# Patient Record
Sex: FEMALE | Race: WHITE | NOT HISPANIC OR LATINO | Employment: FULL TIME | URBAN - METROPOLITAN AREA
[De-identification: names, ages, dates, MRNs, and addresses within clinical notes are randomized per-mention and may not be internally consistent; named-entity substitution may affect disease eponyms.]

---

## 2017-01-03 ENCOUNTER — TELEPHONE (OUTPATIENT)
Dept: INTERNAL MEDICINE | Facility: CLINIC | Age: 42
End: 2017-01-03

## 2017-01-03 DIAGNOSIS — N39.0 URINARY TRACT INFECTION, SITE UNSPECIFIED: Primary | ICD-10-CM

## 2017-01-04 ENCOUNTER — LAB VISIT (OUTPATIENT)
Dept: LAB | Facility: HOSPITAL | Age: 42
End: 2017-01-04
Attending: INTERNAL MEDICINE
Payer: COMMERCIAL

## 2017-01-04 DIAGNOSIS — N39.0 URINARY TRACT INFECTION, SITE UNSPECIFIED: ICD-10-CM

## 2017-01-04 LAB
BACTERIA #/AREA URNS AUTO: NORMAL /HPF
BILIRUB UR QL STRIP: NEGATIVE
CLARITY UR REFRACT.AUTO: CLEAR
COLOR UR AUTO: YELLOW
GLUCOSE UR QL STRIP: NEGATIVE
HGB UR QL STRIP: NEGATIVE
KETONES UR QL STRIP: NEGATIVE
LEUKOCYTE ESTERASE UR QL STRIP: ABNORMAL
MICROSCOPIC COMMENT: NORMAL
NITRITE UR QL STRIP: NEGATIVE
PH UR STRIP: 5 [PH] (ref 5–8)
PROT UR QL STRIP: NEGATIVE
RBC #/AREA URNS AUTO: 1 /HPF (ref 0–4)
SP GR UR STRIP: 1.02 (ref 1–1.03)
SQUAMOUS #/AREA URNS AUTO: 1 /HPF
URN SPEC COLLECT METH UR: ABNORMAL
UROBILINOGEN UR STRIP-ACNC: NEGATIVE EU/DL
WBC #/AREA URNS AUTO: 2 /HPF (ref 0–5)

## 2017-01-04 PROCEDURE — 81001 URINALYSIS AUTO W/SCOPE: CPT

## 2017-01-04 PROCEDURE — 87086 URINE CULTURE/COLONY COUNT: CPT

## 2017-01-05 ENCOUNTER — TELEPHONE (OUTPATIENT)
Dept: INTERNAL MEDICINE | Facility: CLINIC | Age: 42
End: 2017-01-05

## 2017-01-05 ENCOUNTER — PATIENT MESSAGE (OUTPATIENT)
Dept: INTERNAL MEDICINE | Facility: CLINIC | Age: 42
End: 2017-01-05

## 2017-01-05 LAB — BACTERIA UR CULT: NO GROWTH

## 2017-01-05 NOTE — TELEPHONE ENCOUNTER
----- Message from Ronnie Ochoa sent at 1/5/2017  2:00 PM CST -----  Contact: Self [phone]  Type: Test Results    What test was performed? Urine Specimen    Who ordered the test? Maty    When and where were the test performed? 01/04/2017 [facility]    Comments: Please call and advice    Thanks

## 2017-01-06 NOTE — TELEPHONE ENCOUNTER
Patient states she has some vaginal pain and there is no pain. The pain is a sharp pain that shoots through the canal area. Not all the time. Patient states she understood termed the call

## 2017-01-13 ENCOUNTER — PATIENT MESSAGE (OUTPATIENT)
Dept: INTERNAL MEDICINE | Facility: CLINIC | Age: 42
End: 2017-01-13

## 2017-01-13 RX ORDER — OXYCODONE AND ACETAMINOPHEN 10; 325 MG/1; MG/1
1 TABLET ORAL EVERY 4 HOURS PRN
Qty: 120 TABLET | Refills: 0 | Status: SHIPPED | OUTPATIENT
Start: 2017-01-13 | End: 2017-01-31 | Stop reason: SDUPTHER

## 2017-01-13 NOTE — TELEPHONE ENCOUNTER
----- Message from Florentino Gibson sent at 1/13/2017  8:20 AM CST -----  Contact: Pt at [phone]  RX request - refill or new RX.  Is this a refill or new RX:  refill  RX name and strength: oxycodone-acetaminophen (PERCOCET)  mg per tablet  Directions: Take 1 tablet by mouth every 4 (four) hours as needed for Pain.  Is this a 30 day or 90 day RX:  30 day  Pharmacy name and phone #: Pt  at [facility]  Comments:

## 2017-01-16 ENCOUNTER — TELEPHONE (OUTPATIENT)
Dept: INTERNAL MEDICINE | Facility: CLINIC | Age: 42
End: 2017-01-16

## 2017-01-16 NOTE — TELEPHONE ENCOUNTER
----- Message from Mehnaz Rodgers sent at 1/16/2017  4:01 PM CST -----  Contact: Call Pt [phone]  Pt called requesting status of refill requested on 01-13-17 for medication Oxycodone 10 mg.

## 2017-01-16 NOTE — TELEPHONE ENCOUNTER
Called to advise the patient that the Rx has been ready since Friday. Patient understood and termed the call

## 2017-01-17 ENCOUNTER — TELEPHONE (OUTPATIENT)
Dept: NEUROLOGY | Facility: HOSPITAL | Age: 42
End: 2017-01-17

## 2017-01-17 NOTE — TELEPHONE ENCOUNTER
Spoke with pt stating that she has magdiel feeling weak for the past few weeks. Pt stated that when she felt like this before Dr. Lynn sent pt to have IV fluids at the infusion center. Pt stated that she drinks 2 to 3 bottles of water a day and states that she cannot increase her water intake because of the ostomy bag that she has and plus she works and is unable to take breaks to drink water. Advised pt to get a 2L of water and drink on it all day and informed pt that forward information to Dr. Lynn for next step

## 2017-01-17 NOTE — TELEPHONE ENCOUNTER
----- Message from Emi Contreras sent at 1/17/2017 10:34 AM CST -----  Contact: [phone]  GI-Patient is thinking she is dehydrated and will need fluids and she now has an ostomy bag but would like to come here and get that done at the Infusion Center. Please call back to assist.

## 2017-01-19 ENCOUNTER — TELEPHONE (OUTPATIENT)
Dept: NEUROLOGY | Facility: HOSPITAL | Age: 42
End: 2017-01-19

## 2017-01-19 DIAGNOSIS — K52.9 IBD (INFLAMMATORY BOWEL DISEASE): Primary | ICD-10-CM

## 2017-01-19 NOTE — TELEPHONE ENCOUNTER
Spoke with pt and informed her that Dr. Lynn does not want pt to get the IV fluids and would like pt to drink oral fluids and also wants pt to see Dr. Currie. Pt stated that she already saw Dr. Currie and was told by his office to return to her GI doctor which is Dr. Lynn. Forward information to Dr. Lynn for review.

## 2017-01-20 ENCOUNTER — TELEPHONE (OUTPATIENT)
Dept: NEUROLOGY | Facility: HOSPITAL | Age: 42
End: 2017-01-20

## 2017-01-20 NOTE — TELEPHONE ENCOUNTER
Spoke with pt informing her that Dr. Lynn stated that she needs to follow up with Dr. Currie's office. Pt stated that she does not want to follow up with Dr. Currie and stated that she will find another GI MD to treat her.

## 2017-01-23 ENCOUNTER — PATIENT MESSAGE (OUTPATIENT)
Dept: GASTROENTEROLOGY | Facility: CLINIC | Age: 42
End: 2017-01-23

## 2017-01-23 ENCOUNTER — TELEPHONE (OUTPATIENT)
Dept: GASTROENTEROLOGY | Facility: CLINIC | Age: 42
End: 2017-01-23

## 2017-01-24 ENCOUNTER — PATIENT MESSAGE (OUTPATIENT)
Dept: INTERNAL MEDICINE | Facility: CLINIC | Age: 42
End: 2017-01-24

## 2017-01-24 ENCOUNTER — TELEPHONE (OUTPATIENT)
Dept: INTERNAL MEDICINE | Facility: CLINIC | Age: 42
End: 2017-01-24

## 2017-01-24 RX ORDER — OXYCODONE AND ACETAMINOPHEN 7.5; 325 MG/1; MG/1
1 TABLET ORAL EVERY 4 HOURS PRN
Qty: 80 TABLET | Refills: 0 | Status: SHIPPED | OUTPATIENT
Start: 2017-01-24 | End: 2017-02-06

## 2017-01-24 NOTE — TELEPHONE ENCOUNTER
"Patient sent a message on the portal that she dropped in Rx in the sink all her medications fell in a sink of water her "95 year old grandma" left in the bathroom. Patient states she spoke with the pharmacy about the situation and they advised her to get the Rx changed to '5mg instead of 10" mg and the can fill the Rx. Advise the patient will follow up later today in regards to her request. Termed the call   "

## 2017-01-24 NOTE — TELEPHONE ENCOUNTER
Called to advise the patient Dr Rutledge.is still reviewing the chart. Also advised that I will follow up with her. In regards to the medication R. She states the Rx has to be a different and I will call her and advise of instructions by 5pm

## 2017-01-24 NOTE — TELEPHONE ENCOUNTER
----- Message from Magali Abdul sent at 1/24/2017  8:09 AM CST -----  Contact: pt [phone]  Pt will like a call from the Dr in regards to her medication going down the sink by her 90 year old grandmother,please advise pt on getting another script

## 2017-01-26 ENCOUNTER — TELEPHONE (OUTPATIENT)
Dept: GASTROENTEROLOGY | Facility: CLINIC | Age: 42
End: 2017-01-26

## 2017-01-31 ENCOUNTER — PATIENT MESSAGE (OUTPATIENT)
Dept: INTERNAL MEDICINE | Facility: CLINIC | Age: 42
End: 2017-01-31

## 2017-01-31 ENCOUNTER — PATIENT MESSAGE (OUTPATIENT)
Dept: GASTROENTEROLOGY | Facility: CLINIC | Age: 42
End: 2017-01-31

## 2017-01-31 RX ORDER — OXYCODONE AND ACETAMINOPHEN 10; 325 MG/1; MG/1
1 TABLET ORAL EVERY 4 HOURS PRN
Qty: 120 TABLET | Refills: 0 | Status: SHIPPED | OUTPATIENT
Start: 2017-01-31 | End: 2017-02-16 | Stop reason: SDUPTHER

## 2017-02-01 ENCOUNTER — TELEPHONE (OUTPATIENT)
Dept: GASTROENTEROLOGY | Facility: CLINIC | Age: 42
End: 2017-02-01

## 2017-02-01 NOTE — TELEPHONE ENCOUNTER
----- Message from Eve Currie MD sent at 2/1/2017  2:48 PM CST -----  Make an appt for patient to see me within the next 2-4 weeks- on my schedule if I have availability otherwise with Paty when I am in clinic  SS

## 2017-02-06 ENCOUNTER — TELEPHONE (OUTPATIENT)
Dept: ENDOSCOPY | Facility: HOSPITAL | Age: 42
End: 2017-02-06

## 2017-02-06 ENCOUNTER — OFFICE VISIT (OUTPATIENT)
Dept: GASTROENTEROLOGY | Facility: CLINIC | Age: 42
End: 2017-02-06
Payer: COMMERCIAL

## 2017-02-06 VITALS
SYSTOLIC BLOOD PRESSURE: 111 MMHG | BODY MASS INDEX: 28.08 KG/M2 | DIASTOLIC BLOOD PRESSURE: 72 MMHG | WEIGHT: 158.5 LBS | HEIGHT: 63 IN | HEART RATE: 81 BPM | RESPIRATION RATE: 20 BRPM | TEMPERATURE: 98 F

## 2017-02-06 DIAGNOSIS — K50.00 CROHN'S DISEASE OF SMALL INTESTINE WITHOUT COMPLICATION: Primary | ICD-10-CM

## 2017-02-06 PROCEDURE — 99214 OFFICE O/P EST MOD 30 MIN: CPT | Mod: S$GLB,,, | Performed by: INTERNAL MEDICINE

## 2017-02-06 PROCEDURE — 99999 PR PBB SHADOW E&M-EST. PATIENT-LVL IV: CPT | Mod: PBBFAC,,, | Performed by: INTERNAL MEDICINE

## 2017-02-06 NOTE — MR AVS SNAPSHOT
[facility]  [address]  [place] [address]  Phone: [phone]  Fax: [phone]                  Emi Johnson   2017 2:20 PM   Office Visit    Description:  Female : 1975   Provider:  Eve Currie MD   Department:  [facility] - Gastroenterology           Reason for Visit     Crohn's Disease           Diagnoses this Visit        Comments    Crohn's disease of small intestine without complication    -  Primary            To Do List           Future Appointments        Provider Department Dept Phone    3/21/2017 2:00 PM Eve Currie MD [facility] Gastroenterology [phone]    3/27/2017 8:20 AM Bianca Rutledge MD [facility] Internal Medicine [phone]      Your Future Surgeries/Procedures     Mar 21, 2017   Surgery with Eve Currie MD   [facility]    [address] [address] [phone]              Goals (5 Years of Data)     None      [facility] On [facility]     [facility] Care Line -  Assistance  Registered nurses in the [facility] Center provide clinical advisement, health education, appointment booking, and other advisory services.  Call for this free service at [phone].             Medications           Message regarding Medications     Verify the changes and/or additions to your medication regime listed below are the same as discussed with your clinician today.  If any of these changes or additions are incorrect, please notify your healthcare provider.        STOP taking these medications     estradiol (ESTRACE) 2 MG tablet Take 2 mg by mouth once daily.    hydrocodone-homatropine 5-1.5 mg/5 ml (HYCODAN) 5-1.5 mg/5 mL Syrp Take 5 mLs by mouth every 6 (six) hours as needed.    HYDROmorphone (DILAUDID) 4 MG tablet Take 1 tablet (4 mg total) by mouth every 6 (six) hours as needed for Pain.    oxycodone-acetaminophen (PERCOCET) 7.5-325 mg per tablet Take 1  "tablet by mouth every 4 (four) hours as needed for Pain.           Verify that the below list of medications is an accurate representation of the medications you are currently taking.  If none reported, the list may be blank. If incorrect, please contact your healthcare provider. Carry this list with you in case of emergency.           Current Medications     albuterol (PROAIR HFA) 90 mcg/actuation inhaler Inhale 2 puffs into the lungs.    albuterol (PROVENTIL) 2.5 mg /3 mL (0.083 %) nebulizer solution Take 3 mLs (2.5 mg total) by nebulization every 6 (six) hours as needed for Wheezing.    blood sugar diagnostic (BLOOD GLUCOSE TEST) Strp by Misc.(Non-Drug; Combo Route) route.    blood sugar diagnostic (ONETOUCH ULTRA TEST) Strp 1 strip by Misc.(Non-Drug; Combo Route) route once daily.    clonazepam (KLONOPIN) 0.5 MG disintegrating tablet Take 1 tablet (0.5 mg total) by mouth 2 (two) times daily as needed.    cyanocobalamin 1,000 mcg/mL injection INJECT 1 ML (1,000 MCG TOTAL) INTO THE MUSCLE EVERY 28 DAYS.    fluticasone-salmeterol 500-50 mcg/dose (ADVAIR DISKUS) 500-50 mcg/dose DsDv diskus inhaler Inhale 1 puff into the lungs.    oxycodone-acetaminophen (PERCOCET)  mg per tablet Take 1 tablet by mouth every 4 (four) hours as needed for Pain.    promethazine (PHENERGAN) 25 MG tablet TAKE 1 TABLET (25 MG TOTAL) BY MOUTH EVERY 6 (SIX) HOURS AS NEEDED FOR NAUSEA.    sumatriptan (IMITREX STATDOSE) 6 mg/0.5 mL kit INJECT 0.5 ML UNDER THE SKIN AS NEEDED ONE TIME FOR MIGRAINE    zolpidem (AMBIEN) 10 mg Tab Take 1 tablet (10 mg total) by mouth nightly as needed.           Clinical Reference Information           Your Vitals Were     BP Pulse Temp Resp Height Weight    111/72 (BP Location: Left arm, Patient Position: Sitting, BP Method: Automatic) 81 98.1 °F (36.7 °C) 20 5' 3" (1.6 m) 71.9 kg (158 lb 8.2 oz)    BMI                28.08 kg/m2          Blood Pressure          Most Recent Value    BP  111/72      Allergies " as of 2/6/2017     Aspirin    Sulfa (Sulfonamide Antibiotics)    Iodinated Contrast Media - Iv Dye    Adhesive    Aspirin    Remicade [Infliximab]    Latex      Immunizations Administered on Date of Encounter - 2/6/2017     None      Orders Placed During Today's Visit      Normal Orders This Visit    Case request GI: ILEOSCOPY through stoma       Instructions    Instructions:  - ileoscopy through stoma end of March/early April  - Take OTC Vitamin D3 1000 IU daily  - avoid NSAIDS- aspirin, ibuprofen, advil, motrin, naproxen, aleve---tylenol okay  - drink plenty of water 70-80 ounces daily  - see your PCP for UTI- recommend at least getting repeat UA with them  - in regards to tailbone pain then be sure to let Dr. Cannon know to see if anything needs to be addressed  - continue f/u with ostomy nurse and ostomy support group  - Use antibiotics with caution  - Vaccines needed:      Flu shot yearly      Tetanus every 10 years      Hepatitis B series (three injections)  - Follow up with Dr. Currie the afternoon of the scope                 Language Assistance Services     ATTENTION: Language assistance services are available, free of charge. Please call [phone].      ATENCIÓN: Si habla aurelia, tiene a madrid disposición servicios gratuitos de asistencia lingüística. Llame al [phone].     DAMIEN Ý: N?u b?n nói Ti?ng Vi?t, có các d?ch v? h? tr? ngôn ng? mi?n phí dành cho b?n. G?i s? [phone].         [facility] complies with applicable Federal civil rights laws and does not discriminate on the basis of race, color, national origin, age, disability, or sex.

## 2017-02-06 NOTE — PATIENT INSTRUCTIONS
Instructions:  - ileoscopy through stoma end of March/early April  - Take OTC Vitamin D3 1000 IU daily  - avoid NSAIDS- aspirin, ibuprofen, advil, motrin, naproxen, aleve---tylenol okay  - drink plenty of water 70-80 ounces daily  - see your PCP for UTI- recommend at least getting repeat UA with them  - in regards to tailbone pain then be sure to let Dr. Cannon know to see if anything needs to be addressed  - continue f/u with ostomy nurse and ostomy support group  - Use antibiotics with caution  - Vaccines needed:      Flu shot yearly      Tetanus every 10 years      Hepatitis B series (three injections)  - Follow up with Dr. Currie the afternoon of the scope

## 2017-02-06 NOTE — TELEPHONE ENCOUNTER
Patient is scheduled for Ileoscopy via stoma 3/21/2017 with Dr. DANIELA Currie.  Instructions given to patient in clinic.

## 2017-02-06 NOTE — LETTER
February 6, 2017        Emily Gaspar MD  [address]  [address]  [place] [address]             [facility]astroenter[facility]  [address]  [place] [address]  Phone: [phone]  Fax: [phone]   Patient: Emi Johnson   MR Number: [id number]   YOB: 1975   Date of Visit: 2/6/2017       Dear Dr. Gaspar:    Thank you for referring Emi Johnson to me for evaluation. Attached you will find relevant portions of my assessment and plan of care.    If you have questions, please do not hesitate to call me. I look forward to following Emi Johnson along with you.    Sincerely,      Eve Currie MD              MD Jasmyne Shah, [facility]

## 2017-02-06 NOTE — PROGRESS NOTES
[facility]             Inflammatory Bowel Disease Follow-up  Note            Dr. Eve Currie  TODAY'S VISIT DATE:  2/6/2017    Chief Complaint:   Chief Complaint   Patient presents with    Crohn's Disease       PCP: Bianca Rutledge    Previous History:  Ms Johnson is a 41 y.o. with inflammatory bowel disease with pouch dysfunction.  She had initial symptoms in 2005 and diagnosed in 2006 with colonoscopy c/w CD initially and then though to be UC.  She was failed asacol, lialda, sulfasalazine, rowasa enemas, canasa suppositories, humira (allergic reaction- injection site reaction, rash), prednisone, cipro/flagyl.  Due to medically refractory disease she had a 2 step restorative proctocolectomy in 2011 with pathology most consistent with UC.  Since surgery though she has had unrelenting ongoing diarrhea up to 15-20 BMs/day despite 10 immodium daily. Pouchoscopy 4/2012, 2/2014 were normal and she has taken several courses of cipro/flagyl without relief and also failed entocort in early 2014. Pt saw Dr. Ray at [facility] who recommended temporary loop ileostomy and to consider biofeedback due to anorectal manometry with concerns for pelvic floor dysfunction.  He also recommended Valium rectal supp if any issues of muscle spasms during biofeedback.  Patient had additional testing in April/May 2014 which showed normal gastrograffin enema, MRI abd/pelvis normal and pouchoscopy on 6/2014 showed normal appearing mucosa, dislodged staples at the anastomosis, and pt felt pain when staples touched. Therefore, the staples were removed by biopsy forcep. Anterior anastomosis staple removal area was put one endoclip to prevent sinus formation. MRI abd/pelvis normal 11/2015. She did not return for appts with Dr. Ray due to cost. She had hysterectomy in 2009 due to endometriosis though 2/2015 bilateral oophorectomy with lysis of adhesions- post op SBO from adhesions. She is on chronic narcotics  "(RLQ and subumbilical pain) and chronic antiemetics (due to nausea/vomiting).  Her last pouchoscopy 10/8/15 showed some ulcerations in the J pouch along suture line but normal neoterminal ileum and overall this was mild in activity. At her previous visits I felt that her symptoms were out of proportion to the findings on endoscopy.  She saw Dr. Mcdaniel who recommended starting remicade which she has been on since 12/7/15. Stool studies were negative for infection 1/2016. She had a pouchoscopy 3/18/15 which was normal with no ulcerations though overall friable mucosa. At the time of her last visit with me on 3/31/16 she had 10-15 mushy to soft BMs/day (consistency changed since pouchoscopy) and anytime she passes gas she needs to have a bowel movement.  She also recommended RLQ intermittent burning with subumbilical cramping with continued rectal pain.  She missed her appt on 6/14/16 due to inability to get off of work.  She also said she lost 2 lomotil prescriptions I had given her.  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  We recommended biofeedback therapy with Dr. Emmanuel but she did not wish to proceed with these recommendations and reported to us by phone on 6/21/16 that "I don't like enemas or suppositories and I am in too much pain." She had several ER visits and had been prescribed prednisone which we recommended that she should taper off of.  We recommended pouch excision with end ileostomy but she was not willing to proceed with this.      Interval History:  - current IBD meds: none  - on percocet 1 tab daily- prescribed by Dr. Rutledge- pt PCP  - mid abdominal pain resolved but still continues with RLQ intermittent abdominal pain  - since last visit she established care with Dr Lynn who referred patient to have surgery with Dr. Gaspar and pt underwent pouch excision with intersphincteric pouchectomy and permanent end ileostomy on 11/11/16; surgical pathology " showed normal SB with fibrosis and chronic inflammation with foreign body giant cell reaction and edema  - since her surgery she has had 3 urinary tract infections with last one a week ago- per patient   - continues with rectal pain around the perianal area; sharp pains going from abdominal pain to vaginal area  - bags of ileostomy output: about 2-3 full ileostomy bags- brown thick liquid; pt sees ostomy nurse at   - sees PCP for fibromyalgia but not on any meds- joint pain/swelling- knees/feet/toes/hands  - lost 18 pounds since last visit with us and has lost about 30 pounds since 2016  - depression due to having ileostomy bag- goes to the ostomy support group at   - around the stoma- pt reports there is reddish mucosa- pt is seeing ostomy nurse tomorrow at   - constitutional/GI symptoms: heartburn  - extraintestinal manifestations: no eye pain/redness, skin lesions/rashes, liver problems    Prior Pertinent Endoscopy/Imagin09 colonoscopy with pathology:  normal terminal ileum, normal cecum, with ascending/transverse/descending colon with minimally active colitis and rectum with mod to moderately active colitis.   2010 colonoscopy:  continuous area of inflamed mucosa in rectosigmoid and sigmoid colon but otherwise normal colonoscopy and terminal ileum.    3/1/11 surgical pathology of colectomy showed multiple full thickness of large bowel generally maintained glandular architecture within density within lamina propria within normal limites, quiescent stage in idiopathic inflammatory bowel disease no excludable.  Unremarkable proximal and distal margins.   2011 gastrograffin enema normal  5/3/11 surgical pathology from ileostomy takedown:  showed ileostomy with flattening of usual villous pattern with surface mucosal erosion and fresh hemorrhage and moderate diffuse chronic inflammation at junction of skin and SB.   2012 pouchoscopy normal and biopsies normal  2014 pouchoscopy- few small  ulcers on septum and formed stool in J pouch with normal neoterminal ileum. Biopsies of ileoanal pouch ulcers showed diffuse marked acute and chronic inflammation and random biopsies of ileoanal pouch showed chronic inflammatory cells.   5/2014 Anorectal manometry;  paradoxical contractions- saw tooth indicating pelvic floor dysfunction; recommended biofeedback therapy 10 consecutive sessions and then may repeat manometry, if saw tooth still does not disappear then may consider botox ; valium rectal supp if any issues of muscle spasms during biofeedback   4/2014 gastrograffin enema:  NO MECHANICAL OBSTRUCTION OF J POUCH OR DISTAL SMALL BOWEL  5/2014 MRI abd/pelvis:  NO MR EVIDENCE OF POUCHITIS. NO ABSCESS OR FLUID COLLECTION.  6/2014 pouchoscopy:  The j-pouch body, afferent limb, inlet and cuff appeared normal. There were dislodged staples at the anastomosis, and pt felt pain when staples touched. Therefore, the staples were removed by biopsy forcep. Anterior  anastomosis staple removal area was put one endoclip to prevent sinus formation. Bx showed TI normal, pouch biopsies and rectal cuff with mild inflammation  7/7/14 EGD: normal.  2/2015 CT abd/pelvis:  postop changes from surgery and small bowel obstructions (distended SB loops and air fluid levels)11/2015 MRI abd/pelvis abundant stool is present in the rectum, distal bowel wall including the area of the anastomosis enhances but with no abnormal bowel wall thickening or surrounding inflammatory changes. No abscess, fistula, or evidence of bowel obstruction are noted. A fluid filled tubular structure is noted to the left of the area of the anastomosis felt to represent a loop of bowel.    10/8/15 pouchoscopy: showed ulceration along suture line with solitary 2 mm ulcer in the distal ileum proximal to J pouch. Pathology showed distal ileum with chronic ileitis with mild activity, J pouch with chronic enteritis with mild activity.  11/2015 MRI abd/pelvis  normal  3/18/15 pouchoscopy normal though friable mucosa; previous ulcerations no longer seen; biopsies all normal    Prior Pertinent Labs:  IBD serology in 5/2010 showed markers for Crohn's and ulcerative colitis.  Lab Results   Component Value Date    STOOLCULTURE  01/29/2016     No Salmonella,Shigella,Vibrio,Campylobacter,Yersinia isolated.    [id number] Negative 01/29/2016    [id number] Negative 01/29/2016    CDIFFICILEAN Negative 01/29/2016    CDIFFTOX Negative 01/29/2016     Lab Results   Component Value Date    CRP 7.3 04/25/2016     Lab Results   Component Value Date    KZKKVUHB65CP 29 (L) 02/18/2013     Lab Results   Component Value Date    HEPBCAB Negative 03/10/2016     No results found for: VARICELLAZOS, VARICELLAINT  No results found for: NIL, TBAG, TBAGNIL, MITOGENNIL, TBGOLD  No results found for: TPMTRESULT  No results found for: ANSADAINIT, INFLIXIMAB, INFLIXINTERP    Vaccinations:  Flu shot: 2015  Chickenpox status/Varicella vaccine: had chickenpox as a child  No results found for: VARICELLAZOS, VARICELLAINT  Tetanus: 2012 (not sure of exact year but UTD)  Pneumonia vaccine: 2015  Hepatitis B: never had  Lab Results   Component Value Date    HEPBSAB Negative 03/10/2016   Hepatitis A: NA  HPV: NA   Meningococcal: NA     Review of Systems   Constitutional: Negative for chills, fever and weight loss.   HENT:        No oral ulcers, dysphagia, oral thrush   Eyes: Negative for blurred vision, pain and redness.   Respiratory: Negative for cough and shortness of breath.    Cardiovascular: Negative for chest pain.   Gastrointestinal: Negative for abdominal pain, heartburn, nausea and vomiting.   Genitourinary: Negative for dysuria and hematuria.   Musculoskeletal: Negative for back pain and joint pain.   Skin: Negative for rash.   Psychiatric/Behavioral: Negative for depression. The patient is not nervous/anxious and does not have insomnia.      All Medical History/Surgical History/Family History/Social  History/Allergies have been reviewed and updated in EMR    Review of patient's allergies indicates:   Allergen Reactions    Aspirin      Other reaction(s): vomiting, contraindicated for bleeding from crohns  Other reaction(s): bleeding    Sulfa (sulfonamide antibiotics) Hives and Rash    Iodinated contrast media - iv dye Other (See Comments)    Adhesive     Aspirin     Remicade [infliximab] Other (See Comments)     Medical induced lupus    Latex Rash     Other reaction(s): Hives       Outpatient Prescriptions Marked as Taking for the 2/6/17 encounter (Office Visit) with Eve Currie MD   Medication Sig Dispense Refill    albuterol (PROAIR HFA) 90 mcg/actuation inhaler Inhale 2 puffs into the lungs.      albuterol (PROVENTIL) 2.5 mg /3 mL (0.083 %) nebulizer solution Take 3 mLs (2.5 mg total) by nebulization every 6 (six) hours as needed for Wheezing. 1 Box 3    blood sugar diagnostic (BLOOD GLUCOSE TEST) Strp by Misc.(Non-Drug; Combo Route) route.      blood sugar diagnostic (ONETOUCH ULTRA TEST) Strp 1 strip by Misc.(Non-Drug; Combo Route) route once daily. 100 strip 2    clonazepam (KLONOPIN) 0.5 MG disintegrating tablet Take 1 tablet (0.5 mg total) by mouth 2 (two) times daily as needed. 60 tablet 3    cyanocobalamin 1,000 mcg/mL injection INJECT 1 ML (1,000 MCG TOTAL) INTO THE MUSCLE EVERY 28 DAYS. 10 mL 1    fluticasone-salmeterol 500-50 mcg/dose (ADVAIR DISKUS) 500-50 mcg/dose DsDv diskus inhaler Inhale 1 puff into the lungs.      oxycodone-acetaminophen (PERCOCET)  mg per tablet Take 1 tablet by mouth every 4 (four) hours as needed for Pain. 120 tablet 0    promethazine (PHENERGAN) 25 MG tablet TAKE 1 TABLET (25 MG TOTAL) BY MOUTH EVERY 6 (SIX) HOURS AS NEEDED FOR NAUSEA. 30 tablet 3    sumatriptan (IMITREX STATDOSE) 6 mg/0.5 mL kit INJECT 0.5 ML UNDER THE SKIN AS NEEDED ONE TIME FOR MIGRAINE  1    zolpidem (AMBIEN) 10 mg Tab Take 1 tablet (10 mg total) by mouth nightly as needed.  30 tablet 3       Vital Signs:           Physical Exam   Constitutional: She is oriented to person, place, and time. She appears well-developed.   HENT:   Mouth/Throat: Oropharynx is clear and moist. No oral lesions.   No oral ulcers or thrush   Eyes: Conjunctivae are normal. Pupils are equal, round, and reactive to light.   Cardiovascular: Normal rate and regular rhythm.    Pulmonary/Chest: Effort normal and breath sounds normal.   Abdominal: Soft. There is no tenderness.   Ostomy bag intact, pink healthy stoma   Musculoskeletal:        Right lower leg: She exhibits no swelling.        Left lower leg: She exhibits no swelling.   Neurological: She is alert and oriented to person, place, and time.   Skin: No rash noted.   Psychiatric: She has a normal mood and affect.   Nursing note and vitals reviewed.      Labs: Reviewed and pertinent noted above    Assessment/Plan:  Emi Johnson is a 41 y.o. female with Crohn's disease of the J pouch with recent pouch excision and permanent end ileostomy in 11/2016.  Patient's abdominal pain has improved along with her nausea/vomiting. Her ongoing issues include anxiety/depression and coping with ostomy.  She still takes percocet daily for anorectal pain and some RLQ abdominal pain that is prescribed by her PCP.  She has had 3 UTIs since last visit managed by PCP but no obvious evidence of fistula to bladder.  She has normal ostomy output but not hydrating as well as she should so we reminded her to do so.  She follows with ostomy nurse who she is seeing tomorrow for some mild peristomal issues but overall stoma looks good. Today we spent time talking about recurrence of Crohn's disease and that overall low risk but we should continue ileoscopy to assess for early endoscopic recurrence which may warrant treatment.     Plan:  - ileoscopy through stoma end of March/early April 2017  - recommend OTC Vitamin D3 1000 IU daily- last vit D level normal  - avoid NSAIDS and use  antibiotics with caution  - drink plenty of water 70-80 ounces daily  - see your PCP for UTI- recommend at least getting repeat UA with them  - in regards to tailbone pain then be sure to let Dr. Cannon know to see if anything needs to be addressed  - continue f/u with ostomy nurse and ostomy support group  - Vaccines needed: Flu shot yearly, tetanus every 10 years, hepatitis B series (three injections)    Follow up: same day as ileoscopy in spring 2017 and thereafter agrees to continue f/u with NPJasmyne    Total visit time was 40 minutes, more than 50% of which was spent in face-to-face counseling with patient regarding prevention and management of recurrent Crohn's disease after surgery.    Eve Currie MD  Department of Gastroenterology  Medical Director, Inflammatory Bowel Disease

## 2017-02-16 ENCOUNTER — PATIENT MESSAGE (OUTPATIENT)
Dept: INTERNAL MEDICINE | Facility: CLINIC | Age: 42
End: 2017-02-16

## 2017-02-16 RX ORDER — OXYCODONE AND ACETAMINOPHEN 10; 325 MG/1; MG/1
1 TABLET ORAL EVERY 4 HOURS PRN
Qty: 120 TABLET | Refills: 0 | Status: SHIPPED | OUTPATIENT
Start: 2017-02-16 | End: 2017-03-07 | Stop reason: SDUPTHER

## 2017-02-16 NOTE — TELEPHONE ENCOUNTER
----- Message from Magali Abdul sent at 2/16/2017  8:13 AM CST -----  Contact: pt [phone]  RX request - refill or new RX.  Is this a refill or new RX:  refill  RX name and strength: oxycodone-acetaminophen (PERCOCET)  mg per tablet  Directions:   Is this a 30 day or 90 day RX:  30    Comments:

## 2017-02-17 ENCOUNTER — OFFICE VISIT (OUTPATIENT)
Dept: WOUND CARE | Facility: CLINIC | Age: 42
End: 2017-02-17
Payer: COMMERCIAL

## 2017-02-17 DIAGNOSIS — K52.9 IBD (INFLAMMATORY BOWEL DISEASE): ICD-10-CM

## 2017-02-17 DIAGNOSIS — Z43.2 ATTENTION TO ILEOSTOMY: Primary | ICD-10-CM

## 2017-02-17 PROCEDURE — 99202 OFFICE O/P NEW SF 15 MIN: CPT | Mod: S$GLB,,, | Performed by: CLINICAL NURSE SPECIALIST

## 2017-02-17 PROCEDURE — 99999 PR PBB SHADOW E&M-EST. PATIENT-LVL III: CPT | Mod: PBBFAC,,, | Performed by: CLINICAL NURSE SPECIALIST

## 2017-02-17 NOTE — PROGRESS NOTES
Subjective:       Patient ID: Emi Johnson is a 41 y.o. female.    Chief Complaint: Ileostomy and Skin Problem    HPI Comments: This is new pt to enterostomal therapy clinic and she comes today for assistance with her ileostomy, She had total colectomy 3 months ago by Dr Saldaña, she has done well and has a few concerns with her stoma and skin. She works full time and has returned to work.     Review of Systems   Constitutional: Negative for activity change, appetite change and fatigue.   HENT: Negative for congestion and rhinorrhea.    Respiratory: Negative for cough and shortness of breath.    Cardiovascular: Negative for chest pain and leg swelling.   Gastrointestinal: Negative.         Ileostomy   Genitourinary: Negative for difficulty urinating and hematuria.        Has had several UTI's in past year but no issues currently    Musculoskeletal: Negative.    Skin: Negative.    Neurological: Negative.    Psychiatric/Behavioral: Negative.        Objective:      Physical Exam   Constitutional: She is oriented to person, place, and time. She appears well-developed and well-nourished.   Pulmonary/Chest: Effort normal. No respiratory distress.   Abdominal: Soft. Bowel sounds are normal. She exhibits no distension. There is no tenderness.   Musculoskeletal: Normal range of motion. She exhibits no edema.   Neurological: She is alert and oriented to person, place, and time.   Skin: Skin is warm and dry.   Psychiatric: She has a normal mood and affect.       She has a nicely budded end stoma 25 mm , there is slightly pinkened skin at ford but this is probably from a reaction she had to stoma ring   She has slight indentation around edge but this is not abnormal   She wears convex cut to fit but the larger one and could benefit from the smaller profile in convex  [id number]   Discussed her routine and doing everything correctly, gave some suggestions but nothing HAS to be changed     Assessment:       1. Attention to  ileostomy    2. IBD (inflammatory bowel disease)        Plan:       Tried her in smaller cut to fit, placed Zayda [id number]   Will request samples from [place] as well   [employer] is her DME   Follow up as needed for any ostomy issues  I have reviewed the plan of care with the patient and she express understanding. I spent over 50% of this 20 minute visit in face to face counseling.

## 2017-03-03 ENCOUNTER — TELEPHONE (OUTPATIENT)
Dept: INTERNAL MEDICINE | Facility: CLINIC | Age: 42
End: 2017-03-03

## 2017-03-03 ENCOUNTER — PATIENT MESSAGE (OUTPATIENT)
Dept: INTERNAL MEDICINE | Facility: CLINIC | Age: 42
End: 2017-03-03

## 2017-03-03 ENCOUNTER — HOSPITAL ENCOUNTER (EMERGENCY)
Facility: HOSPITAL | Age: 42
Discharge: HOME | End: 2017-03-03
Attending: EMERGENCY MEDICINE
Payer: COMMERCIAL

## 2017-03-03 VITALS
RESPIRATION RATE: 18 BRPM | HEART RATE: 72 BPM | BODY MASS INDEX: 28.35 KG/M2 | TEMPERATURE: 98 F | HEIGHT: 63 IN | DIASTOLIC BLOOD PRESSURE: 63 MMHG | OXYGEN SATURATION: 100 % | WEIGHT: 160 LBS | SYSTOLIC BLOOD PRESSURE: 105 MMHG

## 2017-03-03 DIAGNOSIS — R35.0 FREQUENCY OF URINATION: Primary | ICD-10-CM

## 2017-03-03 DIAGNOSIS — R30.0 DYSURIA: ICD-10-CM

## 2017-03-03 DIAGNOSIS — R35.0 URINARY FREQUENCY: Primary | ICD-10-CM

## 2017-03-03 LAB
ALBUMIN SERPL BCP-MCNC: 3.8 G/DL
ALP SERPL-CCNC: 101 U/L
ALT SERPL W/O P-5'-P-CCNC: 18 U/L
ANION GAP SERPL CALC-SCNC: 10 MMOL/L
AST SERPL-CCNC: 15 U/L
BACTERIA #/AREA URNS HPF: ABNORMAL /HPF
BACTERIA #/AREA URNS HPF: ABNORMAL /HPF
BASOPHILS # BLD AUTO: 0.03 K/UL
BASOPHILS NFR BLD: 0.6 %
BILIRUB SERPL-MCNC: 0.6 MG/DL
BILIRUB UR QL STRIP: NEGATIVE
BILIRUB UR QL STRIP: NEGATIVE
BUN SERPL-MCNC: 18 MG/DL
CALCIUM SERPL-MCNC: 9.3 MG/DL
CHLORIDE SERPL-SCNC: 107 MMOL/L
CLARITY UR: ABNORMAL
CLARITY UR: ABNORMAL
CO2 SERPL-SCNC: 26 MMOL/L
COLOR UR: YELLOW
COLOR UR: YELLOW
CREAT SERPL-MCNC: 1 MG/DL
DIFFERENTIAL METHOD: NORMAL
EOSINOPHIL # BLD AUTO: 0.2 K/UL
EOSINOPHIL NFR BLD: 3.1 %
ERYTHROCYTE [DISTWIDTH] IN BLOOD BY AUTOMATED COUNT: 13 %
EST. GFR  (AFRICAN AMERICAN): >60 ML/MIN/1.73 M^2
EST. GFR  (NON AFRICAN AMERICAN): >60 ML/MIN/1.73 M^2
GLUCOSE SERPL-MCNC: 94 MG/DL
GLUCOSE UR QL STRIP: NEGATIVE
GLUCOSE UR QL STRIP: NEGATIVE
HCT VFR BLD AUTO: 44.4 %
HGB BLD-MCNC: 14.5 G/DL
HGB UR QL STRIP: ABNORMAL
HGB UR QL STRIP: NEGATIVE
KETONES UR QL STRIP: NEGATIVE
KETONES UR QL STRIP: NEGATIVE
LEUKOCYTE ESTERASE UR QL STRIP: ABNORMAL
LEUKOCYTE ESTERASE UR QL STRIP: ABNORMAL
LYMPHOCYTES # BLD AUTO: 1.8 K/UL
LYMPHOCYTES NFR BLD: 34.6 %
MCH RBC QN AUTO: 27.6 PG
MCHC RBC AUTO-ENTMCNC: 32.7 %
MCV RBC AUTO: 85 FL
MICROSCOPIC COMMENT: ABNORMAL
MICROSCOPIC COMMENT: ABNORMAL
MONOCYTES # BLD AUTO: 0.4 K/UL
MONOCYTES NFR BLD: 7.6 %
NEUTROPHILS # BLD AUTO: 2.8 K/UL
NEUTROPHILS NFR BLD: 54.1 %
NITRITE UR QL STRIP: NEGATIVE
NITRITE UR QL STRIP: NEGATIVE
PH UR STRIP: 5 [PH] (ref 5–8)
PH UR STRIP: 6 [PH] (ref 5–8)
PLATELET # BLD AUTO: 174 K/UL
PMV BLD AUTO: 11.3 FL
POTASSIUM SERPL-SCNC: 3.8 MMOL/L
PROT SERPL-MCNC: 6.8 G/DL
PROT UR QL STRIP: NEGATIVE
PROT UR QL STRIP: NEGATIVE
RBC # BLD AUTO: 5.25 M/UL
RBC #/AREA URNS HPF: 3 /HPF (ref 0–4)
SODIUM SERPL-SCNC: 143 MMOL/L
SP GR UR STRIP: >=1.03 (ref 1–1.03)
SP GR UR STRIP: >=1.03 (ref 1–1.03)
URN SPEC COLLECT METH UR: ABNORMAL
URN SPEC COLLECT METH UR: ABNORMAL
UROBILINOGEN UR STRIP-ACNC: NEGATIVE EU/DL
UROBILINOGEN UR STRIP-ACNC: NEGATIVE EU/DL
WBC # BLD AUTO: 5.15 K/UL
WBC #/AREA URNS HPF: 15 /HPF (ref 0–5)
WBC #/AREA URNS HPF: 7 /HPF (ref 0–5)

## 2017-03-03 PROCEDURE — 81000 URINALYSIS NONAUTO W/SCOPE: CPT | Mod: 91

## 2017-03-03 PROCEDURE — 80053 COMPREHEN METABOLIC PANEL: CPT

## 2017-03-03 PROCEDURE — 85025 COMPLETE CBC W/AUTO DIFF WBC: CPT

## 2017-03-03 PROCEDURE — 87086 URINE CULTURE/COLONY COUNT: CPT

## 2017-03-03 PROCEDURE — P9612 CATHETERIZE FOR URINE SPEC: HCPCS

## 2017-03-03 PROCEDURE — 81000 URINALYSIS NONAUTO W/SCOPE: CPT

## 2017-03-03 PROCEDURE — 25000003 PHARM REV CODE 250: Performed by: PHYSICIAN ASSISTANT

## 2017-03-03 PROCEDURE — 99283 EMERGENCY DEPT VISIT LOW MDM: CPT

## 2017-03-03 RX ORDER — ONDANSETRON 4 MG/1
4 TABLET, ORALLY DISINTEGRATING ORAL EVERY 6 HOURS PRN
Qty: 15 TABLET | Refills: 0 | Status: SHIPPED | OUTPATIENT
Start: 2017-03-03 | End: 2017-08-31 | Stop reason: SDUPTHER

## 2017-03-03 RX ORDER — ONDANSETRON 4 MG/1
4 TABLET, ORALLY DISINTEGRATING ORAL
Status: COMPLETED | OUTPATIENT
Start: 2017-03-03 | End: 2017-03-03

## 2017-03-03 RX ORDER — SODIUM CHLORIDE 9 MG/ML
1000 INJECTION, SOLUTION INTRAVENOUS
Status: COMPLETED | OUTPATIENT
Start: 2017-03-03 | End: 2017-03-03

## 2017-03-03 RX ADMIN — SODIUM CHLORIDE 1000 ML: 0.9 INJECTION, SOLUTION INTRAVENOUS at 01:03

## 2017-03-03 RX ADMIN — ONDANSETRON 4 MG: 4 TABLET, ORALLY DISINTEGRATING ORAL at 01:03

## 2017-03-03 NOTE — DISCHARGE INSTRUCTIONS
"  Dysuria     Painful urination (dysuria) is often caused by a problem in the urinary tract.   Dysuria is pain felt during urination. It is often described as a burning. Learn more about this problem and how it can be treated.  What causes dysuria?  Possible causes include:  · Infection with a bacteria or virus. This can be a urinary tract infection (UTI). Or it may be a sexually transmitted infection (STI).  · Sensitivity or allergy to chemicals. These chemicals are found in lotions and other products.  · Prostate or bladder problems  · Radiation therapy to the pelvic area  How is dysuria diagnosed?  Your healthcare provider will examine you. He or she will ask about your symptoms and health. After talking with you and doing a physical exam, your healthcare provider may know what is causing your dysuria. He or she will usually request  a sample of your urine. Tests of your urine, or a "urinalysis," are done. A urinalysis may include:  · Looking at the urine sample (visual exam)  · Checking for substances (chemical exam)  · Checking a small amount under a microscope (microscopic exam)  Some parts of the urinalysis may be done in the provider's office and some in a lab. And, the urine sample may be checked for bacteria and yeast (urine culture). Your healthcare provider will tell you more about these tests if they are needed.  How is dysuria treated?  Treatment depends on the cause. If you have a bacterial infection, you may need antibiotics. You may be given medications to make it easier for you to urinate and help relieve pain. Your healthcare provider can tell you more about your treatment options. Untreated, symptoms may get worse.  Call the healthcare provider right away if you have any of the following:  · Fever of 100.4°F (38°C) or higher   · No improvement after three days of treatment  · Trouble urinating because of pain  · New or increased discharge from the vagina or penis  · Rash or joint " pain  · Increased back or abdominal pain  · Enlarged painful lymph nodes (lumps) in the groin   Date Last Reviewed: 9/24/2014  © 4183-2963 [employer]. [address] [place] [address], [place] [address]. All rights reserved. This information is not intended as a substitute for professional medical care. Always follow your healthcare professional's instructions.

## 2017-03-03 NOTE — ED AVS SNAPSHOT
[facility] [address]  [place] [address]               Emi Johnson   3/3/2017 12:33 PM   ED    Description:  Female : 1975   Department:  [facility]           Your Care was Coordinated By:     Provider Role From To    Giuliana Spangler MD Attending Provider 17 5006 --    Patito Mason PA-C Physician Assistant 17 1241 --      Reason for Visit     Urinary Tract Infection           Diagnoses this Visit        Comments    Urinary frequency    -  Primary     Dysuria           ED Disposition     None           To Do List           Follow-up Information     Please follow up.    Why:  keep appointment with [facility] this [facility].         These Medications        Disp Refills Start End    ondansetron (ZOFRAN-ODT) 4 MG TbDL 15 tablet 0 3/3/2017     Take 1 tablet (4 mg total) by mouth every 6 (six) hours as needed. - Oral      [facility]     [facility] -  Assistance  Registered nurses in the [facility] provide clinical advisement, health education, appointment booking, and other advisory services.  Call for this free service at [phone].             Medications           Message regarding Medications     Verify the changes and/or additions to your medication regime listed below are the same as discussed with your clinician today.  If any of these changes or additions are incorrect, please notify your healthcare provider.        START taking these NEW medications        Refills    ondansetron (ZOFRAN-ODT) 4 MG TbDL 0    Sig: Take 1 tablet (4 mg total) by mouth every 6 (six) hours as needed.    Class: Print    Route: Oral      These medications were administered today        Dose Freq    ondansetron disintegrating tablet 4 mg 4 mg ED 1 Time    Sig: Take 1 tablet (4 mg total) by mouth ED 1 Time.    Class: Normal    Route: Oral    Cosign for Ordering: Accepted by Giuliana Spangler MD on  3/3/2017  1:29 PM    0.9%  NaCl infusion 1,000 mL ED 1 Time    Sig: Inject 1,000 mLs into the vein ED 1 Time.    Class: Normal    Route: Intravenous    Cosign for Ordering: Accepted by Giuliana Spangler MD on 3/3/2017  1:29 PM           Verify that the below list of medications is an accurate representation of the medications you are currently taking.  If none reported, the list may be blank. If incorrect, please contact your healthcare provider. Carry this list with you in case of emergency.           Current Medications     albuterol (PROAIR HFA) 90 mcg/actuation inhaler Inhale 2 puffs into the lungs.    albuterol (PROVENTIL) 2.5 mg /3 mL (0.083 %) nebulizer solution Take 3 mLs (2.5 mg total) by nebulization every 6 (six) hours as needed for Wheezing.    blood sugar diagnostic (BLOOD GLUCOSE TEST) Strp by Misc.(Non-Drug; Combo Route) route.    blood sugar diagnostic (ONETOUCH ULTRA TEST) Strp 1 strip by Misc.(Non-Drug; Combo Route) route once daily.    clonazepam (KLONOPIN) 0.5 MG disintegrating tablet Take 1 tablet (0.5 mg total) by mouth 2 (two) times daily as needed.    cyanocobalamin 1,000 mcg/mL injection INJECT 1 ML (1,000 MCG TOTAL) INTO THE MUSCLE EVERY 28 DAYS.    fluticasone-salmeterol 500-50 mcg/dose (ADVAIR DISKUS) 500-50 mcg/dose DsDv diskus inhaler Inhale 1 puff into the lungs.    ondansetron (ZOFRAN-ODT) 4 MG TbDL Take 1 tablet (4 mg total) by mouth every 6 (six) hours as needed.    oxycodone-acetaminophen (PERCOCET)  mg per tablet Take 1 tablet by mouth every 4 (four) hours as needed for Pain.    promethazine (PHENERGAN) 25 MG tablet TAKE 1 TABLET (25 MG TOTAL) BY MOUTH EVERY 6 (SIX) HOURS AS NEEDED FOR NAUSEA.    sumatriptan (IMITREX STATDOSE) 6 mg/0.5 mL kit INJECT 0.5 ML UNDER THE SKIN AS NEEDED ONE TIME FOR MIGRAINE    zolpidem (AMBIEN) 10 mg Tab Take 1 tablet (10 mg total) by mouth nightly as needed.           Clinical Reference Information           Your Vitals Were     BP Pulse Temp Resp  "Height Weight    105/63 (BP Location: Right arm, Patient Position: Sitting, BP Method: Automatic) 72 98.2 °F (36.8 °C) (Oral) 18 5' 3" (1.6 m) 72.6 kg (160 lb)    SpO2 BMI             100% 28.34 kg/m2         Allergies as of 3/3/2017        Reactions    Aspirin     Other reaction(s): vomiting, contraindicated for bleeding from crohns  Other reaction(s): bleeding    Sulfa (Sulfonamide Antibiotics) Hives, Rash    Iodinated Contrast Media - Iv Dye Other (See Comments)    Adhesive     Aspirin     Remicade [Infliximab] Other (See Comments)    Medical induced lupus    Latex Rash    Other reaction(s): Hives      Immunizations Administered on Date of Encounter - 3/3/2017     None      ED Micro, Lab, POCT     Start Ordered       Status Ordering Provider    03/03/17 1345 03/03/17 1345  Urine culture  Once      In process     03/03/17 1330 03/03/17 1330  Urine culture  Add-on      Completed     03/03/17 1326 03/03/17 1325  CBC auto differential  STAT      Final result     03/03/17 1326 03/03/17 1325  Comprehensive metabolic panel  STAT      Final result     03/03/17 1326 03/03/17 1325  Urinalysis Catheterized  STAT      Final result     03/03/17 1325 03/03/17 1325  Urinalysis Microscopic  Once      Final result     03/03/17 1243 03/03/17 1242  Urinalysis  STAT      Final result     03/03/17 1243 03/03/17 1242    STAT,   Status:  Canceled      Canceled     03/03/17 1242 03/03/17 1242  Urinalysis Microscopic  Once      Final result       ED Imaging Orders     None        Discharge Instructions         Dysuria     Painful urination (dysuria) is often caused by a problem in the urinary tract.   Dysuria is pain felt during urination. It is often described as a burning. Learn more about this problem and how it can be treated.  What causes dysuria?  Possible causes include:  · Infection with a bacteria or virus. This can be a urinary tract infection (UTI). Or it may be a sexually transmitted infection (STI).  · Sensitivity or allergy " "to chemicals. These chemicals are found in lotions and other products.  · Prostate or bladder problems  · Radiation therapy to the pelvic area  How is dysuria diagnosed?  Your healthcare provider will examine you. He or she will ask about your symptoms and health. After talking with you and doing a physical exam, your healthcare provider may know what is causing your dysuria. He or she will usually request  a sample of your urine. Tests of your urine, or a "urinalysis," are done. A urinalysis may include:  · Looking at the urine sample (visual exam)  · Checking for substances (chemical exam)  · Checking a small amount under a microscope (microscopic exam)  Some parts of the urinalysis may be done in the provider's office and some in a lab. And, the urine sample may be checked for bacteria and yeast (urine culture). Your healthcare provider will tell you more about these tests if they are needed.  How is dysuria treated?  Treatment depends on the cause. If you have a bacterial infection, you may need antibiotics. You may be given medications to make it easier for you to urinate and help relieve pain. Your healthcare provider can tell you more about your treatment options. Untreated, symptoms may get worse.  Call the healthcare provider right away if you have any of the following:  · Fever of 100.4°F (38°C) or higher   · No improvement after three days of treatment  · Trouble urinating because of pain  · New or increased discharge from the vagina or penis  · Rash or joint pain  · Increased back or abdominal pain  · Enlarged painful lymph nodes (lumps) in the groin   Date Last Reviewed: 9/24/2014  © 3657-2807 [employer], [employer]. [address] [place] [address], [place] [address]. All rights reserved. This information is not intended as a substitute for professional medical care. Always follow your healthcare professional's instructions.          Your Scheduled Appointments     Mar 21, 2017  2:00 PM CDT   GASTROENTEROLOGY " ESTABLISHED PATIENT with Eve Currie MD   [facility] - Gastroenterology ([facility] )    [address]  [place] [address]   [phone]            Mar 27, 2017  8:20 AM CDT   Established Patient Visit with Bianca Rutledge MD   [place] [facility] [place]    [address]  [place] [address]   [phone]            Apr 05, 2017  9:00 AM CDT   New Patient with Gia Langley MD   [facility] - Urology 4th Floor ([facility] )    [address]  [place] [address]   [phone]              Your Future Surgeries/Procedures     Mar 21, 2017   Surgery with Eve Currie MD   [facility] ([facility])    [address] [facility] [id number] [phone]               [facility] complies with applicable Federal civil rights laws and does not discriminate on the basis of race, color, national origin, age, disability, or sex.        Language Assistance Services     ATTENTION: Language assistance services are available, free of charge. Please call [phone].      ATENCIÓN: Si habla español, tiene a madrid disposición servicios gratuitos de asistencia lingüística. Llame al [phone].     CHÚ Ý: N?u b?n nói Ti?ng Vi?t, có các d?ch v? h? tr? ngôn ng? mi?n phí dành cho b?n. G?i s? [phone].

## 2017-03-03 NOTE — TELEPHONE ENCOUNTER
----- Message from Florentino Gibson sent at 3/3/2017  7:11 AM CST -----  Contact: Pt at [phone]  Patient would like to get medical advice.  Symptoms (please be specific):  cough,stuffy nose,conjestion,possible uti  How long has patient had these symptoms:  About 2 days  Pharmacy name and phone #:  [facility]  [phone] (Phone)  [phone] (Fax)  Any drug allergies:   Aspirin  Sulfa (Sulfonamide Antibiotics)Hives, Rash  Iodinated Contrast Media - Iv DyeOther (See Comments)  Adhesive  Aspirin  Remicade [Infliximab]Other (See Comments)  LatexRash      Comments: Pt also thinks she is dehydrated and requesting a call back.

## 2017-03-05 LAB
BACTERIA UR CULT: NORMAL
BACTERIA UR CULT: NORMAL

## 2017-03-06 RX ORDER — BENZONATATE 200 MG/1
200 CAPSULE ORAL 3 TIMES DAILY PRN
Qty: 30 CAPSULE | Refills: 0 | Status: SHIPPED | OUTPATIENT
Start: 2017-03-06 | End: 2017-03-13

## 2017-03-06 RX ORDER — ZOLPIDEM TARTRATE 10 MG/1
10 TABLET ORAL NIGHTLY PRN
Qty: 30 TABLET | Refills: 3 | Status: SHIPPED | OUTPATIENT
Start: 2017-03-06 | End: 2017-06-19 | Stop reason: SDUPTHER

## 2017-03-06 NOTE — TELEPHONE ENCOUNTER
----- Message from Florentino Gibson sent at 3/6/2017  7:09 AM CST -----  Contact: Pt at [phone]  RX request - refill or new RX.  Is this a refill or new RX:  refill  RX name and strength: oxycodone-acetaminophen (PERCOCET)  mg per tablet  Directions: Take 1 tablet by mouth every 4 (four) hours as needed for Pain. - Oral  Is this a 30 day or 90 day RX:  120 tablet  Pharmacy name and phone #: Pt  at [facility]    RX request - refill or new RX.  Is this a refill or new RX:  refill  RX name and strength: zolpidem (AMBIEN) 10 mg Tab  Directions: Take 1 tablet (10 mg total) by mouth nightly as needed. - Oral  Is this a 30 day or 90 day RX:  30 day  Pharmacy name and phone #: Pt  at [facility]  Comments:      Patient would like to get medical advice.  Symptoms (please be specific):  On going cough,sore throat  How long has patient had these symptoms:  About 4 days  Pharmacy name and phone #:  [facility]  [phone] (Phone)  [phone] (Fax)  Any drug allergies:    Comments:

## 2017-03-07 RX ORDER — OXYCODONE AND ACETAMINOPHEN 10; 325 MG/1; MG/1
1 TABLET ORAL EVERY 4 HOURS PRN
Qty: 120 TABLET | Refills: 0 | Status: SHIPPED | OUTPATIENT
Start: 2017-03-07 | End: 2017-03-22 | Stop reason: SDUPTHER

## 2017-03-16 ENCOUNTER — TELEPHONE (OUTPATIENT)
Dept: ENDOSCOPY | Facility: HOSPITAL | Age: 42
End: 2017-03-16

## 2017-03-16 NOTE — TELEPHONE ENCOUNTER
Per Pre Call nurse, patient requests an emailed copy of prep instructions. Instructions emailed to address provided by patient.

## 2017-03-21 ENCOUNTER — HOSPITAL ENCOUNTER (OUTPATIENT)
Facility: HOSPITAL | Age: 42
Discharge: HOME | End: 2017-03-21
Attending: INTERNAL MEDICINE | Admitting: INTERNAL MEDICINE
Payer: COMMERCIAL

## 2017-03-21 ENCOUNTER — ANESTHESIA EVENT (OUTPATIENT)
Dept: ENDOSCOPY | Facility: HOSPITAL | Age: 42
End: 2017-03-21
Payer: COMMERCIAL

## 2017-03-21 ENCOUNTER — ANESTHESIA (OUTPATIENT)
Dept: ENDOSCOPY | Facility: HOSPITAL | Age: 42
End: 2017-03-21
Payer: COMMERCIAL

## 2017-03-21 ENCOUNTER — SURGERY (OUTPATIENT)
Age: 42
End: 2017-03-21

## 2017-03-21 ENCOUNTER — PATIENT MESSAGE (OUTPATIENT)
Dept: GASTROENTEROLOGY | Facility: CLINIC | Age: 42
End: 2017-03-21

## 2017-03-21 ENCOUNTER — TELEPHONE (OUTPATIENT)
Dept: GASTROENTEROLOGY | Facility: CLINIC | Age: 42
End: 2017-03-21

## 2017-03-21 ENCOUNTER — OFFICE VISIT (OUTPATIENT)
Dept: GASTROENTEROLOGY | Facility: CLINIC | Age: 42
End: 2017-03-21
Payer: COMMERCIAL

## 2017-03-21 VITALS
RESPIRATION RATE: 12 BRPM | OXYGEN SATURATION: 98 % | WEIGHT: 158 LBS | SYSTOLIC BLOOD PRESSURE: 120 MMHG | HEART RATE: 69 BPM | RESPIRATION RATE: 15 BRPM | TEMPERATURE: 99 F | BODY MASS INDEX: 28 KG/M2 | DIASTOLIC BLOOD PRESSURE: 65 MMHG | HEIGHT: 63 IN

## 2017-03-21 VITALS
TEMPERATURE: 99 F | DIASTOLIC BLOOD PRESSURE: 67 MMHG | BODY MASS INDEX: 28.29 KG/M2 | WEIGHT: 159.63 LBS | HEIGHT: 63 IN | HEART RATE: 84 BPM | SYSTOLIC BLOOD PRESSURE: 113 MMHG | RESPIRATION RATE: 18 BRPM

## 2017-03-21 DIAGNOSIS — F32.A DEPRESSION, UNSPECIFIED DEPRESSION TYPE: Primary | ICD-10-CM

## 2017-03-21 DIAGNOSIS — K52.9 IBD (INFLAMMATORY BOWEL DISEASE): Primary | ICD-10-CM

## 2017-03-21 DIAGNOSIS — K50.90 CROHN'S DISEASE: ICD-10-CM

## 2017-03-21 PROCEDURE — D9220A PRA ANESTHESIA: Mod: CRNA,,, | Performed by: NURSE ANESTHETIST, CERTIFIED REGISTERED

## 2017-03-21 PROCEDURE — 99999 PR PBB SHADOW E&M-EST. PATIENT-LVL IV: CPT | Mod: PBBFAC,,, | Performed by: INTERNAL MEDICINE

## 2017-03-21 PROCEDURE — 63600175 PHARM REV CODE 636 W HCPCS: Performed by: NURSE ANESTHETIST, CERTIFIED REGISTERED

## 2017-03-21 PROCEDURE — 44380 SMALL BOWEL ENDOSCOPY BR/WA: CPT | Mod: ,,, | Performed by: INTERNAL MEDICINE

## 2017-03-21 PROCEDURE — 44380 SMALL BOWEL ENDOSCOPY BR/WA: CPT | Performed by: INTERNAL MEDICINE

## 2017-03-21 PROCEDURE — D9220A PRA ANESTHESIA: Mod: ANES,,, | Performed by: ANESTHESIOLOGY

## 2017-03-21 PROCEDURE — 1160F RVW MEDS BY RX/DR IN RCRD: CPT | Mod: S$GLB,,, | Performed by: INTERNAL MEDICINE

## 2017-03-21 PROCEDURE — 99214 OFFICE O/P EST MOD 30 MIN: CPT | Mod: 25,S$GLB,, | Performed by: INTERNAL MEDICINE

## 2017-03-21 PROCEDURE — 25000003 PHARM REV CODE 250: Performed by: NURSE ANESTHETIST, CERTIFIED REGISTERED

## 2017-03-21 PROCEDURE — 37000009 HC ANESTHESIA EA ADD 15 MINS: Performed by: INTERNAL MEDICINE

## 2017-03-21 PROCEDURE — 37000008 HC ANESTHESIA 1ST 15 MINUTES: Performed by: INTERNAL MEDICINE

## 2017-03-21 PROCEDURE — 25000003 PHARM REV CODE 250: Performed by: INTERNAL MEDICINE

## 2017-03-21 RX ORDER — PROPOFOL 10 MG/ML
VIAL (ML) INTRAVENOUS CONTINUOUS PRN
Status: DISCONTINUED | OUTPATIENT
Start: 2017-03-21 | End: 2017-03-21

## 2017-03-21 RX ORDER — PROPOFOL 10 MG/ML
VIAL (ML) INTRAVENOUS
Status: DISCONTINUED | OUTPATIENT
Start: 2017-03-21 | End: 2017-03-21

## 2017-03-21 RX ORDER — ONDANSETRON 2 MG/ML
INJECTION INTRAMUSCULAR; INTRAVENOUS
Status: DISCONTINUED | OUTPATIENT
Start: 2017-03-21 | End: 2017-03-21

## 2017-03-21 RX ORDER — LIDOCAINE HCL/PF 100 MG/5ML
SYRINGE (ML) INTRAVENOUS
Status: DISCONTINUED | OUTPATIENT
Start: 2017-03-21 | End: 2017-03-21

## 2017-03-21 RX ORDER — SODIUM CHLORIDE 9 MG/ML
INJECTION, SOLUTION INTRAVENOUS CONTINUOUS
Status: DISCONTINUED | OUTPATIENT
Start: 2017-03-21 | End: 2017-03-21 | Stop reason: HOSPADM

## 2017-03-21 RX ADMIN — SODIUM CHLORIDE: 0.9 INJECTION, SOLUTION INTRAVENOUS at 07:03

## 2017-03-21 RX ADMIN — PROPOFOL 50 MG: 10 INJECTION, EMULSION INTRAVENOUS at 08:03

## 2017-03-21 RX ADMIN — LIDOCAINE HYDROCHLORIDE 100 MG: 20 INJECTION, SOLUTION INTRAVENOUS at 08:03

## 2017-03-21 RX ADMIN — ONDANSETRON 4 MG: 2 INJECTION INTRAMUSCULAR; INTRAVENOUS at 08:03

## 2017-03-21 RX ADMIN — PROPOFOL 150 MCG/KG/MIN: 10 INJECTION, EMULSION INTRAVENOUS at 08:03

## 2017-03-21 NOTE — H&P
Short Stay Endoscopy History and Physical    PCP - Bianca Rutledge MD  Referring Physician - Eve Currie MD  [address] [place] [address]    Procedure - ileoscopy through stoma  ASA - per anesthesia  Mallampati - per anesthesia  History of Anesthesia problems - no  Family history Anesthesia problems -  no   Plan of anesthesia - General    HPI  41 y.o. female  Reason for procedure: evaluate for recurrent Crohn's disease    ROS:  Constitutional: No fevers, chills, No weight loss  CV: No chest pain  Pulm: No cough, No shortness of breath  GI: see HPI    Medical History:  has a past medical history of Anemia; Asthma; B12 deficiency; Crohn's disease; High risk medication use; History of shingles; Immunosuppressed status; Lupus; Migraine headache; Raynaud disease; Reactive hypoglycemia; Seizures (2004); and Sleep apnea.    Surgical History:  has a past surgical history that includes Hysterectomy; TONSILLECTOMY, ADENOIDECTOMY; Laparoscopy w/ mini-laparotomy; large intestine removed; pelvic mass removal; Abdominal surgery; Stomach surgery; Colon surgery; Colostomy; Revision Colostomy (2010); and Ileostomy.    Family History: family history includes Breast cancer in her sister; Cancer in her maternal aunt, mother, and paternal grandmother; Colon cancer in her maternal grandmother; Migraines in her sister; Seizures in her sister. There is no history of Celiac disease, Cirrhosis, Colon polyps, Crohn's disease, Cystic fibrosis, Hemochromatosis, Esophageal cancer, Inflammatory bowel disease, Irritable bowel syndrome, Liver cancer, Liver disease, Rectal cancer, Stomach cancer, Ulcerative colitis, or Hugo's disease.. Otherwise no colon cancer, inflammatory bowel disease, or GI malignancies.    Social History:  reports that she has never smoked. She has never used smokeless tobacco. She reports that she does not drink alcohol or use illicit drugs.    Review of patient's allergies indicates:   Allergen  Reactions    Aspirin      Other reaction(s): vomiting, contraindicated for bleeding from crohns  Other reaction(s): bleeding    Sulfa (sulfonamide antibiotics) Hives and Rash    Iodinated contrast media - iv dye Other (See Comments)    Adhesive     Aspirin     Remicade [infliximab] Other (See Comments)     Medical induced lupus    Latex Rash     Other reaction(s): Hives       Medications:   Prescriptions Prior to Admission   Medication Sig Dispense Refill Last Dose    blood sugar diagnostic (BLOOD GLUCOSE TEST) Strp by Misc.(Non-Drug; Combo Route) route.   3/20/2017 at Unknown time    blood sugar diagnostic (ONETOUCH ULTRA TEST) Strp 1 strip by Misc.(Non-Drug; Combo Route) route once daily. 100 strip 2 3/20/2017 at Unknown time    clonazepam (KLONOPIN) 0.5 MG disintegrating tablet Take 1 tablet (0.5 mg total) by mouth 2 (two) times daily as needed. 60 tablet 3 Past Week at Unknown time    cyanocobalamin 1,000 mcg/mL injection INJECT 1 ML (1,000 MCG TOTAL) INTO THE MUSCLE EVERY 28 DAYS. 10 mL 1 Past Month at Unknown time    oxycodone-acetaminophen (PERCOCET)  mg per tablet Take 1 tablet by mouth every 4 (four) hours as needed for Pain. 120 tablet 0 3/20/2017 at Unknown time    zolpidem (AMBIEN) 10 mg Tab Take 1 tablet (10 mg total) by mouth nightly as needed. 30 tablet 3 3/20/2017 at Unknown time    albuterol (PROAIR HFA) 90 mcg/actuation inhaler Inhale 2 puffs into the lungs.   More than a month at Unknown time    albuterol (PROVENTIL) 2.5 mg /3 mL (0.083 %) nebulizer solution Take 3 mLs (2.5 mg total) by nebulization every 6 (six) hours as needed for Wheezing. 1 Box 3 More than a month at Unknown time    fluticasone-salmeterol 500-50 mcg/dose (ADVAIR DISKUS) 500-50 mcg/dose DsDv diskus inhaler Inhale 1 puff into the lungs.   Taking    ondansetron (ZOFRAN-ODT) 4 MG TbDL Take 1 tablet (4 mg total) by mouth every 6 (six) hours as needed. 15 tablet 0 More than a month at Unknown time     promethazine (PHENERGAN) 25 MG tablet TAKE 1 TABLET (25 MG TOTAL) BY MOUTH EVERY 6 (SIX) HOURS AS NEEDED FOR NAUSEA. 30 tablet 3 More than a month at Unknown time    sumatriptan (IMITREX STATDOSE) 6 mg/0.5 mL kit INJECT 0.5 ML UNDER THE SKIN AS NEEDED ONE TIME FOR MIGRAINE  1 More than a month at Unknown time       Physical Exam:    Vital Signs:   Vitals:    03/21/17 0741   BP: 113/71   Pulse: 85   Resp: 16   Temp: 98.2 °F (36.8 °C)       General Appearance: Well appearing in no acute distress  Lungs: CTA anteriorly  Heart:  Regular rate, S1, S2 normal, no murmurs heard.  Abdomen: Soft, non tender, non distended with normal bowel sounds, no masses  Extremities: No edema    Labs:  Lab Results   Component Value Date    WBC 5.15 03/03/2017    HGB 14.5 03/03/2017    HCT 44.4 03/03/2017     03/03/2017    ALT 18 03/03/2017    AST 15 03/03/2017     03/03/2017    K 3.8 03/03/2017     03/03/2017    CREATININE 1.0 03/03/2017    BUN 18 03/03/2017    CO2 26 03/03/2017    TSH 1.937 03/10/2016    INR 1.0 01/08/2014    GLUF 94 09/03/2009    HGBA1C 5.3 09/29/2011       I have explained the risks and benefits of this endoscopic procedure to the patient including but not limited to bleeding, inflammation, infection, perforation, and death.      Eve Currie MD

## 2017-03-21 NOTE — PATIENT INSTRUCTIONS
Instruction:  - repeat ileoscopy in 1 year  - no need for any Crohn's meds at this time  - referral to psychology- Dr. Rodríguez  - follow up 6 months- SCAR Jo

## 2017-03-21 NOTE — MR AVS SNAPSHOT
[facility]  [address]  [facility] [address]  Phone: [phone]  Fax: [phone]                  Emi Johnson   3/21/2017 2:00 PM   Office Visit    Description:  Female : 1975   Provider:  Eve Currie MD   Department:  [facility]           Reason for Visit     Crohn's Disease           Diagnoses this Visit        Comments    Depression, unspecified depression type    -  Primary            To Do List           Future Appointments        Provider Department Dept Phone    3/27/2017 8:20 AM Bianca Rutledge MD [place] - Internal Medicine [phone]    2017 9:00 AM MD [facility] 4th Floor [phone]      Goals (5 Years of Data)     None      [facility]     [facility] Nurse Care [facility] -  Assistance  Registered nurses in the [facility] Call Center provide clinical advisement, health education, appointment booking, and other advisory services.  Call for this free service at [phone].             Medications           Message regarding Medications     Verify the changes and/or additions to your medication regime listed below are the same as discussed with your clinician today.  If any of these changes or additions are incorrect, please notify your healthcare provider.        STOP taking these medications     fluticasone-salmeterol 500-50 mcg/dose (ADVAIR DISKUS) 500-50 mcg/dose DsDv diskus inhaler Inhale 1 puff into the lungs.           Verify that the below list of medications is an accurate representation of the medications you are currently taking.  If none reported, the list may be blank. If incorrect, please contact your healthcare provider. Carry this list with you in case of emergency.           Current Medications     albuterol (PROAIR HFA) 90 mcg/actuation inhaler Inhale 2 puffs into the lungs.    albuterol (PROVENTIL) 2.5 mg /3 mL (0.083 %) nebulizer solution Take 3 mLs (2.5 mg total) by  "nebulization every 6 (six) hours as needed for Wheezing.    blood sugar diagnostic (BLOOD GLUCOSE TEST) Strp by Misc.(Non-Drug; Combo Route) route.    blood sugar diagnostic (ONETOUCH ULTRA TEST) Strp 1 strip by Misc.(Non-Drug; Combo Route) route once daily.    clonazepam (KLONOPIN) 0.5 MG disintegrating tablet Take 1 tablet (0.5 mg total) by mouth 2 (two) times daily as needed.    cyanocobalamin 1,000 mcg/mL injection INJECT 1 ML (1,000 MCG TOTAL) INTO THE MUSCLE EVERY 28 DAYS.    ondansetron (ZOFRAN-ODT) 4 MG TbDL Take 1 tablet (4 mg total) by mouth every 6 (six) hours as needed.    oxycodone-acetaminophen (PERCOCET)  mg per tablet Take 1 tablet by mouth every 4 (four) hours as needed for Pain.    promethazine (PHENERGAN) 25 MG tablet TAKE 1 TABLET (25 MG TOTAL) BY MOUTH EVERY 6 (SIX) HOURS AS NEEDED FOR NAUSEA.    sumatriptan (IMITREX STATDOSE) 6 mg/0.5 mL kit INJECT 0.5 ML UNDER THE SKIN AS NEEDED ONE TIME FOR MIGRAINE    zolpidem (AMBIEN) 10 mg Tab Take 1 tablet (10 mg total) by mouth nightly as needed.           Clinical Reference Information           Your Vitals Were     BP Pulse Temp Resp Height Weight    113/67 84 98.6 °F (37 °C) (Oral) 18 5' 3" (1.6 m) 72.4 kg (159 lb 9.8 oz)    BMI                28.27 kg/m2          Blood Pressure          Most Recent Value    BP  113/67      Allergies as of 3/21/2017     Aspirin    Sulfa (Sulfonamide Antibiotics)    Iodinated Contrast Media - Iv Dye    Adhesive    Aspirin    Remicade [Infliximab]    Latex      Immunizations Administered on Date of Encounter - 3/21/2017     None      Orders Placed During Today's Visit      Normal Orders This Visit    Ambulatory Referral to Psychology       Instructions    Instruction:  - repeat ileoscopy in 1 year  - no need for any Crohn's meds at this time  - referral to psychology- Dr. Rodríguez  - follow up 6 months- NP Paty Jo       Language Assistance Services     ATTENTION: Language assistance services are available, free of " charge. Please call [phone].      ATENCIÓN: Si habla español, tiene a madrid disposición servicios gratuitos de asistencia lingüística. Llame al [phone].     CHÚ Ý: N?u b?n nói Ti?ng Vi?t, có các d?ch v? h? tr? ngôn ng? mi?n phí dành cho b?n. G?i s? [phone].         [facility] complies with applicable Federal civil rights laws and does not discriminate on the basis of race, color, national origin, age, disability, or sex.

## 2017-03-21 NOTE — DISCHARGE INSTRUCTIONS
Colonoscopy     A camera attached to a flexible tube with a viewing lens is used to take video pictures.     Colonoscopy is a test to view the inside of your lower digestive tract (colon and rectum). Sometimes it can show the last part of the small intestine (ileum). During the test, small pieces of tissue may be removed for testing. This is called a biopsy. Small growths, such as polyps, may also be removed.   Why is colonoscopy done?  The test is done to help look for colon cancer. And it can help find the source of abdominal pain, bleeding, and changes in bowel habits. It may be needed once a year, depending on factors such as your:  · Age  · Health history  · Family health history  · Symptoms  · Results from any prior colonoscopy  Risks and possible complications  These include:  · Bleeding               · A puncture or tear in the colon   · Risks of anesthesia  · A cancer lesion not being seen  Getting ready   To prepare for the test:  · Talk with your healthcare provider about the risks of the test (see below). Also ask your healthcare provider about alternatives to the test.  · Tell your healthcare provider about any medicines you take. Also tell him or her about any health conditions you may have.  · Make sure your rectum and colon are empty for the test. Follow the diet and bowel prep instructions exactly. If you dont, the test may need to be rescheduled.  · Plan for a friend or family member to drive you home after the test.     Colonoscopy provides an inside view of the entire colon.     You may discuss the results with your doctor right away or at a future visit.  During the test   The test is usually done in the hospital on an outpatient basis. This means you go home the same day. The procedure takes about 30 minutes. During that time:  · You are given relaxing (sedating) medicine through an IV line. You may be drowsy, or fully asleep.  · The healthcare provider will first give you a physical exam to  check for anal and rectal problems.  · Then the anus is lubricated and the scope inserted.  · If you are awake, you may have a feeling similar to needing to have a bowel movement. You may also feel pressure as air is pumped into the colon. Its OK to pass gas during the procedure.  · Biopsy, polyp removal, or other treatments may be done during the test.  After the test   You may have gas right after the test. It can help to try to pass it to help prevent later bloating. Your healthcare provider may discuss the results with you right away. Or you may need to schedule a follow-up visit to talk about the results. After the test, you can go back to your normal eating and other activities. You may be tired from the sedation and need to rest for a few hours.  Date Last Reviewed: 11/1/2016  © 4291-5683 [employer]. [address] [place] [address], [place] [address]. All rights reserved. This information is not intended as a substitute for professional medical care. Always follow your healthcare professional's instructions.

## 2017-03-21 NOTE — ANESTHESIA PREPROCEDURE EVALUATION
03/21/2017  Emi Johnson is a 41 y.o., female.    [facility] Anesthesia Evaluation         Review of Systems  Anesthesia Hx:  No problems with previous Anesthesia Hx of Anesthetic complications           ponv             Social:  Non-Smoker   Cardiovascular:   Exercise tolerance: good Denies Hypertension.  Denies CAD.     Denies Angina.  Functional Capacity Can you climb two flights of stairs? ==> Yes    Pulmonary:   Asthma Denies Recent URI. Sleep Apnea    Renal/:  Renal/ Normal     Hepatic/GI:   Denies PUD. Denies Hiatal Hernia. GERD Denies Liver Disease.  Denies Hepatitis.    Neurological:   Denies CVA. Seizures    Endocrine:   Denies Diabetes. Denies Hypothyroidism.        Physical Exam  General:  Well nourished    Airway/Jaw/Neck:  Airway Findings: Mouth Opening: Normal Tongue: Normal  General Airway Assessment: Adult, Possible difficult intubation            High arched palate, small oropharynx                 Mallampati: II  Improves to II with phonation.  TM Distance: Normal, at least 6 cm  Jaw/Neck Findings:  Neck ROM: Normal ROM  Neck Findings:     Eyes/Ears/Nose:  EYES/EARS/NOSE FINDINGS: Normal   Dental:  Dental Findings: In tact   Chest/Lungs:  Chest/Lungs Findings: Clear to auscultation     Heart/Vascular:  Heart Findings: Rate: Normal  Rhythm: Regular Rhythm  Sounds: Normal        Mental Status:  Mental Status Findings:  Alert and Oriented         Anesthesia Plan  Type of Anesthesia, risks & benefits discussed:  Anesthesia Type:  general  Patient's Preference: Proceed with anesthesia understanding that the risks are very small but could be serious or life threatening.  Intra-op Monitoring Plan: standard ASA monitors  Intra-op Monitoring Plan Comments:   Post Op Pain Control Plan:   Post Op Pain Control Plan Comments:   Induction:   IV  Beta Blocker:  Patient is not currently on a Beta-Blocker  (No further documentation required).       Informed Consent: Patient understands risks and agrees with Anesthesia plan.  Questions answered. Anesthesia consent signed with patient.  ASA Score: 2     Day of Surgery Review of History & Physical: I have interviewed and examined the patient. I have reviewed the patient's H&P dated:            Ready For Surgery From Anesthesia Perspective.

## 2017-03-21 NOTE — ANESTHESIA POSTPROCEDURE EVALUATION
"Anesthesia Post Evaluation    Patient: Emi Johnson    Procedure(s) Performed: Procedure(s) (LRB):  ILEOSCOPY through stoma (N/A)    Final Anesthesia Type: general  Patient location during evaluation: GI PACU  Patient participation: Yes- Able to Participate  Level of consciousness: awake and alert  Post-procedure vital signs: reviewed and stable  Pain management: adequate  Airway patency: patent  PONV status at discharge: No PONV  Anesthetic complications: no      Cardiovascular status: blood pressure returned to baseline  Respiratory status: unassisted  Hydration status: euvolemic  Follow-up not needed.        Visit Vitals    /65 (BP Location: Left arm, Patient Position: Lying, BP Method: Automatic)    Pulse 69    Temp 37.2 °C (98.9 °F) (Oral)    Resp 12    Ht 5' 3" (1.6 m)    Wt 71.7 kg (158 lb)    SpO2 98%    Breastfeeding No    BMI 27.99 kg/m2       Pain/Elliott Score: Pain Assessment Performed: Yes (3/21/2017  8:45 AM)  Presence of Pain: denies (3/21/2017  8:45 AM)  Pain Rating Prior to Med Admin: 0 (3/21/2017  8:45 AM)  Elliott Score: 10 (3/21/2017  8:45 AM)      "

## 2017-03-21 NOTE — PROGRESS NOTES
[facility]             Inflammatory Bowel Disease Follow-up  Note            Dr. Eve Currie  TODAY'S VISIT DATE:  3/21/2017    Chief Complaint:   Chief Complaint   Patient presents with    Crohn's Disease     endo follow up       PCP: Bianca Rutledge    Previous History:  Ms Johnson is a 41 y.o. with inflammatory bowel disease with pouch dysfunction who has since undergone pouch excision     .  She had initial symptoms in 2005 and diagnosed in 2006 with colonoscopy c/w CD initially and then though to be UC.  She was failed asacol, lialda, sulfasalazine, rowasa enemas, canasa suppositories, humira (allergic reaction- injection site reaction, rash), prednisone, cipro/flagyl.  Due to medically refractory disease she had a 2 step restorative proctocolectomy in 2011 with pathology most consistent with UC.  Since surgery though she has had unrelenting ongoing diarrhea up to 15-20 BMs/day despite 10 immodium daily. Pouchoscopy 4/2012, 2/2014 were normal and she has taken several courses of cipro/flagyl without relief and also failed entocort in early 2014. Pt saw Dr. Ray at [facility] who recommended temporary loop ileostomy and to consider biofeedback due to anorectal manometry with concerns for pelvic floor dysfunction.  He also recommended Valium rectal supp if any issues of muscle spasms during biofeedback.  Patient had additional testing in April/May 2014 which showed normal gastrograffin enema, MRI abd/pelvis normal and pouchoscopy on 6/2014 showed normal appearing mucosa, dislodged staples at the anastomosis, and pt felt pain when staples touched. Therefore, the staples were removed by biopsy forcep. Anterior anastomosis staple removal area was put one endoclip to prevent sinus formation. MRI abd/pelvis normal 11/2015. She did not return for appts with Dr. Ray due to cost. She had hysterectomy in 2009 due to endometriosis though 2/2015 bilateral oophorectomy with lysis of  "adhesions- post op SBO from adhesions. She is on chronic narcotics (RLQ and subumbilical pain) and chronic antiemetics (due to nausea/vomiting).  Her last pouchoscopy 10/8/15 showed some ulcerations in the J pouch along suture line but normal neoterminal ileum and overall this was mild in activity. At her previous visits I felt that her symptoms were out of proportion to the findings on endoscopy.  She saw Dr. Mcdaniel who recommended starting remicade which she has been on since 12/7/15. Stool studies were negative for infection 1/2016. She had a pouchoscopy 3/18/15 which was normal with no ulcerations though overall friable mucosa. She had ongoing symptoms of abdominal pain, diarrhea and missed f/u appts due to work issues and lost lomotil prescriptions given.  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 We had recommended biofeedback therapy with Dr. Emmanuel but she did not wish to proceed with these recommendations and reported to us by phone on 6/21/16 that "I don't like enemas or suppositories and I am in too much pain." She had several ER visits and had been prescribed prednisone which we recommended that she should taper off of.  We recommended pouch excision with end ileostomy but she was not willing to proceed with this at the time but ultimately had a pouch excision by Dr. Gaspar at .  She ultimately underwent a pouch excision with intersphincteric pouchectomy and permanent end ileostomy on 11/11/16. Surgical pathology showed normal SB with fibrosis and chronic inflammation with foreign body giant cell reaction and edema. She restablished care with us on 3/31/16 at which time we proceeded with scheduling ileoscopy to assess for recurrence of possible Crohn's disease and see if treatment was warranted.      Interval History:  - current IBD meds: none  - 3/21/17 ileoscopy: normal with no active Crohn's disease  - intermittent right abdominal pain especially RLQ, " worse with coughing or sneezing  - mild erythema peristomal area- seeing wound/ostomy RN; has transferred care to Rozina our ostomy nurse  - bags of ileostomy output: about 2-3 full ileostomy bags- brown thick liquid  - not on any meds for fibromyalgia, takes klonopin once every 2 weeks  - depression due to having ileostomy bag- goes to the ostomy support group at   - continued joint pain/swelling- knees/feet/toes/hands  - constitutional/GI symptoms: heartburn, dysphagia, GI bleeding  - extraintestinal manifestations: no eye pain/redness, skin lesions/rashes, liver problems    Prior Pertinent Endoscopy/Imagin09 colonoscopy with pathology:  normal terminal ileum, normal cecum, with ascending/transverse/descending colon with minimally active colitis and rectum with mod to moderately active colitis.   2010 colonoscopy:  continuous area of inflamed mucosa in rectosigmoid and sigmoid colon but otherwise normal colonoscopy and terminal ileum.    3/1/11 surgical pathology of colectomy showed multiple full thickness of large bowel generally maintained glandular architecture within density within lamina propria within normal limites, quiescent stage in idiopathic inflammatory bowel disease no excludable.  Unremarkable proximal and distal margins.   2011 gastrograffin enema normal  5/3/11 surgical pathology from ileostomy takedown:  showed ileostomy with flattening of usual villous pattern with surface mucosal erosion and fresh hemorrhage and moderate diffuse chronic inflammation at junction of skin and SB.   2012 pouchoscopy normal and biopsies normal  2014 pouchoscopy- few small ulcers on septum and formed stool in J pouch with normal neoterminal ileum. Biopsies of ileoanal pouch ulcers showed diffuse marked acute and chronic inflammation and random biopsies of ileoanal pouch showed chronic inflammatory cells.   2014 Anorectal manometry;  paradoxical contractions- saw tooth indicating pelvic floor  dysfunction; recommended biofeedback therapy 10 consecutive sessions and then may repeat manometry, if saw tooth still does not disappear then may consider botox ; valium rectal supp if any issues of muscle spasms during biofeedback   4/2014 gastrograffin enema:  NO MECHANICAL OBSTRUCTION OF J POUCH OR DISTAL SMALL BOWEL  5/2014 MRI abd/pelvis:  NO MR EVIDENCE OF POUCHITIS. NO ABSCESS OR FLUID COLLECTION.  6/2014 pouchoscopy:  The j-pouch body, afferent limb, inlet and cuff appeared normal. There were dislodged staples at the anastomosis, and pt felt pain when staples touched. Therefore, the staples were removed by biopsy forcep. Anterior  anastomosis staple removal area was put one endoclip to prevent sinus formation. Bx showed TI normal, pouch biopsies and rectal cuff with mild inflammation  7/7/14 EGD: normal.  2/2015 CT abd/pelvis:  postop changes from surgery and small bowel obstructions (distended SB loops and air fluid levels)11/2015 MRI abd/pelvis abundant stool is present in the rectum, distal bowel wall including the area of the anastomosis enhances but with no abnormal bowel wall thickening or surrounding inflammatory changes. No abscess, fistula, or evidence of bowel obstruction are noted. A fluid filled tubular structure is noted to the left of the area of the anastomosis felt to represent a loop of bowel.    10/8/15 pouchoscopy: showed ulceration along suture line with solitary 2 mm ulcer in the distal ileum proximal to J pouch. Pathology showed distal ileum with chronic ileitis with mild activity, J pouch with chronic enteritis with mild activity.  11/2015 MRI abd/pelvis normal  3/18/15 pouchoscopy normal though friable mucosa; previous ulcerations no longer seen; biopsies all normal  3/21/17 ileoscopy: normal with no active Crohn's disease    Prior Pertinent Labs:  IBD serology in 5/2010 showed markers for Crohn's and ulcerative colitis.  Lab Results   Component Value Date    STOOLCULTURE  01/29/2016      No Salmonella,Shigella,Vibrio,Campylobacter,Yersinia isolated.    [id number] Negative 01/29/2016    LHTAZXQTPW0D Negative 01/29/2016    CDIFFICILEAN Negative 01/29/2016    CDIFFTOX Negative 01/29/2016     Lab Results   Component Value Date    CRP 7.3 04/25/2016     Lab Results   Component Value Date    YAVTGYWI90AU 29 (L) 02/18/2013     Lab Results   Component Value Date    HEPBCAB Negative 03/10/2016     No results found for: VARICELLAZOS, VARICELLAINT  No results found for: NIL, TBAG, TBAGNIL, MITOGENNIL, TBGOLD  No results found for: TPMTRESULT  No results found for: ANSADAINIT, INFLIXIMAB, INFLIXINTERP    Vaccinations:  Flu shot: 2015  Chickenpox status/Varicella vaccine: had chickenpox as a child  No results found for: VARICELLAZOS, VARICELLAINT  Tetanus: 2012 (not sure of exact year but UTD)  Pneumonia vaccine: 2015  Hepatitis B: never had  Lab Results   Component Value Date    HEPBSAB Negative 03/10/2016   Hepatitis A: NA  HPV: NA   Meningococcal: NA     Review of Systems   Constitutional: Positive for chills (hot flashes). Negative for fever and weight loss.   HENT:        No oral ulcers, dysphagia, oral thrush   Eyes: Negative for blurred vision, pain and redness.   Respiratory: Positive for cough (constant nonproductive, sneezing attributes to sinus problems). Negative for shortness of breath.    Cardiovascular: Negative for chest pain.   Gastrointestinal: Positive for nausea (on phenergan which helps). Negative for abdominal pain, heartburn and vomiting.   Genitourinary: Negative for dysuria and hematuria.   Musculoskeletal: Negative for back pain and joint pain.   Skin: Negative for rash.   Psychiatric/Behavioral: Positive for depression. Negative for suicidal ideas. The patient is not nervous/anxious and does not have insomnia.      All Medical History/Surgical History/Family History/Social History/Allergies have been reviewed and updated in EMR    Review of patient's allergies indicates:    Allergen Reactions    Aspirin      Other reaction(s): vomiting, contraindicated for bleeding from crohns  Other reaction(s): bleeding    Sulfa (sulfonamide antibiotics) Hives and Rash    Iodinated contrast media - iv dye Other (See Comments)    Adhesive     Aspirin     Remicade [infliximab] Other (See Comments)     Medical induced lupus    Latex Rash     Other reaction(s): Hives       Outpatient Prescriptions Marked as Taking for the 3/21/17 encounter (Office Visit) with Eve Currie MD   Medication Sig Dispense Refill    albuterol (PROAIR HFA) 90 mcg/actuation inhaler Inhale 2 puffs into the lungs.      albuterol (PROVENTIL) 2.5 mg /3 mL (0.083 %) nebulizer solution Take 3 mLs (2.5 mg total) by nebulization every 6 (six) hours as needed for Wheezing. 1 Box 3    blood sugar diagnostic (BLOOD GLUCOSE TEST) Strp by Misc.(Non-Drug; Combo Route) route.      blood sugar diagnostic (ONETOUCH ULTRA TEST) Strp 1 strip by Misc.(Non-Drug; Combo Route) route once daily. 100 strip 2    clonazepam (KLONOPIN) 0.5 MG disintegrating tablet Take 1 tablet (0.5 mg total) by mouth 2 (two) times daily as needed. 60 tablet 3    cyanocobalamin 1,000 mcg/mL injection INJECT 1 ML (1,000 MCG TOTAL) INTO THE MUSCLE EVERY 28 DAYS. 10 mL 1    oxycodone-acetaminophen (PERCOCET)  mg per tablet Take 1 tablet by mouth every 4 (four) hours as needed for Pain. 120 tablet 0    promethazine (PHENERGAN) 25 MG tablet TAKE 1 TABLET (25 MG TOTAL) BY MOUTH EVERY 6 (SIX) HOURS AS NEEDED FOR NAUSEA. 30 tablet 3    sumatriptan (IMITREX STATDOSE) 6 mg/0.5 mL kit INJECT 0.5 ML UNDER THE SKIN AS NEEDED ONE TIME FOR MIGRAINE  1    zolpidem (AMBIEN) 10 mg Tab Take 1 tablet (10 mg total) by mouth nightly as needed. 30 tablet 3       Vital Signs:  BP: 113/67 Temp: 98.6 °F (37 °C) Pulse: 84 Resp: 18  Physical Exam   Constitutional: She is oriented to person, place, and time. She appears well-developed.   HENT:   Mouth/Throat: Oropharynx is  clear and moist. No oral lesions.   No oral ulcers or thrush   Eyes: Conjunctivae are normal. Pupils are equal, round, and reactive to light.   Cardiovascular: Normal rate and regular rhythm.    Pulmonary/Chest: Effort normal and breath sounds normal.   Abdominal: Soft. There is no tenderness.   Ostomy bag intact, pink healthy stoma   Musculoskeletal:        Right lower leg: She exhibits no swelling.        Left lower leg: She exhibits no swelling.   Neurological: She is alert and oriented to person, place, and time.   Skin: No rash noted.   Psychiatric: She has a normal mood and affect.   Nursing note and vitals reviewed.      Labs: Reviewed and pertinent noted above    Assessment/Plan:  Emi Johnson is a 41 y.o. female with Crohn's disease of the J pouch with pouch excision and permanent end ileostomy in 11/2016.  She has some ongoing mild peristomal issues that are improving with wound/ostomy care and has some intermittent abdominal pain. She has ongoing depression from adjusting to having ileostomy though I think has had issues with this in the past. She reports that she internalizes a lot and doesn't even discuss things about her childhood with her . I told her that she will likely benefit from evaluation with a psychologist and may consider seeing a psychiatrist in the future and she is agreeable to this. Given she also has a history of fibromyalgia she may benefit from trying cymbalta.  I will set her up to see Dr. Rodríguez. She had ileoscopy done today which shows no recurrent Crohn's disease and does not need any medications to prevent Crohn's disease recurrence though I will repeat ileoscopy in 1 year.       Plan:  - ileoscopy through stoma in 3/2017  - continue vit D 3 1000 IU daily  - continue f/u with wound/ostomy care nurse- Rozina Mcdonald  - will refer patient to psychologist, Dr. Rodríguez    Follow up: 6 months with NP, Paty Jo    Total visit time was 30 minutes, more than 50% of which was spent  in face-to-face counseling with patient regarding overall care for her Crohn's disease post-surgery    Eve Currie MD  Department of Gastroenterology  Medical Director, Inflammatory Bowel Disease

## 2017-03-21 NOTE — ANESTHESIA RELEASE NOTE
Anesthesia Release from PACU Note    Patient: Emi Johnson    Procedure(s) Performed: Procedure(s) (LRB):  ILEOSCOPY through stoma (N/A)    Anesthesia type: General    Post pain: Adequate analgesia    Post assessment: no apparent anesthetic complications    Last Vitals:   Vitals:    03/21/17 0845   BP: 120/65   Pulse: 69   Resp: 12   Temp: 37.2 °C (98.9 °F)   SpO2: 98%       Post vital signs: stable    Level of consciousness: awake    Complications: none    Airway Patency: patent    Respiratory: spontaneous    Cardiovascular: stable    Hydration: euvolemic

## 2017-03-21 NOTE — OR NURSING
"Notified Anesthesia and Dr. Currie that patient is c omplaining and stating " I am never coming here again"   " I am in pain"   According to Dr. Currie, paitient lives with chronic pain.   I notified Charge Carly and Cuca who gave me new Stoma Bags.   Patient wanted  to go home and get another one.   I advised we have some.   Madelyn JAMESON called the stoma RN  Rozina who is at bedside assisting patient with bag and stoma.   I covered stoma with clean sterile gauze siince patient pulled off bag Dr. Currie applied.   Rozina Stoma RN assisting patient with new bag.   "

## 2017-03-21 NOTE — OR NURSING
Dr. Currie at the bedside and talking with client.   Patient now happy with new stoma bag.   I provided sterile water and sterile gauze with new bag at bedside to assist patient with applying new bag provided and cut by Rozina Stoma TRINO.

## 2017-03-21 NOTE — TRANSFER OF CARE
"Anesthesia Transfer of Care Note    Patient: Emi Johnson    Procedure(s) Performed: Procedure(s) (LRB):  ILEOSCOPY through stoma (N/A)    Patient location: PACU    Anesthesia Type: general    Transport from OR: Transported from OR on 6-10 L/min O2 by face mask with adequate spontaneous ventilation    Post pain: adequate analgesia    Post assessment: no apparent anesthetic complications and tolerated procedure well    Post vital signs: stable    Level of consciousness: sedated and responds to stimulation    Nausea/Vomiting: no nausea/vomiting    Complications: none          Last vitals:   Visit Vitals    /71 (BP Location: Left arm, Patient Position: Sitting, BP Method: Automatic)    Pulse 85    Temp 36.8 °C (98.2 °F) (Oral)    Resp 16    Ht 5' 3" (1.6 m)    Wt 71.7 kg (158 lb)    SpO2 96%    Breastfeeding No    BMI 27.99 kg/m2     "

## 2017-03-21 NOTE — TELEPHONE ENCOUNTER
----- Message from Jackelin Butterfield sent at 3/21/2017 11:40 AM CDT -----  Contact: pt#[phone]  Pt is calling to see if she still needs make appt for today. She states that saw Dr Currie today and received results

## 2017-03-22 RX ORDER — OXYCODONE AND ACETAMINOPHEN 10; 325 MG/1; MG/1
1 TABLET ORAL EVERY 4 HOURS PRN
Qty: 120 TABLET | Refills: 0 | Status: SHIPPED | OUTPATIENT
Start: 2017-03-22 | End: 2017-03-29 | Stop reason: SDUPTHER

## 2017-03-22 NOTE — TELEPHONE ENCOUNTER
----- Message from Magali Abdul sent at 3/22/2017  8:19 AM CDT -----  Contact: pt [phone]  RX request - refill or new RX.  Is this a refill or new RX:  refill  RX name and strength: oxycodone-acetaminophen (PERCOCET)  mg per tablet  Directions:   Is this a 30 day or 90 day RX:  30     Comments:

## 2017-03-29 ENCOUNTER — OFFICE VISIT (OUTPATIENT)
Dept: INTERNAL MEDICINE | Facility: CLINIC | Age: 42
End: 2017-03-29
Payer: COMMERCIAL

## 2017-03-29 ENCOUNTER — LAB VISIT (OUTPATIENT)
Dept: LAB | Facility: HOSPITAL | Age: 42
End: 2017-03-29
Attending: INTERNAL MEDICINE
Payer: COMMERCIAL

## 2017-03-29 VITALS
WEIGHT: 157.63 LBS | DIASTOLIC BLOOD PRESSURE: 80 MMHG | HEIGHT: 63 IN | HEART RATE: 72 BPM | TEMPERATURE: 98 F | BODY MASS INDEX: 27.93 KG/M2 | RESPIRATION RATE: 16 BRPM | SYSTOLIC BLOOD PRESSURE: 114 MMHG

## 2017-03-29 DIAGNOSIS — J45.909 ASTHMA, CHRONIC, UNSPECIFIED ASTHMA SEVERITY, UNCOMPLICATED: Primary | Chronic | ICD-10-CM

## 2017-03-29 DIAGNOSIS — R20.0 NUMBNESS AND TINGLING: ICD-10-CM

## 2017-03-29 DIAGNOSIS — R11.0 NAUSEA: ICD-10-CM

## 2017-03-29 DIAGNOSIS — R20.2 NUMBNESS AND TINGLING: ICD-10-CM

## 2017-03-29 LAB
BASOPHILS # BLD AUTO: 0.01 K/UL
BASOPHILS NFR BLD: 0.2 %
DIFFERENTIAL METHOD: NORMAL
EOSINOPHIL # BLD AUTO: 0.2 K/UL
EOSINOPHIL NFR BLD: 3.6 %
ERYTHROCYTE [DISTWIDTH] IN BLOOD BY AUTOMATED COUNT: 13.3 %
HCT VFR BLD AUTO: 42.7 %
HGB BLD-MCNC: 14.4 G/DL
LYMPHOCYTES # BLD AUTO: 1.5 K/UL
LYMPHOCYTES NFR BLD: 26.7 %
MCH RBC QN AUTO: 27.6 PG
MCHC RBC AUTO-ENTMCNC: 33.7 %
MCV RBC AUTO: 82 FL
MONOCYTES # BLD AUTO: 0.4 K/UL
MONOCYTES NFR BLD: 7.3 %
NEUTROPHILS # BLD AUTO: 3.4 K/UL
NEUTROPHILS NFR BLD: 62 %
PLATELET # BLD AUTO: 192 K/UL
PMV BLD AUTO: 11.4 FL
RBC # BLD AUTO: 5.22 M/UL
WBC # BLD AUTO: 5.51 K/UL

## 2017-03-29 PROCEDURE — 82607 VITAMIN B-12: CPT

## 2017-03-29 PROCEDURE — 1160F RVW MEDS BY RX/DR IN RCRD: CPT | Mod: S$GLB,,, | Performed by: INTERNAL MEDICINE

## 2017-03-29 PROCEDURE — 82306 VITAMIN D 25 HYDROXY: CPT

## 2017-03-29 PROCEDURE — 99999 PR PBB SHADOW E&M-EST. PATIENT-LVL III: CPT | Mod: PBBFAC,,, | Performed by: INTERNAL MEDICINE

## 2017-03-29 PROCEDURE — 36415 COLL VENOUS BLD VENIPUNCTURE: CPT | Mod: PO

## 2017-03-29 PROCEDURE — 80053 COMPREHEN METABOLIC PANEL: CPT

## 2017-03-29 PROCEDURE — 99214 OFFICE O/P EST MOD 30 MIN: CPT | Mod: S$GLB,,, | Performed by: INTERNAL MEDICINE

## 2017-03-29 PROCEDURE — 85025 COMPLETE CBC W/AUTO DIFF WBC: CPT

## 2017-03-29 RX ORDER — OXYCODONE AND ACETAMINOPHEN 10; 325 MG/1; MG/1
1 TABLET ORAL EVERY 4 HOURS PRN
Qty: 150 TABLET | Refills: 0
Start: 2017-03-29 | End: 2017-04-10 | Stop reason: SDUPTHER

## 2017-03-29 NOTE — PROGRESS NOTES
CC: asthma exacerbation  HPI:  The patient is a 41 y.o. year old female who presents to the office for asthma exacerbation.  She reports cough, wheezing and shortness of breath.  Symptoms have been present for month.  She also complains of nausea.  She reports there is a virus going around at work.  She has been taking albuterol.  She also complains of numbness and tingling in her hands and feet.    PAST MEDICAL HISTORY:  Past Medical History:   Diagnosis Date    Anemia     Asthma     B12 deficiency     Crohn's disease     History of shingles     Lupus     Migraine headache     Raynaud disease     Reactive hypoglycemia     Seizures 2004    no medication     Sleep apnea     Non compliant with CPAP       SURGICAL HISTORY:  Past Surgical History:   Procedure Laterality Date    ABDOMINAL SURGERY      COLON SURGERY      95% of colon removed 2010    COLONOSCOPY      COLOSTOMY      HYSTERECTOMY      due to endometriosis    ILEOSTOMY      LAPAROSCOPY W/ MINI-LAPAROTOMY      large intestine removed      just a portion    pelvic mass removal      REVISION COLOSTOMY  2010    STOMACH SURGERY      TONSILLECTOMY, ADENOIDECTOMY         MEDS:  Medcard reviewed and updated    ALLERGIES: Allergy Card reviewed and updated    SOCIAL HISTORY:   The patient is a nonsmoker.    PE:   APPEARANCE: Well nourished, well developed, in no acute distress.    CHEST: Lungs clear to auscultation with unlabored respirations.  CARDIOVASCULAR: Normal S1, S2. No murmurs. No carotid bruits. No pedal edema.  ABDOMEN: Bowel sounds normal. Not distended. Soft. No tenderness or masses. Positive colostomy  PSYCHIATRIC: The patient is oriented to person, place, and time and has a pleasant affect.        ASSESSMENT/PLAN:  Emi was seen today for follow-up.    Diagnoses and all orders for this visit:    Asthma, chronic, unspecified asthma severity, uncomplicated   -     Continue current therapy    Numbness and tingling  -     Vitamin B12;  Future  -     Vitamin D; Future

## 2017-03-29 NOTE — MR AVS SNAPSHOT
[facility]   [facility]  [place] [address]  Phone: [phone]  Fax: [phone]                  Emi Johnson   3/29/2017 2:40 PM   Office Visit    Description:  Female : 1975   Provider:  Bianca Rutledge MD   Department:  [facility]           Reason for Visit     Follow-up           Diagnoses this Visit        Comments    Asthma, chronic, unspecified asthma severity, uncomplicated    -  Primary     Numbness and tingling         Nausea                To Do List           Future Appointments        Provider Department Dept Phone    3/29/2017 3:30 PM LAB, [facility] [place] - Laboratory [phone]    3/29/2017 3:45 PM [facility] [phone]    2017 9:00 AM Gia Langley MD [facility] - Urology 4th Floor [phone]    2017 2:00 PM Bianca Rutledge MD [facility] Internal Medicine [phone]      Goals (5 Years of Data)     None      Follow-Up and Disposition     Return in about 3 months (around 2017).       These Medications        Disp Refills Start End    oxycodone-acetaminophen (PERCOCET)  mg per tablet 150 tablet 0 3/29/2017     Take 1 tablet by mouth every 4 (four) hours as needed for Pain. - Oral    Pharmacy: [facility]/pharmacy #[id number] - [place] Munoz  [address] [place] [address] Ph #: [phone]         [facility] On Call     [facility] On Call Nurse Care Line -  Assistance  Registered nurses in the [facility] On Call Center provide clinical advisement, health education, appointment booking, and other advisory services.  Call for this free service at [phone].             Medications           Message regarding Medications     Verify the changes and/or additions to your medication regime listed below are the same as discussed with your clinician today.  If any of these changes or additions are incorrect, please notify your healthcare provider.             Verify that the below  "list of medications is an accurate representation of the medications you are currently taking.  If none reported, the list may be blank. If incorrect, please contact your healthcare provider. Carry this list with you in case of emergency.           Current Medications     albuterol (PROAIR HFA) 90 mcg/actuation inhaler Inhale 2 puffs into the lungs.    albuterol (PROVENTIL) 2.5 mg /3 mL (0.083 %) nebulizer solution Take 3 mLs (2.5 mg total) by nebulization every 6 (six) hours as needed for Wheezing.    blood sugar diagnostic (BLOOD GLUCOSE TEST) Strp by Misc.(Non-Drug; Combo Route) route.    blood sugar diagnostic (ONETOUCH ULTRA TEST) Strp 1 strip by Misc.(Non-Drug; Combo Route) route once daily.    clonazepam (KLONOPIN) 0.5 MG disintegrating tablet Take 1 tablet (0.5 mg total) by mouth 2 (two) times daily as needed.    cyanocobalamin 1,000 mcg/mL injection INJECT 1 ML (1,000 MCG TOTAL) INTO THE MUSCLE EVERY 28 DAYS.    ondansetron (ZOFRAN-ODT) 4 MG TbDL Take 1 tablet (4 mg total) by mouth every 6 (six) hours as needed.    oxycodone-acetaminophen (PERCOCET)  mg per tablet Take 1 tablet by mouth every 4 (four) hours as needed for Pain.    promethazine (PHENERGAN) 25 MG tablet TAKE 1 TABLET (25 MG TOTAL) BY MOUTH EVERY 6 (SIX) HOURS AS NEEDED FOR NAUSEA.    sumatriptan (IMITREX STATDOSE) 6 mg/0.5 mL kit INJECT 0.5 ML UNDER THE SKIN AS NEEDED ONE TIME FOR MIGRAINE    zolpidem (AMBIEN) 10 mg Tab Take 1 tablet (10 mg total) by mouth nightly as needed.           Clinical Reference Information           Your Vitals Were     BP Pulse Temp Resp Height Weight    114/80 (BP Location: Left arm, Patient Position: Sitting, BP Method: Manual) 72 98 °F (36.7 °C) (Oral) 16 5' 3" (1.6 m) 71.5 kg (157 lb 10.1 oz)    BMI                27.92 kg/m2          Blood Pressure          Most Recent Value    BP  114/80      Allergies as of 3/29/2017     Aspirin    Sulfa (Sulfonamide Antibiotics)    Iodinated Contrast Media - Iv Dye    " Adhesive    Aspirin    Remicade [Infliximab]    Latex      Immunizations Administered on Date of Encounter - 3/29/2017     None      Orders Placed During Today's Visit     Future Labs/Procedures Expected by Expires    CBC auto differential  3/29/2017 5/28/2018    Comprehensive metabolic panel  3/29/2017 5/28/2018    Vitamin B12  3/29/2017 5/28/2018    Vitamin D  3/29/2017 3/29/2018      Language Assistance Services     ATTENTION: Language assistance services are available, free of charge. Please call [phone].      ATENCIÓN: Si habla español, tiene a madrid disposición servicios gratuitos de asistencia lingüística. Llame al [phone].     CHÚ Ý: N?u b?n nói Ti?ng Vi?t, có các d?ch v? h? tr? ngôn ng? mi?n phí dành cho b?n. G?i s? [phone].         [facility] complies with applicable Federal civil rights laws and does not discriminate on the basis of race, color, national origin, age, disability, or sex.

## 2017-03-30 LAB
25(OH)D3+25(OH)D2 SERPL-MCNC: 40 NG/ML
ALBUMIN SERPL BCP-MCNC: 3.9 G/DL
ALP SERPL-CCNC: 110 U/L
ALT SERPL W/O P-5'-P-CCNC: 18 U/L
ANION GAP SERPL CALC-SCNC: 11 MMOL/L
AST SERPL-CCNC: 19 U/L
BILIRUB SERPL-MCNC: 0.7 MG/DL
BUN SERPL-MCNC: 4 MG/DL
CALCIUM SERPL-MCNC: 9.3 MG/DL
CHLORIDE SERPL-SCNC: 110 MMOL/L
CO2 SERPL-SCNC: 22 MMOL/L
CREAT SERPL-MCNC: 1 MG/DL
EST. GFR  (AFRICAN AMERICAN): >60 ML/MIN/1.73 M^2
EST. GFR  (NON AFRICAN AMERICAN): >60 ML/MIN/1.73 M^2
GLUCOSE SERPL-MCNC: 91 MG/DL
POTASSIUM SERPL-SCNC: 3.8 MMOL/L
PROT SERPL-MCNC: 7.1 G/DL
SODIUM SERPL-SCNC: 143 MMOL/L
VIT B12 SERPL-MCNC: 427 PG/ML

## 2017-04-06 ENCOUNTER — TELEPHONE (OUTPATIENT)
Dept: PSYCHIATRY | Facility: CLINIC | Age: 42
End: 2017-04-06

## 2017-04-06 NOTE — TELEPHONE ENCOUNTER
Ms. Johnson did not show to her initial intake evaluation at 10:00 am today.  A voicemail message was left for her requesting she call this provider to discuss whether she is still interested in treatment.

## 2017-04-10 RX ORDER — OXYCODONE AND ACETAMINOPHEN 10; 325 MG/1; MG/1
1 TABLET ORAL EVERY 4 HOURS PRN
Qty: 150 TABLET | Refills: 0 | Status: SHIPPED | OUTPATIENT
Start: 2017-04-10 | End: 2017-05-01 | Stop reason: SDUPTHER

## 2017-04-10 NOTE — TELEPHONE ENCOUNTER
----- Message from Alphonse Bills sent at 4/10/2017  7:07 AM CDT -----  Contact: self [phone]  Type: Rx    Name of medication(s):  oxycodone-acetaminophen (PERCOCET)  mg per tablet    Is this a refill? New rx? Refill     Who prescribed medication?    Pharmacy Name, Phone, & Location:  from the office .     Comments: please advise, Thanks  !

## 2017-04-21 ENCOUNTER — PATIENT MESSAGE (OUTPATIENT)
Dept: WOUND CARE | Facility: CLINIC | Age: 42
End: 2017-04-21

## 2017-04-21 ENCOUNTER — PATIENT MESSAGE (OUTPATIENT)
Dept: GASTROENTEROLOGY | Facility: CLINIC | Age: 42
End: 2017-04-21

## 2017-04-21 NOTE — TELEPHONE ENCOUNTER
Patient emailed complaining of pain to stoma with minimal blood.  She also emailed Rozina Mcdonald who took care of the complaint.    GERI

## 2017-05-01 RX ORDER — OXYCODONE AND ACETAMINOPHEN 10; 325 MG/1; MG/1
1 TABLET ORAL EVERY 4 HOURS PRN
Qty: 150 TABLET | Refills: 0 | Status: SHIPPED | OUTPATIENT
Start: 2017-05-01 | End: 2017-05-23 | Stop reason: SDUPTHER

## 2017-05-01 NOTE — TELEPHONE ENCOUNTER
----- Message from Jeannine Vazquez sent at 5/1/2017  7:49 AM CDT -----  Contact: call [phone]  RX request - refill or new RX.  Is this a refill or new RX: refill   RX name and strength: oxycodone-acetaminophen (PERCOCET)  mg per tablet  Directions:   Is this a 30 day or 90 day RX:  30 day  Pharmacy name and phone #:   Comments:  Please call when ready to

## 2017-05-08 ENCOUNTER — TELEPHONE (OUTPATIENT)
Dept: GASTROENTEROLOGY | Facility: CLINIC | Age: 42
End: 2017-05-08

## 2017-05-08 NOTE — TELEPHONE ENCOUNTER
----- Message from Kirill Briceno sent at 5/5/2017  4:54 PM CDT -----  Contact: Pt:[phone]  Pt called and states she would like to speak with 's nurse to schedule an appointment she is having issues with her stomach.

## 2017-05-09 NOTE — TELEPHONE ENCOUNTER
Called and spoke with pt  She stated that her stomach is bulged on the Right side of her stoma. She thinks it may be hernia. She noticed the change around a month ago.  When she is laying down flat nothing comes out. Only comes out if she lays on her side or is moving around.  Pain is a 8/10 when she coughs.  5-6/10 around the stoma regularly since she got it.     No meds    Last labs 03/29

## 2017-05-09 NOTE — TELEPHONE ENCOUNTER
Called and spoke with pt  I explained if she thinks it is a hernia she should contact the surgeon and see what they recommend. I also told her with pain 8/10 she should go to the ER.  She expressed understanding and appreciated the call

## 2017-05-09 NOTE — TELEPHONE ENCOUNTER
Patient calling with what sounds like abdominal pain secondary to a hernia.  Dr. Currie is out of the office until 5/18/2017.  Recommend she f/u with her surgeon for evaluation if this is a hernia.  8/10 abdominal pain warrants an ED visit to r/o incarcerated hernia.      KG

## 2017-05-12 ENCOUNTER — OFFICE VISIT (OUTPATIENT)
Dept: INTERNAL MEDICINE | Facility: CLINIC | Age: 42
End: 2017-05-12
Payer: COMMERCIAL

## 2017-05-12 VITALS
WEIGHT: 156.5 LBS | TEMPERATURE: 98 F | SYSTOLIC BLOOD PRESSURE: 116 MMHG | DIASTOLIC BLOOD PRESSURE: 82 MMHG | BODY MASS INDEX: 27.73 KG/M2 | HEIGHT: 63 IN | HEART RATE: 90 BPM

## 2017-05-12 DIAGNOSIS — M79.7 FIBROMYALGIA: Primary | ICD-10-CM

## 2017-05-12 DIAGNOSIS — R10.9 ABDOMINAL PAIN, UNSPECIFIED LOCATION: ICD-10-CM

## 2017-05-12 PROCEDURE — 99999 PR PBB SHADOW E&M-EST. PATIENT-LVL IV: CPT | Mod: PBBFAC,,, | Performed by: INTERNAL MEDICINE

## 2017-05-12 PROCEDURE — 99213 OFFICE O/P EST LOW 20 MIN: CPT | Mod: S$GLB,,, | Performed by: INTERNAL MEDICINE

## 2017-05-12 PROCEDURE — 1160F RVW MEDS BY RX/DR IN RCRD: CPT | Mod: S$GLB,,, | Performed by: INTERNAL MEDICINE

## 2017-05-12 RX ORDER — CODEINE PHOSPHATE AND GUAIFENESIN 10; 100 MG/5ML; MG/5ML
5 SOLUTION ORAL 3 TIMES DAILY PRN
Qty: 180 ML | Refills: 0 | Status: SHIPPED | OUTPATIENT
Start: 2017-05-12 | End: 2018-01-22 | Stop reason: SDUPTHER

## 2017-05-12 NOTE — PROGRESS NOTES
CC: followup of fibromyalgia  HPI:  The patient is a 41 y.o. year old female who presents to the office for followup of fibromyalgia.  Her symptoms have flared up recently.  Currently, she complains of right ear pain, migraine headaches, nausea, shortness of breath and chest pressure.  She also complains of abdominal pain with cough and sneezing, as well as feeling weak.      PAST MEDICAL HISTORY:  Past Medical History:   Diagnosis Date    Anemia     Asthma     B12 deficiency     Crohn's disease     History of shingles     Lupus     Migraine headache     Raynaud disease     Reactive hypoglycemia     Seizures 2004    no medication     Sleep apnea     Non compliant with CPAP       SURGICAL HISTORY:  Past Surgical History:   Procedure Laterality Date    ABDOMINAL SURGERY      COLON SURGERY      95% of colon removed 2010    COLONOSCOPY      COLOSTOMY      HYSTERECTOMY      due to endometriosis    ILEOSTOMY      LAPAROSCOPY W/ MINI-LAPAROTOMY      large intestine removed      just a portion    pelvic mass removal      REVISION COLOSTOMY  2010    STOMACH SURGERY      TONSILLECTOMY, ADENOIDECTOMY         MEDS:  Medcard reviewed and updated    ALLERGIES: Allergy Card reviewed and updated    SOCIAL HISTORY:   The patient is a nonsmoker.    PE:   APPEARANCE: Well nourished, well developed, in no acute distress.      EARS: TM's intact. No retraction or perforation.    MOUTH & THROAT: No tonsillar enlargement. No pharyngeal erythema or exudate. No stridor.  CHEST: Lungs clear to auscultation with unlabored respirations.  CARDIOVASCULAR: Normal S1, S2. No murmurs. No carotid bruits. No pedal edema.  ABDOMEN: Soft. Positive colostomy.  Mild swelling around colostomy, no erythema.  PSYCHIATRIC: The patient is oriented to person, place, and time and has a pleasant affect.        ASSESSMENT/PLAN:  Emi was seen today for fibromyalgia.    Diagnoses and all orders for this visit:    Fibromyalgia  -     Continue  pain medication    Abdominal pain, unspecified location  -     CT Abdomen Pelvis  Without Contrast; Future  -      Evaluate for possible hernia around colostomy

## 2017-05-12 NOTE — MR AVS SNAPSHOT
[facility]   [facility]  [place] [address]  Phone: [phone]  Fax: [phone]                  Emi Johnson   2017 9:40 AM   Office Visit    Description:  Female : 1975   Provider:  Bianca Rutledge MD   Department:  [facility]           Reason for Visit     Fibromyalgia           Diagnoses this Visit        Comments    Fibromyalgia    -  Primary     Abdominal pain, unspecified location                To Do List           Future Appointments        Provider Department Dept Phone    2017 9:40 AM Bianca Rutledge MD [facility] Internal Medicine [phone]      Goals (5 Years of Data)     None      Follow-Up and Disposition     Return in about 3 months (around 2017).      [facility] On Call     [facility] On Call Nurse Care Line -  Assistance  Unless otherwise directed by your provider, please contact [facility], our nurse care line that is available for  assistance.     Registered nurses in the [facility] On Call Center provide: appointment scheduling, clinical advisement, health education, and other advisory services.  Call: [phone] (toll free)               Medications           Message regarding Medications     Verify the changes and/or additions to your medication regime listed below are the same as discussed with your clinician today.  If any of these changes or additions are incorrect, please notify your healthcare provider.             Verify that the below list of medications is an accurate representation of the medications you are currently taking.  If none reported, the list may be blank. If incorrect, please contact your healthcare provider. Carry this list with you in case of emergency.           Current Medications     blood sugar diagnostic (BLOOD GLUCOSE TEST) Strp by Misc.(Non-Drug; Combo Route) route.    blood sugar diagnostic (ONETOUCH ULTRA TEST) Strp 1 strip by Misc.(Non-Drug; Combo Route)  "route once daily.    clonazepam (KLONOPIN) 0.5 MG disintegrating tablet Take 1 tablet (0.5 mg total) by mouth 2 (two) times daily as needed.    cyanocobalamin 1,000 mcg/mL injection INJECT 1 ML (1,000 MCG TOTAL) INTO THE MUSCLE EVERY 28 DAYS.    ondansetron (ZOFRAN-ODT) 4 MG TbDL Take 1 tablet (4 mg total) by mouth every 6 (six) hours as needed.    oxycodone-acetaminophen (PERCOCET)  mg per tablet Take 1 tablet by mouth every 4 (four) hours as needed for Pain.    promethazine (PHENERGAN) 25 MG tablet TAKE 1 TABLET (25 MG TOTAL) BY MOUTH EVERY 6 (SIX) HOURS AS NEEDED FOR NAUSEA.    sumatriptan (IMITREX STATDOSE) 6 mg/0.5 mL kit INJECT 0.5 ML UNDER THE SKIN AS NEEDED ONE TIME FOR MIGRAINE    zolpidem (AMBIEN) 10 mg Tab Take 1 tablet (10 mg total) by mouth nightly as needed.    albuterol (PROAIR HFA) 90 mcg/actuation inhaler Inhale 2 puffs into the lungs.    albuterol (PROVENTIL) 2.5 mg /3 mL (0.083 %) nebulizer solution Take 3 mLs (2.5 mg total) by nebulization every 6 (six) hours as needed for Wheezing.           Clinical Reference Information           Your Vitals Were     BP Pulse Temp Height Weight BMI    116/82 90 98.3 °F (36.8 °C) 5' 3" (1.6 m) 71 kg (156 lb 8.4 oz) 27.73 kg/m2      Blood Pressure          Most Recent Value    BP  116/82      Allergies as of 5/12/2017     Aspirin    Sulfa (Sulfonamide Antibiotics)    Iodinated Contrast Media - Oral And Iv Dye    Adhesive    Aspirin    Remicade [Infliximab]    Latex      Immunizations Administered on Date of Encounter - 5/12/2017     None      Orders Placed During Today's Visit     Future Labs/Procedures Expected by Expires    CT Abdomen Pelvis  Without Contrast  5/12/2017 5/12/2018      Language Assistance Services     ATTENTION: Language assistance services are available, free of charge. Please call [phone].      ATENCIÓN: Si habla español, tiene a madrid disposición servicios gratuitos de asistencia lingüística. Llame al [phone].     DAMIEN Ý: N?u " b?n nói Ti?ng Vi?t, có các d?ch v? h? tr? ngôn ng? mi?n phí dành cho b?n. G?i s? [phone].         [facility] complies with applicable Federal civil rights laws and does not discriminate on the basis of race, color, national origin, age, disability, or sex.

## 2017-05-23 RX ORDER — OXYCODONE AND ACETAMINOPHEN 10; 325 MG/1; MG/1
1 TABLET ORAL EVERY 4 HOURS PRN
Qty: 150 TABLET | Refills: 0 | Status: SHIPPED | OUTPATIENT
Start: 2017-05-23 | End: 2017-06-13 | Stop reason: SDUPTHER

## 2017-05-23 NOTE — TELEPHONE ENCOUNTER
----- Message from Alycia Malagon sent at 5/23/2017  7:06 AM CDT -----  Contact: patient [phone]  Pt called to request percocet 10 mg.

## 2017-06-13 RX ORDER — OXYCODONE AND ACETAMINOPHEN 10; 325 MG/1; MG/1
1 TABLET ORAL EVERY 4 HOURS PRN
Qty: 150 TABLET | Refills: 0 | Status: SHIPPED | OUTPATIENT
Start: 2017-06-13 | End: 2017-07-05 | Stop reason: SDUPTHER

## 2017-06-13 NOTE — TELEPHONE ENCOUNTER
----- Message from Karine Hampton sent at 6/13/2017  7:53 AM CDT -----  Contact: Mobile: [phone]   RX request - refill or new RX.  Is this a refill or new RX:  Refill  RX name and strength: Oxycodone 10 mg  Directions: take one pill every four hours for pain  Is this a 30 day or 90 day RX:  30  Pharmacy name and phone #:     Comments:  Call when ready for

## 2017-06-19 RX ORDER — ZOLPIDEM TARTRATE 10 MG/1
10 TABLET ORAL NIGHTLY PRN
Qty: 30 TABLET | Refills: 3 | Status: SHIPPED | OUTPATIENT
Start: 2017-06-19 | End: 2017-12-07 | Stop reason: SDUPTHER

## 2017-06-19 NOTE — TELEPHONE ENCOUNTER
----- Message from Angeles Cho sent at 6/19/2017  8:06 AM CDT -----  Contact: patient- [phone]  RX request - refill or new RX.  Is this a refill or new RX:  New Rx  RX name and strength: zolpidem (AMBIEN) 10 mg Tab  Directions:   Is this a 30 day or 90 day RX: 30   Pharmacy name and phone #: [facility] [phone] (Phone)  [phone] (Fax)  Comments:

## 2017-07-05 NOTE — TELEPHONE ENCOUNTER
----- Message from Mehnaz Rodgers sent at 7/3/2017  8:38 AM CDT -----  Contact: Pt [phone]  RX request - refill or new RX.  Is this a refill or new RX:  New  RX name and strength: Oxycodone/apap 10/325 mg  Directions: 1 T Q 4 H  Is this a 30 day or 90 day RX:  30  Pharmacy name and phone #: Pt will  hard copy when ready  Comments:

## 2017-07-06 RX ORDER — OXYCODONE AND ACETAMINOPHEN 10; 325 MG/1; MG/1
1 TABLET ORAL EVERY 4 HOURS PRN
Qty: 150 TABLET | Refills: 0 | Status: SHIPPED | OUTPATIENT
Start: 2017-07-06 | End: 2017-07-25 | Stop reason: SDUPTHER

## 2017-07-07 ENCOUNTER — TELEPHONE (OUTPATIENT)
Dept: INTERNAL MEDICINE | Facility: CLINIC | Age: 42
End: 2017-07-07

## 2017-07-25 RX ORDER — OXYCODONE AND ACETAMINOPHEN 10; 325 MG/1; MG/1
1 TABLET ORAL EVERY 4 HOURS PRN
Qty: 150 TABLET | Refills: 0 | Status: SHIPPED | OUTPATIENT
Start: 2017-07-25 | End: 2017-08-14 | Stop reason: SDUPTHER

## 2017-07-25 NOTE — TELEPHONE ENCOUNTER
----- Message from Alycia Malagon sent at 7/25/2017  7:09 AM CDT -----  Contact: patient [phone]  Pt called to request a rx for oxycodone and has been advised that someone will call when rx is ready to .

## 2017-08-07 ENCOUNTER — TELEPHONE (OUTPATIENT)
Dept: GASTROENTEROLOGY | Facility: CLINIC | Age: 42
End: 2017-08-07

## 2017-08-07 ENCOUNTER — TELEPHONE (OUTPATIENT)
Dept: ENDOSCOPY | Facility: HOSPITAL | Age: 42
End: 2017-08-07

## 2017-08-07 DIAGNOSIS — K50.00 CROHN'S DISEASE OF SMALL INTESTINE WITHOUT COMPLICATION: Primary | ICD-10-CM

## 2017-08-07 DIAGNOSIS — K13.79 RECURRENT ORAL ULCERS: ICD-10-CM

## 2017-08-07 NOTE — TELEPHONE ENCOUNTER
Called patient and started with oral ulcers 2 days ago and is concerned about having recurrent Crohn's disease.  She agrees to have ileoscopy through stoma to assess for Crohn's disease. No other bowel symptom change or change in ostomy output.     Orders for ileoscopy placed.     My team to route message to schedulers to have it scheduled

## 2017-08-07 NOTE — TELEPHONE ENCOUNTER
----- Message from Jackelin Butterfield sent at 8/7/2017  9:11 AM CDT -----  Contact: pt#[phone]  Pt states that she has a flare up with chron's. She states that she has ulcers in her mouth. Please call

## 2017-08-08 ENCOUNTER — TELEPHONE (OUTPATIENT)
Dept: INTERNAL MEDICINE | Facility: CLINIC | Age: 42
End: 2017-08-08

## 2017-08-08 NOTE — TELEPHONE ENCOUNTER
----- Message from Magali Abdul sent at 8/8/2017 11:52 AM CDT -----  Contact: pt [phone]  Pt said it's called Lidocaine oral suspension

## 2017-08-08 NOTE — TELEPHONE ENCOUNTER
----- Message from Alycia Malagon sent at 8/7/2017  7:43 AM CDT -----  Contact: patient [phone]  Pt has two mouth ulcers and pt called to ask if you would order lidocaine/ not the topical but one she can put in her mouth. It numbs the ulcers.  Pt said that this is a sign that her Chron's is back.     Pharmacy on file

## 2017-08-14 RX ORDER — OXYCODONE AND ACETAMINOPHEN 10; 325 MG/1; MG/1
1 TABLET ORAL EVERY 4 HOURS PRN
Qty: 150 TABLET | Refills: 0 | Status: SHIPPED | OUTPATIENT
Start: 2017-08-14 | End: 2017-08-31 | Stop reason: SDUPTHER

## 2017-08-14 NOTE — TELEPHONE ENCOUNTER
----- Message from Angeles Cho sent at 8/14/2017  8:43 AM CDT -----  Contact: patient- [phone]  Need to  Rx for oxycodone-acetaminophen (PERCOCET)

## 2017-08-23 ENCOUNTER — HOSPITAL ENCOUNTER (OUTPATIENT)
Facility: HOSPITAL | Age: 42
Discharge: HOME | End: 2017-08-23
Attending: INTERNAL MEDICINE | Admitting: INTERNAL MEDICINE
Payer: COMMERCIAL

## 2017-08-23 ENCOUNTER — ANESTHESIA EVENT (OUTPATIENT)
Dept: ENDOSCOPY | Facility: HOSPITAL | Age: 42
End: 2017-08-23
Payer: COMMERCIAL

## 2017-08-23 ENCOUNTER — ANESTHESIA (OUTPATIENT)
Dept: ENDOSCOPY | Facility: HOSPITAL | Age: 42
End: 2017-08-23
Payer: COMMERCIAL

## 2017-08-23 ENCOUNTER — SURGERY (OUTPATIENT)
Age: 42
End: 2017-08-23

## 2017-08-23 VITALS
HEIGHT: 63 IN | SYSTOLIC BLOOD PRESSURE: 115 MMHG | DIASTOLIC BLOOD PRESSURE: 67 MMHG | WEIGHT: 170 LBS | HEART RATE: 67 BPM | RESPIRATION RATE: 16 BRPM | BODY MASS INDEX: 30.12 KG/M2 | TEMPERATURE: 98 F | OXYGEN SATURATION: 96 %

## 2017-08-23 VITALS — RESPIRATION RATE: 30 BRPM

## 2017-08-23 DIAGNOSIS — K50.00 CROHN'S DISEASE OF SMALL INTESTINE WITHOUT COMPLICATION: Primary | ICD-10-CM

## 2017-08-23 DIAGNOSIS — K50.90 CROHN'S DISEASE: ICD-10-CM

## 2017-08-23 PROCEDURE — 37000008 HC ANESTHESIA 1ST 15 MINUTES: Performed by: INTERNAL MEDICINE

## 2017-08-23 PROCEDURE — D9220A PRA ANESTHESIA: Mod: ANES,,, | Performed by: ANESTHESIOLOGY

## 2017-08-23 PROCEDURE — 25000003 PHARM REV CODE 250: Performed by: INTERNAL MEDICINE

## 2017-08-23 PROCEDURE — 37000009 HC ANESTHESIA EA ADD 15 MINS: Performed by: INTERNAL MEDICINE

## 2017-08-23 PROCEDURE — 44380 SMALL BOWEL ENDOSCOPY BR/WA: CPT | Performed by: INTERNAL MEDICINE

## 2017-08-23 PROCEDURE — 63600175 PHARM REV CODE 636 W HCPCS: Performed by: NURSE ANESTHETIST, CERTIFIED REGISTERED

## 2017-08-23 PROCEDURE — D9220A PRA ANESTHESIA: Mod: CRNA,,, | Performed by: NURSE ANESTHETIST, CERTIFIED REGISTERED

## 2017-08-23 PROCEDURE — 44380 SMALL BOWEL ENDOSCOPY BR/WA: CPT | Mod: ,,, | Performed by: INTERNAL MEDICINE

## 2017-08-23 RX ORDER — LIDOCAINE HCL/PF 100 MG/5ML
SYRINGE (ML) INTRAVENOUS
Status: DISCONTINUED | OUTPATIENT
Start: 2017-08-23 | End: 2017-08-23

## 2017-08-23 RX ORDER — PROPOFOL 10 MG/ML
VIAL (ML) INTRAVENOUS
Status: DISCONTINUED | OUTPATIENT
Start: 2017-08-23 | End: 2017-08-23

## 2017-08-23 RX ORDER — SODIUM CHLORIDE 9 MG/ML
INJECTION, SOLUTION INTRAVENOUS CONTINUOUS
Status: DISCONTINUED | OUTPATIENT
Start: 2017-08-23 | End: 2017-08-23 | Stop reason: HOSPADM

## 2017-08-23 RX ORDER — ONDANSETRON 2 MG/ML
INJECTION INTRAMUSCULAR; INTRAVENOUS
Status: DISCONTINUED | OUTPATIENT
Start: 2017-08-23 | End: 2017-08-23

## 2017-08-23 RX ADMIN — SODIUM CHLORIDE: 0.9 INJECTION, SOLUTION INTRAVENOUS at 10:08

## 2017-08-23 RX ADMIN — PROPOFOL 50 MG: 10 INJECTION, EMULSION INTRAVENOUS at 10:08

## 2017-08-23 RX ADMIN — LIDOCAINE HYDROCHLORIDE 50 MG: 20 INJECTION, SOLUTION INTRAVENOUS at 10:08

## 2017-08-23 RX ADMIN — ONDANSETRON 4 MG: 2 INJECTION INTRAMUSCULAR; INTRAVENOUS at 10:08

## 2017-08-23 RX ADMIN — PROPOFOL 100 MG: 10 INJECTION, EMULSION INTRAVENOUS at 10:08

## 2017-08-23 NOTE — PLAN OF CARE
Dr Villar aware of reactive glycemia, sts ok to not not check blood sugar since patient home blood sugar has been normal.

## 2017-08-23 NOTE — ANESTHESIA POSTPROCEDURE EVALUATION
"Anesthesia Post Evaluation    Patient: Emi Johnson    Procedure(s) Performed: Procedure(s) (LRB):  ILEOSCOPY through stoma (N/A)    Final Anesthesia Type: general  Patient location during evaluation: PACU  Patient participation: Yes- Able to Participate  Level of consciousness: awake and alert and oriented  Post-procedure vital signs: reviewed and stable  Pain management: adequate  Airway patency: patent  PONV status at discharge: No PONV  Anesthetic complications: no      Cardiovascular status: blood pressure returned to baseline and hemodynamically stable  Respiratory status: unassisted, spontaneous ventilation and room air  Hydration status: euvolemic  Follow-up not needed.        Visit Vitals  /62 (BP Location: Left arm, Patient Position: Sitting)   Pulse 88   Temp 36.6 °C (97.8 °F)   Resp 17   Ht 5' 3" (1.6 m)   Wt 77.1 kg (170 lb)   SpO2 96%   Breastfeeding? No   BMI 30.11 kg/m²       Pain/Elliott Score: Pain Assessment Performed: Yes (8/23/2017 11:12 AM)  Presence of Pain: denies (8/23/2017 11:12 AM)  Pain Rating Prior to Med Admin: 6 (8/23/2017 10:29 AM)  Pain Rating Post Med Admin: 6 (8/23/2017 10:29 AM)  Elliott Score: 10 (8/23/2017 11:12 AM)      "

## 2017-08-23 NOTE — ANESTHESIA PREPROCEDURE EVALUATION
08/23/2017  Emi Johnson is a 41 y.o., female.    Pre-op Assessment         Review of Systems  Anesthesia Hx:  No problems with previous Anesthesia Hx of Anesthetic complications           ponv             Social:  Non-Smoker    Cardiovascular:   Exercise tolerance: good Denies Hypertension.  Denies CAD.     Denies Angina.  Functional Capacity Can you climb two flights of stairs? ==> Yes    Pulmonary:   Asthma Denies Recent URI. Sleep Apnea    Renal/:  Renal/ Normal     Hepatic/GI:   Denies PUD. Denies Hiatal Hernia. GERD Denies Liver Disease.  Denies Hepatitis.    Neurological:   Denies CVA. Seizures    Endocrine:   Denies Diabetes. Denies Hypothyroidism.        Physical Exam  General:  Well nourished    Airway/Jaw/Neck:  Airway Findings: Mouth Opening: Normal Tongue: Normal  General Airway Assessment: Adult, Possible difficult intubation            High arched palate, small oropharynx                 Mallampati: II  Improves to II with phonation.  TM Distance: Normal, at least 6 cm  Jaw/Neck Findings:  Neck ROM: Normal ROM  Neck Findings:     Eyes/Ears/Nose:  EYES/EARS/NOSE FINDINGS: Normal   Dental:  Dental Findings: In tact   Chest/Lungs:  Chest/Lungs Findings: Clear to auscultation     Heart/Vascular:  Heart Findings: Rate: Normal  Rhythm: Regular Rhythm  Sounds: Normal        Mental Status:  Mental Status Findings:  Alert and Oriented         Anesthesia Plan  Type of Anesthesia, risks & benefits discussed:  Anesthesia Type:  general  Patient's Preference:   Intra-op Monitoring Plan: standard ASA monitors  Intra-op Monitoring Plan Comments:   Post Op Pain Control Plan:   Post Op Pain Control Plan Comments:   Induction:   IV  Beta Blocker:  Patient is not currently on a Beta-Blocker (No further documentation required).       Informed Consent: Patient understands risks and agrees with Anesthesia  plan.  Questions answered. Anesthesia consent signed with patient.  ASA Score: 2     Day of Surgery Review of History & Physical: I have interviewed and examined the patient. I have reviewed the patient's H&P dated:    H&P update referred to the provider.         Ready For Surgery From Anesthesia Perspective.

## 2017-08-23 NOTE — TRANSFER OF CARE
"Anesthesia Transfer of Care Note    Patient: Emi Johnson    Procedure(s) Performed: Procedure(s) (LRB):  ILEOSCOPY through stoma (N/A)    Patient location: PACU    Anesthesia Type: general    Transport from OR: Transported from OR on room air with adequate spontaneous ventilation    Post pain: adequate analgesia    Post assessment: no apparent anesthetic complications and tolerated procedure well    Post vital signs: stable    Level of consciousness: awake    Nausea/Vomiting: no nausea/vomiting    Complications: none    Transfer of care protocol was followed      Last vitals:   Visit Vitals  /70 (BP Location: Left arm, Patient Position: Lying)   Pulse 79   Temp 37.2 °C (99 °F) (Temporal)   Resp 18   Ht 5' 3" (1.6 m)   Wt 77.1 kg (170 lb)   SpO2 100%   Breastfeeding? No   BMI 30.11 kg/m²     "

## 2017-08-23 NOTE — H&P
Short Stay Endoscopy History and Physical    PCP - Bianca Rutledge MD  Referring Physician - Eev Currie MD  [address] [place] [address]    Procedure - ileoscopy through stoma  ASA - per anesthesia  Mallampati - per anesthesia  History of Anesthesia problems - no  Family history Anesthesia problems -  no   Plan of anesthesia - General    HPI  41 y.o. female  Reason for procedure:  Crohn's disease    ROS:  Constitutional: No fevers, chills, No weight loss  CV: No chest pain  Pulm: No cough, No shortness of breath  GI: see HPI    Medical History:  has a past medical history of Anemia; Asthma; B12 deficiency; Crohn's disease; History of shingles; Lupus; Migraine headache; Raynaud disease; Reactive hypoglycemia; Seizures (2004); and Sleep apnea.    Surgical History:  has a past surgical history that includes Hysterectomy; TONSILLECTOMY, ADENOIDECTOMY; Laparoscopy w/ mini-laparotomy; large intestine removed; pelvic mass removal; Abdominal surgery; Stomach surgery; Colon surgery; Colostomy; Revision Colostomy (2010); Ileostomy; and Colonoscopy.    Family History: family history includes Breast cancer in her sister; Cancer in her maternal aunt, mother, and paternal grandmother; Colon cancer in her maternal grandmother; Migraines in her sister; Seizures in her sister.. Otherwise no colon cancer, inflammatory bowel disease, or GI malignancies.    Social History:  reports that she has never smoked. She has never used smokeless tobacco. She reports that she does not drink alcohol or use drugs.    Review of patient's allergies indicates:   Allergen Reactions    Aspirin      Other reaction(s): vomiting, contraindicated for bleeding from crohns  Other reaction(s): bleeding    Sulfa (sulfonamide antibiotics) Hives and Rash    Iodinated contrast- oral and iv dye Other (See Comments)    Adhesive     Aspirin     Remicade [infliximab] Other (See Comments)     Medical induced lupus    Latex Rash      Other reaction(s): Hives       Medications:   Prescriptions Prior to Admission   Medication Sig Dispense Refill Last Dose    albuterol (PROAIR HFA) 90 mcg/actuation inhaler Inhale 2 puffs into the lungs.   Not Taking    albuterol (PROVENTIL) 2.5 mg /3 mL (0.083 %) nebulizer solution Take 3 mLs (2.5 mg total) by nebulization every 6 (six) hours as needed for Wheezing. 1 Box 3 Not Taking    blood sugar diagnostic (BLOOD GLUCOSE TEST) Strp by Misc.(Non-Drug; Combo Route) route.   Taking    blood sugar diagnostic (ONETOUCH ULTRA TEST) Strp 1 strip by Misc.(Non-Drug; Combo Route) route once daily. 100 strip 2 Taking    clonazepam (KLONOPIN) 0.5 MG disintegrating tablet Take 1 tablet (0.5 mg total) by mouth 2 (two) times daily as needed. 60 tablet 3 Taking    cyanocobalamin 1,000 mcg/mL injection INJECT 1 ML (1,000 MCG TOTAL) INTO THE MUSCLE EVERY 28 DAYS. 10 mL 1 Taking    guaifenesin-codeine 100-10 mg/5 ml (CHERATUSSIN AC)  mg/5 mL syrup Take 5 mLs by mouth 3 (three) times daily as needed for Cough. Caution- medication is sedating. 180 mL 0     ondansetron (ZOFRAN-ODT) 4 MG TbDL Take 1 tablet (4 mg total) by mouth every 6 (six) hours as needed. 15 tablet 0 Taking    oxycodone-acetaminophen (PERCOCET)  mg per tablet Take 1 tablet by mouth every 4 (four) hours as needed for Pain. 150 tablet 0     promethazine (PHENERGAN) 25 MG tablet TAKE 1 TABLET (25 MG TOTAL) BY MOUTH EVERY 6 (SIX) HOURS AS NEEDED FOR NAUSEA. 30 tablet 3 Taking    sumatriptan (IMITREX STATDOSE) 6 mg/0.5 mL kit INJECT 0.5 ML UNDER THE SKIN AS NEEDED ONE TIME FOR MIGRAINE  1 Taking    zolpidem (AMBIEN) 10 mg Tab Take 1 tablet (10 mg total) by mouth nightly as needed. 30 tablet 3        Physical Exam:    Vital Signs: There were no vitals filed for this visit.    General Appearance: Well appearing in no acute distress  Lungs: CTA anteriorly  Heart:  Regular rate, S1, S2 normal, no murmurs heard.  Abdomen: Soft, non tender, non  distended with normal bowel sounds, no masses  Extremities: No edema    Labs:  Lab Results   Component Value Date    WBC 5.51 03/29/2017    HGB 14.4 03/29/2017    HCT 42.7 03/29/2017     03/29/2017    ALT 18 03/29/2017    AST 19 03/29/2017     03/29/2017    K 3.8 03/29/2017     03/29/2017    CREATININE 1.0 03/29/2017    BUN 4 (L) 03/29/2017    CO2 22 (L) 03/29/2017    TSH 1.937 03/10/2016    INR 1.0 01/08/2014    GLUF 94 09/03/2009    HGBA1C 5.3 09/29/2011       I have explained the risks and benefits of this endoscopic procedure to the patient including but not limited to bleeding, inflammation, infection, perforation, and death.      Eve Currie MD

## 2017-08-23 NOTE — PATIENT INSTRUCTIONS
Discharge Summary/Instructions after an Endoscopic Procedure  Patient Name: Emi Johnson  Patient MRN: [id number]  Patient YOB: 1975 Wednesday, August 23, 2017  Eve Currie MD  RESTRICTIONS:  During your procedure today, you received medications for sedation.  These   medications may affect your judgment, balance and coordination.  Therefore,   for 24 hours, you have the following restrictions:   - DO NOT drive a car, operate machinery, make legal/financial decisions,   sign important papers or drink alcohol.    ACTIVITY:  The following day: return to full activity including work, except no heavy   lifting, straining or running for 3 days if polyps were removed.  DIET:  Eat and drink normally unless instructed otherwise.  TREATMENT FOR COMMON SIDE EFFECTS:  - Mild abdominal pain, belching, bloating or excessive gas: rest, eat   lightly and use a heating pad.  - Sore Throat: treat with throat lozenges and/or gargle with warm salt   water.  SYMPTOMS TO WATCH FOR AND REPORT TO YOUR PHYSICIAN:  1. Abdominal pain or bloating, other than gas cramps.  2. Chest pain.  3. Back pain.  4. Chills or fever occurring within 24 hours after the procedure.  5. Rectal bleeding, which would show as bright red, maroon, or black stools.   (A tablespoon of blood from the rectum is not serious, especially if   hemorrhoids are present.)  6. Vomiting.  7. Weakness or dizziness.  8. Because air was used during the procedure, expelling large amounts of air   from your rectum or belching is normal.  9. If a bowel prep was taken, you may not have a bowel movement for 1-3   days.  This is normal.  GO DIRECTLY TO THE EMERGENCY ROOM IF YOU HAVE ANY OF THE FOLLOWING:   Difficulty breathing  Chills and/or fever over 101 F   Persistent vomiting and/or vomiting blood   Severe abdominal pain   Severe chest pain   Black, tarry stools   Bleeding- more than one tablespoon  Your doctor recommends these additional instructions:  If any  biopsies were taken, your doctorâs clinic will call you in 1 to 2   weeks with any results.  You are being discharged to home.   You have a contact number available for emergencies.  The signs and symptoms   of potential delayed complications were discussed with you.  You may return   to normal activities tomorrow.  Written discharge instructions were   provided to you.   Resume your previous diet.   Your physician has recommended a repeat ileoscopy in one year to assess   disease activity.  For questions, problems or results please call your physician - Eve Currie MD at Work:  [phone].  [facility], EMERGENCY ROOM PHONE NUMBER: [phone]  IF A COMPLICATION OR EMERGENCY SITUATION ARISES AND YOU ARE UNABLE TO REACH   YOUR PHYSICIAN - GO DIRECTLY TO THE EMERGENCY ROOM.  Eve Currie MD  8/23/2017 11:01:01 AM  This report has been verified and signed electronically.

## 2017-08-30 ENCOUNTER — TELEPHONE (OUTPATIENT)
Dept: ENDOSCOPY | Facility: HOSPITAL | Age: 42
End: 2017-08-30

## 2017-08-31 ENCOUNTER — OFFICE VISIT (OUTPATIENT)
Dept: INTERNAL MEDICINE | Facility: CLINIC | Age: 42
End: 2017-08-31
Payer: COMMERCIAL

## 2017-08-31 ENCOUNTER — LAB VISIT (OUTPATIENT)
Dept: LAB | Facility: HOSPITAL | Age: 42
End: 2017-08-31
Attending: INTERNAL MEDICINE
Payer: COMMERCIAL

## 2017-08-31 VITALS
SYSTOLIC BLOOD PRESSURE: 120 MMHG | HEIGHT: 64 IN | HEART RATE: 86 BPM | WEIGHT: 175.06 LBS | BODY MASS INDEX: 29.89 KG/M2 | TEMPERATURE: 98 F | DIASTOLIC BLOOD PRESSURE: 81 MMHG

## 2017-08-31 DIAGNOSIS — R53.83 FATIGUE, UNSPECIFIED TYPE: ICD-10-CM

## 2017-08-31 DIAGNOSIS — R11.0 NAUSEA: ICD-10-CM

## 2017-08-31 DIAGNOSIS — M79.7 FIBROMYALGIA: Primary | ICD-10-CM

## 2017-08-31 LAB
ALBUMIN SERPL BCP-MCNC: 3.9 G/DL
ALP SERPL-CCNC: 109 U/L
ALT SERPL W/O P-5'-P-CCNC: 27 U/L
ANION GAP SERPL CALC-SCNC: 9 MMOL/L
AST SERPL-CCNC: 20 U/L
BASOPHILS # BLD AUTO: 0.03 K/UL
BASOPHILS NFR BLD: 0.5 %
BILIRUB SERPL-MCNC: 0.8 MG/DL
BUN SERPL-MCNC: 17 MG/DL
CALCIUM SERPL-MCNC: 9.6 MG/DL
CHLORIDE SERPL-SCNC: 106 MMOL/L
CO2 SERPL-SCNC: 25 MMOL/L
CREAT SERPL-MCNC: 1 MG/DL
DIFFERENTIAL METHOD: NORMAL
EOSINOPHIL # BLD AUTO: 0.2 K/UL
EOSINOPHIL NFR BLD: 2.8 %
ERYTHROCYTE [DISTWIDTH] IN BLOOD BY AUTOMATED COUNT: 13 %
EST. GFR  (AFRICAN AMERICAN): >60 ML/MIN/1.73 M^2
EST. GFR  (NON AFRICAN AMERICAN): >60 ML/MIN/1.73 M^2
GLUCOSE SERPL-MCNC: 101 MG/DL
HCT VFR BLD AUTO: 44 %
HGB BLD-MCNC: 15.1 G/DL
LYMPHOCYTES # BLD AUTO: 1.7 K/UL
LYMPHOCYTES NFR BLD: 26.4 %
MCH RBC QN AUTO: 28 PG
MCHC RBC AUTO-ENTMCNC: 34.3 G/DL
MCV RBC AUTO: 82 FL
MONOCYTES # BLD AUTO: 0.5 K/UL
MONOCYTES NFR BLD: 7.4 %
NEUTROPHILS # BLD AUTO: 4.1 K/UL
NEUTROPHILS NFR BLD: 62.7 %
PLATELET # BLD AUTO: 192 K/UL
PMV BLD AUTO: 11.4 FL
POTASSIUM SERPL-SCNC: 4.1 MMOL/L
PROT SERPL-MCNC: 7.1 G/DL
RBC # BLD AUTO: 5.39 M/UL
SODIUM SERPL-SCNC: 140 MMOL/L
TSH SERPL DL<=0.005 MIU/L-ACNC: 2.46 UIU/ML
WBC # BLD AUTO: 6.52 K/UL

## 2017-08-31 PROCEDURE — 85025 COMPLETE CBC W/AUTO DIFF WBC: CPT

## 2017-08-31 PROCEDURE — 80053 COMPREHEN METABOLIC PANEL: CPT

## 2017-08-31 PROCEDURE — 84443 ASSAY THYROID STIM HORMONE: CPT

## 2017-08-31 PROCEDURE — 99999 PR PBB SHADOW E&M-EST. PATIENT-LVL III: CPT | Mod: PBBFAC,,, | Performed by: INTERNAL MEDICINE

## 2017-08-31 PROCEDURE — 99214 OFFICE O/P EST MOD 30 MIN: CPT | Mod: S$GLB,,, | Performed by: INTERNAL MEDICINE

## 2017-08-31 PROCEDURE — 36415 COLL VENOUS BLD VENIPUNCTURE: CPT | Mod: PO

## 2017-08-31 PROCEDURE — 3008F BODY MASS INDEX DOCD: CPT | Mod: S$GLB,,, | Performed by: INTERNAL MEDICINE

## 2017-08-31 RX ORDER — OXYCODONE AND ACETAMINOPHEN 10; 325 MG/1; MG/1
1 TABLET ORAL EVERY 4 HOURS PRN
Qty: 150 TABLET | Refills: 0 | Status: SHIPPED | OUTPATIENT
Start: 2017-08-31 | End: 2017-09-26 | Stop reason: SDUPTHER

## 2017-08-31 RX ORDER — ONDANSETRON 4 MG/1
4 TABLET, ORALLY DISINTEGRATING ORAL EVERY 8 HOURS PRN
Qty: 30 TABLET | Refills: 0 | Status: SHIPPED | OUTPATIENT
Start: 2017-08-31 | End: 2018-04-14 | Stop reason: SDUPTHER

## 2017-08-31 RX ORDER — HYDROMORPHONE HYDROCHLORIDE 4 MG/1
4 TABLET ORAL EVERY 6 HOURS PRN
Qty: 30 TABLET | Refills: 0 | Status: SHIPPED | OUTPATIENT
Start: 2017-08-31 | End: 2017-12-07 | Stop reason: SDUPTHER

## 2017-08-31 NOTE — PROGRESS NOTES
CC: followup of fibromyalgia  HPI:  The patient is a 41 y.o. year old female who presents to the office for followup of fibromyalgia.  She reports worsening pain.  She is currently working three jobs to cover the cost of her colostomy bags, $300 a month.  She reports asthma flareup recently.  The patient reports feeling weak and occasional nausea.    PAST MEDICAL HISTORY:  Past Medical History:   Diagnosis Date    Anemia     Asthma     B12 deficiency     Crohn's disease     History of shingles     Lupus     Migraine headache     Raynaud disease     Reactive hypoglycemia     Seizures 2004    no medication     Sleep apnea     Non compliant with CPAP       SURGICAL HISTORY:  Past Surgical History:   Procedure Laterality Date    ABDOMINAL SURGERY      COLON SURGERY      95% of colon removed 2010    COLONOSCOPY      COLOSTOMY      HYSTERECTOMY      due to endometriosis    ILEOSTOMY      LAPAROSCOPY W/ MINI-LAPAROTOMY      large intestine removed      just a portion    pelvic mass removal      REVISION COLOSTOMY  2010    STOMACH SURGERY      TONSILLECTOMY, ADENOIDECTOMY         MEDS:  Medcard reviewed and updated    ALLERGIES: Allergy Card reviewed and updated    SOCIAL HISTORY:   The patient is a nonsmoker.    PE:   APPEARANCE: Well nourished, well developed, in no acute distress.    CHEST: Lungs clear to auscultation with unlabored respirations.  CARDIOVASCULAR: Normal S1, S2. No murmurs. No carotid bruits. No pedal edema.  ABDOMEN: Bowel sounds normal. Not distended. Soft. Positive stoma with colostomy bag.  PSYCHIATRIC: The patient is oriented to person, place, and time and has a pleasant affect.        ASSESSMENT/PLAN:  Emi was seen today for follow-up.    Diagnoses and all orders for this visit:    Fibromyalgia  -     Prescribed Dilaudid for severe pain x 1  -     Refill Percocet    Nausea  -     Prescribed Zofran    Fatigue, unspecified type  -     CBC auto differential; Future  -      Comprehensive metabolic panel; Future  -     TSH; Future    Other orders  -     oxycodone-acetaminophen (PERCOCET)  mg per tablet; Take 1 tablet by mouth every 4 (four) hours as needed for Pain.  -     HYDROmorphone (DILAUDID) 4 MG tablet; Take 1 tablet (4 mg total) by mouth every 6 (six) hours as needed for Pain.  -     ondansetron (ZOFRAN-ODT) 4 MG TbDL; Take 1 tablet (4 mg total) by mouth every 8 (eight) hours as needed.        .

## 2017-09-26 RX ORDER — OXYCODONE AND ACETAMINOPHEN 10; 325 MG/1; MG/1
1 TABLET ORAL EVERY 4 HOURS PRN
Qty: 150 TABLET | Refills: 0 | Status: SHIPPED | OUTPATIENT
Start: 2017-09-26 | End: 2017-10-17 | Stop reason: SDUPTHER

## 2017-09-26 NOTE — TELEPHONE ENCOUNTER
----- Message from Alycia Malagon sent at 9/25/2017  7:42 AM CDT -----  Contact: patient [phone]  Patient called to request a rx for Oxycodone 10mg and has been advised that she will be called when rx is ready to .

## 2017-10-17 RX ORDER — OXYCODONE AND ACETAMINOPHEN 10; 325 MG/1; MG/1
1 TABLET ORAL EVERY 4 HOURS PRN
Qty: 150 TABLET | Refills: 0 | Status: SHIPPED | OUTPATIENT
Start: 2017-10-17 | End: 2017-11-06 | Stop reason: SDUPTHER

## 2017-10-17 NOTE — TELEPHONE ENCOUNTER
----- Message from Magali Abdul sent at 10/17/2017  9:08 AM CDT -----  Contact: pt [phone]  RX request - refill or new RX.  Is this a refill or new RX:  refill  RX name and strength: oxycodone-acetaminophen (PERCOCET)  mg per tablet  Directions:   Is this a 30 day or 90 day RX:      Comments:

## 2017-10-19 ENCOUNTER — PATIENT MESSAGE (OUTPATIENT)
Dept: INTERNAL MEDICINE | Facility: CLINIC | Age: 42
End: 2017-10-19

## 2017-10-31 ENCOUNTER — TELEPHONE (OUTPATIENT)
Dept: INTERNAL MEDICINE | Facility: CLINIC | Age: 42
End: 2017-10-31

## 2017-10-31 NOTE — TELEPHONE ENCOUNTER
----- Message from Rosa Damon sent at 10/31/2017  7:41 AM CDT -----  Contact: self [phone]  Patient would like to get medical advice.  Symptoms (please be specific):  Cough, congestion   How long has patient had these symptoms:  3 days   Pharmacy name and phone #:  [id number] [phone] (Phone)  [phone] (Fax)  Any drug allergies:  See chart   Comments:

## 2017-11-01 RX ORDER — AMOXICILLIN 875 MG/1
875 TABLET, FILM COATED ORAL 2 TIMES DAILY
Qty: 20 TABLET | Refills: 0 | Status: SHIPPED | OUTPATIENT
Start: 2017-11-01 | End: 2020-01-30 | Stop reason: SDUPTHER

## 2017-11-06 RX ORDER — OXYCODONE AND ACETAMINOPHEN 10; 325 MG/1; MG/1
1 TABLET ORAL EVERY 4 HOURS PRN
Qty: 150 TABLET | Refills: 0 | Status: SHIPPED | OUTPATIENT
Start: 2017-11-06 | End: 2017-12-04 | Stop reason: SDUPTHER

## 2017-11-06 NOTE — TELEPHONE ENCOUNTER
----- Message from Florentino Gibson sent at 11/6/2017  7:47 AM CST -----  Contact: Pt at [phone]  RX request - refill or new RX.  Is this a refill or new RX:  refill  RX name and strength: oxycodone-acetaminophen (PERCOCET)  mg per tablet    Pharmacy name and phone # : Pt  at [facility]  Comments:

## 2017-11-07 ENCOUNTER — PATIENT MESSAGE (OUTPATIENT)
Dept: INTERNAL MEDICINE | Facility: CLINIC | Age: 42
End: 2017-11-07

## 2017-11-07 NOTE — TELEPHONE ENCOUNTER
Called to advise the patient the Rx was ready for pickup lmom.  Also sent a message via the portal

## 2017-12-04 NOTE — TELEPHONE ENCOUNTER
----- Message from Alycia Malagon sent at 12/4/2017  7:10 AM CST -----  Contact: patient [phone]  Pt called to request oxycodone and has been advised that she will be called when her rx is ready to

## 2017-12-05 ENCOUNTER — PATIENT MESSAGE (OUTPATIENT)
Dept: INTERNAL MEDICINE | Facility: CLINIC | Age: 42
End: 2017-12-05

## 2017-12-05 RX ORDER — OXYCODONE AND ACETAMINOPHEN 10; 325 MG/1; MG/1
1 TABLET ORAL EVERY 4 HOURS PRN
Qty: 150 TABLET | Refills: 0 | Status: SHIPPED | OUTPATIENT
Start: 2017-12-05 | End: 2017-12-26 | Stop reason: SDUPTHER

## 2017-12-07 ENCOUNTER — TELEPHONE (OUTPATIENT)
Dept: INTERNAL MEDICINE | Facility: CLINIC | Age: 42
End: 2017-12-07

## 2017-12-07 ENCOUNTER — OFFICE VISIT (OUTPATIENT)
Dept: INTERNAL MEDICINE | Facility: CLINIC | Age: 42
End: 2017-12-07
Payer: COMMERCIAL

## 2017-12-07 VITALS
HEIGHT: 63 IN | RESPIRATION RATE: 12 BRPM | TEMPERATURE: 98 F | WEIGHT: 178.56 LBS | DIASTOLIC BLOOD PRESSURE: 70 MMHG | HEART RATE: 92 BPM | BODY MASS INDEX: 31.64 KG/M2 | SYSTOLIC BLOOD PRESSURE: 104 MMHG

## 2017-12-07 DIAGNOSIS — J01.90 ACUTE SINUSITIS, RECURRENCE NOT SPECIFIED, UNSPECIFIED LOCATION: Primary | ICD-10-CM

## 2017-12-07 PROCEDURE — 99213 OFFICE O/P EST LOW 20 MIN: CPT | Mod: S$GLB,,, | Performed by: INTERNAL MEDICINE

## 2017-12-07 PROCEDURE — 99999 PR PBB SHADOW E&M-EST. PATIENT-LVL IV: CPT | Mod: PBBFAC,,, | Performed by: INTERNAL MEDICINE

## 2017-12-07 RX ORDER — HYDROMORPHONE HYDROCHLORIDE 4 MG/1
4 TABLET ORAL EVERY 6 HOURS PRN
Qty: 30 TABLET | Refills: 0 | Status: SHIPPED | OUTPATIENT
Start: 2017-12-07 | End: 2018-03-07

## 2017-12-07 RX ORDER — ZOLPIDEM TARTRATE 10 MG/1
10 TABLET ORAL NIGHTLY PRN
Qty: 30 TABLET | Refills: 3 | Status: SHIPPED | OUTPATIENT
Start: 2017-12-07 | End: 2018-03-07 | Stop reason: SDUPTHER

## 2017-12-07 RX ORDER — DOXYCYCLINE HYCLATE 100 MG
100 TABLET ORAL 2 TIMES DAILY
Qty: 14 TABLET | Refills: 0 | Status: SHIPPED | OUTPATIENT
Start: 2017-12-07 | End: 2018-01-16 | Stop reason: ALTCHOICE

## 2017-12-07 NOTE — LETTER
December 7, 2017      [facility]  [address]  [place] [address]  Phone: [phone]  Fax: [phone]       Patient: Emi Johnson   YOB: 1975  Date of Visit: 12/07/2017    To Whom It May Concern:    Rodri Johnson  was at [facility] on 12/07/2017. She may return to work/school on 12/08/2017 with no restrictions.  Please excuse absence 12/06/2017 and 12/07/2017 for illness. If you have any questions or concerns, or if I can be of further assistance, please do not hesitate to contact me.    Sincerely,    Bianca Rutledge MD

## 2017-12-07 NOTE — PROGRESS NOTES
CC: right ear pain  HPI:  The patient is a 41 y.o. year old female who presents to the office for right ear pain.  Her symptoms started about 3 days ago.  She denies any sore throat or fever, but does report green nasal discharge.  She complains of postnasal drip and episatxis from right nare.    PAST MEDICAL HISTORY:  Past Medical History:   Diagnosis Date    Anemia     Asthma     B12 deficiency     Crohn's disease     History of shingles     Lupus     Migraine headache     Raynaud disease     Reactive hypoglycemia     Seizures 2004    no medication     Sleep apnea     Non compliant with CPAP       SURGICAL HISTORY:  Past Surgical History:   Procedure Laterality Date    ABDOMINAL SURGERY      COLON SURGERY      95% of colon removed 2010    COLONOSCOPY      COLOSTOMY      HYSTERECTOMY      due to endometriosis    ILEOSTOMY      LAPAROSCOPY W/ MINI-LAPAROTOMY      large intestine removed      just a portion    pelvic mass removal      REVISION COLOSTOMY  2010    STOMACH SURGERY      TONSILLECTOMY, ADENOIDECTOMY         MEDS:  Medcard reviewed and updated    ALLERGIES: Allergy Card reviewed and updated    SOCIAL HISTORY:   The patient is a nonsmoker.    PE:   APPEARANCE: Well nourished, well developed, in no acute distress.    EARS: TM's intact. No retraction or perforation.    NOSE: Mucosa pink. Airway clear.  MOUTH & THROAT: No tonsillar enlargement. No pharyngeal erythema or exudate. No stridor.  CHEST: Lungs clear to auscultation with unlabored respirations.  CARDIOVASCULAR: Normal S1, S2. No murmurs. No carotid bruits. No pedal edema.  ABDOMEN: Bowel sounds normal. Not distended. Soft. No tenderness or masses.  Positive colostomy.  PSYCHIATRIC: The patient is oriented to person, place, and time and has a pleasant affect.        ASSESSMENT/PLAN:  Emi was seen today for r ear pain.    Diagnoses and all orders for this visit:    Acute sinusitis, recurrence not specified, unspecified  location  -     Prescribed doxycycline    Other orders  -     zolpidem (AMBIEN) 10 mg Tab; Take 1 tablet (10 mg total) by mouth nightly as needed.  -     HYDROmorphone (DILAUDID) 4 MG tablet; Take 1 tablet (4 mg total) by mouth every 6 (six) hours as needed for Pain.  -     doxycycline (VIBRA-TABS) 100 MG tablet; Take 1 tablet (100 mg total) by mouth 2 (two) times daily.

## 2017-12-26 ENCOUNTER — PATIENT MESSAGE (OUTPATIENT)
Dept: INTERNAL MEDICINE | Facility: CLINIC | Age: 42
End: 2017-12-26

## 2017-12-26 RX ORDER — OXYCODONE AND ACETAMINOPHEN 10; 325 MG/1; MG/1
1 TABLET ORAL EVERY 4 HOURS PRN
Qty: 150 TABLET | Refills: 0 | Status: SHIPPED | OUTPATIENT
Start: 2017-12-26 | End: 2018-01-16 | Stop reason: SDUPTHER

## 2017-12-26 NOTE — TELEPHONE ENCOUNTER
----- Message from Ashley Juan sent at 12/26/2017  9:09 AM CST -----  Contact: Patient [phone]    Is this a refill or new RX:  Refill    RX name and strength: oxyCODONE-acetaminophen (PERCOCET)  mg per tablet    Comments:  Please call her when it's ready.

## 2018-01-16 ENCOUNTER — LAB VISIT (OUTPATIENT)
Dept: LAB | Facility: HOSPITAL | Age: 43
End: 2018-01-16
Attending: INTERNAL MEDICINE
Payer: COMMERCIAL

## 2018-01-16 ENCOUNTER — OFFICE VISIT (OUTPATIENT)
Dept: INTERNAL MEDICINE | Facility: CLINIC | Age: 43
End: 2018-01-16
Payer: COMMERCIAL

## 2018-01-16 VITALS
OXYGEN SATURATION: 98 % | BODY MASS INDEX: 32.46 KG/M2 | SYSTOLIC BLOOD PRESSURE: 108 MMHG | WEIGHT: 183.19 LBS | TEMPERATURE: 98 F | DIASTOLIC BLOOD PRESSURE: 76 MMHG | HEIGHT: 63 IN | RESPIRATION RATE: 16 BRPM | HEART RATE: 100 BPM

## 2018-01-16 DIAGNOSIS — Z90.49 S/P COLECTOMY: ICD-10-CM

## 2018-01-16 DIAGNOSIS — R10.9 ABDOMINAL PAIN, UNSPECIFIED ABDOMINAL LOCATION: Primary | ICD-10-CM

## 2018-01-16 DIAGNOSIS — R10.9 ABDOMINAL PAIN, UNSPECIFIED ABDOMINAL LOCATION: ICD-10-CM

## 2018-01-16 DIAGNOSIS — J30.9 ALLERGIC RHINITIS, UNSPECIFIED CHRONICITY, UNSPECIFIED SEASONALITY, UNSPECIFIED TRIGGER: ICD-10-CM

## 2018-01-16 DIAGNOSIS — K50.00 CROHN'S DISEASE OF SMALL INTESTINE WITHOUT COMPLICATION: ICD-10-CM

## 2018-01-16 LAB
ALBUMIN SERPL BCP-MCNC: 3.8 G/DL
ALP SERPL-CCNC: 104 U/L
ALT SERPL W/O P-5'-P-CCNC: 34 U/L
ANION GAP SERPL CALC-SCNC: 8 MMOL/L
AST SERPL-CCNC: 22 U/L
BILIRUB SERPL-MCNC: 0.6 MG/DL
BUN SERPL-MCNC: 19 MG/DL
CALCIUM SERPL-MCNC: 9.6 MG/DL
CHLORIDE SERPL-SCNC: 107 MMOL/L
CO2 SERPL-SCNC: 28 MMOL/L
CREAT SERPL-MCNC: 1.1 MG/DL
EST. GFR  (AFRICAN AMERICAN): >60 ML/MIN/1.73 M^2
EST. GFR  (NON AFRICAN AMERICAN): >60 ML/MIN/1.73 M^2
GLUCOSE SERPL-MCNC: 91 MG/DL
MAGNESIUM SERPL-MCNC: 2.4 MG/DL
POTASSIUM SERPL-SCNC: 4.2 MMOL/L
PROT SERPL-MCNC: 7.2 G/DL
SODIUM SERPL-SCNC: 143 MMOL/L

## 2018-01-16 PROCEDURE — 36415 COLL VENOUS BLD VENIPUNCTURE: CPT | Mod: PO

## 2018-01-16 PROCEDURE — 80053 COMPREHEN METABOLIC PANEL: CPT

## 2018-01-16 PROCEDURE — 99999 PR PBB SHADOW E&M-EST. PATIENT-LVL IV: CPT | Mod: PBBFAC,,, | Performed by: INTERNAL MEDICINE

## 2018-01-16 PROCEDURE — 83735 ASSAY OF MAGNESIUM: CPT

## 2018-01-16 PROCEDURE — 99214 OFFICE O/P EST MOD 30 MIN: CPT | Mod: S$GLB,,, | Performed by: INTERNAL MEDICINE

## 2018-01-16 RX ORDER — ALPRAZOLAM 0.5 MG/1
TABLET ORAL
COMMUNITY
Start: 2015-04-10 | End: 2018-06-20 | Stop reason: ALTCHOICE

## 2018-01-16 RX ORDER — ALBUTEROL SULFATE 1.25 MG/3ML
SOLUTION RESPIRATORY (INHALATION)
COMMUNITY
Start: 2015-04-10 | End: 2018-10-08

## 2018-01-16 RX ORDER — SUMATRIPTAN SUCCINATE 100 MG/1
TABLET ORAL
COMMUNITY
End: 2018-10-08

## 2018-01-16 RX ORDER — GABAPENTIN 300 MG/1
CAPSULE ORAL
Refills: 0 | COMMUNITY
Start: 2017-12-28 | End: 2018-10-08

## 2018-01-16 RX ORDER — ALBUTEROL SULFATE 90 UG/1
AEROSOL, METERED RESPIRATORY (INHALATION)
COMMUNITY
Start: 2017-09-19 | End: 2018-03-07 | Stop reason: SDUPTHER

## 2018-01-16 RX ORDER — SCOLOPAMINE TRANSDERMAL SYSTEM 1 MG/1
1 PATCH, EXTENDED RELEASE TRANSDERMAL
Qty: 10 PATCH | Refills: 3 | Status: SHIPPED | OUTPATIENT
Start: 2018-01-16 | End: 2018-10-08

## 2018-01-16 RX ORDER — CYCLOBENZAPRINE HCL 10 MG
TABLET ORAL
Refills: 2 | COMMUNITY
Start: 2017-12-07 | End: 2018-10-08

## 2018-01-16 RX ORDER — FLUTICASONE PROPIONATE 50 MCG
1 SPRAY, SUSPENSION (ML) NASAL 2 TIMES DAILY
Qty: 1 BOTTLE | Refills: 3 | Status: SHIPPED | OUTPATIENT
Start: 2018-01-16 | End: 2018-06-20

## 2018-01-16 RX ORDER — TIZANIDINE 4 MG/1
4 TABLET ORAL EVERY 8 HOURS PRN
Qty: 45 TABLET | Refills: 0 | Status: SHIPPED | OUTPATIENT
Start: 2018-01-16 | End: 2018-01-26

## 2018-01-16 RX ORDER — FLUTICASONE PROPIONATE AND SALMETEROL 500; 50 UG/1; UG/1
POWDER RESPIRATORY (INHALATION)
COMMUNITY
End: 2018-03-07

## 2018-01-16 RX ORDER — DIAZEPAM 2 MG/1
TABLET ORAL
Refills: 0 | COMMUNITY
Start: 2018-01-10 | End: 2018-06-20 | Stop reason: ALTCHOICE

## 2018-01-16 RX ORDER — OXYCODONE AND ACETAMINOPHEN 10; 325 MG/1; MG/1
1 TABLET ORAL EVERY 4 HOURS PRN
Qty: 150 TABLET | Refills: 0 | Status: SHIPPED | OUTPATIENT
Start: 2018-01-23 | End: 2018-02-19 | Stop reason: SDUPTHER

## 2018-01-16 RX ORDER — CYCLOBENZAPRINE HCL 5 MG
TABLET ORAL
Refills: 0 | COMMUNITY
Start: 2018-01-09 | End: 2018-03-07 | Stop reason: SDUPTHER

## 2018-01-16 NOTE — LETTER
January 16, 2018      [facility]  [address]  [place] [address]  Phone: [phone]  Fax: [phone]       Patient: Emi Johnson   YOB: 1975  Date of Visit: 01/16/2018    To Whom It May Concern:    Rodri Johnson  was at [facility] on 01/16/2018. She may return to work/school on 01/17/208 with no restrictions. If you have any questions or concerns, or if I can be of further assistance, please do not hesitate to contact me.    Sincerely,    Bianca Rutledge MD

## 2018-01-16 NOTE — PROGRESS NOTES
CC: followup of abdominal pain  HPI:  The patient is a 42 y.o. year old female who presents to the office for followup of abdominal pain.  The patient reports cramping around colostomy.  She reports decreased water intake recently.  She has been taking flexeril without relief.  She also complains of 4 day history of sore throat, nasal congestion, postnasal drip, rhinorrhea and nausea.    PAST MEDICAL HISTORY:  Past Medical History:   Diagnosis Date    Anemia     Asthma     B12 deficiency     Crohn's disease     History of shingles     Lupus     Migraine headache     Raynaud disease     Reactive hypoglycemia     Seizures 2004    no medication     Sleep apnea     Non compliant with CPAP       SURGICAL HISTORY:  Past Surgical History:   Procedure Laterality Date    ABDOMINAL SURGERY      COLON SURGERY      95% of colon removed 2010    COLONOSCOPY      COLOSTOMY      HYSTERECTOMY      due to endometriosis    ILEOSTOMY      LAPAROSCOPY W/ MINI-LAPAROTOMY      large intestine removed      just a portion    pelvic mass removal      REVISION COLOSTOMY  2010    STOMACH SURGERY      TONSILLECTOMY, ADENOIDECTOMY         MEDS:  Medcard reviewed and updated    ALLERGIES: Allergy Card reviewed and updated    SOCIAL HISTORY:   The patient is a nonsmoker.    PE:   APPEARANCE: Well nourished, well developed, in no acute distress.    CHEST: Lungs clear to auscultation with unlabored respirations.  CARDIOVASCULAR: Normal S1, S2. No murmurs. No carotid bruits. No pedal edema.  ABDOMEN: Bowel sounds normal. Not distended. Soft. No tenderness or masses.   PSYCHIATRIC: The patient is oriented to person, place, and time and has a pleasant affect.        ASSESSMENT/PLAN:  Emi was seen today for sinusitis.    Diagnoses and all orders for this visit:    Abdominal pain, unspecified abdominal location  -     Comprehensive metabolic panel; Future  -     Magnesium; Future    S/P colectomy  -     Follow-up with  surgeon    Crohn's disease of small intestine without complication  -     Follow-up with surgeon    Allergic rhinitis, unspecified chronicity, unspecified seasonality, unspecified trigger  -     Flonase nasal spray    Other orders  -     oxyCODONE-acetaminophen (PERCOCET)  mg per tablet; Take 1 tablet by mouth every 4 (four) hours as needed for Pain.  -     tiZANidine (ZANAFLEX) 4 MG tablet; Take 1 tablet (4 mg total) by mouth every 8 (eight) hours as needed.  -     scopolamine (TRANSDERM-SCOP) 1.3-1.5 mg (1 mg over 3 days); Place 1 patch onto the skin every 72 hours.  -     fluticasone (FLONASE) 50 mcg/actuation nasal spray; 1 spray (50 mcg total) by Each Nare route 2 (two) times daily.

## 2018-01-22 ENCOUNTER — PATIENT MESSAGE (OUTPATIENT)
Dept: INTERNAL MEDICINE | Facility: CLINIC | Age: 43
End: 2018-01-22

## 2018-01-22 RX ORDER — CODEINE PHOSPHATE AND GUAIFENESIN 10; 100 MG/5ML; MG/5ML
5 SOLUTION ORAL 3 TIMES DAILY PRN
Qty: 180 ML | Refills: 0 | Status: SHIPPED | OUTPATIENT
Start: 2018-01-22 | End: 2018-06-20 | Stop reason: ALTCHOICE

## 2018-01-22 NOTE — TELEPHONE ENCOUNTER
----- Message from Alycia Rincon sent at 1/22/2018  7:37 AM CST -----  Contact: [phone]  Pt called in regard to getting a Rx due to bad cough.      Please advise

## 2018-01-23 ENCOUNTER — PATIENT MESSAGE (OUTPATIENT)
Dept: INTERNAL MEDICINE | Facility: CLINIC | Age: 43
End: 2018-01-23

## 2018-02-19 RX ORDER — OXYCODONE AND ACETAMINOPHEN 10; 325 MG/1; MG/1
1 TABLET ORAL EVERY 4 HOURS PRN
Qty: 150 TABLET | Refills: 0 | Status: SHIPPED | OUTPATIENT
Start: 2018-02-19 | End: 2018-03-07 | Stop reason: SDUPTHER

## 2018-02-19 NOTE — TELEPHONE ENCOUNTER
----- Message from Alycia Malagon sent at 2/19/2018  7:13 AM CST -----  Contact: patient [phone]  Pt called to request a rx for oxycodone 10mg and has been advised that she will be called when rx is ready to .

## 2018-02-20 ENCOUNTER — PATIENT MESSAGE (OUTPATIENT)
Dept: INTERNAL MEDICINE | Facility: CLINIC | Age: 43
End: 2018-02-20

## 2018-03-05 ENCOUNTER — TELEPHONE (OUTPATIENT)
Dept: GASTROENTEROLOGY | Facility: CLINIC | Age: 43
End: 2018-03-05

## 2018-03-05 DIAGNOSIS — K50.00 CROHN'S DISEASE OF SMALL INTESTINE WITHOUT COMPLICATION: Primary | ICD-10-CM

## 2018-03-05 NOTE — TELEPHONE ENCOUNTER
----- Message from Emi Ac sent at 3/5/2018  4:44 PM CST -----  Contact: self - [phone]  Kush - calling re scheduling her scope - please call patient at [phone]

## 2018-03-05 NOTE — TELEPHONE ENCOUNTER
----- Message from Emi Ac sent at 3/5/2018  8:49 AM CST -----  Contact: self   Kush -  Calling to schedule a scope - please call patient at

## 2018-03-05 NOTE — TELEPHONE ENCOUNTER
Pt called to schedule a F/U scope, but it is not due until 8/2018.    Pt reports emptying bag 6-8 times a day. 1/2 full-full. Soft. No blood. Noc x3/night.    Pain: Stoma 4/10.    Not taking any IBD Meds.

## 2018-03-05 NOTE — TELEPHONE ENCOUNTER
Spoke to pt and explained that she may schedule a Ileoscopy via stoma; also explained that pt will need 2 week F/U with WENDY Cabrera, after scope. Pt verbalized understanding. Transferred to endoscopy schedulers.

## 2018-03-05 NOTE — TELEPHONE ENCOUNTER
Please let patient know since she is having stoma pain we are happy to take a look and make sure her Crohn's has not come back. Initially we had planned on a repeat ileoscopy through stoma in 8/2017.      Please have schedulers schedule ileoscopy through stoma. Orders placed.     I would schedule her for a f/u appt with Paty 2 weeks after ileoscopy through stoma preferably when I am in clinic

## 2018-03-06 NOTE — TELEPHONE ENCOUNTER
Pt is scheduled for 2 week post endoscopy F/U with WENDY Cabrera, on 3/21/2018. Appointment reminder mailed to pt, mailing address confirmed.

## 2018-03-07 ENCOUNTER — OFFICE VISIT (OUTPATIENT)
Dept: INTERNAL MEDICINE | Facility: CLINIC | Age: 43
End: 2018-03-07
Payer: COMMERCIAL

## 2018-03-07 VITALS
BODY MASS INDEX: 33.79 KG/M2 | DIASTOLIC BLOOD PRESSURE: 80 MMHG | RESPIRATION RATE: 18 BRPM | WEIGHT: 190.69 LBS | HEART RATE: 87 BPM | TEMPERATURE: 98 F | HEIGHT: 63 IN | SYSTOLIC BLOOD PRESSURE: 118 MMHG

## 2018-03-07 DIAGNOSIS — K50.00 CROHN'S DISEASE OF SMALL INTESTINE WITHOUT COMPLICATION: ICD-10-CM

## 2018-03-07 DIAGNOSIS — F11.90 CHRONIC, CONTINUOUS USE OF OPIOIDS: ICD-10-CM

## 2018-03-07 DIAGNOSIS — R10.9 ABDOMINAL PAIN, UNSPECIFIED ABDOMINAL LOCATION: Primary | ICD-10-CM

## 2018-03-07 LAB
AMPHET+METHAMPHET UR QL: NEGATIVE
BARBITURATES UR QL SCN>200 NG/ML: NEGATIVE
BENZODIAZ UR QL SCN>200 NG/ML: NEGATIVE
BZE UR QL SCN: NEGATIVE
CANNABINOIDS UR QL SCN: NEGATIVE
CREAT UR-MCNC: 188 MG/DL
ETHANOL UR-MCNC: <10 MG/DL
METHADONE UR QL SCN>300 NG/ML: NEGATIVE
OPIATES UR QL SCN: NEGATIVE
PCP UR QL SCN>25 NG/ML: NEGATIVE
TOXICOLOGY INFORMATION: NORMAL

## 2018-03-07 PROCEDURE — 99999 PR PBB SHADOW E&M-EST. PATIENT-LVL IV: CPT | Mod: PBBFAC,,, | Performed by: INTERNAL MEDICINE

## 2018-03-07 PROCEDURE — 99213 OFFICE O/P EST LOW 20 MIN: CPT | Mod: S$GLB,,, | Performed by: INTERNAL MEDICINE

## 2018-03-07 PROCEDURE — 80307 DRUG TEST PRSMV CHEM ANLYZR: CPT

## 2018-03-07 RX ORDER — ZOLPIDEM TARTRATE 10 MG/1
10 TABLET ORAL NIGHTLY PRN
Qty: 30 TABLET | Refills: 3 | Status: SHIPPED | OUTPATIENT
Start: 2018-03-07 | End: 2018-10-08 | Stop reason: SDUPTHER

## 2018-03-07 RX ORDER — ALBUTEROL SULFATE 90 UG/1
AEROSOL, METERED RESPIRATORY (INHALATION)
Qty: 18 G | Refills: 6 | Status: SHIPPED | OUTPATIENT
Start: 2018-03-07 | End: 2018-10-08

## 2018-03-07 RX ORDER — OXYCODONE AND ACETAMINOPHEN 10; 325 MG/1; MG/1
1 TABLET ORAL EVERY 4 HOURS PRN
Qty: 150 TABLET | Refills: 0 | Status: SHIPPED | OUTPATIENT
Start: 2018-03-07 | End: 2018-04-09 | Stop reason: SDUPTHER

## 2018-03-07 NOTE — PROGRESS NOTES
CC: followup of Crohn's disease  HPI:  The patient is a 42 y.o. year old female who presents to the office for followup of Crohn's disease.  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  She continues to experience abdominal pain and has an endoscopy scheduled.    PAST MEDICAL HISTORY:  Past Medical History:   Diagnosis Date    Anemia     Asthma     B12 deficiency     Crohn's disease     History of shingles     Lupus     Migraine headache     Raynaud disease     Reactive hypoglycemia     Seizures 2004    no medication     Sleep apnea     Non compliant with CPAP       SURGICAL HISTORY:  Past Surgical History:   Procedure Laterality Date    ABDOMINAL SURGERY      COLON SURGERY      95% of colon removed 2010    COLONOSCOPY      COLOSTOMY      HYSTERECTOMY      due to endometriosis    ILEOSTOMY      LAPAROSCOPY W/ MINI-LAPAROTOMY      large intestine removed      just a portion    pelvic mass removal      REVISION COLOSTOMY  2010    STOMACH SURGERY      TONSILLECTOMY, ADENOIDECTOMY         MEDS:  Medcard reviewed and updated    ALLERGIES: Allergy Card reviewed and updated    SOCIAL HISTORY:   The patient is a nonsmoker.    PE:   APPEARANCE: Well nourished, well developed, in no acute distress.    CHEST: Lungs clear to auscultation with unlabored respirations.  CARDIOVASCULAR: Normal S1, S2. No murmurs. No carotid bruits. No pedal edema.  ABDOMEN: Bowel sounds normal. Not distended. Soft. No tenderness or masses. Positive colostomy.  PSYCHIATRIC: The patient is oriented to person, place, and time and has a pleasant affect.        ASSESSMENT/PLAN:  Emi was seen today for follow-up and abdominal pain.    Diagnoses and all orders for this visit:    Abdominal pain, unspecified abdominal location  -     Refill pain medication    Crohn's disease of small intestine without complication  -      Awaiting endoscopy        Chronic, continuous use of opioids  -      TOXICOLOGY SCREEN, URINE, RANDOM (COMPLIANCE)    Other orders  -     oxyCODONE-acetaminophen (PERCOCET)  mg per tablet; Take 1 tablet by mouth every 4 (four) hours as needed for Pain.  -     zolpidem (AMBIEN) 10 mg Tab; Take 1 tablet (10 mg total) by mouth nightly as needed.  -     albuterol 90 mcg/actuation inhaler; 2 puffs as needed

## 2018-03-12 ENCOUNTER — TELEPHONE (OUTPATIENT)
Dept: INTERNAL MEDICINE | Facility: CLINIC | Age: 43
End: 2018-03-12

## 2018-03-12 ENCOUNTER — PATIENT MESSAGE (OUTPATIENT)
Dept: INTERNAL MEDICINE | Facility: CLINIC | Age: 43
End: 2018-03-12

## 2018-03-12 NOTE — TELEPHONE ENCOUNTER
Patient called and states she was concerned about the results. Advised will inform PCP and rtc she understood and termed the call

## 2018-03-12 NOTE — TELEPHONE ENCOUNTER
----- Message from Karine Hampton sent at 3/9/2018  7:58 AM CST -----  Contact: Self . Call   [phone]  Patient want to go over her test results from 1/16.

## 2018-03-12 NOTE — TELEPHONE ENCOUNTER
----- Message from Karine Memo sent at 3/12/2018  8:55 AM CDT -----  Contact: Self Call Mobile: [phone]   She wants a call back about her urine test done at last visit. She'd rather tell nurse or doctor.

## 2018-03-13 ENCOUNTER — ANESTHESIA EVENT (OUTPATIENT)
Dept: ENDOSCOPY | Facility: HOSPITAL | Age: 43
End: 2018-03-13
Payer: COMMERCIAL

## 2018-03-13 ENCOUNTER — HOSPITAL ENCOUNTER (OUTPATIENT)
Facility: HOSPITAL | Age: 43
Discharge: HOME | End: 2018-03-13
Attending: INTERNAL MEDICINE | Admitting: INTERNAL MEDICINE
Payer: COMMERCIAL

## 2018-03-13 ENCOUNTER — ANESTHESIA (OUTPATIENT)
Dept: ENDOSCOPY | Facility: HOSPITAL | Age: 43
End: 2018-03-13
Payer: COMMERCIAL

## 2018-03-13 ENCOUNTER — SURGERY (OUTPATIENT)
Age: 43
End: 2018-03-13

## 2018-03-13 VITALS
OXYGEN SATURATION: 95 % | WEIGHT: 190 LBS | TEMPERATURE: 99 F | HEART RATE: 95 BPM | SYSTOLIC BLOOD PRESSURE: 116 MMHG | RESPIRATION RATE: 18 BRPM | DIASTOLIC BLOOD PRESSURE: 64 MMHG | HEIGHT: 63 IN | BODY MASS INDEX: 33.66 KG/M2

## 2018-03-13 DIAGNOSIS — K50.90 CROHN'S DISEASE: ICD-10-CM

## 2018-03-13 DIAGNOSIS — K50.00 CROHN'S DISEASE OF SMALL INTESTINE WITHOUT COMPLICATION: Primary | ICD-10-CM

## 2018-03-13 LAB — POCT GLUCOSE: 90 MG/DL (ref 70–110)

## 2018-03-13 PROCEDURE — D9220A PRA ANESTHESIA: Mod: ANES,,, | Performed by: ANESTHESIOLOGY

## 2018-03-13 PROCEDURE — 25000003 PHARM REV CODE 250: Performed by: INTERNAL MEDICINE

## 2018-03-13 PROCEDURE — 44380 SMALL BOWEL ENDOSCOPY BR/WA: CPT | Performed by: INTERNAL MEDICINE

## 2018-03-13 PROCEDURE — 82962 GLUCOSE BLOOD TEST: CPT | Performed by: INTERNAL MEDICINE

## 2018-03-13 PROCEDURE — 37000009 HC ANESTHESIA EA ADD 15 MINS: Performed by: INTERNAL MEDICINE

## 2018-03-13 PROCEDURE — 63600175 PHARM REV CODE 636 W HCPCS: Performed by: NURSE ANESTHETIST, CERTIFIED REGISTERED

## 2018-03-13 PROCEDURE — 37000008 HC ANESTHESIA 1ST 15 MINUTES: Performed by: INTERNAL MEDICINE

## 2018-03-13 PROCEDURE — D9220A PRA ANESTHESIA: Mod: CRNA,,, | Performed by: NURSE ANESTHETIST, CERTIFIED REGISTERED

## 2018-03-13 PROCEDURE — 44380 SMALL BOWEL ENDOSCOPY BR/WA: CPT | Mod: ,,, | Performed by: INTERNAL MEDICINE

## 2018-03-13 RX ORDER — LIDOCAINE HCL/PF 100 MG/5ML
SYRINGE (ML) INTRAVENOUS
Status: DISCONTINUED | OUTPATIENT
Start: 2018-03-13 | End: 2018-03-13

## 2018-03-13 RX ORDER — PROPOFOL 10 MG/ML
VIAL (ML) INTRAVENOUS
Status: DISCONTINUED | OUTPATIENT
Start: 2018-03-13 | End: 2018-03-13

## 2018-03-13 RX ORDER — ONDANSETRON 2 MG/ML
INJECTION INTRAMUSCULAR; INTRAVENOUS
Status: DISCONTINUED | OUTPATIENT
Start: 2018-03-13 | End: 2018-03-13

## 2018-03-13 RX ORDER — SODIUM CHLORIDE 9 MG/ML
INJECTION, SOLUTION INTRAVENOUS CONTINUOUS
Status: DISCONTINUED | OUTPATIENT
Start: 2018-03-13 | End: 2018-03-13 | Stop reason: HOSPADM

## 2018-03-13 RX ADMIN — SODIUM CHLORIDE: 9 INJECTION, SOLUTION INTRAVENOUS at 09:03

## 2018-03-13 RX ADMIN — SODIUM CHLORIDE: 9 INJECTION, SOLUTION INTRAVENOUS at 08:03

## 2018-03-13 RX ADMIN — PROPOFOL 100 MG: 10 INJECTION, EMULSION INTRAVENOUS at 09:03

## 2018-03-13 RX ADMIN — ONDANSETRON 4 MG: 2 INJECTION INTRAMUSCULAR; INTRAVENOUS at 09:03

## 2018-03-13 RX ADMIN — LIDOCAINE HYDROCHLORIDE 50 MG: 20 INJECTION, SOLUTION INTRAVENOUS at 09:03

## 2018-03-13 RX ADMIN — PROPOFOL 50 MG: 10 INJECTION, EMULSION INTRAVENOUS at 09:03

## 2018-03-13 NOTE — DISCHARGE INSTRUCTIONS
What Is a Colostomy?    During a colostomy part of the colon (large intestine) is removed or disconnected. If the large intestine was diseased, it may be removed. If it was injured, it may be disconnected for a short time while it heals, then reconnected. after a certain period of time. During a colostomy, the colon is brought through the abdominal wall. This makes an opening, called a stoma, for stool and mucus to pass out of the body. You will have special appliances to cover the stoma to collect the stool and to eliminate odor.   Types of colostomies  The type of colostomy you have depends on what part of the colon is removed or disconnected. The most common types of colostomies are:  Sigmoid colostomy    Here is what to expect with a sigmoid colostomy:  · The last section of the colon is removed or disconnected. The rectum and anus may be removed, or they may be disconnected and left in the body.  · The stoma is usually on the lower left side of the belly.  · Stool is most often firm.  Descending colostomy  Here is what to expect with a descending colostomy:  · The sigmoid colon and part of the descending colon are removed or disconnected. The rectum and anus may be removed or just disconnected.  · The stoma is usually on the left side of the belly.  · Stool may be almost firm.  Transverse colostomy  Here is what to expect with a transverse colostomy:  · All of the sigmoid and descending colon and part of the transverse colon are removed or disconnected. The rectum and anus may be removed or just disconnected.  · The stoma can be in the middle or on the right or left side of the upper belly.  · Stool varies from pastelike to almost liquid.  Types of stomas  The stoma is created by bringing the colon through the abdominal wall and turning it back on itself, like a cuff. The stoma is pink and moist, like the inside of the mouth. It shrinks to its final size 6 or 8 weeks after surgery. The kind of stoma you have  depends on your surgery. The most common types are:  An end stoma, most often done for a permanent colostomy. Stool and mucus pass from the same opening. If the anus is not removed, mucus passes from it as well, ?like you might see in the stool of a normal bowel movement??.  A loop stoma, most often done for a temporary colostomy. Stool passes from one side of the stoma. Mucus passes from the other. The anus is most often not removed, so mucus passes from it, too.  Two stomas may be done for a temporary or permanent colostomy. Stool passes from one stoma. Mucus passes from the other. If the anus is not removed, mucus passes from it as well.  Date Last Reviewed: 7/1/2016  © 2356-1721 [employer]. [address] [place] [address], [place] [address]. All rights reserved. This information is not intended as a substitute for professional medical care. Always follow your healthcare professional's instructions.

## 2018-03-13 NOTE — H&P
Short Stay Endoscopy History and Physical    PCP - Bianca Rutledge MD  Referring Physician - Amy Aguilar RN  No address on file    Procedure - ileoscopy through stoma  ASA - per anesthesia  Mallampati - per anesthesia  History of Anesthesia problems - no  Family history Anesthesia problems -  no   Plan of anesthesia - General    HPI  42 y.o. female  Reason for procedure: abdominal pain, stoma pain, assess for recurrent Crohn's disease    ROS:  Constitutional: No fevers, chills, No weight loss  CV: No chest pain  Pulm: No cough, No shortness of breath  GI: see HPI    Medical History:  has a past medical history of Anemia; Asthma; B12 deficiency; Crohn's disease; History of shingles; Lupus; Migraine headache; Raynaud disease; Reactive hypoglycemia; Seizures (2004); and Sleep apnea.    Surgical History:  has a past surgical history that includes Hysterectomy; TONSILLECTOMY, ADENOIDECTOMY; Laparoscopy w/ mini-laparotomy; large intestine removed; pelvic mass removal; Abdominal surgery; Stomach surgery; Colon surgery; Colostomy; Revision Colostomy (2010); Ileostomy; and Colonoscopy.    Family History: family history includes Breast cancer in her sister; Cancer in her maternal aunt, mother, and paternal grandmother; Colon cancer in her maternal grandmother; Migraines in her sister; Seizures in her sister.. Otherwise no colon cancer, inflammatory bowel disease, or GI malignancies.    Social History:  reports that she has never smoked. She has never used smokeless tobacco. She reports that she does not drink alcohol or use drugs.    Review of patient's allergies indicates:   Allergen Reactions    Aspirin      Other reaction(s): vomiting, contraindicated for bleeding from crohns  Other reaction(s): bleeding    Sulfa (sulfonamide antibiotics) Hives and Rash    Iodinated contrast- oral and iv dye Other (See Comments)    Adhesive     Aspirin     Remicade [infliximab] Other (See Comments)     Medical induced lupus     Latex Rash     Other reaction(s): Hives       Medications:   Prescriptions Prior to Admission   Medication Sig Dispense Refill Last Dose    albuterol (ACCUNEB) 1.25 mg/3 mL Nebu 3 ml as needed   Taking    albuterol (PROVENTIL) 2.5 mg /3 mL (0.083 %) nebulizer solution Take 3 mLs (2.5 mg total) by nebulization every 6 (six) hours as needed for Wheezing. 1 Box 3 Taking    albuterol 90 mcg/actuation inhaler 2 puffs as needed 18 g 6     ALPRAZolam (XANAX) 0.5 MG tablet 1/2-1 tablet   Taking    blood sugar diagnostic (BLOOD GLUCOSE TEST) Strp by Misc.(Non-Drug; Combo Route) route.   Taking    blood sugar diagnostic (ONETOUCH ULTRA TEST) Strp 1 strip by Misc.(Non-Drug; Combo Route) route once daily. 100 strip 2 Taking    clonazepam (KLONOPIN) 0.5 MG disintegrating tablet Take 1 tablet (0.5 mg total) by mouth 2 (two) times daily as needed. 60 tablet 3 Taking    cyanocobalamin 1,000 mcg/mL injection INJECT 1 ML (1,000 MCG TOTAL) INTO THE MUSCLE EVERY 28 DAYS. 10 mL 1 Taking    cyclobenzaprine (FLEXERIL) 10 MG tablet TAKE 1 TABLET BY MOUTH TWICE A DAY AS NEEDED FOR SPASMS  2 Taking    diazePAM (VALIUM) 2 MG tablet TAKE 1 TABLET BY MOUTH 4 TIMES A DAY AS NEEDED FOR SPASM  0 Not Taking    fluticasone (FLONASE) 50 mcg/actuation nasal spray 1 spray (50 mcg total) by Each Nare route 2 (two) times daily. 1 Bottle 3 Not Taking    gabapentin (NEURONTIN) 300 MG capsule TAKE 1 CAPSULE BY MOUTH THREE TIMES A DAY AS NEEDED FOR PAIN  0 Not Taking    guaifenesin-codeine 100-10 mg/5 ml (CHERATUSSIN AC)  mg/5 mL syrup Take 5 mLs by mouth 3 (three) times daily as needed for Cough. Caution- medication is sedating. 180 mL 0 Not Taking    ondansetron (ZOFRAN-ODT) 4 MG TbDL Take 1 tablet (4 mg total) by mouth every 8 (eight) hours as needed. 30 tablet 0 Taking    oxyCODONE-acetaminophen (PERCOCET)  mg per tablet Take 1 tablet by mouth every 4 (four) hours as needed for Pain. 150 tablet 0     promethazine  (PHENERGAN) 25 MG tablet TAKE 1 TABLET (25 MG TOTAL) BY MOUTH EVERY 6 (SIX) HOURS AS NEEDED FOR NAUSEA. 30 tablet 3 Taking    scopolamine (TRANSDERM-SCOP) 1.3-1.5 mg (1 mg over 3 days) Place 1 patch onto the skin every 72 hours. 10 patch 3 Taking    sumatriptan (IMITREX STATDOSE) 6 mg/0.5 mL kit INJECT 0.5 ML UNDER THE SKIN AS NEEDED ONE TIME FOR MIGRAINE  1 Taking    sumatriptan (IMITREX) 100 MG tablet 1 tablet as needed one time   Taking    SUMAtriptan succinate 6 mg/0.5 mL NfIj 0.5 ml as needed   Taking    zolpidem (AMBIEN) 10 mg Tab Take 1 tablet (10 mg total) by mouth nightly as needed. 30 tablet 3        Physical Exam:    Vital Signs: There were no vitals filed for this visit.    General Appearance: Well appearing in no acute distress  Lungs: CTA anteriorly  Heart:  Regular rate, S1, S2 normal, no murmurs heard.  Abdomen: Soft, non tender, non distended with normal bowel sounds, no masses  Extremities: No edema    Labs:  Lab Results   Component Value Date    WBC 6.52 08/31/2017    HGB 15.1 08/31/2017    HCT 44.0 08/31/2017     08/31/2017    ALT 34 01/16/2018    AST 22 01/16/2018     01/16/2018    K 4.2 01/16/2018     01/16/2018    CREATININE 1.1 01/16/2018    BUN 19 01/16/2018    CO2 28 01/16/2018    TSH 2.460 08/31/2017    INR 1.0 01/08/2014    GLUF 94 09/03/2009    HGBA1C 5.3 09/29/2011       I have explained the risks and benefits of this endoscopic procedure to the patient including but not limited to bleeding, inflammation, infection, perforation, and death.      Eve Currie MD

## 2018-03-13 NOTE — ANESTHESIA POSTPROCEDURE EVALUATION
"Anesthesia Post Evaluation    Patient: Emi Johnson    Procedure(s) Performed: Procedure(s) (LRB):  ILEOSCOPY through stoma (N/A)    Final Anesthesia Type: general  Patient location during evaluation: GI PACU  Patient participation: Yes- Able to Participate  Level of consciousness: awake and alert and oriented  Post-procedure vital signs: reviewed and stable  Pain management: adequate  Airway patency: patent  PONV status at discharge: No PONV  Anesthetic complications: no      Cardiovascular status: blood pressure returned to baseline and stable  Respiratory status: unassisted, spontaneous ventilation and room air  Hydration status: euvolemic  Follow-up not needed.        Visit Vitals  /64 (BP Location: Left arm, Patient Position: Sitting)   Pulse 95   Temp 37.1 °C (98.8 °F) (Temporal)   Resp 18   Ht 5' 3" (1.6 m)   Wt 86.2 kg (190 lb)   SpO2 95%   Breastfeeding? No   BMI 33.66 kg/m²       Pain/Elliott Score: Pain Assessment Performed: Yes (3/13/2018  9:55 AM)  Presence of Pain: denies (3/13/2018  9:55 AM)  Elliott Score: 9 (3/13/2018  9:25 AM)      "

## 2018-03-13 NOTE — ANESTHESIA PREPROCEDURE EVALUATION
03/13/2018  Emi Johnson is a 42 y.o., female.    Pre-op Assessment         Review of Systems  Anesthesia Hx:  Hx of Anesthetic complications ponv             Social:  Non-Smoker    Cardiovascular:   Exercise tolerance: good Denies Hypertension.  Denies CAD.     Denies Angina.  Functional Capacity Can you climb two flights of stairs? ==> Yes    Pulmonary:   Asthma Denies Recent URI. Sleep Apnea    Renal/:  Renal/ Normal     Hepatic/GI:   Denies PUD. Denies Hiatal Hernia. GERD Denies Liver Disease.  Denies Hepatitis.    Neurological:   Denies CVA. Seizures    Endocrine:   Denies Diabetes. Denies Hypothyroidism.        Physical Exam  General:  Well nourished    Airway/Jaw/Neck:  Airway Findings: Mouth Opening: Normal Tongue: Normal  General Airway Assessment: Adult, Possible difficult intubation            High arched palate, small oropharynx                 Mallampati: II  Improves to II with phonation.  TM Distance: Normal, at least 6 cm  Jaw/Neck Findings:  Neck ROM: Normal ROM  Neck Findings:      Dental:  Dental Findings: In tact, Prominent Incisors   Chest/Lungs:  Chest/Lungs Findings: Clear to auscultation     Heart/Vascular:  Heart Findings: Rate: Normal  Rhythm: Regular Rhythm  Sounds: Normal        Mental Status:  Mental Status Findings:  Alert and Oriented         Anesthesia Plan  Type of Anesthesia, risks & benefits discussed:  Anesthesia Type:  general  Patient's Preference: ga  Intra-op Monitoring Plan: standard ASA monitors  Intra-op Monitoring Plan Comments:   Post Op Pain Control Plan:   Post Op Pain Control Plan Comments:   Induction:   IV  Beta Blocker:  Patient is not currently on a Beta-Blocker (No further documentation required).       Informed Consent: Patient understands risks and agrees with Anesthesia plan.  Questions answered. Anesthesia consent signed with patient.  ASA Score:  2     Day of Surgery Review of History & Physical:    H&P update referred to the provider.         Ready For Surgery From Anesthesia Perspective.

## 2018-03-13 NOTE — TRANSFER OF CARE
"Anesthesia Transfer of Care Note    Patient: Emi Johnson    Procedure(s) Performed: Procedure(s) (LRB):  ILEOSCOPY through stoma (N/A)    Patient location: PACU    Anesthesia Type: general    Transport from OR: Transported from OR on room air with adequate spontaneous ventilation    Post pain: adequate analgesia    Post assessment: no apparent anesthetic complications    Post vital signs: stable    Level of consciousness: awake    Nausea/Vomiting: no nausea/vomiting    Complications: none    Transfer of care protocol was followed      Last vitals:   Visit Vitals  /69   Pulse 86   Temp 37 °C (98.6 °F)   Resp 16   Ht 5' 3" (1.6 m)   Wt 86.2 kg (190 lb)   SpO2 99%   Breastfeeding? No   BMI 33.66 kg/m²     "

## 2018-03-13 NOTE — ANESTHESIA RELEASE NOTE
"Anesthesia Release from PACU Note    Patient: Emi Johnson    Procedure(s) Performed: Procedure(s) (LRB):  ILEOSCOPY through stoma (N/A)    Anesthesia type: general    Post pain: Adequate analgesia    Post assessment: no apparent anesthetic complications and tolerated procedure well    Last Vitals:   Visit Vitals  /64 (BP Location: Left arm, Patient Position: Sitting)   Pulse 95   Temp 37.1 °C (98.8 °F) (Temporal)   Resp 18   Ht 5' 3" (1.6 m)   Wt 86.2 kg (190 lb)   SpO2 95%   Breastfeeding? No   BMI 33.66 kg/m²       Post vital signs: stable    Level of consciousness: awake, alert  and oriented    Nausea/Vomiting: no nausea/no vomiting    Complications: none    Airway Patency: patent    Respiratory: unassisted, spontaneous ventilation, room air    Cardiovascular: stable and blood pressure at baseline    Hydration: euvolemic  "

## 2018-03-14 ENCOUNTER — PATIENT MESSAGE (OUTPATIENT)
Dept: INTERNAL MEDICINE | Facility: CLINIC | Age: 43
End: 2018-03-14

## 2018-03-14 ENCOUNTER — TELEPHONE (OUTPATIENT)
Dept: INTERNAL MEDICINE | Facility: CLINIC | Age: 43
End: 2018-03-14

## 2018-03-14 ENCOUNTER — TELEPHONE (OUTPATIENT)
Dept: GASTROENTEROLOGY | Facility: CLINIC | Age: 43
End: 2018-03-14

## 2018-03-14 DIAGNOSIS — F11.90 CHRONIC, CONTINUOUS USE OF OPIOIDS: Primary | ICD-10-CM

## 2018-03-14 NOTE — TELEPHONE ENCOUNTER
----- Message from Eve Currie MD sent at 3/13/2018  9:22 AM CDT -----  Okay to cancel appt with Paty on 3/27 since ileoscopy through stoma is normal. Do a routine appt as planned based on my last clinic note please. Schedule this now if possible.  Next ileoscopy won't be for 1 year    SS

## 2018-03-20 ENCOUNTER — TELEPHONE (OUTPATIENT)
Dept: ENDOSCOPY | Facility: HOSPITAL | Age: 43
End: 2018-03-20

## 2018-03-22 NOTE — TELEPHONE ENCOUNTER
Called pt, no answer, Lm explaining I was calling to get a follow up scheduled.  Left my direct line to return call

## 2018-03-23 ENCOUNTER — LAB VISIT (OUTPATIENT)
Dept: LAB | Facility: HOSPITAL | Age: 43
End: 2018-03-23
Attending: INTERNAL MEDICINE
Payer: COMMERCIAL

## 2018-03-23 ENCOUNTER — TELEPHONE (OUTPATIENT)
Dept: INTERNAL MEDICINE | Facility: CLINIC | Age: 43
End: 2018-03-23

## 2018-03-23 DIAGNOSIS — F11.90 CHRONIC, CONTINUOUS USE OF OPIOIDS: Primary | ICD-10-CM

## 2018-03-23 DIAGNOSIS — F11.90 CHRONIC, CONTINUOUS USE OF OPIOIDS: ICD-10-CM

## 2018-03-23 PROCEDURE — 80361 OPIATES 1 OR MORE: CPT

## 2018-03-26 ENCOUNTER — TELEPHONE (OUTPATIENT)
Dept: INTERNAL MEDICINE | Facility: CLINIC | Age: 43
End: 2018-03-26

## 2018-03-27 NOTE — TELEPHONE ENCOUNTER
Called and spoke with pt  Scheduled follow up for Wednesday 09/12 @ 2:20    Appoint reminder mailed

## 2018-03-28 LAB
CODEINE UR-MCNC: NEGATIVE NG/ML
CODEINE UR-MCNC: NEGATIVE NG/ML
HYDROCODONE UR-MCNC: NEGATIVE NG/ML
HYDROMORPHONE UR-MCNC: NEGATIVE NG/ML
MORPHINE UR-MCNC: NEGATIVE NG/ML
NALOXONE BY LS MS/MS: NEGATIVE NG/ML
NORHYDROCODONE BY LC MS/MS: NEGATIVE NG/ML
NOROXYCODONE BY LC-MS/MS: NORMAL NG/ML
NOROXYMORPHONE BY LC-MS/MS: 307 NG/ML
OXYCODONE UR-MCNC: 4804 NG/ML
OXYCODONE UR-MCNC: 900 NG/ML
TOXICOLOGIST REVIEW: NORMAL

## 2018-04-09 NOTE — TELEPHONE ENCOUNTER
----- Message from Alycia Malgaon sent at 4/9/2018  7:58 AM CDT -----  Contact: patient [phone]  Pt called to request a rx for oxycodone and is aware that she will be called when rx is ready for .

## 2018-04-10 RX ORDER — OXYCODONE AND ACETAMINOPHEN 10; 325 MG/1; MG/1
1 TABLET ORAL EVERY 4 HOURS PRN
Qty: 150 TABLET | Refills: 0 | Status: SHIPPED | OUTPATIENT
Start: 2018-04-10 | End: 2018-05-04 | Stop reason: SDUPTHER

## 2018-04-16 RX ORDER — ONDANSETRON 4 MG/1
TABLET, ORALLY DISINTEGRATING ORAL
Qty: 30 TABLET | Refills: 0 | Status: SHIPPED | OUTPATIENT
Start: 2018-04-16 | End: 2018-10-08 | Stop reason: SDUPTHER

## 2018-05-04 RX ORDER — OXYCODONE AND ACETAMINOPHEN 10; 325 MG/1; MG/1
1 TABLET ORAL EVERY 4 HOURS PRN
Qty: 150 TABLET | Refills: 0 | Status: SHIPPED | OUTPATIENT
Start: 2018-05-04 | End: 2018-05-28 | Stop reason: SDUPTHER

## 2018-05-04 NOTE — TELEPHONE ENCOUNTER
"----- Message from Jeannine George sent at 5/4/2018 12:13 PM CDT -----  Contact: call pt [phone]  RX request - refill or new RX.  Is this a refill or new RX:  refrill  RX name and strength: oxyCODONE-acetaminophen (PERCOCET)  mg per tablet  Directions:   Is this a 30 day or 90 day RX:  30 day   Pharmacy name and phone # (DON'T enter "on file" or "in chart"):   Comments: please call when ready to        "

## 2018-05-07 ENCOUNTER — PATIENT MESSAGE (OUTPATIENT)
Dept: INTERNAL MEDICINE | Facility: CLINIC | Age: 43
End: 2018-05-07

## 2018-05-28 RX ORDER — OXYCODONE AND ACETAMINOPHEN 10; 325 MG/1; MG/1
1 TABLET ORAL EVERY 4 HOURS PRN
Qty: 150 TABLET | Refills: 0 | Status: SHIPPED | OUTPATIENT
Start: 2018-05-28 | End: 2018-06-20 | Stop reason: SDUPTHER

## 2018-05-28 NOTE — TELEPHONE ENCOUNTER
----- Message from Angeles Cho sent at 5/28/2018 11:17 AM CDT -----  Contact: patient- [phone]  Patient would like to  Rx for oxyCODONE-acetaminophen (PERCOCET) .

## 2018-05-31 ENCOUNTER — TELEPHONE (OUTPATIENT)
Dept: INTERNAL MEDICINE | Facility: CLINIC | Age: 43
End: 2018-05-31

## 2018-05-31 NOTE — TELEPHONE ENCOUNTER
----- Message from Aslhee Andino sent at 5/31/2018  8:56 AM CDT -----  Contact: Self [phone]  Patient would like to know if the script for oxyCODONE-acetaminophen (PERCOCET)  mg per tablet is ready for pickup.

## 2018-06-18 ENCOUNTER — TELEPHONE (OUTPATIENT)
Dept: INTERNAL MEDICINE | Facility: CLINIC | Age: 43
End: 2018-06-18

## 2018-06-20 ENCOUNTER — TELEPHONE (OUTPATIENT)
Dept: INTERNAL MEDICINE | Facility: CLINIC | Age: 43
End: 2018-06-20

## 2018-06-20 ENCOUNTER — LAB VISIT (OUTPATIENT)
Dept: LAB | Facility: HOSPITAL | Age: 43
End: 2018-06-20
Attending: INTERNAL MEDICINE
Payer: COMMERCIAL

## 2018-06-20 ENCOUNTER — OFFICE VISIT (OUTPATIENT)
Dept: INTERNAL MEDICINE | Facility: CLINIC | Age: 43
End: 2018-06-20
Payer: COMMERCIAL

## 2018-06-20 VITALS
SYSTOLIC BLOOD PRESSURE: 110 MMHG | BODY MASS INDEX: 31.27 KG/M2 | HEART RATE: 76 BPM | HEIGHT: 64 IN | WEIGHT: 183.19 LBS | DIASTOLIC BLOOD PRESSURE: 80 MMHG | TEMPERATURE: 98 F

## 2018-06-20 DIAGNOSIS — R53.83 FATIGUE, UNSPECIFIED TYPE: Primary | ICD-10-CM

## 2018-06-20 DIAGNOSIS — K50.00 CROHN'S DISEASE OF SMALL INTESTINE WITHOUT COMPLICATION: ICD-10-CM

## 2018-06-20 DIAGNOSIS — K50.00 CROHN'S DISEASE OF SMALL INTESTINE WITHOUT COMPLICATION: Primary | ICD-10-CM

## 2018-06-20 LAB
ANION GAP SERPL CALC-SCNC: 8 MMOL/L
BUN SERPL-MCNC: 17 MG/DL
CALCIUM SERPL-MCNC: 9.4 MG/DL
CHLORIDE SERPL-SCNC: 107 MMOL/L
CO2 SERPL-SCNC: 27 MMOL/L
CREAT SERPL-MCNC: 1 MG/DL
EST. GFR  (AFRICAN AMERICAN): >60 ML/MIN/1.73 M^2
EST. GFR  (NON AFRICAN AMERICAN): >60 ML/MIN/1.73 M^2
GLUCOSE SERPL-MCNC: 83 MG/DL
POTASSIUM SERPL-SCNC: 3.9 MMOL/L
SODIUM SERPL-SCNC: 142 MMOL/L

## 2018-06-20 PROCEDURE — 3008F BODY MASS INDEX DOCD: CPT | Mod: CPTII,S$GLB,, | Performed by: INTERNAL MEDICINE

## 2018-06-20 PROCEDURE — 36415 COLL VENOUS BLD VENIPUNCTURE: CPT | Mod: PO

## 2018-06-20 PROCEDURE — 80048 BASIC METABOLIC PNL TOTAL CA: CPT

## 2018-06-20 PROCEDURE — 99999 PR PBB SHADOW E&M-EST. PATIENT-LVL IV: CPT | Mod: PBBFAC,,, | Performed by: INTERNAL MEDICINE

## 2018-06-20 PROCEDURE — 99213 OFFICE O/P EST LOW 20 MIN: CPT | Mod: S$GLB,,, | Performed by: INTERNAL MEDICINE

## 2018-06-20 RX ORDER — OXYCODONE AND ACETAMINOPHEN 10; 325 MG/1; MG/1
1 TABLET ORAL EVERY 4 HOURS PRN
Qty: 150 TABLET | Refills: 0 | Status: SHIPPED | OUTPATIENT
Start: 2018-06-20 | End: 2018-07-17 | Stop reason: SDUPTHER

## 2018-06-20 NOTE — PROGRESS NOTES
CC: followup of abdominal pain  HPI:  The patient is a 42 y.o. year old female who presents to the office for followup of abdominal pain.  She complains of fatigue.  The patient is working two jobs.  She reports having problems with her colostomy bags, leakage x 2 weeks.  She also reports persistent abdominal pain.    PAST MEDICAL HISTORY:  Past Medical History:   Diagnosis Date    Anemia     Asthma     B12 deficiency     Crohn's disease     History of shingles     Lupus     Migraine headache     Raynaud disease     Reactive hypoglycemia     Seizures 2004    no medication     Sleep apnea     Non compliant with CPAP       SURGICAL HISTORY:  Past Surgical History:   Procedure Laterality Date    ABDOMINAL SURGERY      COLON SURGERY      95% of colon removed 2010    COLONOSCOPY      COLOSTOMY      HYSTERECTOMY      due to endometriosis    ILEOSTOMY      LAPAROSCOPY W/ MINI-LAPAROTOMY      large intestine removed      just a portion    pelvic mass removal      REVISION COLOSTOMY  2010    STOMACH SURGERY      TONSILLECTOMY, ADENOIDECTOMY         MEDS:  Medcard reviewed and updated    ALLERGIES: Allergy Card reviewed and updated    SOCIAL HISTORY:   The patient is a nonsmoker.    PE:   APPEARANCE: Well nourished, well developed, in no acute distress.    CHEST: Lungs clear to auscultation with unlabored respirations.  CARDIOVASCULAR: Normal S1, S2. No murmurs. No carotid bruits. No pedal edema.  ABDOMEN: Bowel sounds normal. Not distended. Soft. No tenderness or masses.   PSYCHIATRIC: The patient is oriented to person, place, and time and has a pleasant affect.        ASSESSMENT/PLAN:  mEi was seen today for follow-up.    Diagnoses and all orders for this visit:    Crohn's disease of small intestine without complication  -     Basic metabolic panel; Future  -     Refill pain medication    Other orders  -     oxyCODONE-acetaminophen (PERCOCET)  mg per tablet; Take 1 tablet by mouth every 4  (four) hours as needed for Pain.

## 2018-06-29 DIAGNOSIS — Z12.39 BREAST CANCER SCREENING: ICD-10-CM

## 2018-07-02 ENCOUNTER — OFFICE VISIT (OUTPATIENT)
Dept: WOUND CARE | Facility: CLINIC | Age: 43
End: 2018-07-02
Payer: COMMERCIAL

## 2018-07-02 DIAGNOSIS — Z43.2 ATTENTION TO ILEOSTOMY: Primary | ICD-10-CM

## 2018-07-02 PROCEDURE — 99213 OFFICE O/P EST LOW 20 MIN: CPT | Mod: S$GLB,,, | Performed by: CLINICAL NURSE SPECIALIST

## 2018-07-02 PROCEDURE — 99999 PR PBB SHADOW E&M-EST. PATIENT-LVL II: CPT | Mod: PBBFAC,,, | Performed by: CLINICAL NURSE SPECIALIST

## 2018-07-02 NOTE — PROGRESS NOTES
"Subjective:       Patient ID: Emi Johnson is a 42 y.o. female.    Chief Complaint: Ileostomy    This is a fu to enterostomal therapy clinic and she comes today for assistance with her ileostomy, She had total colectomy 3 months ago by Dr Saldaña, she has few concerns and wants advice on pouches she should try .      Review of Systems   Constitutional: Negative for activity change, appetite change and fatigue.   HENT: Negative for congestion and rhinorrhea.    Respiratory: Negative for cough and shortness of breath.    Cardiovascular: Negative for chest pain and leg swelling.   Gastrointestinal: Negative.         Ileostomy   Genitourinary: Negative.    Musculoskeletal: Negative.    Skin: Negative.    Neurological: Negative.    Psychiatric/Behavioral: Negative.        Objective:      Physical Exam   Constitutional: She is oriented to person, place, and time. She appears well-developed and well-nourished.   Pulmonary/Chest: Effort normal. No respiratory distress.   Abdominal: Soft. Bowel sounds are normal. She exhibits no distension. There is no tenderness.   Musculoskeletal: Normal range of motion. She exhibits no edema.   Neurological: She is alert and oriented to person, place, and time.   Skin: Skin is warm and dry.   Psychiatric: She has a normal mood and affect.       First visit      Today 7/2/18    She has a nicely budded end stoma 25 mm , She wears convex cut to fit but the larger one and could benefit from the smaller profile in convex   This was told her her on last visit as well, she has a new image 13/4 cut to fit that was given by friend and has liked it, so I rec she order same for month and decide,  Placed a convex 1"precut cera pouch on and mini [facility],   Discussed her routine and doing everything correctly, gave some suggestions     Assessment:       1. Attention to ileostomy        Plan:       Try new image gave samples  She can request samples from [facility] as well   [facility] is her DME   Follow " up as needed for any ostomy issues  I have reviewed the plan of care with the patient and she express understanding. I spent over 50% of this 15 minute visit in face to face counseling.

## 2018-07-06 ENCOUNTER — TELEPHONE (OUTPATIENT)
Dept: GASTROENTEROLOGY | Facility: CLINIC | Age: 43
End: 2018-07-06

## 2018-07-06 NOTE — TELEPHONE ENCOUNTER
Called and spoke with pt  I explained I was calling to reschedule her follow up that is on 09/12 @ 2:20  Pt stated she was at work and asked if I could call back after 1:30.  I explained I can try but I gave her my direct line and told her to call me once she can talk  She expressed understanding     Portal message sent

## 2018-07-17 DIAGNOSIS — K50.00 CROHN'S DISEASE OF SMALL INTESTINE WITHOUT COMPLICATION: ICD-10-CM

## 2018-07-17 DIAGNOSIS — M79.7 FIBROMYALGIA: Primary | ICD-10-CM

## 2018-07-17 NOTE — TELEPHONE ENCOUNTER
"----- Message from Petty Ingram sent at 7/17/2018  7:59 AM CDT -----  Contact: self/ [phone]  RX request - refill or new RX.  Is this a refill or new RX:    RX name and strength: oxyCODONE-acetaminophen (PERCOCET)  mg per tablet 150 tablet   Directions:   Is this a 30 day or 90 day RX:    Local pharmacy or mail order pharmacy:    Pharmacy name and phone # (DON'T enter "on file" or "in chart"):   Comments:        "

## 2018-07-18 ENCOUNTER — TELEPHONE (OUTPATIENT)
Dept: INTERNAL MEDICINE | Facility: CLINIC | Age: 43
End: 2018-07-18

## 2018-07-18 RX ORDER — OXYCODONE AND ACETAMINOPHEN 10; 325 MG/1; MG/1
1 TABLET ORAL EVERY 4 HOURS PRN
Qty: 150 TABLET | Refills: 0 | Status: SHIPPED | OUTPATIENT
Start: 2018-07-18 | End: 2018-08-13 | Stop reason: SDUPTHER

## 2018-08-13 ENCOUNTER — PATIENT MESSAGE (OUTPATIENT)
Dept: INTERNAL MEDICINE | Facility: CLINIC | Age: 43
End: 2018-08-13

## 2018-08-13 DIAGNOSIS — M79.7 FIBROMYALGIA: ICD-10-CM

## 2018-08-13 DIAGNOSIS — K50.00 CROHN'S DISEASE OF SMALL INTESTINE WITHOUT COMPLICATION: ICD-10-CM

## 2018-08-13 RX ORDER — OXYCODONE AND ACETAMINOPHEN 10; 325 MG/1; MG/1
1 TABLET ORAL EVERY 4 HOURS PRN
Qty: 150 TABLET | Refills: 0 | Status: SHIPPED | OUTPATIENT
Start: 2018-08-13 | End: 2018-09-04 | Stop reason: SDUPTHER

## 2018-08-13 NOTE — TELEPHONE ENCOUNTER
"----- Message from Petty Ingram sent at 8/13/2018  7:17 AM CDT -----  Contact: self/ [phone]  RX request - refill or new RX.  Is this a refill or new RX:    RX name and strength:oxyCODONE-acetaminophen (PERCOCET)  mg per tablet   Directions:   Is this a 30 day or 90 day RX:    Local pharmacy or mail order pharmacy:    Pharmacy name and phone # (DON'T enter "on file" or "in chart"):   Comments:        "

## 2018-09-04 ENCOUNTER — PATIENT MESSAGE (OUTPATIENT)
Dept: INTERNAL MEDICINE | Facility: CLINIC | Age: 43
End: 2018-09-04

## 2018-09-04 DIAGNOSIS — M79.7 FIBROMYALGIA: ICD-10-CM

## 2018-09-04 DIAGNOSIS — K50.00 CROHN'S DISEASE OF SMALL INTESTINE WITHOUT COMPLICATION: ICD-10-CM

## 2018-09-04 RX ORDER — OXYCODONE AND ACETAMINOPHEN 10; 325 MG/1; MG/1
1 TABLET ORAL EVERY 4 HOURS PRN
Qty: 150 TABLET | Refills: 0 | Status: SHIPPED | OUTPATIENT
Start: 2018-09-04 | End: 2018-10-01 | Stop reason: SDUPTHER

## 2018-09-04 NOTE — TELEPHONE ENCOUNTER
----- Message from Alycia Malagon sent at 9/4/2018  7:29 AM CDT -----  Contact: patient [phone]  Pt called to request a rx for oxycodone 10mg and has been advised that she will be called when rx is ready to .

## 2018-09-28 ENCOUNTER — TELEPHONE (OUTPATIENT)
Dept: TRANSPLANT | Facility: CLINIC | Age: 43
End: 2018-09-28

## 2018-09-28 NOTE — TELEPHONE ENCOUNTER
Emi Elizabeth called and stated that she is interested in becoming a living donor for Albert Abdul, who she saw on social media.  Medical and social history obtained.  Not a candidate for donation due to multiple medical problems and prior surgeries. Patient verbalized understanding.

## 2018-10-01 DIAGNOSIS — K50.00 CROHN'S DISEASE OF SMALL INTESTINE WITHOUT COMPLICATION: ICD-10-CM

## 2018-10-01 DIAGNOSIS — M79.7 FIBROMYALGIA: ICD-10-CM

## 2018-10-01 RX ORDER — OXYCODONE AND ACETAMINOPHEN 10; 325 MG/1; MG/1
1 TABLET ORAL EVERY 4 HOURS PRN
Qty: 150 TABLET | Refills: 0 | Status: SHIPPED | OUTPATIENT
Start: 2018-10-01 | End: 2018-10-25 | Stop reason: SDUPTHER

## 2018-10-01 NOTE — TELEPHONE ENCOUNTER
----- Message from Alycia Malagon sent at 10/1/2018  7:18 AM CDT -----  Contact: patient [phone]  Pt called to request a rx for Oxycodone 10mg and hs been advised that she will called when rx is ready to .

## 2018-10-08 ENCOUNTER — OFFICE VISIT (OUTPATIENT)
Dept: INTERNAL MEDICINE | Facility: CLINIC | Age: 43
End: 2018-10-08
Payer: COMMERCIAL

## 2018-10-08 VITALS
HEART RATE: 60 BPM | DIASTOLIC BLOOD PRESSURE: 72 MMHG | TEMPERATURE: 98 F | WEIGHT: 176.13 LBS | HEIGHT: 63 IN | SYSTOLIC BLOOD PRESSURE: 94 MMHG | BODY MASS INDEX: 31.21 KG/M2

## 2018-10-08 DIAGNOSIS — K50.00 CROHN'S DISEASE OF SMALL INTESTINE WITHOUT COMPLICATION: Primary | ICD-10-CM

## 2018-10-08 DIAGNOSIS — M79.7 FIBROMYALGIA: ICD-10-CM

## 2018-10-08 PROCEDURE — 90686 IIV4 VACC NO PRSV 0.5 ML IM: CPT | Mod: S$GLB,,, | Performed by: INTERNAL MEDICINE

## 2018-10-08 PROCEDURE — 99213 OFFICE O/P EST LOW 20 MIN: CPT | Mod: 25,S$GLB,, | Performed by: INTERNAL MEDICINE

## 2018-10-08 PROCEDURE — 99999 PR PBB SHADOW E&M-EST. PATIENT-LVL IV: CPT | Mod: PBBFAC,,, | Performed by: INTERNAL MEDICINE

## 2018-10-08 PROCEDURE — 3008F BODY MASS INDEX DOCD: CPT | Mod: CPTII,S$GLB,, | Performed by: INTERNAL MEDICINE

## 2018-10-08 PROCEDURE — 90471 IMMUNIZATION ADMIN: CPT | Mod: S$GLB,,, | Performed by: INTERNAL MEDICINE

## 2018-10-08 RX ORDER — ALBUTEROL SULFATE 90 UG/1
2 AEROSOL, METERED RESPIRATORY (INHALATION)
COMMUNITY
Start: 2014-06-12 | End: 2019-12-10 | Stop reason: SDUPTHER

## 2018-10-08 RX ORDER — METRONIDAZOLE 7.5 MG/G
1 CREAM TOPICAL DAILY
Refills: 12 | COMMUNITY
Start: 2018-10-06 | End: 2019-04-17

## 2018-10-08 RX ORDER — LANCETS
1 EACH MISCELLANEOUS DAILY
Qty: 100 EACH | Refills: 3 | Status: SHIPPED | OUTPATIENT
Start: 2018-10-08 | End: 2019-01-11

## 2018-10-08 RX ORDER — ONDANSETRON 4 MG/1
TABLET, ORALLY DISINTEGRATING ORAL
Qty: 30 TABLET | Refills: 3 | Status: SHIPPED | OUTPATIENT
Start: 2018-10-08 | End: 2019-07-24 | Stop reason: SDUPTHER

## 2018-10-08 RX ORDER — ZOLPIDEM TARTRATE 10 MG/1
10 TABLET ORAL NIGHTLY PRN
Qty: 30 TABLET | Refills: 3 | Status: SHIPPED | OUTPATIENT
Start: 2018-10-08 | End: 2019-04-17 | Stop reason: SDUPTHER

## 2018-10-08 RX ORDER — CYANOCOBALAMIN 1000 UG/ML
INJECTION, SOLUTION INTRAMUSCULAR; SUBCUTANEOUS
Qty: 10 ML | Refills: 1 | Status: SHIPPED | OUTPATIENT
Start: 2018-10-08 | End: 2019-01-11 | Stop reason: SDUPTHER

## 2018-10-08 NOTE — PROGRESS NOTES
CC: followup of abdominal pain  HPI:  The patient is a 42 y.o. year old female who presents to the office for followup of abdominal pain.  She states her pain persists.  She reports recent pressure sensation in her rectum, and has an appointment with the surgeon.  She reports recent oral ulcers, secondary to Crohn's.      PAST MEDICAL HISTORY:  Past Medical History:   Diagnosis Date    Anemia     Asthma     B12 deficiency     Crohn's disease     History of shingles     Lupus     Migraine headache     Raynaud disease     Reactive hypoglycemia     Seizures 2004    no medication     Sleep apnea     Non compliant with CPAP       SURGICAL HISTORY:  Past Surgical History:   Procedure Laterality Date    ABDOMINAL SURGERY      COLON SURGERY      95% of colon removed 2010    COLONOSCOPY      COLOSTOMY      CT (COMPUTED TOMOGRAPHY) N/A 12/26/2013    [facility] by [facility] OR    EGD (ESOPHAGOGASTRODUODENOSCOPY) Left 4/22/2013    Performed by [facility] at [facility] ENDO    ESOPHAGOGASTRODUODENOSCOPY (EGD) N/A 4/19/2016    [facility] (4[facility])    ESOPHAGOGASTRODUODENOSCOPY (EGD) N/A 7/17/2014    [facility]    HYSTERECTOMY      due to endometriosis    ILEOSCOPY through stoma N/A 3/13/2018    Performed by [facility] at [facility] ENDO (4TH FLR)    ILEOSCOPY through stoma N/A 8/23/2017    Performed by [facility] ENDO (4TH FLR)    ILEOSCOPY through stoma N/A 3/21/2017    [facility]formed by [facility] ENDO (4TH FLR)    ILEOSTOMY      LAPAROSCOPY W/ MINI-LAPAROTOMY      large intestine removed      just a portion    pelvic mass removal      POUCHOSCOPY N/A 3/18/2016    Performed by [facility] ENDO (4TH FLR)    REVISION COLOSTOMY  2010    SIGMOIDOSCOPY, FLEXIBLE Left 4/22/2013    Performed [facility] ENDO    SIGMOIDOSCOPY-FLEXIBLE N/A  10/8/2015    Performed [facility] ENDO    STOMACH SURGERY      TONSILLECTOMY, ADENOIDECTOMY         MEDS:  Medcard reviewed and updated    ALLERGIES: Allergy Card reviewed and updated    SOCIAL HISTORY:   The patient is a nonsmoker.    PE:   APPEARANCE: Well nourished, well developed, in no acute distress.    CHEST: Lungs clear to auscultation with unlabored respirations.  CARDIOVASCULAR: Normal S1, S2. No murmurs. No carotid bruits. No pedal edema.  ABDOMEN: Bowel sounds normal. Not distended. Soft. No tenderness or masses. Positive colostomy.  PSYCHIATRIC: The patient is oriented to person, place, and time and has a pleasant affect.        ASSESSMENT/PLAN:  Emi was seen today for follow-up.    Diagnoses and all orders for this visit:    Crohn's disease of small intestine without complication  -     follow-up with GI    Fibromyalgia  -     continue pain control    Other orders  -     cyanocobalamin 1,000 mcg/mL injection; INJECT 1 ML (1,000 MCG TOTAL) INTO THE MUSCLE EVERY 28 DAYS.  -     zolpidem (AMBIEN) 10 mg Tab; Take 1 tablet (10 mg total) by mouth nightly as needed.  -     ondansetron (ZOFRAN-ODT) 4 MG TbDL; DISSOLVE 1 TABLET ON THE TONGUE EVERY 8 HOURS AS NEEDED  -     blood sugar diagnostic (BLOOD GLUCOSE TEST) Strp; 1 strip by Misc.(Non-Drug; Combo Route) route once daily.  -     lancets Misc; 1 Units by Misc.(Non-Drug; Combo Route) route once daily.  -     Influenza - Quadrivalent (3 years & older) (PF)

## 2018-10-23 DIAGNOSIS — R05.9 COUGH: ICD-10-CM

## 2018-10-23 NOTE — TELEPHONE ENCOUNTER
"----- Message from Karine Hampton sent at 10/23/2018  7:03 AM CDT -----  Contact: Call [phone]  Patient would like to get medical advice.    Symptoms (please be specific):  Bad cough    How long has patient had these symptoms:  X 2 days     Pharmacy name and phone # (DON'T enter "on file" or "in chart"):  [facility] [phone] (Phone) [phone] (Fax)    Any drug allergies:  Check chart     Would you prefer a response via [employer]?:   Phone     Comments:  Can't sleep and coughing all day.  "

## 2018-10-24 RX ORDER — PROMETHAZINE HYDROCHLORIDE AND DEXTROMETHORPHAN HYDROBROMIDE 6.25; 15 MG/5ML; MG/5ML
5 SYRUP ORAL NIGHTLY PRN
Qty: 180 ML | Refills: 0 | Status: SHIPPED | OUTPATIENT
Start: 2018-10-24 | End: 2019-10-24 | Stop reason: SDUPTHER

## 2018-10-25 DIAGNOSIS — K50.00 CROHN'S DISEASE OF SMALL INTESTINE WITHOUT COMPLICATION: ICD-10-CM

## 2018-10-25 DIAGNOSIS — M79.7 FIBROMYALGIA: ICD-10-CM

## 2018-10-25 RX ORDER — OXYCODONE AND ACETAMINOPHEN 10; 325 MG/1; MG/1
1 TABLET ORAL EVERY 4 HOURS PRN
Qty: 150 TABLET | Refills: 0 | Status: SHIPPED | OUTPATIENT
Start: 2018-10-25 | End: 2018-11-14 | Stop reason: SDUPTHER

## 2018-10-25 NOTE — TELEPHONE ENCOUNTER
"----- Message from Shandra Samara sent at 10/25/2018  7:57 AM CDT -----  Contact: pt [phone]  RX request - refill or new RX.  Is this a refill or new RX:  Refill   RX name and strength: oxyCODONE-acetaminophen (PERCOCET)  mg per tablet  Directions:   Is this a 30 day or 90 day RX:    Local pharmacy or mail order pharmacy:    Pharmacy name and phone # (DON'T enter "on file" or "in chart"):    Comments:        "

## 2018-10-29 ENCOUNTER — PATIENT MESSAGE (OUTPATIENT)
Dept: INTERNAL MEDICINE | Facility: CLINIC | Age: 43
End: 2018-10-29

## 2018-11-14 ENCOUNTER — PATIENT MESSAGE (OUTPATIENT)
Dept: INTERNAL MEDICINE | Facility: CLINIC | Age: 43
End: 2018-11-14

## 2018-11-14 DIAGNOSIS — M79.7 FIBROMYALGIA: ICD-10-CM

## 2018-11-14 DIAGNOSIS — K50.00 CROHN'S DISEASE OF SMALL INTESTINE WITHOUT COMPLICATION: ICD-10-CM

## 2018-11-14 RX ORDER — OXYCODONE AND ACETAMINOPHEN 10; 325 MG/1; MG/1
1 TABLET ORAL EVERY 4 HOURS PRN
Qty: 150 TABLET | Refills: 0 | Status: SHIPPED | OUTPATIENT
Start: 2018-11-14 | End: 2018-12-12 | Stop reason: SDUPTHER

## 2018-11-14 NOTE — TELEPHONE ENCOUNTER
"----- Message from Jackelin Ortiz sent at 11/14/2018  8:33 AM CST -----  Contact: self/[phone]  RX request - refill or new RX.  Is this a refill or new RX:  refill  RX name and strength: oxyCODONE-acetaminophen (PERCOCET)  mg per tablet  Directions: Take 1 tablet by mouth every 4 (four) hours as needed for Pain  Is this a 30 day or 90 day RX:    Local pharmacy or mail order pharmacy:    Pharmacy name and phone # (DON'T enter "on file" or "in chart"):   Comments:        "

## 2018-11-19 ENCOUNTER — TELEPHONE (OUTPATIENT)
Dept: INTERNAL MEDICINE | Facility: CLINIC | Age: 43
End: 2018-11-19

## 2018-11-19 NOTE — TELEPHONE ENCOUNTER
----- Message from Patito Garcia LPN sent at 11/17/2018  8:56 AM CST -----  Contact: self/[phone]      ----- Message -----  From: Jackelin Ortiz  Sent: 11/16/2018   4:28 PM  To: [facility] Staff    Pt would like to speak with the nurse in regards to paper work placed inside the pt's script that she picked up today from the clinic.

## 2018-11-21 ENCOUNTER — PATIENT MESSAGE (OUTPATIENT)
Dept: INTERNAL MEDICINE | Facility: CLINIC | Age: 43
End: 2018-11-21

## 2018-11-21 ENCOUNTER — TELEPHONE (OUTPATIENT)
Dept: INTERNAL MEDICINE | Facility: CLINIC | Age: 43
End: 2018-11-21

## 2018-11-21 NOTE — TELEPHONE ENCOUNTER
----- Message from Patito Garcia LPN sent at 11/17/2018  8:56 AM CST -----  Contact: self/[phone]      ----- Message -----  From: Jackelin Ortiz  Sent: 11/16/2018   4:28 PM  To: [facility] Staff    Pt would like to speak with the nurse in regards to paper work placed inside the pt's script that she picked up today from the clinic.

## 2018-12-06 ENCOUNTER — OFFICE VISIT (OUTPATIENT)
Dept: URGENT CARE | Facility: CLINIC | Age: 43
End: 2018-12-06
Payer: COMMERCIAL

## 2018-12-06 VITALS
BODY MASS INDEX: 31.01 KG/M2 | RESPIRATION RATE: 19 BRPM | HEART RATE: 110 BPM | SYSTOLIC BLOOD PRESSURE: 128 MMHG | HEIGHT: 63 IN | TEMPERATURE: 99 F | OXYGEN SATURATION: 98 % | DIASTOLIC BLOOD PRESSURE: 87 MMHG | WEIGHT: 175 LBS

## 2018-12-06 DIAGNOSIS — R05.9 COUGH: ICD-10-CM

## 2018-12-06 DIAGNOSIS — H66.002 ACUTE SUPPURATIVE OTITIS MEDIA OF LEFT EAR WITHOUT SPONTANEOUS RUPTURE OF TYMPANIC MEMBRANE, RECURRENCE NOT SPECIFIED: Primary | ICD-10-CM

## 2018-12-06 DIAGNOSIS — R52 BODY ACHES: ICD-10-CM

## 2018-12-06 LAB
CTP QC/QA: YES
FLUAV AG NPH QL: NEGATIVE
FLUBV AG NPH QL: NEGATIVE

## 2018-12-06 PROCEDURE — 3008F BODY MASS INDEX DOCD: CPT | Mod: CPTII,S$GLB,, | Performed by: NURSE PRACTITIONER

## 2018-12-06 PROCEDURE — 87804 INFLUENZA ASSAY W/OPTIC: CPT | Mod: QW,S$GLB,, | Performed by: NURSE PRACTITIONER

## 2018-12-06 PROCEDURE — 99214 OFFICE O/P EST MOD 30 MIN: CPT | Mod: S$GLB,,, | Performed by: NURSE PRACTITIONER

## 2018-12-06 RX ORDER — AMOXICILLIN AND CLAVULANATE POTASSIUM 875; 125 MG/1; MG/1
1 TABLET, FILM COATED ORAL 2 TIMES DAILY
Qty: 20 TABLET | Refills: 0 | Status: SHIPPED | OUTPATIENT
Start: 2018-12-06 | End: 2018-12-16

## 2018-12-06 RX ORDER — BENZONATATE 100 MG/1
100 CAPSULE ORAL 3 TIMES DAILY PRN
Qty: 30 CAPSULE | Refills: 0 | Status: SHIPPED | OUTPATIENT
Start: 2018-12-06 | End: 2018-12-16

## 2018-12-07 NOTE — PROGRESS NOTES
"Subjective:       Patient ID: Emi Johnson is a 42 y.o. female.    Vitals:  height is 5' 3" (1.6 m) and weight is 79.4 kg (175 lb). Her oral temperature is 99.4 °F (37.4 °C). Her blood pressure is 128/87 and her pulse is 110. Her respiration is 19 and oxygen saturation is 98%.     Chief Complaint: URI    URI    This is a new problem. The current episode started yesterday. The problem has been gradually worsening. There has been no fever. Associated symptoms include congestion, coughing, headaches and nausea. Pertinent negatives include no abdominal pain, chest pain, diarrhea, dysuria, ear pain, joint pain, joint swelling, neck pain, plugged ear sensation, rash, rhinorrhea, sinus pain, sneezing, sore throat, swollen glands, vomiting or wheezing. She has tried decongestant for the symptoms. The treatment provided no relief.       Constitution: Negative for chills, sweating, fatigue and fever.   HENT: Positive for congestion. Negative for ear pain, sinus pain, sinus pressure, sore throat and voice change.    Neck: Negative for neck pain and painful lymph nodes.   Cardiovascular: Negative for chest pain.   Eyes: Negative for eye redness.   Respiratory: Positive for cough and shortness of breath. Negative for chest tightness, sputum production, bloody sputum, COPD, stridor, wheezing and asthma.    Gastrointestinal: Positive for nausea. Negative for abdominal pain, vomiting and diarrhea.   Genitourinary: Negative for dysuria.   Musculoskeletal: Negative for muscle ache.   Skin: Negative for rash.   Allergic/Immunologic: Negative for seasonal allergies, asthma and sneezing.   Neurological: Positive for headaches.   Hematologic/Lymphatic: Negative for swollen lymph nodes.       Objective:      Physical Exam   Constitutional: She is oriented to person, place, and time. She appears well-developed and well-nourished. She is cooperative.  Non-toxic appearance. She does not appear ill. No distress.   HENT:   Head: " Normocephalic and atraumatic.   Right Ear: Hearing, external ear and ear canal normal. Tympanic membrane is bulging.   Left Ear: Hearing, external ear and ear canal normal. Tympanic membrane is erythematous and bulging.   Nose: Mucosal edema and rhinorrhea present. No nasal deformity. No epistaxis. Right sinus exhibits no maxillary sinus tenderness and no frontal sinus tenderness. Left sinus exhibits no maxillary sinus tenderness and no frontal sinus tenderness.   Mouth/Throat: Uvula is midline, oropharynx is clear and moist and mucous membranes are normal. No trismus in the jaw. Normal dentition. No uvula swelling. No posterior oropharyngeal erythema.   Eyes: Conjunctivae and lids are normal. No scleral icterus.   Sclera clear bilat   Neck: Trachea normal, full passive range of motion without pain and phonation normal. Neck supple.   Cardiovascular: Normal rate, regular rhythm, normal heart sounds, intact distal pulses and normal pulses.   Pulmonary/Chest: Effort normal and breath sounds normal. No respiratory distress.   Abdominal: Soft. Normal appearance and bowel sounds are normal. She exhibits no distension. There is no tenderness.       Musculoskeletal: Normal range of motion. She exhibits no edema or deformity.   Neurological: She is alert and oriented to person, place, and time. She exhibits normal muscle tone. Coordination normal.   Skin: Skin is warm, dry and intact. She is not diaphoretic. No pallor.   Psychiatric: She has a normal mood and affect. Her speech is normal and behavior is normal. Judgment and thought content normal. Cognition and memory are normal.   Nursing note and vitals reviewed.        Results for orders placed or performed in visit on 12/06/18   POCT Influenza A/B   Result Value Ref Range    Rapid Influenza A Ag Negative Negative    Rapid Influenza B Ag Negative Negative     Acceptable Yes        Assessment:       1. Acute suppurative otitis media of left ear without  spontaneous rupture of tympanic membrane, recurrence not specified    2. Body aches    3. Cough        Plan:         Acute suppurative otitis media of left ear without spontaneous rupture of tympanic membrane, recurrence not specified  -     amoxicillin-clavulanate 875-125mg (AUGMENTIN) 875-125 mg per tablet; Take 1 tablet by mouth 2 (two) times daily. for 10 days  Dispense: 20 tablet; Refill: 0    Body aches  -     POCT Influenza A/B    Cough  -     benzonatate (TESSALON) 100 MG capsule; Take 1 capsule (100 mg total) by mouth 3 (three) times daily as needed.  Dispense: 30 capsule; Refill: 0      Patient Instructions     Middle Ear Infection (Adult)  You have an infection of the middle ear, the space behind the eardrum. This is also called acute otitis media (AOM). Sometimes it is caused by the common cold. This is because congestion can block the internal passage (eustachian tube) that drains fluid from the middle ear. When the middle ear fills with fluid, bacteria can grow there and cause an infection. Oral antibiotics are used to treat this illness, not ear drops. Symptoms usually start to improve within 1 to 2 days of treatment.    Home care  The following are general care guidelines:  · Finish all of the antibiotic medicine given, even though you may feel better after the first few days.  · You may use over-the-counter medicine, such as acetaminophen or ibuprofen, to control pain and fever, unless something else was prescribed. If you have chronic liver or kidney disease or have ever had a stomach ulcer or gastrointestinal bleeding, talk with your healthcare provider before using these medicines. Do not give aspirin to anyone under 18 years of age who has a fever. It may cause severe illness or death.  Follow-up care  Follow up with your healthcare provider, or as advised, in 2 weeks if all symptoms have not gotten better, or if hearing doesn't go back to normal within 1 month.  When to seek medical  advice  Call your healthcare provider right away if any of these occur:  · Ear pain gets worse or does not improve after 3 days of treatment  · Unusual drowsiness or confusion  · Neck pain, stiff neck, or headache  · Fluid or blood draining from the ear canal  · Fever of 100.4°F (38°C) or as advised   · Seizure  Date Last Reviewed: 6/1/2016  © 4435-3636 [employer]. [address] [place] [address], [place] [address]. All rights reserved. This information is not intended as a substitute for professional medical care. Always follow your healthcare professional's instructions.    -Your flu swab today is negative  -Tessalon perles as needed for coughing.  -10 days of antibiotics for the ear infection.  -Increase your fluid intake.  -Follow up with your Primary care doctor.  Please follow up with your Primary care provider within 2-5 days if your signs and symptoms have not resolved or worsen.     If your condition worsens or fails to improve we recommend that you receive another evaluation at the emergency room immediately or contact your primary medical clinic to discuss your concerns.   You must understand that you have received an Urgent Care treatment only and that you may be released before all of your medical problems are known or treated. You, the patient, will arrange for follow up care as instructed.

## 2018-12-07 NOTE — PATIENT INSTRUCTIONS
Middle Ear Infection (Adult)  You have an infection of the middle ear, the space behind the eardrum. This is also called acute otitis media (AOM). Sometimes it is caused by the common cold. This is because congestion can block the internal passage (eustachian tube) that drains fluid from the middle ear. When the middle ear fills with fluid, bacteria can grow there and cause an infection. Oral antibiotics are used to treat this illness, not ear drops. Symptoms usually start to improve within 1 to 2 days of treatment.    Home care  The following are general care guidelines:  · Finish all of the antibiotic medicine given, even though you may feel better after the first few days.  · You may use over-the-counter medicine, such as acetaminophen or ibuprofen, to control pain and fever, unless something else was prescribed. If you have chronic liver or kidney disease or have ever had a stomach ulcer or gastrointestinal bleeding, talk with your healthcare provider before using these medicines. Do not give aspirin to anyone under 18 years of age who has a fever. It may cause severe illness or death.  Follow-up care  Follow up with your healthcare provider, or as advised, in 2 weeks if all symptoms have not gotten better, or if hearing doesn't go back to normal within 1 month.  When to seek medical advice  Call your healthcare provider right away if any of these occur:  · Ear pain gets worse or does not improve after 3 days of treatment  · Unusual drowsiness or confusion  · Neck pain, stiff neck, or headache  · Fluid or blood draining from the ear canal  · Fever of 100.4°F (38°C) or as advised   · Seizure  Date Last Reviewed: 6/1/2016 © 2000-2017 [employer]. [address] [place] [address], [place] [address]. All rights reserved. This information is not intended as a substitute for professional medical care. Always follow your healthcare professional's instructions.    -Your flu swab today is negative  -Tessalon  perles as needed for coughing.  -10 days of antibiotics for the ear infection.  -Increase your fluid intake.  -Follow up with your Primary care doctor.  Please follow up with your Primary care provider within 2-5 days if your signs and symptoms have not resolved or worsen.     If your condition worsens or fails to improve we recommend that you receive another evaluation at the emergency room immediately or contact your primary medical clinic to discuss your concerns.   You must understand that you have received an Urgent Care treatment only and that you may be released before all of your medical problems are known or treated. You, the patient, will arrange for follow up care as instructed.

## 2018-12-10 ENCOUNTER — TELEPHONE (OUTPATIENT)
Dept: INTERNAL MEDICINE | Facility: CLINIC | Age: 43
End: 2018-12-10

## 2018-12-10 RX ORDER — OFLOXACIN 3 MG/ML
5 SOLUTION AURICULAR (OTIC) DAILY
Qty: 5 ML | Refills: 0 | Status: SHIPPED | OUTPATIENT
Start: 2018-12-10 | End: 2019-04-17

## 2018-12-10 NOTE — TELEPHONE ENCOUNTER
Please inform patient that prescription for Floxin otic has been sent to pharmacy electronically.  However, she will need to schedule an appointment to be evaluated for efficacy of therapy.

## 2018-12-10 NOTE — TELEPHONE ENCOUNTER
----- Message from Rosa Damon sent at 12/10/2018  7:29 AM CST -----  Contact: self   Pt was seen at Urgent Care 12/06 for flu like symptoms& ear infection were give antibiotics  unable to take antibiotic for ear infection. Would like for doctor to call in ear drops.  Pharmacy :[id number]   [phone] (Phone)  [phone] (Fax)    Please advise

## 2018-12-11 ENCOUNTER — TELEPHONE (OUTPATIENT)
Dept: GASTROENTEROLOGY | Facility: CLINIC | Age: 43
End: 2018-12-11

## 2018-12-11 ENCOUNTER — PATIENT MESSAGE (OUTPATIENT)
Dept: INTERNAL MEDICINE | Facility: CLINIC | Age: 43
End: 2018-12-11

## 2018-12-11 NOTE — TELEPHONE ENCOUNTER
Spoke with patient.  Stated she was referred to Dr.James Velázquez by a friend. She has Crohn's. She has seen Dr.Shamita Currie and  in the past.  Scheduled to see Dr.James Velázquez on 2/6/2019 for 4:00.  Will mail confirmation

## 2018-12-11 NOTE — TELEPHONE ENCOUNTER
----- Message from Brianna Souza sent at 12/10/2018  8:56 AM CST -----  Contact: pt  Pt would like to be called back regarding est care    Pt can be reached at [phone]

## 2018-12-12 DIAGNOSIS — K50.00 CROHN'S DISEASE OF SMALL INTESTINE WITHOUT COMPLICATION: ICD-10-CM

## 2018-12-12 DIAGNOSIS — M79.7 FIBROMYALGIA: ICD-10-CM

## 2018-12-12 RX ORDER — OXYCODONE AND ACETAMINOPHEN 10; 325 MG/1; MG/1
1 TABLET ORAL EVERY 4 HOURS PRN
Qty: 150 TABLET | Refills: 0 | Status: SHIPPED | OUTPATIENT
Start: 2018-12-12 | End: 2019-01-11 | Stop reason: SDUPTHER

## 2018-12-13 ENCOUNTER — PATIENT MESSAGE (OUTPATIENT)
Dept: INTERNAL MEDICINE | Facility: CLINIC | Age: 43
End: 2018-12-13

## 2019-01-11 ENCOUNTER — OFFICE VISIT (OUTPATIENT)
Dept: INTERNAL MEDICINE | Facility: CLINIC | Age: 44
End: 2019-01-11
Payer: COMMERCIAL

## 2019-01-11 VITALS
HEIGHT: 63 IN | DIASTOLIC BLOOD PRESSURE: 83 MMHG | BODY MASS INDEX: 31.17 KG/M2 | SYSTOLIC BLOOD PRESSURE: 113 MMHG | HEART RATE: 79 BPM | WEIGHT: 175.94 LBS | RESPIRATION RATE: 16 BRPM | TEMPERATURE: 98 F

## 2019-01-11 DIAGNOSIS — E16.1 REACTIVE HYPOGLYCEMIA: ICD-10-CM

## 2019-01-11 DIAGNOSIS — K50.00 CROHN'S DISEASE OF SMALL INTESTINE WITHOUT COMPLICATION: Primary | ICD-10-CM

## 2019-01-11 DIAGNOSIS — M79.7 FIBROMYALGIA: ICD-10-CM

## 2019-01-11 PROCEDURE — 3008F BODY MASS INDEX DOCD: CPT | Mod: CPTII,S$GLB,, | Performed by: INTERNAL MEDICINE

## 2019-01-11 PROCEDURE — 99999 PR PBB SHADOW E&M-EST. PATIENT-LVL IV: ICD-10-PCS | Mod: PBBFAC,,, | Performed by: INTERNAL MEDICINE

## 2019-01-11 PROCEDURE — 99214 PR OFFICE/OUTPT VISIT, EST, LEVL IV, 30-39 MIN: ICD-10-PCS | Mod: S$GLB,,, | Performed by: INTERNAL MEDICINE

## 2019-01-11 PROCEDURE — 99214 OFFICE O/P EST MOD 30 MIN: CPT | Mod: S$GLB,,, | Performed by: INTERNAL MEDICINE

## 2019-01-11 PROCEDURE — 3008F PR BODY MASS INDEX (BMI) DOCUMENTED: ICD-10-PCS | Mod: CPTII,S$GLB,, | Performed by: INTERNAL MEDICINE

## 2019-01-11 PROCEDURE — 99999 PR PBB SHADOW E&M-EST. PATIENT-LVL IV: CPT | Mod: PBBFAC,,, | Performed by: INTERNAL MEDICINE

## 2019-01-11 RX ORDER — CYANOCOBALAMIN 1000 UG/ML
INJECTION, SOLUTION INTRAMUSCULAR; SUBCUTANEOUS
Qty: 10 ML | Refills: 1 | Status: SHIPPED | OUTPATIENT
Start: 2019-01-11 | End: 2019-01-11 | Stop reason: SDUPTHER

## 2019-01-11 RX ORDER — CYANOCOBALAMIN 1000 UG/ML
INJECTION, SOLUTION INTRAMUSCULAR; SUBCUTANEOUS
Qty: 10 ML | Refills: 1 | Status: SHIPPED | OUTPATIENT
Start: 2019-01-11 | End: 2019-04-17 | Stop reason: SDUPTHER

## 2019-01-11 RX ORDER — OXYCODONE AND ACETAMINOPHEN 10; 325 MG/1; MG/1
1 TABLET ORAL EVERY 6 HOURS PRN
Qty: 120 TABLET | Refills: 0 | Status: SHIPPED | OUTPATIENT
Start: 2019-01-11 | End: 2019-02-06 | Stop reason: SDUPTHER

## 2019-01-11 RX ORDER — INSULIN PUMP SYRINGE, 3 ML
EACH MISCELLANEOUS
Qty: 1 EACH | Refills: 0 | Status: SHIPPED | OUTPATIENT
Start: 2019-01-11 | End: 2019-01-11 | Stop reason: SDUPTHER

## 2019-01-11 RX ORDER — OXYCODONE AND ACETAMINOPHEN 10; 325 MG/1; MG/1
1 TABLET ORAL EVERY 4 HOURS PRN
Qty: 120 TABLET | Refills: 0 | Status: SHIPPED | OUTPATIENT
Start: 2019-01-11 | End: 2019-01-11

## 2019-01-11 RX ORDER — INSULIN PUMP SYRINGE, 3 ML
EACH MISCELLANEOUS
Qty: 1 EACH | Refills: 0 | Status: SHIPPED | OUTPATIENT
Start: 2019-01-11 | End: 2020-07-06 | Stop reason: SDUPTHER

## 2019-01-11 RX ORDER — TIZANIDINE 4 MG/1
4 TABLET ORAL NIGHTLY PRN
Qty: 30 TABLET | Refills: 3 | Status: SHIPPED | OUTPATIENT
Start: 2019-01-11 | End: 2019-05-22 | Stop reason: SDUPTHER

## 2019-01-11 RX ORDER — LANCETS
EACH MISCELLANEOUS
Qty: 100 EACH | Refills: 3 | Status: SHIPPED | OUTPATIENT
Start: 2019-01-11 | End: 2019-01-11 | Stop reason: SDUPTHER

## 2019-01-11 RX ORDER — LANCETS
EACH MISCELLANEOUS
Qty: 100 EACH | Refills: 3 | Status: SHIPPED | OUTPATIENT
Start: 2019-01-11 | End: 2020-01-30 | Stop reason: SDUPTHER

## 2019-01-11 NOTE — PROGRESS NOTES
CC: followup of abdominal pain  HPI:  The patient is a 43 y.o. year old female who presents to the office for followup of abdominal pain.  She reports rectal pressure and oral ulcers have improved.  She complains of continued abdominal pain.  She has an upcoming appointment with GI.  The patient does report recent episode of nausea and vomiting.  She is working three jobs to help with her medical costs.  The patient reports she now has a $2,000 deductible.  She complains of pain due to fibromyalgia as well.    PAST MEDICAL HISTORY:  Past Medical History:   Diagnosis Date    Anemia     Asthma     B12 deficiency     Crohn's disease     History of shingles     Lupus     Migraine headache     Raynaud disease     Reactive hypoglycemia     Seizures 2004    no medication     Sleep apnea     Non compliant with CPAP       SURGICAL HISTORY:  Past Surgical History:   Procedure Laterality Date    ABDOMINAL SURGERY      COLON SURGERY      95% of colon removed 2010    COLONOSCOPY      COLOSTOMY      CT (COMPUTED TOMOGRAPHY) N/A 12/26/2013    [facility]    EGD (ESOPHAGOGASTRODUODENOSCOPY) Left 4/22/2013    Performed by Parish [facility] at [facility] ENDO    ESOPHAGOGASTRODUODENOSCOPY (EGD) N/A 4/19/2016    Performed by [facility] at [facility] ENDO (4TH FLR)    ESOPHAGOGASTRODUODENOSCOPY (EGD) N/A 7/17/2014    Per[facility] by [facility] ENDO    HYSTERECTOMY      due to endometriosis    ILEOSCOPY through stoma N/A 3/13/2018    Performed by [facility] ENDO (4TH FLR)    ILEOSCOPY through stoma N/A 8/23/2017    Performed by [facility] ENDO (4TH FLR)    ILEOSCOPY through stoma N/A 3/21/2017    [facility] by [facility] END[facility] (4TH FLR)    ILEOSTOMY      LAPAROSCOPY W/ MINI-LAPAROTOMY      large intestine removed      just a portion    pelvic mass removal      POUCHOSCOPY N/A 3/18/2016    Performed by [facility]O (4TH FLR)    REVISION COLOSTOMY  2010    SIGMOIDOSCOPY, FLEXIBLE Left 4/22/2013    [facility]    SIGMOIDOSCOPY-FLEXIBLE N/A 10/8/2015    [facility] END[facility]    STOMACH SURGERY      TONSILLECTOMY, ADENOIDECTOMY         MEDS:  Medcard reviewed and updated    ALLERGIES: Allergy Card reviewed and updated    SOCIAL HISTORY:   The patient is a nonsmoker.    PE:   APPEARANCE: Well nourished, well developed, in no acute distress.    CHEST: Lungs clear to auscultation with unlabored respirations.  CARDIOVASCULAR: Normal S1, S2. No murmurs. No carotid bruits. No pedal edema.  ABDOMEN: Bowel sounds normal. Not distended. Soft. No tenderness or masses. Positive colostomy.  PSYCHIATRIC: The patient is oriented to person, place, and time and has a pleasant affect.        ASSESSMENT/PLAN:   Emi was seen today for follow-up.    Diagnoses and all orders for this visit:    Crohn's disease of small intestine without complication  -     Discontinue: oxyCODONE-acetaminophen (PERCOCET)  mg per tablet; Take 1 tablet by mouth every 4 (four) hours as needed for Pain.  -     oxyCODONE-acetaminophen (PERCOCET)  mg per tablet; Take 1 tablet by mouth every 6 (six) hours as needed for Pain.   -     follow up with GI    Fibromyalgia  -     Discontinue: oxyCODONE-acetaminophen (PERCOCET)  mg per tablet; Take 1 tablet by mouth every 4 (four) hours as needed for Pain.  -     oxyCODONE-acetaminophen (PERCOCET)  mg per tablet; Take 1 tablet by mouth every 6 (six) hours as needed for Pain.    Reactive hypoglycemia  -     blood-glucose meter kit; To check BG 1 times daily, to use with insurance preferred meter  -     lancets Misc; To check BG 1 times daily, to use with insurance preferred meter  -     blood sugar diagnostic Strp; To check BG 1 times daily, to use with insurance preferred meter    Other orders  -     cyanocobalamin 1,000  mcg/mL injection; INJECT 1 ML (1,000 MCG TOTAL) INTO THE MUSCLE EVERY 28 DAYS.  -     tiZANidine (ZANAFLEX) 4 MG tablet; Take 1 tablet (4 mg total) by mouth nightly as needed.

## 2019-02-06 DIAGNOSIS — K50.00 CROHN'S DISEASE OF SMALL INTESTINE WITHOUT COMPLICATION: ICD-10-CM

## 2019-02-06 DIAGNOSIS — M79.7 FIBROMYALGIA: ICD-10-CM

## 2019-02-06 RX ORDER — OXYCODONE AND ACETAMINOPHEN 10; 325 MG/1; MG/1
1 TABLET ORAL EVERY 6 HOURS PRN
Qty: 120 TABLET | Refills: 0 | Status: SHIPPED | OUTPATIENT
Start: 2019-02-11 | End: 2019-03-08 | Stop reason: SDUPTHER

## 2019-02-06 NOTE — TELEPHONE ENCOUNTER
"----- Message from Petty Ingram sent at 2/6/2019  7:32 AM CST -----  Contact: self/ [phone]  RX request - refill or new RX.  Is this a refill or new RX:    RX name and strength: oxyCODONE-acetaminophen (PERCOCET)  mg per tablet 120 tablet   Directions:   Is this a 30 day or 90 day RX:    Local pharmacy or mail order pharmacy:    Pharmacy name and phone # (DON'T enter "on file" or "in chart"):   Comments:        "

## 2019-02-07 ENCOUNTER — PATIENT MESSAGE (OUTPATIENT)
Dept: INTERNAL MEDICINE | Facility: CLINIC | Age: 44
End: 2019-02-07

## 2019-02-11 ENCOUNTER — OFFICE VISIT (OUTPATIENT)
Dept: URGENT CARE | Facility: CLINIC | Age: 44
End: 2019-02-11
Payer: COMMERCIAL

## 2019-02-11 VITALS
BODY MASS INDEX: 31.71 KG/M2 | OXYGEN SATURATION: 96 % | SYSTOLIC BLOOD PRESSURE: 111 MMHG | RESPIRATION RATE: 16 BRPM | WEIGHT: 179 LBS | HEART RATE: 76 BPM | HEIGHT: 63 IN | TEMPERATURE: 97 F | DIASTOLIC BLOOD PRESSURE: 80 MMHG

## 2019-02-11 DIAGNOSIS — Z77.22 SECOND HAND SMOKE EXPOSURE: ICD-10-CM

## 2019-02-11 DIAGNOSIS — B34.9 ACUTE VIRAL SYNDROME: ICD-10-CM

## 2019-02-11 DIAGNOSIS — R52 BODY ACHES: ICD-10-CM

## 2019-02-11 DIAGNOSIS — Z20.828 EXPOSURE TO THE FLU: Primary | ICD-10-CM

## 2019-02-11 LAB
CTP QC/QA: YES
FLUAV AG NPH QL: NEGATIVE
FLUBV AG NPH QL: NEGATIVE

## 2019-02-11 PROCEDURE — 87804 POCT INFLUENZA A/B: ICD-10-PCS | Mod: 59,QW,S$GLB, | Performed by: NURSE PRACTITIONER

## 2019-02-11 PROCEDURE — 3008F BODY MASS INDEX DOCD: CPT | Mod: CPTII,S$GLB,, | Performed by: NURSE PRACTITIONER

## 2019-02-11 PROCEDURE — 87804 INFLUENZA ASSAY W/OPTIC: CPT | Mod: QW,S$GLB,, | Performed by: NURSE PRACTITIONER

## 2019-02-11 PROCEDURE — 99214 PR OFFICE/OUTPT VISIT, EST, LEVL IV, 30-39 MIN: ICD-10-PCS | Mod: S$GLB,,, | Performed by: NURSE PRACTITIONER

## 2019-02-11 PROCEDURE — 99214 OFFICE O/P EST MOD 30 MIN: CPT | Mod: S$GLB,,, | Performed by: NURSE PRACTITIONER

## 2019-02-11 PROCEDURE — 3008F PR BODY MASS INDEX (BMI) DOCUMENTED: ICD-10-PCS | Mod: CPTII,S$GLB,, | Performed by: NURSE PRACTITIONER

## 2019-02-11 RX ORDER — PREDNISONE 20 MG/1
40 TABLET ORAL DAILY
Qty: 8 TABLET | Refills: 0 | Status: SHIPPED | OUTPATIENT
Start: 2019-02-11 | End: 2019-02-15

## 2019-02-11 RX ORDER — OSELTAMIVIR PHOSPHATE 75 MG/1
75 CAPSULE ORAL 2 TIMES DAILY
Qty: 10 CAPSULE | Refills: 0 | Status: SHIPPED | OUTPATIENT
Start: 2019-02-11 | End: 2019-02-11

## 2019-02-11 RX ORDER — OSELTAMIVIR PHOSPHATE 75 MG/1
75 CAPSULE ORAL DAILY
Qty: 10 CAPSULE | Refills: 0 | Status: SHIPPED | OUTPATIENT
Start: 2019-02-11 | End: 2019-02-21

## 2019-02-11 RX ORDER — ALBUTEROL SULFATE 90 UG/1
2 AEROSOL, METERED RESPIRATORY (INHALATION)
Qty: 1 INHALER | Refills: 0 | Status: SHIPPED | OUTPATIENT
Start: 2019-02-11 | End: 2019-07-24

## 2019-02-11 NOTE — PROGRESS NOTES
"Subjective:       Patient ID: Emi Johnson is a 43 y.o. female.    Vitals:  height is 5' 3" (1.6 m) and weight is 81.2 kg (179 lb). Her temperature is 97.4 °F (36.3 °C). Her blood pressure is 111/80 and her pulse is 76. Her respiration is 16 and oxygen saturation is 96%.     Chief Complaint: URI      Patient states she has been exposed to the flu at work. 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       URI    This is a new problem. The current episode started today. The problem has been rapidly worsening. Maximum temperature: subjective fevers. Associated symptoms include coughing and headaches. Pertinent negatives include no congestion, ear pain, nausea, rash, sinus pain, sore throat, vomiting or wheezing. She has tried acetaminophen for the symptoms. The treatment provided mild relief.       Constitution: Positive for chills. Negative for sweating, fatigue and fever.   HENT: Negative for ear pain, congestion, sinus pain, sinus pressure, sore throat and voice change.    Neck: Negative for painful lymph nodes.   Eyes: Negative for eye redness.   Respiratory: Positive for cough and asthma. Negative for chest tightness, sputum production, bloody sputum, COPD, shortness of breath, stridor and wheezing.    Gastrointestinal: Negative for nausea and vomiting.   Musculoskeletal: Positive for muscle ache.   Skin: Negative for rash.   Allergic/Immunologic: Positive for asthma. Negative for seasonal allergies.   Neurological: Positive for headaches.   Hematologic/Lymphatic: Negative for swollen lymph nodes.       Objective:      Physical Exam   Constitutional: She is oriented to person, place, and time. She appears well-developed and well-nourished. She is cooperative.  Non-toxic appearance. She does not appear ill. No distress.   HENT:   Head: Normocephalic and atraumatic.   Right Ear: Hearing, tympanic membrane, external ear and ear canal normal.   Left Ear: Hearing, tympanic membrane, external " ear and ear canal normal.   Nose: Nose normal. No mucosal edema, rhinorrhea or nasal deformity. No epistaxis. Right sinus exhibits no maxillary sinus tenderness and no frontal sinus tenderness. Left sinus exhibits no maxillary sinus tenderness and no frontal sinus tenderness.   Mouth/Throat: Uvula is midline, oropharynx is clear and moist and mucous membranes are normal. No trismus in the jaw. Normal dentition. No uvula swelling. No posterior oropharyngeal erythema.   Eyes: Conjunctivae and lids are normal. No scleral icterus.   Sclera clear bilat   Neck: Trachea normal, full passive range of motion without pain and phonation normal. Neck supple.   Cardiovascular: Normal rate, regular rhythm, normal heart sounds, intact distal pulses and normal pulses.   Pulmonary/Chest: Effort normal and breath sounds normal. No respiratory distress.   Abdominal: Soft. Normal appearance and bowel sounds are normal. She exhibits no distension. There is no tenderness.   Musculoskeletal: Normal range of motion. She exhibits no edema or deformity.   Neurological: She is alert and oriented to person, place, and time. She exhibits normal muscle tone. Coordination normal.   Skin: Skin is warm, dry and intact. She is not diaphoretic. No pallor.   Psychiatric: She has a normal mood and affect. Her speech is normal and behavior is normal. Judgment and thought content normal. Cognition and memory are normal.   Nursing note and vitals reviewed.      Results for orders placed or performed in visit on 02/11/19   POCT Influenza A/B   Result Value Ref Range    Rapid Influenza A Ag Negative Negative    Rapid Influenza B Ag Negative Negative     Acceptable Yes        Assessment:       1. Exposure to the flu    2. Body aches    3. Second hand smoke exposure    4. Acute viral syndrome        Plan:         Exposure to the flu  -     Discontinue: oseltamivir (TAMIFLU) 75 MG capsule; Take 1 capsule (75 mg total) by mouth 2 (two) times  "daily. for 5 days  Dispense: 10 capsule; Refill: 0  -     oseltamivir (TAMIFLU) 75 MG capsule; Take 1 capsule (75 mg total) by mouth once daily. for 10 days  Dispense: 10 capsule; Refill: 0    Body aches  -     POCT Influenza A/B    Second hand smoke exposure    Acute viral syndrome  -     predniSONE (DELTASONE) 20 MG tablet; Take 2 tablets (40 mg total) by mouth once daily. for 4 days  Dispense: 8 tablet; Refill: 0  -     albuterol (PROVENTIL/VENTOLIN HFA) 90 mcg/actuation inhaler; Inhale 2 puffs into the lungs every 4 to 6 hours as needed for Wheezing or Shortness of Breath. Rescue  Dispense: 1 Inhaler; Refill: 0            Patient Instructions     You must understand that you've received an Urgent Care treatment only and that you may be released before all your medical problems are known or treated. You, the patient, will arrange for follow up care as instructed.  If your condition worsens we recommend that you receive another evaluation at the emergency room immediately or contact your primary medical clinics after hours call service to discuss your concerns.  Please return here or go to the Emergency Department for any concerns or worsening of condition.      Viral Syndrome (Adult)  A viral illness may cause a number of symptoms. The symptoms depend on the part of the body that the virus affects. If it settles in your nose, throat, and lungs, it may cause cough, sore throat, congestion, and sometimes headache. If it settles in your stomach and intestinal tract, it may cause vomiting and diarrhea. Sometimes it causes vague symptoms like "aching all over," feeling tired, loss of appetite, or fever.  A viral illness usually lasts 1 to 2 weeks, but sometimes it lasts longer. In some cases, a more serious infection can look like a viral syndrome in the first few days of the illness. You may need another exam and additional tests to know the difference. Watch for the warning signs listed below.  Home care  Follow " these guidelines for taking care of yourself at home:  · If symptoms are severe, rest at home for the first 2 to 3 days.  · Stay away from cigarette smoke - both your smoke and the smoke from others.  · You may use over-the-counter acetaminophen or ibuprofen for fever, muscle aching, and headache, unless another medicine was prescribed for this. If you have chronic liver or kidney disease or ever had a stomach ulcer or GI bleeding, talk with your doctor before using these medicines. No one who is younger than 18 and ill with a fever should take aspirin. It may cause severe disease or death.  · Your appetite may be poor, so a light diet is fine. Avoid dehydration by drinking 8 to 12 8-ounce glasses of fluids each day. This may include water; orange juice; lemonade; apple, grape, and cranberry juice; clear fruit drinks; electrolyte replacement and sports drinks; and decaffeinated teas and coffee. If you have been diagnosed with a kidney disease, ask your doctor how much and what types of fluids you should drink to prevent dehydration. If you have kidney disease, drinking too much fluid can cause it build up in the your body and be dangerous to your health.  · Over-the-counter remedies won't shorten the length of the illness but may be helpful for cough, sore throat; and nasal and sinus congestion. Don't use decongestants if you have high blood pressure.  Follow-up care  Follow up with your healthcare provider if you do not improve over the next week.  Call 911  Get emergency medical care if any of the following occur:  · Convulsion  · Feeling weak, dizzy, or like you are going to faint  · Chest pain, shortness of breath, wheezing, or difficulty breathing  When to seek medical advice  Call your healthcare provider right away if any of these occur:  · Cough with lots of colored sputum (mucus) or blood in your sputum  · Chest pain, shortness of breath, wheezing, or difficulty breathing  · Severe headache; face, neck, or  ear pain  · Severe, constant pain in the lower right side of your belly (abdominal)  · Continued vomiting (cant keep liquids down)  · Frequent diarrhea (more than 5 times a day); blood (red or black color) or mucus in diarrhea  · Feeling weak, dizzy, or like you are going to faint  · Extreme thirst  · Fever of 100.4°F (38°C) or higher, or as directed by your healthcare provider  Date Last Reviewed: 9/25/2015  © 4688-7468 [employer]. [address] [place] [address]. All rights reserved. This information is not intended as a substitute for professional medical care. Always follow your healthcare professional's instructions.

## 2019-02-12 NOTE — PATIENT INSTRUCTIONS
"  You must understand that you've received an Urgent Care treatment only and that you may be released before all your medical problems are known or treated. You, the patient, will arrange for follow up care as instructed.  If your condition worsens we recommend that you receive another evaluation at the emergency room immediately or contact your primary medical clinics after hours call service to discuss your concerns.  Please return here or go to the Emergency Department for any concerns or worsening of condition.      Viral Syndrome (Adult)  A viral illness may cause a number of symptoms. The symptoms depend on the part of the body that the virus affects. If it settles in your nose, throat, and lungs, it may cause cough, sore throat, congestion, and sometimes headache. If it settles in your stomach and intestinal tract, it may cause vomiting and diarrhea. Sometimes it causes vague symptoms like "aching all over," feeling tired, loss of appetite, or fever.  A viral illness usually lasts 1 to 2 weeks, but sometimes it lasts longer. In some cases, a more serious infection can look like a viral syndrome in the first few days of the illness. You may need another exam and additional tests to know the difference. Watch for the warning signs listed below.  Home care  Follow these guidelines for taking care of yourself at home:  · If symptoms are severe, rest at home for the first 2 to 3 days.  · Stay away from cigarette smoke - both your smoke and the smoke from others.  · You may use over-the-counter acetaminophen or ibuprofen for fever, muscle aching, and headache, unless another medicine was prescribed for this. If you have chronic liver or kidney disease or ever had a stomach ulcer or GI bleeding, talk with your doctor before using these medicines. No one who is younger than 18 and ill with a fever should take aspirin. It may cause severe disease or death.  · Your appetite may be poor, so a light diet is fine. Avoid " dehydration by drinking 8 to 12 8-ounce glasses of fluids each day. This may include water; orange juice; lemonade; apple, grape, and cranberry juice; clear fruit drinks; electrolyte replacement and sports drinks; and decaffeinated teas and coffee. If you have been diagnosed with a kidney disease, ask your doctor how much and what types of fluids you should drink to prevent dehydration. If you have kidney disease, drinking too much fluid can cause it build up in the your body and be dangerous to your health.  · Over-the-counter remedies won't shorten the length of the illness but may be helpful for cough, sore throat; and nasal and sinus congestion. Don't use decongestants if you have high blood pressure.  Follow-up care  Follow up with your healthcare provider if you do not improve over the next week.  Call 911  Get emergency medical care if any of the following occur:  · Convulsion  · Feeling weak, dizzy, or like you are going to faint  · Chest pain, shortness of breath, wheezing, or difficulty breathing  When to seek medical advice  Call your healthcare provider right away if any of these occur:  · Cough with lots of colored sputum (mucus) or blood in your sputum  · Chest pain, shortness of breath, wheezing, or difficulty breathing  · Severe headache; face, neck, or ear pain  · Severe, constant pain in the lower right side of your belly (abdominal)  · Continued vomiting (cant keep liquids down)  · Frequent diarrhea (more than 5 times a day); blood (red or black color) or mucus in diarrhea  · Feeling weak, dizzy, or like you are going to faint  · Extreme thirst  · Fever of 100.4°F (38°C) or higher, or as directed by your healthcare provider  Date Last Reviewed: 9/25/2015  © 2690-2528 [employer]. [address] [place] [address], [place] [address]. All rights reserved. This information is not intended as a substitute for professional medical care. Always follow your healthcare professional's  instructions.

## 2019-03-08 DIAGNOSIS — M79.7 FIBROMYALGIA: ICD-10-CM

## 2019-03-08 DIAGNOSIS — K50.00 CROHN'S DISEASE OF SMALL INTESTINE WITHOUT COMPLICATION: ICD-10-CM

## 2019-03-08 RX ORDER — OXYCODONE AND ACETAMINOPHEN 10; 325 MG/1; MG/1
1 TABLET ORAL EVERY 6 HOURS PRN
Qty: 120 TABLET | Refills: 0 | Status: SHIPPED | OUTPATIENT
Start: 2019-03-08 | End: 2019-04-08 | Stop reason: SDUPTHER

## 2019-03-11 ENCOUNTER — PATIENT MESSAGE (OUTPATIENT)
Dept: INTERNAL MEDICINE | Facility: CLINIC | Age: 44
End: 2019-03-11

## 2019-04-08 ENCOUNTER — TELEPHONE (OUTPATIENT)
Dept: GASTROENTEROLOGY | Facility: CLINIC | Age: 44
End: 2019-04-08

## 2019-04-08 DIAGNOSIS — M79.7 FIBROMYALGIA: ICD-10-CM

## 2019-04-08 DIAGNOSIS — K50.00 CROHN'S DISEASE OF SMALL INTESTINE WITHOUT COMPLICATION: ICD-10-CM

## 2019-04-08 DIAGNOSIS — K50.00 CROHN'S DISEASE OF ILEUM WITHOUT COMPLICATION: Primary | ICD-10-CM

## 2019-04-08 RX ORDER — OXYCODONE AND ACETAMINOPHEN 10; 325 MG/1; MG/1
1 TABLET ORAL EVERY 6 HOURS PRN
Qty: 120 TABLET | Refills: 0 | Status: SHIPPED | OUTPATIENT
Start: 2019-04-08 | End: 2019-05-06 | Stop reason: SDUPTHER

## 2019-04-08 NOTE — TELEPHONE ENCOUNTER
----- Message from Rema Ardon MA sent at 4/8/2019  8:31 AM CDT -----  Contact: [phone]  Needs Advice    Reason for call: pt received a letter that its time for her to have a scope due to her ileostomy.         Communication Preference: [phone]    Additional Information: pt has 2 jobs and needs to coordinate her appt around her work schedule

## 2019-04-08 NOTE — TELEPHONE ENCOUNTER
Called patient and told her to call endoscopy to get scope scheduled. She has noticed a little blood from stoma but reassured that we will evaluate and this may be from stoma irritation. Scope orders placed. No f/u needed unless scope shows something abnormal

## 2019-04-08 NOTE — TELEPHONE ENCOUNTER
----- Message from Alycia Malagon sent at 4/8/2019  7:25 AM CDT -----  Contact: patient [phone]  Pt called to request a rx for oxycodone 10 and was advised that she will be called when rx is ready to .

## 2019-04-09 ENCOUNTER — PATIENT MESSAGE (OUTPATIENT)
Dept: INTERNAL MEDICINE | Facility: CLINIC | Age: 44
End: 2019-04-09

## 2019-04-10 ENCOUNTER — HOSPITAL ENCOUNTER (OUTPATIENT)
Facility: HOSPITAL | Age: 44
Discharge: HOME | End: 2019-04-10
Attending: INTERNAL MEDICINE | Admitting: INTERNAL MEDICINE
Payer: COMMERCIAL

## 2019-04-10 ENCOUNTER — ANESTHESIA (OUTPATIENT)
Dept: ENDOSCOPY | Facility: HOSPITAL | Age: 44
End: 2019-04-10
Payer: COMMERCIAL

## 2019-04-10 ENCOUNTER — ANESTHESIA EVENT (OUTPATIENT)
Dept: ENDOSCOPY | Facility: HOSPITAL | Age: 44
End: 2019-04-10
Payer: COMMERCIAL

## 2019-04-10 VITALS
OXYGEN SATURATION: 99 % | WEIGHT: 170 LBS | TEMPERATURE: 99 F | SYSTOLIC BLOOD PRESSURE: 126 MMHG | BODY MASS INDEX: 30.12 KG/M2 | HEART RATE: 76 BPM | HEIGHT: 63 IN | RESPIRATION RATE: 14 BRPM | DIASTOLIC BLOOD PRESSURE: 74 MMHG

## 2019-04-10 DIAGNOSIS — K50.90 CROHN'S DISEASE: ICD-10-CM

## 2019-04-10 DIAGNOSIS — K50.00 CROHN'S DISEASE OF SMALL INTESTINE WITHOUT COMPLICATION: Primary | ICD-10-CM

## 2019-04-10 PROCEDURE — 37000008 HC ANESTHESIA 1ST 15 MINUTES: Performed by: INTERNAL MEDICINE

## 2019-04-10 PROCEDURE — 63600175 PHARM REV CODE 636 W HCPCS: Performed by: NURSE ANESTHETIST, CERTIFIED REGISTERED

## 2019-04-10 PROCEDURE — 44380 SMALL BOWEL ENDOSCOPY BR/WA: CPT | Mod: ,,, | Performed by: INTERNAL MEDICINE

## 2019-04-10 PROCEDURE — 37000009 HC ANESTHESIA EA ADD 15 MINS: Performed by: INTERNAL MEDICINE

## 2019-04-10 PROCEDURE — 44380 PR ILEOSCOPY THRU STOMA,DIAGNOSTIC: ICD-10-PCS | Mod: ,,, | Performed by: INTERNAL MEDICINE

## 2019-04-10 PROCEDURE — 25000003 PHARM REV CODE 250: Performed by: INTERNAL MEDICINE

## 2019-04-10 PROCEDURE — E9220 PRA ENDO ANESTHESIA: ICD-10-PCS | Mod: ,,, | Performed by: NURSE ANESTHETIST, CERTIFIED REGISTERED

## 2019-04-10 PROCEDURE — E9220 PRA ENDO ANESTHESIA: HCPCS | Mod: ,,, | Performed by: NURSE ANESTHETIST, CERTIFIED REGISTERED

## 2019-04-10 PROCEDURE — 44380 SMALL BOWEL ENDOSCOPY BR/WA: CPT | Performed by: INTERNAL MEDICINE

## 2019-04-10 RX ORDER — PROPOFOL 10 MG/ML
VIAL (ML) INTRAVENOUS
Status: DISCONTINUED | OUTPATIENT
Start: 2019-04-10 | End: 2019-04-10

## 2019-04-10 RX ORDER — SODIUM CHLORIDE 9 MG/ML
INJECTION, SOLUTION INTRAVENOUS CONTINUOUS
Status: DISCONTINUED | OUTPATIENT
Start: 2019-04-10 | End: 2019-04-10 | Stop reason: HOSPADM

## 2019-04-10 RX ORDER — LIDOCAINE HCL/PF 100 MG/5ML
SYRINGE (ML) INTRAVENOUS
Status: DISCONTINUED | OUTPATIENT
Start: 2019-04-10 | End: 2019-04-10

## 2019-04-10 RX ADMIN — PROPOFOL 100 MG: 10 INJECTION, EMULSION INTRAVENOUS at 09:04

## 2019-04-10 RX ADMIN — LIDOCAINE HYDROCHLORIDE 50 MG: 20 INJECTION, SOLUTION INTRAVENOUS at 09:04

## 2019-04-10 RX ADMIN — SODIUM CHLORIDE: 0.9 INJECTION, SOLUTION INTRAVENOUS at 09:04

## 2019-04-10 RX ADMIN — PROPOFOL 30 MG: 10 INJECTION, EMULSION INTRAVENOUS at 09:04

## 2019-04-10 NOTE — ANESTHESIA PREPROCEDURE EVALUATION
04/10/2019  Emi Johnson is a 43 y.o., female.    Anesthesia Evaluation    I have reviewed the Patient Summary Reports.     I have reviewed the Medications.     Review of Systems  Anesthesia Hx:  Denies Family Hx of Anesthesia complications.  Personal Hx of Anesthesia complications, Post-Operative Nausea/Vomiting   Pulmonary:   Asthma Sleep Apnea    Neurological:   Neuromuscular Disease, Headaches Seizures Fibromyalgia  Migraines       Physical Exam  General:  Obesity    Airway/Jaw/Neck:  Airway Findings: Mouth Opening: Normal Tongue: Normal  General Airway Assessment: Adult  Mallampati: I  TM Distance: Normal, at least 6 cm        Eyes/Ears/Nose:  EYES/EARS/NOSE FINDINGS: Normal   Dental:  DENTAL FINDINGS: Normal   Chest/Lungs:  Chest/Lungs Findings: Clear to auscultation, Normal Respiratory Rate     Heart/Vascular:  Heart Findings: Rate: Normal  Rhythm: Regular Rhythm        Mental Status:  Mental Status Findings:  Cooperative, Alert and Oriented         Anesthesia Plan  Type of Anesthesia, risks & benefits discussed:  Anesthesia Type:  general, MAC  Patient's Preference:   Intra-op Monitoring Plan: standard ASA monitors  Intra-op Monitoring Plan Comments:   Post Op Pain Control Plan:   Post Op Pain Control Plan Comments:   Induction:   IV  Beta Blocker:  Patient is not currently on a Beta-Blocker (No further documentation required).       Informed Consent: Patient understands risks and agrees with Anesthesia plan.  Questions answered. Anesthesia consent signed with patient.  ASA Score: 3     Day of Surgery Review of History & Physical:    H&P update referred to the surgeon.         Ready For Surgery From Anesthesia Perspective.

## 2019-04-10 NOTE — [employer] PATIENT INSTRUCTIONS
Discharge Summary/Instructions after an Endoscopic Procedure  Patient Name: Emi Johnson  Patient MRN: [id number]  Patient YOB: 1975  Wednesday, April 10, 2019  Eve Currie MD  RESTRICTIONS:  During your procedure today, you received medications for sedation.  These   medications may affect your judgment, balance and coordination.  Therefore,   for 24 hours, you have the following restrictions:   - DO NOT drive a car, operate machinery, make legal/financial decisions,   sign important papers or drink alcohol.    ACTIVITY:  Today: no heavy lifting, straining or running due to procedural   sedation/anesthesia.  The following day: return to full activity including work.  DIET:  Eat and drink normally unless instructed otherwise.     TREATMENT FOR COMMON SIDE EFFECTS:  - Mild abdominal pain, nausea, belching, bloating or excessive gas:  rest,   eat lightly and use a heating pad.  - Sore Throat: treat with throat lozenges and/or gargle with warm salt   water.  - Because air was used during the procedure, expelling large amounts of air   from your rectum or belching is normal.  - If a bowel prep was taken, you may not have a bowel movement for 1-3 days.    This is normal.  SYMPTOMS TO WATCH FOR AND REPORT TO YOUR PHYSICIAN:  1. Abdominal pain or bloating, other than gas cramps.  2. Chest pain.  3. Back pain.  4. Signs of infection such as: chills or fever occurring within 24 hours   after the procedure.  5. Rectal bleeding, which would show as bright red, maroon, or black stools.   (A tablespoon of blood from the rectum is not serious, especially if   hemorrhoids are present.)  6. Vomiting.  7. Weakness or dizziness.  GO DIRECTLY TO THE NEAREST EMERGENCY ROOM IF YOU HAVE ANY OF THE FOLLOWING:      Difficulty breathing              Chills and/or fever over 101 F   Persistent vomiting and/or vomiting blood   Severe abdominal pain   Severe chest pain   Black, tarry stools   Bleeding- more than one  tablespoon   Any other symptom or condition that you feel may need urgent attention  Your doctor recommends these additional instructions:  If any biopsies were taken, your doctors clinic will contact you in 1 to 2   weeks with any results.  - Discharge patient to home.   - Patient has a contact number available for emergencies.  The signs and   symptoms of potential delayed complications were discussed with the   patient.  Return to normal activities tomorrow.  Written discharge   instructions were provided to the patient.   - Resume previous diet.   - Repeat ileoscopy in 1 year to assess disease activity. Please call in   3/2019 to get set up for early April 2019  For questions, problems or results please call your physician - Eve Currie MD at Work:  [phone].  [facility], EMERGENCY ROOM PHONE NUMBER: [phone]  IF A COMPLICATION OR EMERGENCY SITUATION ARISES AND YOU ARE UNABLE TO REACH   YOUR PHYSICIAN - GO DIRECTLY TO THE EMERGENCY ROOM.  Eve Currie MD  4/10/2019 10:00:11 AM  This report has been verified and signed electronically.  [employer]

## 2019-04-10 NOTE — H&P
Short Stay Endoscopy History and Physical    PCP - Bianca Rutledge MD  Referring Physician - Eve Currie MD  [address] [place] [address]    Procedure - ileoscopy through stoma  ASA - per anesthesia  Mallampati - per anesthesia  History of Anesthesia problems - no  Family history Anesthesia problems -  no   Plan of anesthesia - General    HPI  43 y.o. female  Reason for procedure:  Crohn's f/u    ROS:  Constitutional: No fevers, chills, No weight loss  CV: No chest pain  Pulm: No cough, No shortness of breath  GI: see HPI    Medical History:  has a past medical history of Anemia, Asthma, B12 deficiency, Crohn's disease, History of shingles, Lupus, Migraine headache, Raynaud disease, Reactive hypoglycemia, Seizures (2004), and Sleep apnea.    Surgical History:  has a past surgical history that includes Hysterectomy; TONSILLECTOMY, ADENOIDECTOMY; Laparoscopy w/ mini-laparotomy; large intestine removed; pelvic mass removal; Abdominal surgery; Stomach surgery; Colon surgery; Colostomy; Revision Colostomy (2010); Ileostomy; and Colonoscopy.    Family History: family history includes Breast cancer in her sister; Cancer in her maternal aunt, mother, and paternal grandmother; Colon cancer in her maternal grandmother; Migraines in her sister; Seizures in her sister.. Otherwise no colon cancer, inflammatory bowel disease, or GI malignancies.    Social History:  reports that she has never smoked. She has never used smokeless tobacco. She reports that she does not drink alcohol or use drugs.    Review of patient's allergies indicates:   Allergen Reactions    Aspirin      Other reaction(s): vomiting, contraindicated for bleeding from crohns  Other reaction(s): bleeding    Sulfa (sulfonamide antibiotics) Hives and Rash    Iodinated contrast- oral and iv dye Other (See Comments)    Adhesive     Aspirin     Remicade [infliximab] Other (See Comments)     Medical induced lupus    Latex Rash     Other  reaction(s): Hives       Medications:   Medications Prior to Admission   Medication Sig Dispense Refill Last Dose    albuterol (PROVENTIL/VENTOLIN HFA) 90 mcg/actuation inhaler Inhale 2 puffs into the lungs.   Past Week at Unknown time    cyanocobalamin 1,000 mcg/mL injection INJECT 1 ML (1,000 MCG TOTAL) INTO THE MUSCLE EVERY 28 DAYS. 10 mL 1 Past Month at Unknown time    fremanezumab-vfrm (AJOVY SUBQ) Inject into the skin every 30 days.       oxyCODONE-acetaminophen (PERCOCET)  mg per tablet Take 1 tablet by mouth every 6 (six) hours as needed for Pain. 120 tablet 0 4/9/2019 at Unknown time    albuterol (PROVENTIL/VENTOLIN HFA) 90 mcg/actuation inhaler Inhale 2 puffs into the lungs every 4 to 6 hours as needed for Wheezing or Shortness of Breath. Rescue 1 Inhaler 0     blood sugar diagnostic Strp To check BG 1 times daily, to use with insurance preferred meter 100 strip 3     blood-glucose meter kit To check BG 1 times daily, to use with insurance preferred meter 1 each 0     lancets Misc To check BG 1 times daily, to use with insurance preferred meter 100 each 3     metronidazole 0.75% (METROCREAM) 0.75 % Crea 1 application once daily. Apply to affected area  12 Taking    ofloxacin (FLOXIN) 0.3 % otic solution Place 5 drops into the left ear once daily. 5 mL 0 More than a month at Unknown time    ondansetron (ZOFRAN-ODT) 4 MG TbDL DISSOLVE 1 TABLET ON THE TONGUE EVERY 8 HOURS AS NEEDED 30 tablet 3 More than a month at Unknown time    promethazine (PHENERGAN) 25 MG tablet TAKE 1 TABLET (25 MG TOTAL) BY MOUTH EVERY 6 (SIX) HOURS AS NEEDED FOR NAUSEA. 30 tablet 3 More than a month at Unknown time    sumatriptan (IMITREX STATDOSE) 6 mg/0.5 mL kit INJECT 0.5 ML UNDER THE SKIN AS NEEDED ONE TIME FOR MIGRAINE  1 More than a month at Unknown time    tiZANidine (ZANAFLEX) 4 MG tablet Take 1 tablet (4 mg total) by mouth nightly as needed. 30 tablet 3 More than a month at Unknown time    zolpidem  (AMBIEN) 10 mg Tab Take 1 tablet (10 mg total) by mouth nightly as needed. 30 tablet 3 Taking       Physical Exam:    Vital Signs: There were no vitals filed for this visit.    General Appearance: Well appearing in no acute distress  Abdomen: Soft, non tender, non distended with normal bowel sounds, no masses    Labs:  Lab Results   Component Value Date    WBC 5.91 06/20/2018    HGB 15.3 06/20/2018    HCT 47.7 06/20/2018     06/20/2018    ALT 39 06/20/2018    AST 24 06/20/2018     06/20/2018     06/20/2018    K 3.9 06/20/2018    K 3.7 06/20/2018     06/20/2018     06/20/2018    CREATININE 1.0 06/20/2018    CREATININE 1.1 06/20/2018    BUN 17 06/20/2018    BUN 17 06/20/2018    CO2 27 06/20/2018    CO2 28 06/20/2018    TSH 2.460 08/31/2017    INR 1.0 01/08/2014    GLUF 94 09/03/2009    HGBA1C 5.3 09/29/2011       I have explained the risks and benefits of this endoscopic procedure to the patient including but not limited to bleeding, inflammation, infection, perforation, and death.      Eve Currie MD

## 2019-04-10 NOTE — TRANSFER OF CARE
"Anesthesia Transfer of Care Note    Patient: Emi Johnson    Procedure(s) Performed: Procedure(s) (LRB):  ILEOSCOPY through stoma (N/A)    Patient location: PACU    Anesthesia Type: general    Transport from OR: Transported from OR on room air with adequate spontaneous ventilation    Post pain: adequate analgesia    Post assessment: no apparent anesthetic complications    Post vital signs: stable    Level of consciousness: awake    Nausea/Vomiting: no nausea/vomiting    Complications: none    Transfer of care protocol was followed      Last vitals:   Visit Vitals  /76 (BP Location: Left arm, Patient Position: Lying)   Pulse 76   Temp 37 °C (98.6 °F) (Temporal)   Resp 18   Ht 5' 3" (1.6 m)   Wt 77.1 kg (170 lb)   SpO2 100%   Breastfeeding? No   BMI 30.11 kg/m²     "

## 2019-04-10 NOTE — ANESTHESIA POSTPROCEDURE EVALUATION
Anesthesia Post Evaluation    Patient: Emi Johnson    Procedure(s) Performed: Procedure(s) (LRB):  ILEOSCOPY through stoma (N/A)    Final Anesthesia Type: general  Patient location during evaluation: PACU  Patient participation: Yes- Able to Participate  Level of consciousness: awake and alert  Post-procedure vital signs: reviewed and stable  Pain management: adequate  Airway patency: patent  PONV status at discharge: No PONV  Anesthetic complications: no      Cardiovascular status: hemodynamically stable  Respiratory status: unassisted, spontaneous ventilation and room air  Hydration status: euvolemic  Follow-up not needed.          Vitals Value Taken Time   /80 4/10/2019 10:01 AM   Temp 37.1 °C (98.8 °F) 4/10/2019 10:01 AM   Pulse 99 4/10/2019 10:01 AM   Resp 14 4/10/2019 10:01 AM   SpO2 96 % 4/10/2019 10:01 AM         No case tracking events are documented in the log.      Pain/Elliott Score: Elliott Score: 8 (4/10/2019 10:02 AM)

## 2019-04-17 ENCOUNTER — TELEPHONE (OUTPATIENT)
Dept: ENDOSCOPY | Facility: HOSPITAL | Age: 44
End: 2019-04-17

## 2019-04-17 ENCOUNTER — OFFICE VISIT (OUTPATIENT)
Dept: INTERNAL MEDICINE | Facility: CLINIC | Age: 44
End: 2019-04-17
Payer: COMMERCIAL

## 2019-04-17 VITALS
WEIGHT: 179 LBS | DIASTOLIC BLOOD PRESSURE: 74 MMHG | TEMPERATURE: 98 F | HEART RATE: 77 BPM | HEIGHT: 63 IN | SYSTOLIC BLOOD PRESSURE: 102 MMHG | BODY MASS INDEX: 31.71 KG/M2

## 2019-04-17 DIAGNOSIS — M79.7 FIBROMYALGIA: ICD-10-CM

## 2019-04-17 DIAGNOSIS — K50.00 CROHN'S DISEASE OF SMALL INTESTINE WITHOUT COMPLICATION: Primary | ICD-10-CM

## 2019-04-17 PROCEDURE — 3008F BODY MASS INDEX DOCD: CPT | Mod: CPTII,S$GLB,, | Performed by: INTERNAL MEDICINE

## 2019-04-17 PROCEDURE — 99999 PR PBB SHADOW E&M-EST. PATIENT-LVL III: ICD-10-PCS | Mod: PBBFAC,,, | Performed by: INTERNAL MEDICINE

## 2019-04-17 PROCEDURE — 99999 PR PBB SHADOW E&M-EST. PATIENT-LVL III: CPT | Mod: PBBFAC,,, | Performed by: INTERNAL MEDICINE

## 2019-04-17 PROCEDURE — 3008F PR BODY MASS INDEX (BMI) DOCUMENTED: ICD-10-PCS | Mod: CPTII,S$GLB,, | Performed by: INTERNAL MEDICINE

## 2019-04-17 PROCEDURE — 99213 PR OFFICE/OUTPT VISIT, EST, LEVL III, 20-29 MIN: ICD-10-PCS | Mod: S$GLB,,, | Performed by: INTERNAL MEDICINE

## 2019-04-17 PROCEDURE — 99213 OFFICE O/P EST LOW 20 MIN: CPT | Mod: S$GLB,,, | Performed by: INTERNAL MEDICINE

## 2019-04-17 RX ORDER — CYANOCOBALAMIN 1000 UG/ML
INJECTION, SOLUTION INTRAMUSCULAR; SUBCUTANEOUS
Qty: 10 ML | Refills: 1 | Status: SHIPPED | OUTPATIENT
Start: 2019-04-17 | End: 2020-09-24

## 2019-04-17 RX ORDER — ZOLPIDEM TARTRATE 10 MG/1
10 TABLET ORAL NIGHTLY PRN
Qty: 30 TABLET | Refills: 3 | Status: SHIPPED | OUTPATIENT
Start: 2019-04-17 | End: 2019-07-24 | Stop reason: SDUPTHER

## 2019-04-17 NOTE — PROGRESS NOTES
CC: followup of Crohn's disease  HPI:  The patient is a 43 y.o. year old female who presents to the office for followup of Crohn's disease.  She reports continued abdominal pain and fibromyalgia.  Pain is primarily in her pain and her feet.  She also complains of acid reflux.  The patient also reports migraines and recent asthma exacerbation.    PAST MEDICAL HISTORY:  Past Medical History:   Diagnosis Date    Anemia     Asthma     B12 deficiency     Crohn's disease     History of shingles     Lupus     Migraine headache     Raynaud disease     Reactive hypoglycemia     Seizures 2004    no medication     Sleep apnea     Non compliant with CPAP       SURGICAL HISTORY:  Past Surgical History:   Procedure Laterality Date    ABDOMINAL SURGERY      COLON SURGERY      95% of colon removed 2010    COLONOSCOPY      COLOSTOMY      CT (COMPUTED TOMOGRAPHY) N/A 12/26/2013    Performed by [facility] OR    EGD (ESOPHAGOGASTRODUODENOSCOPY) Left 4/22/2013    Performed by Parish Lynn [facility] at [facility] ENDO    ESOPHAGOGASTRODUODENOSCOPY (EGD) N/A 4/19/2016    Performed by [facility] at [facility] (4TH FLR)    ESOPHAGOGASTRODUODENOSCOPY (EGD) N/A 7/17/2014    Performed by [facility] at [facility] ENDO    HYSTERECTOMY      due to endometriosis    ILEOSCOPY through stoma N/A 4/10/2019    [facility] (4TH FLR)    ILEOSCOPY through stoma N/A 3/13/2018    [facility] (4TH FLR)    ILEOSCOPY through stoma N/A 8/23/2017    [facility] (4TH FLR)    ILEOSCOPY through stoma N/A 3/21/2017    [facility] by [facility] ENDO (4TH FLR)    ILEOSTOMY      LAPAROSCOPY W/ MINI-LAPAROTOMY      large intestine removed      just a portion    pelvic mass removal      POUCHOSCOPY N/A 3/18/2016    Performed by [facility] at [facility] (4[facility] FLR)    REVISION COLOSTOMY  2010     "SIGMOIDOSCOPY, FLEXIBLE Left 4/22/2013    [facility] at [facility]    SIGMOIDOSCOPY-FLEXIBLE N/A 10/8/2015    Performed by [facility] at [facility] ENDO    STOMACH SURGERY      TONSILLECTOMY, ADENOIDECTOMY         MEDS:  Medcard reviewed and updated    ALLERGIES: Allergy Card reviewed and updated    SOCIAL HISTORY:   The patient is a nonsmoker.    PE:   APPEARANCE: Well nourished, well developed, in no acute distress.    CHEST: Lungs clear to auscultation with unlabored respirations.  CARDIOVASCULAR: Normal S1, S2. No murmurs. No carotid bruits. No pedal edema.  ABDOMEN: Bowel sounds normal. Not distended. Soft. No tenderness or masses. Positive colostomy.  PSYCHIATRIC: The patient is oriented to person, place, and time and has a pleasant affect.        ASSESSMENT/PLAN:  Emi was seen today for follow-up.    Diagnoses and all orders for this visit:    Crohn's disease of small intestine without complication  -     follow-up by GI    Fibromyalgia  -     continue pain medication    Other orders  -     cyanocobalamin 1,000 mcg/mL injection; INJECT 1 ML (1,000 MCG TOTAL) INTO THE MUSCLE EVERY 28 DAYS.  -     syringe with needle, safety (BD INTEGRA SYRINGE) 3 mL 25 gauge x 1" Syrg; 1 Syringe by Misc.(Non-Drug; Combo Route) route every 30 days.  -     zolpidem (AMBIEN) 10 mg Tab; Take 1 tablet (10 mg total) by mouth nightly as needed.          "

## 2019-05-06 DIAGNOSIS — M79.7 FIBROMYALGIA: ICD-10-CM

## 2019-05-06 DIAGNOSIS — K50.00 CROHN'S DISEASE OF SMALL INTESTINE WITHOUT COMPLICATION: ICD-10-CM

## 2019-05-06 RX ORDER — OXYCODONE AND ACETAMINOPHEN 10; 325 MG/1; MG/1
1 TABLET ORAL EVERY 6 HOURS PRN
Qty: 120 TABLET | Refills: 0 | Status: SHIPPED | OUTPATIENT
Start: 2019-05-06 | End: 2019-06-03 | Stop reason: SDUPTHER

## 2019-05-06 NOTE — TELEPHONE ENCOUNTER
----- Message from Petty Ingram sent at 5/6/2019  8:34 AM CDT -----  Contact: self/ [phone]  Patient is calling for an RX refill or new RX.  Is this a refill or new RX:    RX name and strength:   Directions (copy/paste from chart):  oxyCODONE-acetaminophen (PERCOCET)  mg per tablet 120 tablet   Is this a 30 day or 90 day RX:    Local pharmacy or mail order pharmacy:    Pharmacy name and phone # (copy/paste from chart):     Comments:

## 2019-05-07 ENCOUNTER — PATIENT MESSAGE (OUTPATIENT)
Dept: INTERNAL MEDICINE | Facility: CLINIC | Age: 44
End: 2019-05-07

## 2019-05-13 ENCOUNTER — PATIENT MESSAGE (OUTPATIENT)
Dept: INTERNAL MEDICINE | Facility: CLINIC | Age: 44
End: 2019-05-13

## 2019-05-13 ENCOUNTER — TELEPHONE (OUTPATIENT)
Dept: INTERNAL MEDICINE | Facility: CLINIC | Age: 44
End: 2019-05-13

## 2019-05-13 NOTE — TELEPHONE ENCOUNTER
Please advised patient that she needs to be seen to determine if this is an allergic reaction versus an infection.

## 2019-05-13 NOTE — TELEPHONE ENCOUNTER
Called to speak with the patient and she advised that she has a rash and her allergy provider is giving her shots for Migraines and she states she was taking benadryl otc

## 2019-05-13 NOTE — TELEPHONE ENCOUNTER
----- Message from Stefania Sorensen sent at 5/13/2019  9:24 AM CDT -----  Contact: self   Patient would like to get medical advice.  Symptoms (please be specific):  Rash at injection site  How long has patient had these symptoms:  Since May 8th  Pharmacy name and phone # (copy/paste from chart):  [id number] - [place] - [address] [phone] (Phone)  [phone] (Fax)  Any drug allergies (copy/paste from chart):    See chart  Would the patient rather a call back or a response via [facility]?:  Call back  Comments:

## 2019-05-15 ENCOUNTER — OFFICE VISIT (OUTPATIENT)
Dept: INTERNAL MEDICINE | Facility: CLINIC | Age: 44
End: 2019-05-15
Payer: COMMERCIAL

## 2019-05-15 VITALS
DIASTOLIC BLOOD PRESSURE: 70 MMHG | TEMPERATURE: 98 F | SYSTOLIC BLOOD PRESSURE: 104 MMHG | HEIGHT: 63 IN | BODY MASS INDEX: 31.53 KG/M2 | HEART RATE: 96 BPM | WEIGHT: 177.94 LBS

## 2019-05-15 DIAGNOSIS — L03.90 CELLULITIS, UNSPECIFIED CELLULITIS SITE: Primary | ICD-10-CM

## 2019-05-15 PROCEDURE — 3008F BODY MASS INDEX DOCD: CPT | Mod: CPTII,S$GLB,, | Performed by: INTERNAL MEDICINE

## 2019-05-15 PROCEDURE — 3008F PR BODY MASS INDEX (BMI) DOCUMENTED: ICD-10-PCS | Mod: CPTII,S$GLB,, | Performed by: INTERNAL MEDICINE

## 2019-05-15 PROCEDURE — 99999 PR PBB SHADOW E&M-EST. PATIENT-LVL III: ICD-10-PCS | Mod: PBBFAC,,, | Performed by: INTERNAL MEDICINE

## 2019-05-15 PROCEDURE — 99213 OFFICE O/P EST LOW 20 MIN: CPT | Mod: S$GLB,,, | Performed by: INTERNAL MEDICINE

## 2019-05-15 PROCEDURE — 99999 PR PBB SHADOW E&M-EST. PATIENT-LVL III: CPT | Mod: PBBFAC,,, | Performed by: INTERNAL MEDICINE

## 2019-05-15 PROCEDURE — 99213 PR OFFICE/OUTPT VISIT, EST, LEVL III, 20-29 MIN: ICD-10-PCS | Mod: S$GLB,,, | Performed by: INTERNAL MEDICINE

## 2019-05-15 RX ORDER — CEPHALEXIN 500 MG/1
500 CAPSULE ORAL EVERY 6 HOURS
Qty: 14 CAPSULE | Refills: 0 | Status: SHIPPED | OUTPATIENT
Start: 2019-05-15 | End: 2019-07-24

## 2019-05-15 NOTE — PROGRESS NOTES
CC: rash  HPI:  The patient is a 43 y.o. year old female who presents to the office for rash.  Her symptoms started about 10 days ago after giving herself an injection of ajovy in her right thigh.  She complains of burning and itching at the site.    PAST MEDICAL HISTORY:  Past Medical History:   Diagnosis Date    Anemia     Asthma     B12 deficiency     Crohn's disease     History of shingles     Lupus     Migraine headache     Raynaud disease     Reactive hypoglycemia     Seizures 2004    no medication     Sleep apnea     Non compliant with CPAP       SURGICAL HISTORY:  Past Surgical History:   Procedure Laterality Date    ABDOMINAL SURGERY      COLON SURGERY      95% of colon removed 2010    COLONOSCOPY      COLOSTOMY      CT (COMPUTED TOMOGRAPHY) N/A 12/26/2013    Performed by [facility] OR    EGD (ESOPHAGOGASTRODUODENOSCOPY) Left 4/22/2013    Performed by Parish [facility] at [facility] ENDO    ESOPHAGOGASTRODUODENOSCOPY (EGD) N/A 4/19/2016    Performed by [facility] at [facility] END[facility] (4TH FLR)    ESOPHAGOGASTRODUODENOSCOPY (EGD) N/A 7/17/2014    [facility]    HYSTERECTOMY      due to endometriosis    ILEOSCOPY through stoma N/A 4/10/2019    Performed by [facility] at [facility] ENDO (4TH FLR)    ILEOSCOPY through stoma N/A 3/13/2018    Performed by [facility] at [facility] ENDO (4TH FLR)    ILEOSCOPY through stoma N/A 8/23/2017    Performed by [facility] (4TH FLR)    ILEOSCOPY through stoma N/A 3/21/2017    Performed by [facility] at [facility] (4TH FLR)    ILEOSTOMY      LAPAROSCOPY W/ MINI-LAPAROTOMY      large intestine removed      just a portion    pelvic mass removal      POUCHOSCOPY N/A 3/18/2016    Performed by [facility] at [facility] END[facility] (4TH FLR)    REVISION COLOSTOMY  2010    SIGMOIDOSCOPY, FLEXIBLE Left 4/22/2013    Performed by Parish [facility] at [facility] ENDO     SIGMOIDOSCOPY-FLEXIBLE N/A 10/8/2015    [facility] at [facility] END[facility]    STOMACH SURGERY      TONSILLECTOMY, ADENOIDECTOMY         MEDS:  Medcard reviewed and updated    ALLERGIES: Allergy Card reviewed and updated    SOCIAL HISTORY:   The patient is a nonsmoker.    PE:   APPEARANCE: Well nourished, well developed, in no acute distress.    CHEST: Lungs clear to auscultation with unlabored respirations.  CARDIOVASCULAR: Normal S1, S2. No murmurs. No carotid bruits. No pedal edema.  ABDOMEN: Bowel sounds normal. Not distended. Soft. No tenderness or masses.    SKIN:  Erythematous patch on anterior aspect of right thigh.  PSYCHIATRIC: The patient is oriented to person, place, and time and has a pleasant affect.        ASSESSMENT/PLAN:  Emi was seen today for rash.    Diagnoses and all orders for this visit:    Cellulitis, unspecified cellulitis site  -     prescribed Keflex    Other orders  -     cephALEXin (KEFLEX) 500 MG capsule; Take 1 capsule (500 mg total) by mouth every 6 (six) hours.

## 2019-05-15 NOTE — LETTER
May 15, 2019      [facility]                                                                                                                                                       [address]  [place] [address]  Phone: [phone]  Fax: [phone]       Patient: Emi Johnson   YOB: 1975  Date of Visit: 05/15/2019    To Whom It May Concern:    Rodri Johnson  was at [facility] on 05/15/2019. She may return to work/school on 05/17/2019 with no restrictions. If you have any questions or concerns, or if I can be of further assistance, please do not hesitate to contact me.    Sincerely,    Bianca Rutledge MD

## 2019-05-20 ENCOUNTER — TELEPHONE (OUTPATIENT)
Dept: INTERNAL MEDICINE | Facility: CLINIC | Age: 44
End: 2019-05-20

## 2019-05-20 DIAGNOSIS — R21 RASH: Primary | ICD-10-CM

## 2019-05-20 NOTE — TELEPHONE ENCOUNTER
----- Message from Alycia Malagon sent at 5/20/2019  9:17 AM CDT -----  Contact: patient [phone]  Pt saw you last week and was given anitbiotics and ointment for legs and was told to call if it wasn't working by today. Nothing has changed and patient would like to know what to do next.

## 2019-05-22 RX ORDER — TIZANIDINE 4 MG/1
4 TABLET ORAL NIGHTLY PRN
Qty: 30 TABLET | Refills: 3 | Status: SHIPPED | OUTPATIENT
Start: 2019-05-22 | End: 2019-09-22 | Stop reason: SDUPTHER

## 2019-05-27 ENCOUNTER — HOSPITAL ENCOUNTER (EMERGENCY)
Facility: HOSPITAL | Age: 44
Discharge: HOME | End: 2019-05-27
Attending: EMERGENCY MEDICINE
Payer: COMMERCIAL

## 2019-05-27 VITALS
HEIGHT: 64 IN | HEART RATE: 70 BPM | RESPIRATION RATE: 16 BRPM | DIASTOLIC BLOOD PRESSURE: 73 MMHG | SYSTOLIC BLOOD PRESSURE: 125 MMHG | OXYGEN SATURATION: 98 % | WEIGHT: 174 LBS | TEMPERATURE: 97 F | BODY MASS INDEX: 29.71 KG/M2

## 2019-05-27 DIAGNOSIS — K43.5 PARASTOMAL HERNIA WITHOUT OBSTRUCTION OR GANGRENE: Primary | ICD-10-CM

## 2019-05-27 LAB
ALBUMIN SERPL BCP-MCNC: 4.1 G/DL (ref 3.5–5.2)
ALP SERPL-CCNC: 90 U/L (ref 55–135)
ALT SERPL W/O P-5'-P-CCNC: 27 U/L (ref 10–44)
ANION GAP SERPL CALC-SCNC: 7 MMOL/L (ref 8–16)
AST SERPL-CCNC: 23 U/L (ref 10–40)
B-HCG UR QL: NEGATIVE
BASOPHILS # BLD AUTO: 0.01 K/UL (ref 0–0.2)
BASOPHILS NFR BLD: 0.2 % (ref 0–1.9)
BILIRUB SERPL-MCNC: 0.8 MG/DL (ref 0.1–1)
BILIRUB UR QL STRIP: NEGATIVE
BUN SERPL-MCNC: 17 MG/DL (ref 6–20)
CALCIUM SERPL-MCNC: 9.9 MG/DL (ref 8.7–10.5)
CHLORIDE SERPL-SCNC: 107 MMOL/L (ref 95–110)
CLARITY UR: CLEAR
CO2 SERPL-SCNC: 28 MMOL/L (ref 23–29)
COLOR UR: YELLOW
CREAT SERPL-MCNC: 1 MG/DL (ref 0.5–1.4)
CRP SERPL-MCNC: 1.8 MG/L (ref 0–8.2)
CTP QC/QA: YES
DIFFERENTIAL METHOD: NORMAL
EOSINOPHIL # BLD AUTO: 0.2 K/UL (ref 0–0.5)
EOSINOPHIL NFR BLD: 2.8 % (ref 0–8)
ERYTHROCYTE [DISTWIDTH] IN BLOOD BY AUTOMATED COUNT: 12.8 % (ref 11.5–14.5)
ERYTHROCYTE [SEDIMENTATION RATE] IN BLOOD BY WESTERGREN METHOD: 2 MM/HR (ref 0–20)
EST. GFR  (AFRICAN AMERICAN): >60 ML/MIN/1.73 M^2
EST. GFR  (NON AFRICAN AMERICAN): >60 ML/MIN/1.73 M^2
GLUCOSE SERPL-MCNC: 85 MG/DL (ref 70–110)
GLUCOSE UR QL STRIP: NEGATIVE
HCT VFR BLD AUTO: 45.7 % (ref 37–48.5)
HGB BLD-MCNC: 15.2 G/DL (ref 12–16)
HGB UR QL STRIP: NEGATIVE
KETONES UR QL STRIP: NEGATIVE
LEUKOCYTE ESTERASE UR QL STRIP: NEGATIVE
LIPASE SERPL-CCNC: 27 U/L (ref 4–60)
LYMPHOCYTES # BLD AUTO: 1.7 K/UL (ref 1–4.8)
LYMPHOCYTES NFR BLD: 26.8 % (ref 18–48)
MCH RBC QN AUTO: 29.2 PG (ref 27–31)
MCHC RBC AUTO-ENTMCNC: 33.3 G/DL (ref 32–36)
MCV RBC AUTO: 88 FL (ref 82–98)
MONOCYTES # BLD AUTO: 0.5 K/UL (ref 0.3–1)
MONOCYTES NFR BLD: 7.6 % (ref 4–15)
NEUTROPHILS # BLD AUTO: 4 K/UL (ref 1.8–7.7)
NEUTROPHILS NFR BLD: 62.4 % (ref 38–73)
NITRITE UR QL STRIP: NEGATIVE
PH UR STRIP: 5 [PH] (ref 5–8)
PLATELET # BLD AUTO: 170 K/UL (ref 150–350)
PMV BLD AUTO: 10.9 FL (ref 9.2–12.9)
POTASSIUM SERPL-SCNC: 4 MMOL/L (ref 3.5–5.1)
PROT SERPL-MCNC: 7 G/DL (ref 6–8.4)
PROT UR QL STRIP: NEGATIVE
RBC # BLD AUTO: 5.2 M/UL (ref 4–5.4)
SODIUM SERPL-SCNC: 142 MMOL/L (ref 136–145)
SP GR UR STRIP: >=1.03 (ref 1–1.03)
URN SPEC COLLECT METH UR: ABNORMAL
UROBILINOGEN UR STRIP-ACNC: NEGATIVE EU/DL
WBC # BLD AUTO: 6.35 K/UL (ref 3.9–12.7)

## 2019-05-27 PROCEDURE — 80053 COMPREHEN METABOLIC PANEL: CPT

## 2019-05-27 PROCEDURE — 96374 THER/PROPH/DIAG INJ IV PUSH: CPT

## 2019-05-27 PROCEDURE — 85652 RBC SED RATE AUTOMATED: CPT

## 2019-05-27 PROCEDURE — 96361 HYDRATE IV INFUSION ADD-ON: CPT

## 2019-05-27 PROCEDURE — 81025 URINE PREGNANCY TEST: CPT | Performed by: EMERGENCY MEDICINE

## 2019-05-27 PROCEDURE — 63600175 PHARM REV CODE 636 W HCPCS: Performed by: EMERGENCY MEDICINE

## 2019-05-27 PROCEDURE — 25000003 PHARM REV CODE 250: Performed by: EMERGENCY MEDICINE

## 2019-05-27 PROCEDURE — 86140 C-REACTIVE PROTEIN: CPT

## 2019-05-27 PROCEDURE — 81003 URINALYSIS AUTO W/O SCOPE: CPT

## 2019-05-27 PROCEDURE — 99284 EMERGENCY DEPT VISIT MOD MDM: CPT | Mod: 25

## 2019-05-27 PROCEDURE — 85025 COMPLETE CBC W/AUTO DIFF WBC: CPT

## 2019-05-27 PROCEDURE — 83690 ASSAY OF LIPASE: CPT

## 2019-05-27 RX ORDER — HYOSCYAMINE SULFATE 0.5 MG/ML
0.5 INJECTION, SOLUTION SUBCUTANEOUS
Status: DISCONTINUED | OUTPATIENT
Start: 2019-05-27 | End: 2019-05-27

## 2019-05-27 RX ORDER — DOCUSATE SODIUM 100 MG/1
100 CAPSULE, LIQUID FILLED ORAL 2 TIMES DAILY
Qty: 60 CAPSULE | Refills: 1 | Status: SHIPPED | OUTPATIENT
Start: 2019-05-27 | End: 2021-09-15

## 2019-05-27 RX ORDER — HYOSCYAMINE SULFATE 0.12 MG/1
0.12 TABLET SUBLINGUAL
Status: COMPLETED | OUTPATIENT
Start: 2019-05-27 | End: 2019-05-27

## 2019-05-27 RX ORDER — ONDANSETRON 2 MG/ML
4 INJECTION INTRAMUSCULAR; INTRAVENOUS
Status: COMPLETED | OUTPATIENT
Start: 2019-05-27 | End: 2019-05-27

## 2019-05-27 RX ORDER — HALOPERIDOL 5 MG/ML
2.5 INJECTION INTRAMUSCULAR
Status: DISCONTINUED | OUTPATIENT
Start: 2019-05-27 | End: 2019-05-27 | Stop reason: HOSPADM

## 2019-05-27 RX ADMIN — SODIUM CHLORIDE 1000 ML: 0.9 INJECTION, SOLUTION INTRAVENOUS at 09:05

## 2019-05-27 RX ADMIN — HYOSCYAMINE SULFATE 0.12 MG: 0.12 TABLET ORAL; SUBLINGUAL at 09:05

## 2019-05-27 RX ADMIN — ONDANSETRON 4 MG: 2 INJECTION INTRAMUSCULAR; INTRAVENOUS at 09:05

## 2019-05-27 NOTE — ED PROVIDER NOTES
Encounter Date: 5/27/2019    SCRIBE #1 NOTE: I, Yuko Dawson, am scribing for, and in the presence of,  Dr. Sneed. I have scribed the entire note.       History     Chief Complaint   Patient presents with    Abdominal Pain     43 year old female presents to ed cc of right lower abdominal pain x 1 day patient states has ostomy placed has noticed decrease in bowel movements from ostomy. patient states nausea no emesis     Emi Johnson is a 43 y.o. female who  has a past medical history of Anemia, Asthma, B12 deficiency, Crohn's disease, History of shingles, Lupus, Migraine headache, Raynaud disease, Reactive hypoglycemia, Seizures (2004), and Sleep apnea.    The patient presents to the ED due to abdominal pain.  Patient reports symptoms started yesterday, located to the lower right side of abdomen. Patient does have Crohn's disease and has chronic pain however it has been worse than usual. She also states there is decrease in amount of output from ostomy. She denies any emesis, fever, chills, diarrhea, or dysuria. For pain, patient takes percocet's but reports no improvement. The patient has had a blockage which was 3 years ago at [facility] where surgery was done. Patient also states she is allergic to Iodine which could put her in kidney failure.     The history is provided by the patient.     Review of patient's allergies indicates:   Allergen Reactions    Aspirin      Other reaction(s): vomiting, contraindicated for bleeding from crohns  Other reaction(s): bleeding    Sulfa (sulfonamide antibiotics) Hives and Rash    Iodinated contrast- oral and iv dye Other (See Comments)    Adhesive     Aspirin     Remicade [infliximab] Other (See Comments)     Medical induced lupus    Latex Rash     Other reaction(s): Hives     Past Medical History:   Diagnosis Date    Anemia     Asthma     B12 deficiency     Crohn's disease     History of shingles     Lupus     Migraine headache     Raynaud disease      Reactive hypoglycemia     Seizures 2004    no medication     Sleep apnea     Non compliant with CPAP     Past Surgical History:   Procedure Laterality Date    ABDOMINAL SURGERY      COLON SURGERY      95% of colon removed 2010    COLONOSCOPY      COLOSTOMY      CT (COMPUTED TOMOGRAPHY) N/A 12/26/2013    Performed by [facility] OR    EGD (ESOPHAGOGASTRODUODENOSCOPY) Left 4/22/2013    Performed by [facility] at [facility] ENDO    ESOPHAGOGASTRODUODENOSCOPY (EGD) N/A 4/19/2016    [facility]formed by [facility] at [facility] ENDO (4TH FLR)    ESOPHAGOGASTRODUODENOSCOPY (EGD) N/A 7/17/2014    Performed by [facility] at [facility]    HYSTERECTOMY      due to endometriosis    ILEOSCOPY through stoma N/A 4/10/2019    [facility]formed by [facility] ENDO (4TH FLR)    ILEOSCOPY through stoma N/A 3/13/2018    [facility]formed by [facility] ENDO (4TH FLR)    ILEOSCOPY through stoma N/A 8/23/2017    [facility]formed [facility] ENDO (4TH FLR)    ILEOSCOPY through stoma N/A 3/21/2017    [facility]formed [facility] ENDO (4TH FLR)    ILEOSTOMY      LAPAROSCOPY W/ MINI-LAPAROTOMY      large intestine removed      just a portion    pelvic mass removal      POUCHOSCOPY N/A 3/18/2016    [facility]formed by [facility] ENDO (4TH FLR)    REVISION COLOSTOMY  2010    SIGMOIDOSCOPY, FLEXIBLE Left 4/22/2013    [facility] ENDO    SIGMOIDOSCOPY-FLEXIBLE N/A 10/8/2015    [facility]formed by [facility] ENDO    STOMACH SURGERY      TONSILLECTOMY, ADENOIDECTOMY       Family History   Problem Relation Age of Onset    Colon cancer Maternal Grandmother     Cancer Mother         anal    Breast cancer Sister     Migraines Sister     Seizures Sister     Cancer Maternal Aunt     Cancer Paternal Grandmother     Celiac disease Neg Hx     Cirrhosis Neg Hx     Colon polyps Neg Hx     Crohn's disease  Neg Hx     Cystic fibrosis Neg Hx     Hemochromatosis Neg Hx     Esophageal cancer Neg Hx     Inflammatory bowel disease Neg Hx     Irritable bowel syndrome Neg Hx     Liver cancer Neg Hx     Liver disease Neg Hx     Rectal cancer Neg Hx     Stomach cancer Neg Hx     Ulcerative colitis Neg Hx     Hugo's disease Neg Hx      Social History     Tobacco Use    Smoking status: Never Smoker    Smokeless tobacco: Never Used   Substance Use Topics    Alcohol use: No    Drug use: No     Review of Systems   Constitutional: Negative for chills and fever.   HENT: Negative for rhinorrhea, sore throat and trouble swallowing.    Eyes: Negative for visual disturbance.   Respiratory: Negative for cough and shortness of breath.    Cardiovascular: Negative for chest pain.   Gastrointestinal: Positive for abdominal pain and nausea. Negative for diarrhea and vomiting.   Genitourinary: Negative for dysuria and hematuria.   Musculoskeletal: Negative for back pain.   Skin: Negative for rash.   Neurological: Negative for numbness and headaches.   Hematological: Negative for adenopathy.       Physical Exam     Initial Vitals [05/27/19 0857]   BP Pulse Resp Temp SpO2   122/75 81 20 98 °F (36.7 °C) 98 %      MAP       --         Physical Exam    Constitutional:  Non-toxic appearance. No distress.   HENT:   Head: Normocephalic and atraumatic.   Eyes: Conjunctivae and EOM are normal. Pupils are equal, round, and reactive to light. Right eye exhibits no nystagmus. Left eye exhibits no nystagmus.   Neck: Neck supple.   Cardiovascular: Regular rhythm, S1 normal and S2 normal.   No murmur heard.  Pulmonary/Chest: Breath sounds normal. No respiratory distress. She has no wheezes. She has no rales.   Abdominal: Soft. She exhibits no distension. There is no tenderness.   No CVA tenderness  Post surgical scars  Ostomy in right lower quadrant  Stoma looks pink; stool noted from it; peristalsis   Generalized peristomal tenderness    Neurological: She is alert. No cranial nerve deficit. GCS eye subscore is 4. GCS verbal subscore is 5. GCS motor subscore is 6.   Skin: Skin is warm. No rash noted.   Psychiatric: She has a normal mood and affect. Her behavior is normal.         ED Course   Procedures  Labs Reviewed   COMPREHENSIVE METABOLIC PANEL - Abnormal; Notable for the following components:       Result Value    Anion Gap 7 (*)     All other components within normal limits   URINALYSIS, REFLEX TO URINE CULTURE - Abnormal; Notable for the following components:    Specific Gravity, UA >=1.030 (*)     All other components within normal limits    Narrative:     Preferred Collection Type->Urine, Clean Catch   CBC W/ AUTO DIFFERENTIAL   LIPASE   SEDIMENTATION RATE   C-REACTIVE PROTEIN   POCT URINE PREGNANCY            X-Rays:   Independently Interpreted Readings:   Other Readings:  Reviewed by myself, read by radiology.    Imaging Results          CT Abdomen Pelvis  Without Contrast (Final result)  Result time 05/27/19 10:46:15    Final result by Diamond Oneal MD (05/27/19 10:46:15)                 Impression:      Hepatic steatosis.    Relatively small caliber pancreas, it is unchanged from exam dated back 04/23/2013.  No mass identified.    Hysterectomy.    Colectomy.    Right lower abdominal quadrant stoma with peristomal bowel containing hernia      Electronically signed by: Diamond Oneal MD  Date:    05/27/2019  Time:    10:46             Narrative:    EXAMINATION:  CT ABDOMEN PELVIS WITHOUT CONTRAST    CLINICAL HISTORY:  Crohn dz suspected, non-acute abd pain/cramping;    TECHNIQUE:  Low dose axial images, sagittal and coronal reformations were obtained from the lung bases to the pubic symphysis.  Oral contrast was not administered.    COMPARISON:  06/18/2016    FINDINGS:  The lung bases are clear.  The noncontrast appearance of the liver demonstrates diffuse hypoattenuation suggestive of fatty liver infiltration.  The  gallbladder is present.  The stomach appears normal.  The pancreas is diminutive in length and caliber, no definite mass identified.  The spleen, adrenal glands and bilateral kidneys appear normal.  The aorta tapers normally, no para-aortic lymphadenopathy.  There is a stoma in the right lower abdominal quadrant with peristoma bowel containing hernia without obstructive changes.  Prior colectomy.  Hysterectomy.  The ovaries are not seen, they may be small or may have been removed.  The inguinal regions appear normal.  No free air, no free fluid.  The osseous structures appear normal.                                Medical Decision Making:   Differential Diagnosis:   Differential Diagnosis includes, but is not limited to:  AAA, aortic dissection, mesenteric ischemia, perforated viscous, MI/ACS, SBO/volvulus, incarcerated/strangulated hernia, intussusception, ileus, appendicitis, cholecystitis, cholangitis, diverticulitis, esophagitis, hepatitis, nephrolithiasis, pancreatitis, gastroenteritis, colitis, IBD/IBS, biliary colic, GERD, PUD, constipation, UTI/pyelonephritis,  disorder.  Clinical Tests:   Lab Tests: Ordered and Reviewed  Radiological Study: Ordered and Reviewed  ED Management:  42 yo f with generalized abdominal ttp worse near ostomy with decreased output    Labs consistent with dehydration - no significant abnormality  Esr/ crp not elevated  CT does not show obstruction or inflammation of colon. New parastomal hernia    Will dc with colace and recommend follow up with surgeon for parastomal hernia. Return precautions for worsening pain fever vomiting or any other concern.    After taking into careful account the historical factors and physical exam findings of the patient's presentation today, in conjunction with the empirical and objective data obtained on ED workup, no acute emergent medical condition has been identified. The patient appears to be low risk for an emergent medical condition and I feel  it is safe and appropriate at this time for the patient to be discharged to follow-up as detailed in their discharge instructions for reevaluation and possible continued outpatient workup and management. I have discussed the specifics of the workup with the patient and the patient has verbalized understanding of the details of the workup, the diagnosis, the treatment plan, and the need for outpatient follow-up.  Although the patient has no emergent etiology today this does not preclude the development of an emergent condition so in addition, I have advised the patient that they can return to the ED and/or activate EMS at any time with worsening of their symptoms, change of their symptoms, or with any other medical complaint.  The patient remained comfortable and stable during their visit in the ED.  Discharge and follow-up instructions discussed with the patient who expressed understanding and willingness to comply with my recommendations.                     ED Course as of May 27 1147   Mon May 27, 2019   0948 Elevated specific gravity consistent with dehydration   Urinalysis, Reflex to Urine Culture Urine, Clean Catch(!) [RN]   0948 CBC W/ AUTO DIFFERENTIAL [RN]   1010 C-reactive protein [RN]   1010 Lipase [RN]   1010 Comp. Metabolic Panel(!) [RN]   1017 CT abdomen without evidence of obstruction     [RN]   1042 Comp. Metabolic Panel(!) [RN]   1110 reviewed case with Dr. Cuevas who recommended a follow up for peristomal hernia surgery. Discharging patient on Colace.     [AF]      ED Course User Index  [AF] Yuko Dawson  [RN] Refugio Sneed Jr., MD     Clinical Impression:       ICD-10-CM ICD-9-CM   1. Parastomal hernia without obstruction or gangrene K43.5 569.69         Disposition:   Disposition: Discharged  Condition: Stable     Scribe Attestation  I, Refugio Sneed,  personally performed the services described in this documentation. All medical record entries made by the scribcarol were at my direction and in my  presence.  I have reviewed the chart and agree that the record reflects my personal performance and is accurate and complete. Refugio Sneed M.D. 9:24 PM05/28/2019                     Refugio Sneed Jr., MD  05/28/19 2121

## 2019-05-27 NOTE — ED NOTES
Patient refused medication stating if its not an opioid or medication stronger than percocet it will not help her and she did not want to take the medication. MD informed and stated he will speak with patient.

## 2019-05-28 ENCOUNTER — TELEPHONE (OUTPATIENT)
Dept: GASTROENTEROLOGY | Facility: CLINIC | Age: 44
End: 2019-05-28

## 2019-05-28 NOTE — TELEPHONE ENCOUNTER
Called and spoke with pt  I asked whom was her surgeon and she stated Dr Cannon   I explained we do not handle hernia's and Dr Cannon would be the one to reach out to  She expressed understanding  (discussed with Paty Jo NP)

## 2019-05-28 NOTE — TELEPHONE ENCOUNTER
----- Message from Emi Ac sent at 5/28/2019  8:26 AM CDT -----  Contact: self   [phone]  Kush    Needs Advice    Reason for call: has hernia next to her stoma        Communication Preference:[phone]    Additional Information:

## 2019-06-03 ENCOUNTER — PATIENT MESSAGE (OUTPATIENT)
Dept: INTERNAL MEDICINE | Facility: CLINIC | Age: 44
End: 2019-06-03

## 2019-06-03 ENCOUNTER — TELEPHONE (OUTPATIENT)
Dept: INTERNAL MEDICINE | Facility: CLINIC | Age: 44
End: 2019-06-03

## 2019-06-03 DIAGNOSIS — M79.7 FIBROMYALGIA: ICD-10-CM

## 2019-06-03 DIAGNOSIS — K50.00 CROHN'S DISEASE OF SMALL INTESTINE WITHOUT COMPLICATION: ICD-10-CM

## 2019-06-03 RX ORDER — OXYCODONE AND ACETAMINOPHEN 10; 325 MG/1; MG/1
1 TABLET ORAL EVERY 6 HOURS PRN
Qty: 120 TABLET | Refills: 0 | Status: SHIPPED | OUTPATIENT
Start: 2019-06-03 | End: 2019-06-27 | Stop reason: SDUPTHER

## 2019-06-03 NOTE — TELEPHONE ENCOUNTER
----- Message from Karine Hampton sent at 6/3/2019  7:18 AM CDT -----  Contact: [phone]/ self  Patient is calling for an RX refill or new RX.  Is this a refill or new RX:  Refill   RX name and strength: oxyCODONE-acetaminophen (PERCOCET)  mg per tablet  Directions (copy/paste from chart):  Take 1 tablet by mouth every 6 (six) hours as needed for Pain. - Oral  Is this a 30 day or 90 day RX:30    Local pharmacy or mail order pharmacy:    Pharmacy name and phone # (copy/paste from chart):       Comments:  Also she need a back to work note to cover her from  May 30 & 31- June 1& 2. She had to go to  [facility] and they diagnose her with a  Hernia,. Will be seeing a katerine 6/18 for this.

## 2019-06-19 ENCOUNTER — PATIENT MESSAGE (OUTPATIENT)
Dept: INTERNAL MEDICINE | Facility: CLINIC | Age: 44
End: 2019-06-19

## 2019-06-27 DIAGNOSIS — M79.7 FIBROMYALGIA: ICD-10-CM

## 2019-06-27 DIAGNOSIS — K50.00 CROHN'S DISEASE OF SMALL INTESTINE WITHOUT COMPLICATION: ICD-10-CM

## 2019-06-27 RX ORDER — OXYCODONE AND ACETAMINOPHEN 10; 325 MG/1; MG/1
1 TABLET ORAL EVERY 6 HOURS PRN
Qty: 120 TABLET | Refills: 0 | Status: SHIPPED | OUTPATIENT
Start: 2019-06-27 | End: 2019-07-24 | Stop reason: SDUPTHER

## 2019-06-27 NOTE — TELEPHONE ENCOUNTER
----- Message from Alycia Malagon sent at 6/27/2019  7:49 AM CDT -----  Contact: patient [phone]  Pt called to request oxycodone 10 mg and has been advised that she will be called when rx is ready to .

## 2019-06-28 ENCOUNTER — PATIENT MESSAGE (OUTPATIENT)
Dept: INTERNAL MEDICINE | Facility: CLINIC | Age: 44
End: 2019-06-28

## 2019-07-01 ENCOUNTER — PATIENT MESSAGE (OUTPATIENT)
Dept: INTERNAL MEDICINE | Facility: CLINIC | Age: 44
End: 2019-07-01

## 2019-07-01 ENCOUNTER — TELEPHONE (OUTPATIENT)
Dept: INTERNAL MEDICINE | Facility: CLINIC | Age: 44
End: 2019-07-01

## 2019-07-02 ENCOUNTER — PATIENT MESSAGE (OUTPATIENT)
Dept: INTERNAL MEDICINE | Facility: CLINIC | Age: 44
End: 2019-07-02

## 2019-07-05 ENCOUNTER — PATIENT MESSAGE (OUTPATIENT)
Dept: INTERNAL MEDICINE | Facility: CLINIC | Age: 44
End: 2019-07-05

## 2019-07-23 ENCOUNTER — PATIENT MESSAGE (OUTPATIENT)
Dept: INTERNAL MEDICINE | Facility: CLINIC | Age: 44
End: 2019-07-23

## 2019-07-24 ENCOUNTER — OFFICE VISIT (OUTPATIENT)
Dept: INTERNAL MEDICINE | Facility: CLINIC | Age: 44
End: 2019-07-24
Payer: COMMERCIAL

## 2019-07-24 VITALS
DIASTOLIC BLOOD PRESSURE: 70 MMHG | HEIGHT: 64 IN | HEART RATE: 89 BPM | WEIGHT: 177.5 LBS | SYSTOLIC BLOOD PRESSURE: 107 MMHG | BODY MASS INDEX: 30.3 KG/M2 | TEMPERATURE: 99 F

## 2019-07-24 DIAGNOSIS — M79.7 FIBROMYALGIA: ICD-10-CM

## 2019-07-24 DIAGNOSIS — Z98.890 S/P HERNIA REPAIR: Primary | ICD-10-CM

## 2019-07-24 DIAGNOSIS — K50.00 CROHN'S DISEASE OF SMALL INTESTINE WITHOUT COMPLICATION: ICD-10-CM

## 2019-07-24 DIAGNOSIS — Z87.19 S/P HERNIA REPAIR: Primary | ICD-10-CM

## 2019-07-24 PROCEDURE — 99213 OFFICE O/P EST LOW 20 MIN: CPT | Mod: S$GLB,,, | Performed by: INTERNAL MEDICINE

## 2019-07-24 PROCEDURE — 3008F PR BODY MASS INDEX (BMI) DOCUMENTED: ICD-10-PCS | Mod: CPTII,S$GLB,, | Performed by: INTERNAL MEDICINE

## 2019-07-24 PROCEDURE — 99999 PR PBB SHADOW E&M-EST. PATIENT-LVL III: ICD-10-PCS | Mod: PBBFAC,,, | Performed by: INTERNAL MEDICINE

## 2019-07-24 PROCEDURE — 99213 PR OFFICE/OUTPT VISIT, EST, LEVL III, 20-29 MIN: ICD-10-PCS | Mod: S$GLB,,, | Performed by: INTERNAL MEDICINE

## 2019-07-24 PROCEDURE — 3008F BODY MASS INDEX DOCD: CPT | Mod: CPTII,S$GLB,, | Performed by: INTERNAL MEDICINE

## 2019-07-24 PROCEDURE — 99999 PR PBB SHADOW E&M-EST. PATIENT-LVL III: CPT | Mod: PBBFAC,,, | Performed by: INTERNAL MEDICINE

## 2019-07-24 RX ORDER — ONDANSETRON 4 MG/1
TABLET, ORALLY DISINTEGRATING ORAL
Qty: 30 TABLET | Refills: 3 | Status: SHIPPED | OUTPATIENT
Start: 2019-07-24 | End: 2020-07-06 | Stop reason: SDUPTHER

## 2019-07-24 RX ORDER — OXYCODONE HYDROCHLORIDE 5 MG/1
5 TABLET ORAL EVERY 8 HOURS PRN
Qty: 21 TABLET | Refills: 0 | Status: SHIPPED | OUTPATIENT
Start: 2019-07-24 | End: 2019-10-24

## 2019-07-24 RX ORDER — ZOLPIDEM TARTRATE 10 MG/1
10 TABLET ORAL NIGHTLY PRN
Qty: 30 TABLET | Refills: 3 | Status: SHIPPED | OUTPATIENT
Start: 2019-07-24 | End: 2019-08-13 | Stop reason: SDUPTHER

## 2019-07-24 RX ORDER — OXYCODONE AND ACETAMINOPHEN 10; 325 MG/1; MG/1
1 TABLET ORAL EVERY 6 HOURS PRN
Qty: 120 TABLET | Refills: 0 | Status: SHIPPED | OUTPATIENT
Start: 2019-07-24 | End: 2019-08-21 | Stop reason: SDUPTHER

## 2019-07-24 RX ORDER — ONDANSETRON 4 MG/1
TABLET, ORALLY DISINTEGRATING ORAL
Qty: 30 TABLET | Refills: 3 | Status: SHIPPED | OUTPATIENT
Start: 2019-07-24 | End: 2019-07-24 | Stop reason: SDUPTHER

## 2019-07-24 RX ORDER — ACETAMINOPHEN 500 MG
500 TABLET ORAL EVERY 4 HOURS PRN
Refills: 0 | COMMUNITY
Start: 2019-07-22 | End: 2019-10-24 | Stop reason: SDUPTHER

## 2019-07-24 RX ORDER — GABAPENTIN 100 MG/1
100 CAPSULE ORAL 3 TIMES DAILY
Refills: 0 | COMMUNITY
Start: 2019-07-22 | End: 2019-10-24 | Stop reason: SDUPTHER

## 2019-07-24 NOTE — PROGRESS NOTES
CC: followup of surgical repair of parastomal hernia  HPI:  The patient is a 43 y.o. year old female who presents to the office for followup of surgical repair of parastomal hernia.  She was hospitalized for five days and discharged 2 days ago.  She complains of pain at the site.  Her drains were removed yesterday.  She complains of low grade fever, that was present while she was in the hospital.  The patient complains of nausea as well.      PAST MEDICAL HISTORY:  Past Medical History:   Diagnosis Date    Anemia     Asthma     B12 deficiency     Crohn's disease     History of shingles     Lupus     Migraine headache     Raynaud disease     Reactive hypoglycemia     Seizures 2004    no medication     Sleep apnea     Non compliant with CPAP       SURGICAL HISTORY:  Past Surgical History:   Procedure Laterality Date    ABDOMINAL SURGERY      COLON SURGERY      95% of colon removed 2010    COLONOSCOPY      COLOSTOMY      CT (COMPUTED TOMOGRAPHY) N/A 12/26/2013    Performed by [facility] at [facility] OR    EGD (ESOPHAGOGASTRODUODENOSCOPY) Left 4/22/2013    Performed by Parish Lynn MD at [facility] ENDO    ESOPHAGOGASTRODUODENOSCOPY (EGD) N/A 4/19/2016    Performed by [facility] at [facility] (4TH FLR)    ESOPHAGOGASTRODUODENOSCOPY (EGD) N/A 7/17/2014    Performed by Parish Lynn MD at [facility] ENDO    HYSTERECTOMY      due to endometriosis    ILEOSCOPY through stoma N/A 4/10/2019    Performed by [facility] MD at [facility] ENDO (4TH FLR)    ILEOSCOPY through stoma N/A 3/13/2018    Performed by [facility] MD at [facility] ENDO (4TH FLR)    ILEOSCOPY through stoma N/A 8/23/2017    Performed by Eve [facility]rie MD at [facility] ENDO (4TH FLR)    ILEOSCOPY through stoma N/A 3/21/2017    Performed by Eve [facility]rie MD at [facility] ENDO (4TH FLR)    ILEOSTOMY      LAPAROSCOPY W/ MINI-LAPAROTOMY      large intestine removed      just a portion    pelvic mass removal      POUCHOSCOPY N/A  3/18/2016    [facility]formed [facility] at [facility] END[facility] (4TH FLR)    REVISION COLOSTOMY  2010    SIGMOIDOSCOPY, FLEXIBLE Left 4/22/2013    Performed by Parish [facility] at [facility] ENDO    SIGMOIDOSCOPY-FLEXIBLE N/A 10/8/2015    Performed by Parish Lynn MD at [facility] ENDO    STOMACH SURGERY      TONSILLECTOMY, ADENOIDECTOMY         MEDS:  Medcard reviewed and updated    ALLERGIES: Allergy Card reviewed and updated    SOCIAL HISTORY:   The patient is a nonsmoker.    PE:   APPEARANCE: Well nourished, well developed, in no acute distress.    CHEST: Lungs clear to auscultation with unlabored respirations.  CARDIOVASCULAR: Normal S1, S2. No murmurs. No carotid bruits. No pedal edema.  ABDOMEN: Bowel sounds normal. Not distended. Soft. No tenderness or masses. Positive colostomy.  PSYCHIATRIC: The patient is oriented to person, place, and time and has a pleasant affect.        ASSESSMENT/PLAN:  Emi was seen today for follow-up.    Diagnoses and all orders for this visit:    S/P hernia repair  -     oxyCODONE (ROXICODONE) 5 MG immediate release tablet; Take 1 tablet (5 mg total) by mouth every 8 (eight) hours as needed for Pain.    Fibromyalgia  -     oxyCODONE-acetaminophen (PERCOCET)  mg per tablet; Take 1 tablet by mouth every 6 (six) hours as needed for Pain.    Crohn's disease of small intestine without complication  -     oxyCODONE-acetaminophen (PERCOCET)  mg per tablet; Take 1 tablet by mouth every 6 (six) hours as needed for Pain.    Other orders  -     ondansetron (ZOFRAN-ODT) 4 MG TbDL; DISSOLVE 1 TABLET ON THE TONGUE EVERY 8 HOURS AS NEEDED  -     zolpidem (AMBIEN) 10 mg Tab; Take 1 tablet (10 mg total) by mouth nightly as needed.

## 2019-07-25 ENCOUNTER — OFFICE VISIT (OUTPATIENT)
Dept: WOUND CARE | Facility: CLINIC | Age: 44
End: 2019-07-25
Payer: COMMERCIAL

## 2019-07-25 DIAGNOSIS — B37.2 SKIN YEAST INFECTION: ICD-10-CM

## 2019-07-25 DIAGNOSIS — Z43.2 ATTENTION TO ILEOSTOMY: Primary | ICD-10-CM

## 2019-07-25 PROCEDURE — 99999 PR PBB SHADOW E&M-EST. PATIENT-LVL II: CPT | Mod: PBBFAC,,, | Performed by: CLINICAL NURSE SPECIALIST

## 2019-07-25 PROCEDURE — 99214 PR OFFICE/OUTPT VISIT, EST, LEVL IV, 30-39 MIN: ICD-10-PCS | Mod: S$GLB,,, | Performed by: CLINICAL NURSE SPECIALIST

## 2019-07-25 PROCEDURE — 99214 OFFICE O/P EST MOD 30 MIN: CPT | Mod: S$GLB,,, | Performed by: CLINICAL NURSE SPECIALIST

## 2019-07-25 PROCEDURE — 99999 PR PBB SHADOW E&M-EST. PATIENT-LVL II: ICD-10-PCS | Mod: PBBFAC,,, | Performed by: CLINICAL NURSE SPECIALIST

## 2019-07-25 NOTE — PROGRESS NOTES
"Subjective:       Patient ID: Emi Johnson is a 43 y.o. female.    Chief Complaint: Ileostomy and Skin Problem    This is a fu to enterostomal therapy clinic and she comes today for assistance with her revised ileostomy done 7/18 along with hernia repair by Dr Saldaña    PMH  She had total colectomy for crohn's  2018  by Dr Saldaña, she later developed large hernia.     Review of Systems   Constitutional: Positive for activity change. Negative for appetite change and fever.   HENT: Negative for congestion and rhinorrhea.    Respiratory: Negative for cough and shortness of breath.    Cardiovascular: Negative.    Gastrointestinal: Positive for abdominal pain.        Ileostomy   Genitourinary: Negative.    Musculoskeletal: Negative.    Skin: Positive for rash.   Neurological: Negative.    Psychiatric/Behavioral: Negative.        Objective:      Physical Exam   Constitutional: She is oriented to person, place, and time. She appears well-developed and well-nourished.   Pulmonary/Chest: Effort normal. No respiratory distress.   Abdominal: Soft. Bowel sounds are normal. She exhibits no distension. There is no tenderness.   Musculoskeletal: Normal range of motion. She exhibits no edema.   Neurological: She is alert and oriented to person, place, and time.   Skin: Skin is warm and dry. Rash noted.   Psychiatric: She has a normal mood and affect.             7/2/18 7/25/19 post hernia repair     She has a new flush stoma with a high skin ridge of concern, also sutures which are problematic and yeast rash as well  There are 2 granulomas at base that I will leave alone today but address next visit in meantime I  Placed a convex 1"precut cera pouch on and mini [facility], told her to wear belt and get some OTC antifungal powder.   Discussed how to manage the sutures for now, cover with Polymem and then pouch over, gave her supplies     Assessment:       1. Attention to ileostomy    2. Skin yeast infection        Plan:     "   Continue with convex wafer but carve out oval shape, wear belt and wound care to the sutures  [employer] is her DME   Follow up 2 weeks after sutures removed  I have reviewed the plan of care with the patient and she express understanding. I spent over 50% of this 25 minute visit in face to face counseling.

## 2019-07-26 ENCOUNTER — PATIENT MESSAGE (OUTPATIENT)
Dept: WOUND CARE | Facility: CLINIC | Age: 44
End: 2019-07-26

## 2019-08-12 ENCOUNTER — OFFICE VISIT (OUTPATIENT)
Dept: WOUND CARE | Facility: CLINIC | Age: 44
End: 2019-08-12
Payer: COMMERCIAL

## 2019-08-12 DIAGNOSIS — Z43.2 ATTENTION TO ILEOSTOMY: Primary | ICD-10-CM

## 2019-08-12 PROCEDURE — 99999 PR PBB SHADOW E&M-EST. PATIENT-LVL II: ICD-10-PCS | Mod: PBBFAC,,, | Performed by: CLINICAL NURSE SPECIALIST

## 2019-08-12 PROCEDURE — 99213 OFFICE O/P EST LOW 20 MIN: CPT | Mod: S$GLB,,, | Performed by: CLINICAL NURSE SPECIALIST

## 2019-08-12 PROCEDURE — 99213 PR OFFICE/OUTPT VISIT, EST, LEVL III, 20-29 MIN: ICD-10-PCS | Mod: S$GLB,,, | Performed by: CLINICAL NURSE SPECIALIST

## 2019-08-12 PROCEDURE — 99999 PR PBB SHADOW E&M-EST. PATIENT-LVL II: CPT | Mod: PBBFAC,,, | Performed by: CLINICAL NURSE SPECIALIST

## 2019-08-12 NOTE — PROGRESS NOTES
"Subjective:       Patient ID: Emi Johnson is a 43 y.o. female.    Chief Complaint: Ileostomy and Wound Check    This is a fu to enterostomal therapy clinic and she comes today for assistance with her revised ileostomy done 7/18 along with hernia repair by Dr Saldaña, it has healed but she has a small "hole" at base that she is concerned with and wants me to check this .   PMH  She had total colectomy for crohn's  2018  by Dr Saldaña, she later developed large hernia.     Wound Check       Review of Systems   Constitutional: Negative for activity change, appetite change and fever.   HENT: Negative for congestion and rhinorrhea.    Respiratory: Negative for cough and shortness of breath.    Cardiovascular: Negative.    Gastrointestinal: Negative for abdominal pain.        Ileostomy   Genitourinary: Negative.    Musculoskeletal: Negative.    Skin: Positive for wound. Negative for rash.   Neurological: Negative.    Psychiatric/Behavioral: Negative.        Objective:      Physical Exam   Constitutional: She is oriented to person, place, and time. She appears well-developed and well-nourished.   Pulmonary/Chest: Effort normal. No respiratory distress.   Abdominal: Soft. Bowel sounds are normal. She exhibits no distension. There is no tenderness.   Musculoskeletal: Normal range of motion. She exhibits no edema.   Neurological: She is alert and oriented to person, place, and time.   Skin: Skin is warm and dry. Rash noted.   Psychiatric: She has a normal mood and affect.             7/2/18 7/25/19 post hernia repair     8/12/19 8/12 the yeast rash is resolved,  The sutures are out, but has small defect she calls hole at 6 oclock that is healing just fine, there is the tiny black spot at bianca that is very tender to touch so I told her to watch this spot.   Advised to place angela over the open wound and then a 2 mm ring and pouch as normal, convex 25mm pre cut New image.   She has Home health for now [facility] but " "does not remember agency name or  Nurse name, ([phone])  Discussed hernia prevention and belt recommended, I measured for security belt and she is [employer], she does not have funds to buy but will see what she can do.        7/25Shcarol has a new flush stoma with a high skin ridge of concern, also sutures which are problematic and yeast rash as well  There are 2 granulomas at base that I will leave alone today but address next visit in meantime I  Placed a convex 1"precut cera pouch on and mini [facility], told her to wear belt and get some OTC antifungal powder.   Discussed how to manage the sutures for now, cover with Polymem and then pouch over, gave her supplies     Assessment:       1. Attention to ileostomy        Plan:       Continue with convex wafer but ass 2 mm ring over prima /small wound  Abd support with binder of some sort, [employer] has some for $20. Or less   [employer] is her DME   Follow up if does not heal in few weeks   I have reviewed the plan of care with the patient and she express understanding. I spent over 50% of this 15 minute visit in face to face counseling.       "

## 2019-08-13 ENCOUNTER — PATIENT MESSAGE (OUTPATIENT)
Dept: INTERNAL MEDICINE | Facility: CLINIC | Age: 44
End: 2019-08-13

## 2019-08-14 ENCOUNTER — PATIENT MESSAGE (OUTPATIENT)
Dept: WOUND CARE | Facility: CLINIC | Age: 44
End: 2019-08-14

## 2019-08-14 RX ORDER — ZOLPIDEM TARTRATE 10 MG/1
10 TABLET ORAL NIGHTLY PRN
Qty: 30 TABLET | Refills: 3 | Status: SHIPPED | OUTPATIENT
Start: 2019-08-14 | End: 2019-12-02 | Stop reason: SDUPTHER

## 2019-08-15 ENCOUNTER — TELEPHONE (OUTPATIENT)
Dept: WOUND CARE | Facility: CLINIC | Age: 44
End: 2019-08-15

## 2019-08-15 NOTE — TELEPHONE ENCOUNTER
Spoke with Ruthie and clarified new orders for care of peristomal skin, she will write as verbal Dr Gaspar  I am aware that the small dark spot has opened and is now a small hole, I had expected this and told the pt but she did not remember.

## 2019-08-21 ENCOUNTER — PATIENT MESSAGE (OUTPATIENT)
Dept: INTERNAL MEDICINE | Facility: CLINIC | Age: 44
End: 2019-08-21

## 2019-08-21 DIAGNOSIS — K50.00 CROHN'S DISEASE OF SMALL INTESTINE WITHOUT COMPLICATION: ICD-10-CM

## 2019-08-21 DIAGNOSIS — M79.7 FIBROMYALGIA: ICD-10-CM

## 2019-08-21 RX ORDER — OXYCODONE AND ACETAMINOPHEN 10; 325 MG/1; MG/1
1 TABLET ORAL EVERY 6 HOURS PRN
Qty: 120 TABLET | Refills: 0 | Status: SHIPPED | OUTPATIENT
Start: 2019-08-21 | End: 2019-09-23 | Stop reason: SDUPTHER

## 2019-08-21 NOTE — TELEPHONE ENCOUNTER
----- Message from Petty Ingram sent at 8/21/2019  7:03 AM CDT -----  Contact: self/ [phone]  Patient is calling for an RX refill or new RX.  Is this a refill or new RX:    RX name and strength: oxyCODONE-acetaminophen (PERCOCET)  mg per tablet 120 tablet   Directions (copy/paste from chart):    Is this a 30 day or 90 day RX:    Local pharmacy or mail order pharmacy:    Pharmacy name and phone # (copy/paste from chart):     Comments:

## 2019-08-26 ENCOUNTER — TELEPHONE (OUTPATIENT)
Dept: INTERNAL MEDICINE | Facility: CLINIC | Age: 44
End: 2019-08-26

## 2019-08-26 NOTE — TELEPHONE ENCOUNTER
Called the pharmacy and they advised the Rx has been received and the Rx is on hold until 8/29/2019 for refill and is not due until

## 2019-09-16 ENCOUNTER — PATIENT MESSAGE (OUTPATIENT)
Dept: INTERNAL MEDICINE | Facility: CLINIC | Age: 44
End: 2019-09-16

## 2019-09-17 ENCOUNTER — TELEPHONE (OUTPATIENT)
Dept: INTERNAL MEDICINE | Facility: CLINIC | Age: 44
End: 2019-09-17

## 2019-09-17 ENCOUNTER — PATIENT MESSAGE (OUTPATIENT)
Dept: INTERNAL MEDICINE | Facility: CLINIC | Age: 44
End: 2019-09-17

## 2019-09-17 DIAGNOSIS — R20.0 NUMBNESS: Primary | ICD-10-CM

## 2019-09-22 DIAGNOSIS — M79.7 FIBROMYALGIA: ICD-10-CM

## 2019-09-22 DIAGNOSIS — K50.00 CROHN'S DISEASE OF SMALL INTESTINE WITHOUT COMPLICATION: ICD-10-CM

## 2019-09-22 RX ORDER — TIZANIDINE 4 MG/1
4 TABLET ORAL NIGHTLY PRN
Qty: 30 TABLET | Refills: 3 | Status: SHIPPED | OUTPATIENT
Start: 2019-09-22 | End: 2020-09-03 | Stop reason: SDUPTHER

## 2019-09-23 ENCOUNTER — PATIENT MESSAGE (OUTPATIENT)
Dept: INTERNAL MEDICINE | Facility: CLINIC | Age: 44
End: 2019-09-23

## 2019-09-23 RX ORDER — OXYCODONE AND ACETAMINOPHEN 10; 325 MG/1; MG/1
1 TABLET ORAL EVERY 6 HOURS PRN
Qty: 120 TABLET | Refills: 0 | Status: SHIPPED | OUTPATIENT
Start: 2019-09-25 | End: 2019-09-26 | Stop reason: SDUPTHER

## 2019-09-25 ENCOUNTER — TELEPHONE (OUTPATIENT)
Dept: INTERNAL MEDICINE | Facility: CLINIC | Age: 44
End: 2019-09-25

## 2019-09-26 ENCOUNTER — TELEPHONE (OUTPATIENT)
Dept: INTERNAL MEDICINE | Facility: CLINIC | Age: 44
End: 2019-09-26

## 2019-09-26 DIAGNOSIS — M79.7 FIBROMYALGIA: ICD-10-CM

## 2019-09-26 DIAGNOSIS — K50.00 CROHN'S DISEASE OF SMALL INTESTINE WITHOUT COMPLICATION: ICD-10-CM

## 2019-09-26 RX ORDER — OXYCODONE AND ACETAMINOPHEN 10; 325 MG/1; MG/1
1 TABLET ORAL EVERY 6 HOURS PRN
Qty: 120 TABLET | Refills: 0 | Status: SHIPPED | OUTPATIENT
Start: 2019-09-26 | End: 2019-10-24 | Stop reason: SDUPTHER

## 2019-10-04 DIAGNOSIS — Z12.39 BREAST CANCER SCREENING: ICD-10-CM

## 2019-10-23 ENCOUNTER — TELEPHONE (OUTPATIENT)
Dept: INTERNAL MEDICINE | Facility: CLINIC | Age: 44
End: 2019-10-23

## 2019-10-23 DIAGNOSIS — K50.00 CROHN'S DISEASE OF SMALL INTESTINE WITHOUT COMPLICATION: ICD-10-CM

## 2019-10-23 DIAGNOSIS — M79.7 FIBROMYALGIA: ICD-10-CM

## 2019-10-23 NOTE — TELEPHONE ENCOUNTER
----- Message from Petty Ingram sent at 10/23/2019  7:06 AM CDT -----  Contact: self/ [phone]  Patient is calling for an RX refill or new RX.  Is this a refill or new RX:    RX name and strength: oxyCODONE-acetaminophen (PERCOCET)  mg per tablet 120 tablet   Directions (copy/paste from chart):   Take 1 tablet by mouth every 6 (six) hours as needed for Pain (Greater than 7 day supply medically necessary  Is this a 30 day or 90 day RX:    Local pharmacy or mail order pharmacy:    Pharmacy name and phone # (copy/paste from chart):   [id number] - [place], [place] - [address] AT [facility] [address]  Comments:  Please add to med profile.

## 2019-10-23 NOTE — PROGRESS NOTES
CC: followup of fibromyalgia  HPI:  The patient is a 43 y.o. year old female who presents to the office for followup of fibromyalgia.  She reports three episodes of vomiting last night.  She complains of a lot of gas recently.  The patient also reports nonproductive cough.  She denies any dysuria or polyuria.  The patient has been under a lot of stress at work.  She has been on her feet for all 8 hours of her shift.  This is the first time she has been on her feet for 8 hours since her ostomy was placed.  She also complains of asthma exacerbation recently, as well as fibromyalgia exacerbation.    PAST MEDICAL HISTORY:  Past Medical History:   Diagnosis Date    Anemia     Asthma     B12 deficiency     Crohn's disease     History of shingles     Lupus     Migraine headache     Raynaud disease     Reactive hypoglycemia     Seizures 2004    no medication     Sleep apnea     Non compliant with CPAP       SURGICAL HISTORY:  Past Surgical History:   Procedure Laterality Date    ABDOMINAL SURGERY      COLON SURGERY      95% of colon removed 2010    COLONOSCOPY      COLOSTOMY      HYSTERECTOMY      due to endometriosis    ILEOSCOPY N/A 4/10/2019    Procedure: ILEOSCOPY through stoma;  Surgeon: Eve Currie MD;  Location: [address];  Service: Endoscopy;  Laterality: N/A;  Schedule as 30 minute case, schedule in April or May 2019    ILEOSTOMY      LAPAROSCOPY W/ MINI-LAPAROTOMY      large intestine removed      just a portion    pelvic mass removal      REVISION COLOSTOMY  2010    STOMACH SURGERY      TONSILLECTOMY, ADENOIDECTOMY         MEDS:  Medcard reviewed and updated    ALLERGIES: Allergy Card reviewed and updated    SOCIAL HISTORY:   The patient is a nonsmoker.    PE:   APPEARANCE: Well nourished, well developed, in no acute distress.    CHEST: Lungs clear to auscultation with unlabored respirations.  CARDIOVASCULAR: Normal S1, S2. No murmurs. No carotid bruits. No pedal  edema.  ABDOMEN: Bowel sounds normal. Not distended. Soft. No tenderness or masses. Positive colostomy.  PSYCHIATRIC: The patient is oriented to person, place, and time and has a pleasant affect.        ASSESSMENT/PLAN:  Emi was seen today for follow-up.    Diagnoses and all orders for this visit:    Fibromyalgia  -     oxyCODONE-acetaminophen (PERCOCET)  mg per tablet; Take 1 tablet by mouth every 6 (six) hours as needed for Pain (Greater than 7 day supply medically necessary).    Crohn's disease of small intestine without complication  -     oxyCODONE-acetaminophen (PERCOCET)  mg per tablet; Take 1 tablet by mouth every 6 (six) hours as needed for Pain (Greater than 7 day supply medically necessary).    Non-intractable vomiting with nausea, unspecified vomiting type  -     CBC auto differential; Future  -     Comprehensive metabolic panel; Future  -     Amylase; Future  -     Lipase; Future    Cough  -     promethazine-dextromethorphan (PROMETHAZINE-DM) 6.25-15 mg/5 mL Syrp; Take 5 mLs by mouth nightly as needed.        Answers for HPI/ROS submitted by the patient on 10/22/2019   activity change: No  unexpected weight change: No  neck pain: Yes  hearing loss: No  rhinorrhea: No  trouble swallowing: No  eye discharge: No  visual disturbance: No  chest tightness: No  wheezing: No  chest pain: No  palpitations: No  blood in stool: No  constipation: No  vomiting: No  diarrhea: No  polydipsia: No  polyuria: No  difficulty urinating: No  hematuria: No  menstrual problem: No  dysuria: No  joint swelling: No  arthralgias: Yes  headaches: Yes  weakness: No  confusion: No  dysphoric mood: No

## 2019-10-24 ENCOUNTER — LAB VISIT (OUTPATIENT)
Dept: LAB | Facility: HOSPITAL | Age: 44
End: 2019-10-24
Attending: INTERNAL MEDICINE
Payer: COMMERCIAL

## 2019-10-24 ENCOUNTER — OFFICE VISIT (OUTPATIENT)
Dept: INTERNAL MEDICINE | Facility: CLINIC | Age: 44
End: 2019-10-24
Payer: COMMERCIAL

## 2019-10-24 VITALS
HEIGHT: 64 IN | BODY MASS INDEX: 30.34 KG/M2 | RESPIRATION RATE: 18 BRPM | DIASTOLIC BLOOD PRESSURE: 86 MMHG | SYSTOLIC BLOOD PRESSURE: 124 MMHG | TEMPERATURE: 99 F | HEART RATE: 92 BPM | WEIGHT: 177.69 LBS

## 2019-10-24 DIAGNOSIS — R11.2 NON-INTRACTABLE VOMITING WITH NAUSEA, UNSPECIFIED VOMITING TYPE: ICD-10-CM

## 2019-10-24 DIAGNOSIS — M79.7 FIBROMYALGIA: Primary | ICD-10-CM

## 2019-10-24 DIAGNOSIS — R05.9 COUGH: ICD-10-CM

## 2019-10-24 DIAGNOSIS — K50.00 CROHN'S DISEASE OF SMALL INTESTINE WITHOUT COMPLICATION: ICD-10-CM

## 2019-10-24 LAB
ALBUMIN SERPL BCP-MCNC: 4.3 G/DL (ref 3.5–5.2)
ALP SERPL-CCNC: 95 U/L (ref 55–135)
ALT SERPL W/O P-5'-P-CCNC: 27 U/L (ref 10–44)
AMYLASE SERPL-CCNC: 47 U/L (ref 20–110)
ANION GAP SERPL CALC-SCNC: 9 MMOL/L (ref 8–16)
AST SERPL-CCNC: 22 U/L (ref 10–40)
BASOPHILS # BLD AUTO: 0.04 K/UL (ref 0–0.2)
BASOPHILS NFR BLD: 0.6 % (ref 0–1.9)
BILIRUB SERPL-MCNC: 0.8 MG/DL (ref 0.1–1)
BUN SERPL-MCNC: 19 MG/DL (ref 6–20)
CALCIUM SERPL-MCNC: 9.8 MG/DL (ref 8.7–10.5)
CHLORIDE SERPL-SCNC: 107 MMOL/L (ref 95–110)
CO2 SERPL-SCNC: 25 MMOL/L (ref 23–29)
CREAT SERPL-MCNC: 1 MG/DL (ref 0.5–1.4)
DIFFERENTIAL METHOD: ABNORMAL
EOSINOPHIL # BLD AUTO: 0.2 K/UL (ref 0–0.5)
EOSINOPHIL NFR BLD: 3.1 % (ref 0–8)
ERYTHROCYTE [DISTWIDTH] IN BLOOD BY AUTOMATED COUNT: 12.7 % (ref 11.5–14.5)
EST. GFR  (AFRICAN AMERICAN): >60 ML/MIN/1.73 M^2
EST. GFR  (NON AFRICAN AMERICAN): >60 ML/MIN/1.73 M^2
GLUCOSE SERPL-MCNC: 93 MG/DL (ref 70–110)
HCT VFR BLD AUTO: 50.5 % (ref 37–48.5)
HGB BLD-MCNC: 16.1 G/DL (ref 12–16)
IMM GRANULOCYTES # BLD AUTO: 0.01 K/UL (ref 0–0.04)
IMM GRANULOCYTES NFR BLD AUTO: 0.1 % (ref 0–0.5)
LIPASE SERPL-CCNC: 31 U/L (ref 4–60)
LYMPHOCYTES # BLD AUTO: 1.9 K/UL (ref 1–4.8)
LYMPHOCYTES NFR BLD: 27.4 % (ref 18–48)
MCH RBC QN AUTO: 28.4 PG (ref 27–31)
MCHC RBC AUTO-ENTMCNC: 31.9 G/DL (ref 32–36)
MCV RBC AUTO: 89 FL (ref 82–98)
MONOCYTES # BLD AUTO: 0.6 K/UL (ref 0.3–1)
MONOCYTES NFR BLD: 8.2 % (ref 4–15)
NEUTROPHILS # BLD AUTO: 4.3 K/UL (ref 1.8–7.7)
NEUTROPHILS NFR BLD: 60.6 % (ref 38–73)
NRBC BLD-RTO: 0 /100 WBC
PLATELET # BLD AUTO: 216 K/UL (ref 150–350)
PMV BLD AUTO: 11.5 FL (ref 9.2–12.9)
POTASSIUM SERPL-SCNC: 4.1 MMOL/L (ref 3.5–5.1)
PROT SERPL-MCNC: 7.4 G/DL (ref 6–8.4)
RBC # BLD AUTO: 5.66 M/UL (ref 4–5.4)
SODIUM SERPL-SCNC: 141 MMOL/L (ref 136–145)
WBC # BLD AUTO: 7.08 K/UL (ref 3.9–12.7)

## 2019-10-24 PROCEDURE — 3008F PR BODY MASS INDEX (BMI) DOCUMENTED: ICD-10-PCS | Mod: CPTII,S$GLB,, | Performed by: INTERNAL MEDICINE

## 2019-10-24 PROCEDURE — 36415 COLL VENOUS BLD VENIPUNCTURE: CPT | Mod: PO

## 2019-10-24 PROCEDURE — 82150 ASSAY OF AMYLASE: CPT

## 2019-10-24 PROCEDURE — 3008F BODY MASS INDEX DOCD: CPT | Mod: CPTII,S$GLB,, | Performed by: INTERNAL MEDICINE

## 2019-10-24 PROCEDURE — 80053 COMPREHEN METABOLIC PANEL: CPT

## 2019-10-24 PROCEDURE — 99999 PR PBB SHADOW E&M-EST. PATIENT-LVL IV: CPT | Mod: PBBFAC,,, | Performed by: INTERNAL MEDICINE

## 2019-10-24 PROCEDURE — 99999 PR PBB SHADOW E&M-EST. PATIENT-LVL IV: ICD-10-PCS | Mod: PBBFAC,,, | Performed by: INTERNAL MEDICINE

## 2019-10-24 PROCEDURE — 85025 COMPLETE CBC W/AUTO DIFF WBC: CPT

## 2019-10-24 PROCEDURE — 99213 OFFICE O/P EST LOW 20 MIN: CPT | Mod: S$GLB,,, | Performed by: INTERNAL MEDICINE

## 2019-10-24 PROCEDURE — 99213 PR OFFICE/OUTPT VISIT, EST, LEVL III, 20-29 MIN: ICD-10-PCS | Mod: S$GLB,,, | Performed by: INTERNAL MEDICINE

## 2019-10-24 PROCEDURE — 83690 ASSAY OF LIPASE: CPT

## 2019-10-24 RX ORDER — METRONIDAZOLE 500 MG/1
TABLET ORAL
COMMUNITY
Start: 2015-10-13 | End: 2021-09-15

## 2019-10-24 RX ORDER — PROMETHAZINE HYDROCHLORIDE AND DEXTROMETHORPHAN HYDROBROMIDE 6.25; 15 MG/5ML; MG/5ML
5 SYRUP ORAL NIGHTLY PRN
Qty: 120 ML | Refills: 0 | Status: SHIPPED | OUTPATIENT
Start: 2019-10-24 | End: 2020-03-23 | Stop reason: SDUPTHER

## 2019-10-24 RX ORDER — OXYCODONE AND ACETAMINOPHEN 10; 325 MG/1; MG/1
1 TABLET ORAL EVERY 6 HOURS PRN
Qty: 120 TABLET | Refills: 0 | Status: SHIPPED | OUTPATIENT
Start: 2019-10-24 | End: 2019-11-19 | Stop reason: SDUPTHER

## 2019-10-24 RX ORDER — FREMANEZUMAB-VFRM 225 MG/1.5ML
225 INJECTION SUBCUTANEOUS
COMMUNITY
Start: 2019-10-22

## 2019-10-24 NOTE — LETTER
[facility]                                                                                                                                                       [address] [facility].  [place] [address]  Phone: [phone]  Fax: [phone] October 24, 2019     Patient: Emi Johnson    YOB: 1975   Date of Visit: 10/24/2019       To Whom It May Concern:    It is my medical opinion that Emi Johnson be allowed to sit down for 2 to 4 hours per shift.     If you have any questions or concerns, please don't hesitate to call.    Sincerely,        Bianca Rutledge MD

## 2019-11-01 ENCOUNTER — PATIENT OUTREACH (OUTPATIENT)
Dept: ADMINISTRATIVE | Facility: OTHER | Age: 44
End: 2019-11-01

## 2019-11-05 ENCOUNTER — OFFICE VISIT (OUTPATIENT)
Dept: NEUROLOGY | Facility: CLINIC | Age: 44
End: 2019-11-05
Payer: COMMERCIAL

## 2019-11-05 VITALS
HEIGHT: 64 IN | HEART RATE: 90 BPM | BODY MASS INDEX: 30.86 KG/M2 | WEIGHT: 180.75 LBS | DIASTOLIC BLOOD PRESSURE: 72 MMHG | SYSTOLIC BLOOD PRESSURE: 104 MMHG

## 2019-11-05 DIAGNOSIS — G57.11 MERALGIA PARESTHETICA OF RIGHT SIDE: ICD-10-CM

## 2019-11-05 DIAGNOSIS — K50.919 CROHN'S DISEASE WITH COMPLICATION, UNSPECIFIED GASTROINTESTINAL TRACT LOCATION: ICD-10-CM

## 2019-11-05 DIAGNOSIS — M79.7 FIBROMYALGIA: Primary | ICD-10-CM

## 2019-11-05 PROCEDURE — 99204 OFFICE O/P NEW MOD 45 MIN: CPT | Mod: S$GLB,,, | Performed by: PSYCHIATRY & NEUROLOGY

## 2019-11-05 PROCEDURE — 99204 PR OFFICE/OUTPT VISIT, NEW, LEVL IV, 45-59 MIN: ICD-10-PCS | Mod: S$GLB,,, | Performed by: PSYCHIATRY & NEUROLOGY

## 2019-11-05 PROCEDURE — 3008F PR BODY MASS INDEX (BMI) DOCUMENTED: ICD-10-PCS | Mod: CPTII,S$GLB,, | Performed by: PSYCHIATRY & NEUROLOGY

## 2019-11-05 PROCEDURE — 99999 PR PBB SHADOW E&M-EST. PATIENT-LVL V: CPT | Mod: PBBFAC,,, | Performed by: PSYCHIATRY & NEUROLOGY

## 2019-11-05 PROCEDURE — 99999 PR PBB SHADOW E&M-EST. PATIENT-LVL V: ICD-10-PCS | Mod: PBBFAC,,, | Performed by: PSYCHIATRY & NEUROLOGY

## 2019-11-05 PROCEDURE — 3008F BODY MASS INDEX DOCD: CPT | Mod: CPTII,S$GLB,, | Performed by: PSYCHIATRY & NEUROLOGY

## 2019-11-05 RX ORDER — DULOXETIN HYDROCHLORIDE 20 MG/1
20 CAPSULE, DELAYED RELEASE ORAL DAILY
Qty: 90 CAPSULE | Refills: 3 | Status: SHIPPED | OUTPATIENT
Start: 2019-11-05 | End: 2021-09-15

## 2019-11-05 NOTE — LETTER
November 5, 2019      Bianca Rutledge MD  [address] [facility] [address]           [facility] Neurology  [address], [address]  [place] [address]  Phone: [phone]  Fax: [phone]          Patient: Emi Johnson   MR Number: [id number]   YOB: 1975   Date of Visit: 11/5/2019       Dear Dr. Bianca Rutledge:    Thank you for referring Emi Johnson to me for evaluation. Attached you will find relevant portions of my assessment and plan of care.    If you have questions, please do not hesitate to call me. I look forward to following Emi Johnson along with you.    Sincerely,    Mitchell Garrido MD    Enclosure  CC:  No Recipients    If you would like to receive this communication electronically, please contact [email or web address] or [phone] to request more information on [employer] Link access.    For providers and/or their staff who would like to refer a patient to [facility], please contact us through our one-stop-shop provider referral line, [facility] , at [phone].    If you feel you have received this communication in error or would no longer like to receive these types of communications, please e-mail [email or web address]

## 2019-11-05 NOTE — PROGRESS NOTES
[facility] NEUROLOGY  [facility], [place]    Date: 11/5/19  Patient Name: Emi Johnson   MRN: [id number]   PCP: Bianca Rutledge  Referring Provider: Bianca Rutledge MD    Assessment:   Emi Jhonson is a 43 y.o. female presenting for evaluation of paresthesias over the lateral aspect of her right thigh consistent with a diagnosis of meralgia paresthetica.  Reviewed conservative management options.  Patient describes symptoms as highly disruptive and requests treatment.  She states she has tried gabapentin and has not found it helpful.  She is interested in a trial Cymbalta which may also be beneficial for management of her fibromyalgia.  She requests rheumatology consult today ongoing evaluation/management of her fibromyalgia.  Plan:     Problem List Items Addressed This Visit        Neuro    Meralgia paresthetica of right side    Current Assessment & Plan     -- reviewed conservative management options  -- trial of cymbalta            GI    Crohn disease    Relevant Orders    Ambulatory consult to Rheumatology       Orthopedic    Fibromyalgia - Primary    Relevant Orders    Ambulatory consult to Rheumatology        Mitchell Garrido MD  [facility]   Department of Neurology    Patient note was created using MModal Dictation.  Any errors in syntax or even information may not have been identified and edited on initial review prior to signing this note.  Subjective:   Patient seen in consultation at the request of Bianca Rutledge MD for the evaluation of paresthesias. A copy of this note will be sent to the referring physician.        HPI:   Ms. Emi Johnson is a 43 y.o. female presenting for evaluation of sensory changes.  The patient reports several months of burning paresthesias over the lateral aspect of her right anterior thigh extending from her hip to her knee with no radiation of the pain. The patient notes that when she lies on her right side the pain seems to  improve.  She does note that the paresthesias come and go however she finds that bothersome.  She denies any other associated focal neurologic deficits or weakness.  She has no other complaints today.    PAST MEDICAL HISTORY:  Past Medical History:   Diagnosis Date    Anemia     Asthma     B12 deficiency     Crohn's disease     History of shingles     Lupus     Migraine headache     Raynaud disease     Reactive hypoglycemia     Seizures 2004    no medication     Sleep apnea     Non compliant with CPAP       PAST SURGICAL HISTORY:  Past Surgical History:   Procedure Laterality Date    ABDOMINAL SURGERY      COLON SURGERY      95% of colon removed 2010    COLONOSCOPY      COLOSTOMY      HYSTERECTOMY      due to endometriosis    ILEOSCOPY N/A 4/10/2019    Procedure: ILEOSCOPY through stoma;  Surgeon: Eve Currie MD;  Location: [address]);  Service: Endoscopy;  Laterality: N/A;  Schedule as 30 minute case, schedule in April or May 2019    ILEOSTOMY      LAPAROSCOPY W/ MINI-LAPAROTOMY      large intestine removed      just a portion    pelvic mass removal      REVISION COLOSTOMY  2010    STOMACH SURGERY      TONSILLECTOMY, ADENOIDECTOMY         CURRENT MEDS:  Current Outpatient Medications   Medication Sig Dispense Refill    AJOVY 225 mg/1.5 mL injection       albuterol (PROVENTIL/VENTOLIN HFA) 90 mcg/actuation inhaler Inhale 2 puffs into the lungs.      blood sugar diagnostic Strp To check BG 1 times daily, to use with insurance preferred meter 100 strip 3    blood-glucose meter kit To check BG 1 times daily, to use with insurance preferred meter 1 each 0    cyanocobalamin 1,000 mcg/mL injection INJECT 1 ML (1,000 MCG TOTAL) INTO THE MUSCLE EVERY 28 DAYS. 10 mL 1    docusate sodium (COLACE) 100 MG capsule Take 1 capsule (100 mg total) by mouth 2 (two) times daily. 60 capsule 1    lancets Misc To check BG 1 times daily, to use with insurance preferred meter 100 each 3     "metroNIDAZOLE (FLAGYL) 500 MG tablet       ondansetron (ZOFRAN-ODT) 4 MG TbDL DISSOLVE 1 TABLET ON THE TONGUE EVERY 8 HOURS AS NEEDED 30 tablet 3    oxyCODONE-acetaminophen (PERCOCET)  mg per tablet Take 1 tablet by mouth every 6 (six) hours as needed for Pain (Greater than 7 day supply medically necessary). 120 tablet 0    promethazine (PHENERGAN) 25 MG tablet TAKE 1 TABLET (25 MG TOTAL) BY MOUTH EVERY 6 (SIX) HOURS AS NEEDED FOR NAUSEA. 30 tablet 3    promethazine-dextromethorphan (PROMETHAZINE-DM) 6.25-15 mg/5 mL Syrp Take 5 mLs by mouth nightly as needed. 120 mL 0    sumatriptan (IMITREX STATDOSE) 6 mg/0.5 mL kit INJECT 0.5 ML UNDER THE SKIN AS NEEDED ONE TIME FOR MIGRAINE  1    syringe with needle, safety (BD INTEGRA SYRINGE) 3 mL 25 gauge x 1" Syrg 1 Syringe by Misc.(Non-Drug; Combo Route) route every 30 days. 20 Syringe 1    tiZANidine (ZANAFLEX) 4 MG tablet TAKE 1 TABLET (4 MG TOTAL) BY MOUTH NIGHTLY AS NEEDED. 30 tablet 3    zolpidem (AMBIEN) 10 mg Tab Take 1 tablet (10 mg total) by mouth nightly as needed. 30 tablet 3    DULoxetine (CYMBALTA) 20 MG capsule Take 1 capsule (20 mg total) by mouth once daily. 90 capsule 3     No current facility-administered medications for this visit.        ALLERGIES:  Review of patient's allergies indicates:   Allergen Reactions    Aspirin      Other reaction(s): vomiting, contraindicated for bleeding from crohns  Other reaction(s): bleeding    Sulfa (sulfonamide antibiotics) Hives and Rash    Iodinated contrast media Other (See Comments)    Adhesive     Aspirin     Remicade [infliximab] Other (See Comments)     Medical induced lupus    Latex Rash     Other reaction(s): Hives       FAMILY HISTORY:  Family History   Problem Relation Age of Onset    Colon cancer Maternal Grandmother     Cancer Mother         anal    Breast cancer Sister     Migraines Sister     Seizures Sister     Cancer Maternal Aunt     Cancer Paternal Grandmother     Celiac " "disease Neg Hx     Cirrhosis Neg Hx     Colon polyps Neg Hx     Crohn's disease Neg Hx     Cystic fibrosis Neg Hx     Hemochromatosis Neg Hx     Esophageal cancer Neg Hx     Inflammatory bowel disease Neg Hx     Irritable bowel syndrome Neg Hx     Liver cancer Neg Hx     Liver disease Neg Hx     Rectal cancer Neg Hx     Stomach cancer Neg Hx     Ulcerative colitis Neg Hx     Hugo's disease Neg Hx        SOCIAL HISTORY:  Social History     Tobacco Use    Smoking status: Never Smoker    Smokeless tobacco: Never Used   Substance Use Topics    Alcohol use: No     Frequency: Never     Drinks per session: Patient refused     Binge frequency: Never    Drug use: No       Review of Systems:  12 system review of systems is negative except for the symptoms mentioned in HPI.      Objective:     Vitals:    11/05/19 0812   BP: 104/72   Pulse: 90   Weight: 82 kg (180 lb 12.4 oz)   Height: 5' 3.5" (1.613 m)     General: NAD, well nourished   Eyes: no tearing, discharge, no erythema   ENT: moist mucous membranes of the oral cavity, nares patent    Neck: Supple, full range of motion  Cardiovascular: Warm and well perfused, pulses equal and symmetrical  Lungs: Normal work of breathing, normal chest wall excursions  Skin: No rash, lesions, or breakdown on exposed skin  Psychiatry: Mood and affect are appropriate   Abdomen: soft, non tender, non distended  Extremeties: No cyanosis, clubbing or edema.    Neurological   MENTAL STATUS: Alert and oriented to person, place, and time. Attention and concentration within normal limits. Speech without dysarthria, able to name and repeat without difficulty. Recent and remote memory within normal limits   CRANIAL NERVES: Visual fields intact. PERRL. EOMI. Facial sensation intact. Face symmetrical. Hearing grossly intact. Full shoulder shrug bilaterally. Tongue protrudes midline   SENSORY: Sensation is intact to light touch throughout.    MOTOR: Normal bulk and tone.5/5 deltoid, " biceps, triceps, interosseous, hand  bilaterally. 5/5 iliopsoas, knee extension/flexion, foot dorsi/plantarflexion bilaterally.    REFLEXES: Symmetric and 2+ throughout.   CEREBELLAR/COORDINATION/GAIT: Gait steady with normal arm swing and stride length. Finger to nose intact. Normal rapid alternating movements.

## 2019-11-11 ENCOUNTER — HOSPITAL ENCOUNTER (EMERGENCY)
Facility: HOSPITAL | Age: 44
Discharge: HOME | End: 2019-11-11
Attending: EMERGENCY MEDICINE
Payer: COMMERCIAL

## 2019-11-11 VITALS
WEIGHT: 181 LBS | RESPIRATION RATE: 19 BRPM | SYSTOLIC BLOOD PRESSURE: 133 MMHG | BODY MASS INDEX: 32.07 KG/M2 | OXYGEN SATURATION: 100 % | TEMPERATURE: 98 F | HEART RATE: 85 BPM | HEIGHT: 63 IN | DIASTOLIC BLOOD PRESSURE: 84 MMHG

## 2019-11-11 DIAGNOSIS — R11.0 NAUSEA: ICD-10-CM

## 2019-11-11 DIAGNOSIS — R10.10 PAIN OF UPPER ABDOMEN: Primary | ICD-10-CM

## 2019-11-11 DIAGNOSIS — R10.9 ABDOMINAL PAIN: ICD-10-CM

## 2019-11-11 LAB
ALBUMIN SERPL BCP-MCNC: 4.5 G/DL (ref 3.5–5.2)
ALP SERPL-CCNC: 109 U/L (ref 55–135)
ALT SERPL W/O P-5'-P-CCNC: 67 U/L (ref 10–44)
ANION GAP SERPL CALC-SCNC: 11 MMOL/L (ref 8–16)
AST SERPL-CCNC: 40 U/L (ref 10–40)
BASOPHILS # BLD AUTO: 0.03 K/UL (ref 0–0.2)
BASOPHILS NFR BLD: 0.4 % (ref 0–1.9)
BILIRUB SERPL-MCNC: 0.7 MG/DL (ref 0.1–1)
BILIRUB UR QL STRIP: NEGATIVE
BUN SERPL-MCNC: 17 MG/DL (ref 6–20)
CALCIUM SERPL-MCNC: 10.5 MG/DL (ref 8.7–10.5)
CHLORIDE SERPL-SCNC: 106 MMOL/L (ref 95–110)
CLARITY UR: CLEAR
CO2 SERPL-SCNC: 25 MMOL/L (ref 23–29)
COLOR UR: YELLOW
CREAT SERPL-MCNC: 1 MG/DL (ref 0.5–1.4)
DIFFERENTIAL METHOD: ABNORMAL
EOSINOPHIL # BLD AUTO: 0.2 K/UL (ref 0–0.5)
EOSINOPHIL NFR BLD: 2.8 % (ref 0–8)
ERYTHROCYTE [DISTWIDTH] IN BLOOD BY AUTOMATED COUNT: 12.9 % (ref 11.5–14.5)
EST. GFR  (AFRICAN AMERICAN): >60 ML/MIN/1.73 M^2
EST. GFR  (NON AFRICAN AMERICAN): >60 ML/MIN/1.73 M^2
GLUCOSE SERPL-MCNC: 89 MG/DL (ref 70–110)
GLUCOSE UR QL STRIP: NEGATIVE
HCT VFR BLD AUTO: 49.2 % (ref 37–48.5)
HGB BLD-MCNC: 16.5 G/DL (ref 12–16)
HGB UR QL STRIP: NEGATIVE
KETONES UR QL STRIP: NEGATIVE
LACTATE SERPL-SCNC: 1.3 MMOL/L (ref 0.5–2.2)
LEUKOCYTE ESTERASE UR QL STRIP: NEGATIVE
LIPASE SERPL-CCNC: 39 U/L (ref 4–60)
LYMPHOCYTES # BLD AUTO: 1.9 K/UL (ref 1–4.8)
LYMPHOCYTES NFR BLD: 27.5 % (ref 18–48)
MCH RBC QN AUTO: 28.8 PG (ref 27–31)
MCHC RBC AUTO-ENTMCNC: 33.5 G/DL (ref 32–36)
MCV RBC AUTO: 86 FL (ref 82–98)
MONOCYTES # BLD AUTO: 0.5 K/UL (ref 0.3–1)
MONOCYTES NFR BLD: 7.8 % (ref 4–15)
NEUTROPHILS # BLD AUTO: 4.2 K/UL (ref 1.8–7.7)
NEUTROPHILS NFR BLD: 61.5 % (ref 38–73)
NITRITE UR QL STRIP: NEGATIVE
PH UR STRIP: 5 [PH] (ref 5–8)
PLATELET # BLD AUTO: 206 K/UL (ref 150–350)
PMV BLD AUTO: 10.7 FL (ref 9.2–12.9)
POCT GLUCOSE: 89 MG/DL (ref 70–110)
POTASSIUM SERPL-SCNC: 3.8 MMOL/L (ref 3.5–5.1)
PROT SERPL-MCNC: 7.7 G/DL (ref 6–8.4)
PROT UR QL STRIP: NEGATIVE
RBC # BLD AUTO: 5.72 M/UL (ref 4–5.4)
SODIUM SERPL-SCNC: 142 MMOL/L (ref 136–145)
SP GR UR STRIP: 1.02 (ref 1–1.03)
URN SPEC COLLECT METH UR: NORMAL
UROBILINOGEN UR STRIP-ACNC: NEGATIVE EU/DL
WBC # BLD AUTO: 6.77 K/UL (ref 3.9–12.7)

## 2019-11-11 PROCEDURE — 81003 URINALYSIS AUTO W/O SCOPE: CPT

## 2019-11-11 PROCEDURE — 96374 THER/PROPH/DIAG INJ IV PUSH: CPT

## 2019-11-11 PROCEDURE — 63600175 PHARM REV CODE 636 W HCPCS: Performed by: EMERGENCY MEDICINE

## 2019-11-11 PROCEDURE — 25000003 PHARM REV CODE 250: Performed by: NURSE PRACTITIONER

## 2019-11-11 PROCEDURE — 80053 COMPREHEN METABOLIC PANEL: CPT

## 2019-11-11 PROCEDURE — 83605 ASSAY OF LACTIC ACID: CPT

## 2019-11-11 PROCEDURE — 96375 TX/PRO/DX INJ NEW DRUG ADDON: CPT

## 2019-11-11 PROCEDURE — 83690 ASSAY OF LIPASE: CPT

## 2019-11-11 PROCEDURE — 85025 COMPLETE CBC W/AUTO DIFF WBC: CPT

## 2019-11-11 PROCEDURE — 63600175 PHARM REV CODE 636 W HCPCS: Performed by: NURSE PRACTITIONER

## 2019-11-11 PROCEDURE — 99284 EMERGENCY DEPT VISIT MOD MDM: CPT | Mod: 25

## 2019-11-11 PROCEDURE — 96361 HYDRATE IV INFUSION ADD-ON: CPT

## 2019-11-11 PROCEDURE — 82962 GLUCOSE BLOOD TEST: CPT

## 2019-11-11 RX ORDER — METOCLOPRAMIDE 10 MG/1
10 TABLET ORAL EVERY 6 HOURS PRN
Qty: 14 TABLET | Refills: 0 | Status: SHIPPED | OUTPATIENT
Start: 2019-11-11 | End: 2021-09-15

## 2019-11-11 RX ORDER — PROCHLORPERAZINE EDISYLATE 5 MG/ML
10 INJECTION INTRAMUSCULAR; INTRAVENOUS
Status: COMPLETED | OUTPATIENT
Start: 2019-11-11 | End: 2019-11-11

## 2019-11-11 RX ORDER — ONDANSETRON 4 MG/1
4 TABLET, ORALLY DISINTEGRATING ORAL
Status: COMPLETED | OUTPATIENT
Start: 2019-11-11 | End: 2019-11-11

## 2019-11-11 RX ORDER — KETOROLAC TROMETHAMINE 30 MG/ML
15 INJECTION, SOLUTION INTRAMUSCULAR; INTRAVENOUS
Status: COMPLETED | OUTPATIENT
Start: 2019-11-11 | End: 2019-11-11

## 2019-11-11 RX ORDER — HALOPERIDOL 5 MG/ML
5 INJECTION INTRAMUSCULAR
Status: DISCONTINUED | OUTPATIENT
Start: 2019-11-11 | End: 2019-11-11

## 2019-11-11 RX ORDER — DICYCLOMINE HYDROCHLORIDE 20 MG/1
20 TABLET ORAL 3 TIMES DAILY PRN
Qty: 14 TABLET | Refills: 0 | Status: SHIPPED | OUTPATIENT
Start: 2019-11-11 | End: 2019-12-11

## 2019-11-11 RX ADMIN — SODIUM CHLORIDE, SODIUM LACTATE, POTASSIUM CHLORIDE, AND CALCIUM CHLORIDE 1000 ML: .6; .31; .03; .02 INJECTION, SOLUTION INTRAVENOUS at 12:11

## 2019-11-11 RX ADMIN — SODIUM CHLORIDE 500 ML: 0.9 INJECTION, SOLUTION INTRAVENOUS at 02:11

## 2019-11-11 RX ADMIN — PROCHLORPERAZINE EDISYLATE 10 MG: 5 INJECTION INTRAMUSCULAR; INTRAVENOUS at 02:11

## 2019-11-11 RX ADMIN — KETOROLAC TROMETHAMINE 15 MG: 30 INJECTION, SOLUTION INTRAMUSCULAR at 02:11

## 2019-11-11 RX ADMIN — ONDANSETRON 4 MG: 4 TABLET, ORALLY DISINTEGRATING ORAL at 12:11

## 2019-11-11 NOTE — ED NOTES
Pt states she feels agitated from the medication and wants to go home. Pt states this happened to her once before with a pian medication a few years ago, but does not know the name of the medication. Dr. Mary notified.

## 2019-11-11 NOTE — ED PROVIDER NOTES
Sort note: Emi Johnson nontoxic/afebrile 43 y.o.  presented to the ED with c/o abdominal pain which is chronic, but worse in the past few days.  +nausea. No vomiting or blood in stool.  Pt has pmhx of Chron's disease and has an ostomy, established with surgeon at [facility].      Patient seen and medically screened by Nurse practitioner in Sort process due to ED crowding.  Appropriate tests and/or medications ordered.  Care transferred to an alternate provider when patient was placed in an Exam Room from the [facility] for physical exam, additional orders, and disposition. 11:06 AM. KYLE Goyal, WENDY  11/11/19 1102

## 2019-11-11 NOTE — ED PROVIDER NOTES
Encounter Date: 11/11/2019    SCRIBE #1 NOTE: I, Coral Gramajo, am scribing for, and in the presence of,  Reinaldo Mary MD. I have scribed the entire note.       History     Chief Complaint   Patient presents with    Abdominal Pain     pt presents to ED today c/o chronic abdominal pain after suregery x 4 months ago pt reports having colostomy. + nasuea today      42 y/o F presents to the ER c/o upper abdominal pain. Reports a chronic upper abdominal pain that has worsened over the last week. Described as an aching pain that fluctuates in intensity, worse with certain positions, no alleviating factors reported. Has h/o Crohn's with ostomy performed at . Reports nausea, denies any vomiting, notes good ostomy output. Denies fever. No other symptoms reported at this time.     The history is provided by the patient.     Review of patient's allergies indicates:   Allergen Reactions    Aspirin      Other reaction(s): vomiting, contraindicated for bleeding from crohns  Other reaction(s): bleeding    Sulfa (sulfonamide antibiotics) Hives and Rash    Iodinated contrast media Other (See Comments)    Adhesive     Aspirin     Remicade [infliximab] Other (See Comments)     Medical induced lupus    Latex Rash     Other reaction(s): Hives     Past Medical History:   Diagnosis Date    Anemia     Asthma     B12 deficiency     Crohn's disease     History of shingles     Lupus     Migraine headache     Raynaud disease     Reactive hypoglycemia     Seizures 2004    no medication     Sleep apnea     Non compliant with CPAP     Past Surgical History:   Procedure Laterality Date    ABDOMINAL SURGERY      COLON SURGERY      95% of colon removed 2010    COLONOSCOPY      COLOSTOMY      HYSTERECTOMY      due to endometriosis    ILEOSCOPY N/A 4/10/2019    Procedure: ILEOSCOPY through stoma;  Surgeon: Eve Currie MD;  Location: [facility] ([address] [place] [address];  Service: Endoscopy;  Laterality: N/A;  Schedule as 30 minute  case, schedule in April or May 2019    ILEOSTOMY      LAPAROSCOPY W/ MINI-LAPAROTOMY      large intestine removed      just a portion    pelvic mass removal      REVISION COLOSTOMY  2010    STOMACH SURGERY      TONSILLECTOMY, ADENOIDECTOMY       Family History   Problem Relation Age of Onset    Colon cancer Maternal Grandmother     Cancer Mother         anal    Breast cancer Sister     Migraines Sister     Seizures Sister     Cancer Maternal Aunt     Cancer Paternal Grandmother     Celiac disease Neg Hx     Cirrhosis Neg Hx     Colon polyps Neg Hx     Crohn's disease Neg Hx     Cystic fibrosis Neg Hx     Hemochromatosis Neg Hx     Esophageal cancer Neg Hx     Inflammatory bowel disease Neg Hx     Irritable bowel syndrome Neg Hx     Liver cancer Neg Hx     Liver disease Neg Hx     Rectal cancer Neg Hx     Stomach cancer Neg Hx     Ulcerative colitis Neg Hx     Hugo's disease Neg Hx      Social History     Tobacco Use    Smoking status: Never Smoker    Smokeless tobacco: Never Used   Substance Use Topics    Alcohol use: No     Frequency: Never     Drinks per session: Patient refused     Binge frequency: Never    Drug use: No     Review of Systems   Constitutional: Negative for chills and fatigue.   HENT: Negative for facial swelling, trouble swallowing and voice change.    Eyes: Negative for photophobia, pain and redness.   Respiratory: Negative for cough, choking and shortness of breath.    Cardiovascular: Negative for chest pain, palpitations and leg swelling.   Gastrointestinal: Positive for abdominal pain and nausea. Negative for diarrhea and vomiting.   Genitourinary: Negative for dysuria, frequency and urgency.   Musculoskeletal: Negative for back pain, neck pain and neck stiffness.   Neurological: Negative for light-headedness, numbness and headaches.   All other systems reviewed and are negative.      Physical Exam     Initial Vitals [11/11/19 1104]   BP Pulse Resp Temp SpO2    (!) 143/86 93 18 98.3 °F (36.8 °C) 98 %      MAP       --         Physical Exam    Nursing note and vitals reviewed.  Constitutional: She appears well-developed and well-nourished. No distress.   HENT:   Head: Normocephalic and atraumatic.   Oropharynx clear; Dry MM   Eyes: Conjunctivae and EOM are normal. Pupils are equal, round, and reactive to light.   Neck: Normal range of motion. Neck supple.   Cardiovascular: Normal rate, regular rhythm, normal heart sounds and intact distal pulses.   Pulmonary/Chest: Breath sounds normal. No respiratory distress. She has no wheezes. She has no rhonchi. She has no rales.   Abdominal: Soft. Bowel sounds are normal. She exhibits no distension. There is tenderness (mild) in the right upper quadrant, epigastric area and left upper quadrant. There is no rebound and no guarding.   Ostomy site c/d/i, productive, no erythema/induration/fluctuance   Musculoskeletal: Normal range of motion. She exhibits no edema or tenderness.   Neurological: She is alert and oriented to person, place, and time. She has normal strength. No cranial nerve deficit.   Skin: Skin is warm and dry.         ED Course   Procedures  Labs Reviewed   CBC W/ AUTO DIFFERENTIAL - Abnormal; Notable for the following components:       Result Value    RBC 5.72 (*)     Hemoglobin 16.5 (*)     Hematocrit 49.2 (*)     All other components within normal limits   COMPREHENSIVE METABOLIC PANEL - Abnormal; Notable for the following components:    ALT 67 (*)     All other components within normal limits   LIPASE   URINALYSIS, REFLEX TO URINE CULTURE    Narrative:     Preferred Collection Type->Urine, Clean Catch   LACTIC ACID, PLASMA   POCT GLUCOSE   POCT GLUCOSE MONITORING CONTINUOUS            X-Rays:   Independently Interpreted Readings:   Other Readings:  Imaging interpreted by radiologist and visualized by me    Imaging Results          X-Ray Abdomen Flat And Erect (Final result)  Result time 11/11/19 14:14:06    Final  result by Jone Le MD (11/11/19 14:14:06)                 Impression:      Minimal bowel gas without pneumatosis or free air or distention seen.      Electronically signed by: Jone Le  Date:    11/11/2019  Time:    14:14             Narrative:    EXAMINATION:  XR ABDOMEN FLAT AND ERECT    CLINICAL HISTORY:  Unspecified abdominal pain    TECHNIQUE:  Flat and erect AP views of the abdomen were performed.    COMPARISON:  06/18/2016    FINDINGS:  Upright and supine views presented with three views.    There is mild gas of the stomach.  There appears to be a right lower quadrant ostomy.  Hip joint spaces are symmetric and preserved.  Visualized lung bases are normal.  No pneumatosis or free air or calculus or fracture or hernia.  No bowel distention.  There is minimal bowel gas.  There appears to be a suture anastomosis projecting at the rectum.                                Medical Decision Making:   History:   Old Medical Records: I decided to obtain old medical records.  Initial Assessment:   43-year-old female presents emergency department complaining of abdominal pain, nausea  Differential Diagnosis:   Diverticulitis, cholecystitis, pancreatitis, appendicitis, obstruction, constipation, UTI, pyelonephritis, kidney stone, gastroenteritis  Independently Interpreted Test(s):   I have ordered and independently interpreted X-rays - see prior notes.  Clinical Tests:   Lab Tests: Reviewed       <> Summary of Lab: Benign  ED Management:  Patient given some IV fluid and Compazine.  States it made her feel little anxious and I offered some other medications, but states that she is otherwise feeling much better and requested discharge at this time.  Informed of results as well as plan to discharge with prescriptions for Reglan and Bentyl, instructions to follow up promptly with a primary care physician, reasons to return to the emergency room.  Vital signs stable on discharge.                                  Clinical Impression:       ICD-10-CM ICD-9-CM   1. Pain of upper abdomen R10.10 789.09   2. Abdominal pain R10.9 789.00   3. Nausea R11.0 787.02         Disposition:   Disposition: Discharged  Condition: Stable        Scribe Attestation I, Dr. Reinaldo Mary, personally performed the services described in this documentation. All medical record entries made by the scribe were at my direction and in my presence.  I have reviewed the chart and agree that the record reflects my personal performance and is accurate and complete. Reinaldo Mary MD.  2:57 PM 11/11/2019                 Reinaldo Mary MD  11/11/19 1453

## 2019-11-11 NOTE — ED NOTES
Pt states she does not want the medication Dr. Mary, pt states she just wants to go home a sit in warm bath.

## 2019-11-12 ENCOUNTER — PATIENT MESSAGE (OUTPATIENT)
Dept: GASTROENTEROLOGY | Facility: CLINIC | Age: 44
End: 2019-11-12

## 2019-11-12 DIAGNOSIS — K50.00 CROHN'S DISEASE OF SMALL INTESTINE WITHOUT COMPLICATION: Primary | ICD-10-CM

## 2019-11-12 DIAGNOSIS — F32.A DEPRESSION, UNSPECIFIED DEPRESSION TYPE: ICD-10-CM

## 2019-11-12 NOTE — TELEPHONE ENCOUNTER
Called & spoke to pt. Informed her that I discussed her message w/ Dr Currie. Instructed her to google low gas diets & try to cut back on sugar intake to see if this helps. Also informed pt that Dr Currie recommends Dr Rodríguez for psychiatric assistance, but it looks as though pt had appt in 4/2017 that was a no show. Informed pt that it may take a few months for another appt. Pt expressed understanding so I placed referral.

## 2019-11-15 ENCOUNTER — TELEPHONE (OUTPATIENT)
Dept: PSYCHIATRY | Facility: CLINIC | Age: 44
End: 2019-11-15

## 2019-11-15 ENCOUNTER — PATIENT MESSAGE (OUTPATIENT)
Dept: PSYCHIATRY | Facility: CLINIC | Age: 44
End: 2019-11-15

## 2019-11-15 NOTE — TELEPHONE ENCOUNTER
Ms. Johnson was referred to treatment by Dr. Eve Currie.  I called and left a voicemail requesting a return call.

## 2019-11-19 DIAGNOSIS — M79.7 FIBROMYALGIA: ICD-10-CM

## 2019-11-19 DIAGNOSIS — K50.00 CROHN'S DISEASE OF SMALL INTESTINE WITHOUT COMPLICATION: ICD-10-CM

## 2019-11-19 NOTE — TELEPHONE ENCOUNTER
----- Message from Yissel Almonte sent at 11/19/2019  7:27 AM CST -----  Contact: Patient [phone]  Prescription Request:     Name of medication: oxyCODONE-acetaminophen (PERCOCET)  mg per tablet    Reason for request: Refill    Please notify patient when Rx is printed and ready to be picked up from the office.    Thank You

## 2019-11-20 ENCOUNTER — TELEPHONE (OUTPATIENT)
Dept: INTERNAL MEDICINE | Facility: CLINIC | Age: 44
End: 2019-11-20

## 2019-11-20 ENCOUNTER — PATIENT MESSAGE (OUTPATIENT)
Dept: INTERNAL MEDICINE | Facility: CLINIC | Age: 44
End: 2019-11-20

## 2019-11-20 RX ORDER — OXYCODONE AND ACETAMINOPHEN 10; 325 MG/1; MG/1
1 TABLET ORAL EVERY 6 HOURS PRN
Qty: 120 TABLET | Refills: 0 | Status: SHIPPED | OUTPATIENT
Start: 2019-11-21 | End: 2019-12-16 | Stop reason: SDUPTHER

## 2019-11-25 ENCOUNTER — DOCUMENTATION ONLY (OUTPATIENT)
Dept: PSYCHIATRY | Facility: CLINIC | Age: 44
End: 2019-11-25

## 2019-11-25 NOTE — PROGRESS NOTES
Ms. Johnson did not show for her initial appointment at 8:00 AM this morning.  This is her second no-show in this department.

## 2019-11-26 ENCOUNTER — TELEPHONE (OUTPATIENT)
Dept: INTERNAL MEDICINE | Facility: CLINIC | Age: 44
End: 2019-11-26

## 2019-11-26 ENCOUNTER — PATIENT MESSAGE (OUTPATIENT)
Dept: INTERNAL MEDICINE | Facility: CLINIC | Age: 44
End: 2019-11-26

## 2019-12-02 ENCOUNTER — PATIENT MESSAGE (OUTPATIENT)
Dept: INTERNAL MEDICINE | Facility: CLINIC | Age: 44
End: 2019-12-02

## 2019-12-02 RX ORDER — ZOLPIDEM TARTRATE 10 MG/1
10 TABLET ORAL NIGHTLY PRN
Qty: 30 TABLET | Refills: 3 | Status: SHIPPED | OUTPATIENT
Start: 2019-12-02 | End: 2020-03-12 | Stop reason: SDUPTHER

## 2019-12-04 ENCOUNTER — PATIENT MESSAGE (OUTPATIENT)
Dept: INTERNAL MEDICINE | Facility: CLINIC | Age: 44
End: 2019-12-04

## 2019-12-06 ENCOUNTER — TELEPHONE (OUTPATIENT)
Dept: GASTROENTEROLOGY | Facility: CLINIC | Age: 44
End: 2019-12-06

## 2019-12-06 ENCOUNTER — PATIENT MESSAGE (OUTPATIENT)
Dept: GASTROENTEROLOGY | Facility: CLINIC | Age: 44
End: 2019-12-06

## 2019-12-06 DIAGNOSIS — R10.9 ABDOMINAL PAIN, UNSPECIFIED ABDOMINAL LOCATION: ICD-10-CM

## 2019-12-06 DIAGNOSIS — K50.00 CROHN'S DISEASE OF SMALL INTESTINE WITHOUT COMPLICATION: ICD-10-CM

## 2019-12-06 DIAGNOSIS — R10.84 GENERALIZED ABDOMINAL PAIN: ICD-10-CM

## 2019-12-06 DIAGNOSIS — K52.3 INDETERMINATE COLITIS: Primary | ICD-10-CM

## 2019-12-06 NOTE — TELEPHONE ENCOUNTER
Called and spoke with pt  She would like to know if a Colon and EGD would be needed     She stated she has pains in different areas     She has pain between breast and down to whole top of stomach.  This pain starts about 2 hours after waking and it is SHARP and comes and goes and it is a 9/10.  She has taken Tums and Gas-x for this with no relief     Also around her stoma and right above the stoma.  This pain is also sharp and comes and goes but it is a 7-8/10 and not as often as the other pain.     No fever and no changes in Bowels.  She empties about 10 x's a day and it is about half full.  So about 5 small bags total.   Recently DX with anemia     No IBD meds and she recently started an OTC Probiotic named Epiclonnie       SHE WOULD RATHER STAY OUT OF THE ER...She stated they seem to not know what to do when she goes because she has an Ostomy

## 2019-12-06 NOTE — TELEPHONE ENCOUNTER
"Spoke with pt and her sister is a patient of Dr. Tenorio. Patient would like to be scheduled for an appointment with Dr. Tenorio because she has Crohn's. Informed pt that because she is established with Dr. Eve Currie and continues to seek advice that she should f/u with her. Patient stated "Dr. Currie is not a regular GI doctor and does not see patients regularly. She just fixes them and sends them back to their regular GI doctor." Informed pt that I would contact Dr. Tenorio and see who she should follow up with.   "

## 2019-12-06 NOTE — TELEPHONE ENCOUNTER
Call patient and let her know this is likely not her Crohn's disease but we will proceed with a CT scan of her abdomen which will also assess her small intestine for Crohn's disease and then we can decide whether doing scopes will be helpful.

## 2019-12-06 NOTE — TELEPHONE ENCOUNTER
Called and spoke with pt  I explained Dr Currie does not feel that it is Crohn's but we can do a CT to assess and then decide if a scope is necessary   She stated she is allergic to the CT Contrast and asked if an MRI or MRE would work  I stated I will have to send message over to DR Currie and we will be in touch first thing Monday  Pt appreciated the call

## 2019-12-06 NOTE — TELEPHONE ENCOUNTER
----- Message from Any Abdul sent at 12/6/2019  1:48 PM CST -----  Contact: [phone]/self  Patient is requesting a call back regarding rescheduling her appointment. She is requesting a sooner date and requested to speak with the office. Thanks

## 2019-12-07 DIAGNOSIS — K52.9 IBD (INFLAMMATORY BOWEL DISEASE): Primary | ICD-10-CM

## 2019-12-07 DIAGNOSIS — Z43.2 ATTENTION TO ILEOSTOMY: ICD-10-CM

## 2019-12-07 DIAGNOSIS — R10.84 GENERALIZED ABDOMINAL PAIN: ICD-10-CM

## 2019-12-09 ENCOUNTER — TELEPHONE (OUTPATIENT)
Dept: GASTROENTEROLOGY | Facility: CLINIC | Age: 44
End: 2019-12-09

## 2019-12-09 RX ORDER — LORAZEPAM 1 MG/1
1 TABLET ORAL EVERY 6 HOURS PRN
Qty: 1 TABLET | Refills: 0 | Status: SHIPPED | OUTPATIENT
Start: 2019-12-09 | End: 2021-03-24

## 2019-12-09 NOTE — TELEPHONE ENCOUNTER
----- Message from Eve Currie MD sent at 12/9/2019  2:52 PM CST -----  I placed prescription for ativan 1 mg 30 min prior to MRI  I have emailed pt  Please call in script since its controlled    SS

## 2019-12-09 NOTE — TELEPHONE ENCOUNTER
Called patient MRI was schedule for 12/23 at 11:15.   Prep was explain to patient   Patient stated she is claustrophobic. She would like something to help keep her claim.   Patient stated her  will bring her to MRI appointment

## 2019-12-09 NOTE — TELEPHONE ENCOUNTER
Informed patient prescription was called to [facility].

## 2019-12-10 ENCOUNTER — OFFICE VISIT (OUTPATIENT)
Dept: INTERNAL MEDICINE | Facility: CLINIC | Age: 44
End: 2019-12-10
Payer: COMMERCIAL

## 2019-12-10 VITALS
BODY MASS INDEX: 29.66 KG/M2 | WEIGHT: 173.75 LBS | SYSTOLIC BLOOD PRESSURE: 98 MMHG | TEMPERATURE: 98 F | RESPIRATION RATE: 18 BRPM | HEIGHT: 64 IN | HEART RATE: 116 BPM | DIASTOLIC BLOOD PRESSURE: 79 MMHG

## 2019-12-10 DIAGNOSIS — M79.7 FIBROMYALGIA: Primary | ICD-10-CM

## 2019-12-10 PROCEDURE — 99213 PR OFFICE/OUTPT VISIT, EST, LEVL III, 20-29 MIN: ICD-10-PCS | Mod: S$GLB,,, | Performed by: INTERNAL MEDICINE

## 2019-12-10 PROCEDURE — 3008F BODY MASS INDEX DOCD: CPT | Mod: CPTII,S$GLB,, | Performed by: INTERNAL MEDICINE

## 2019-12-10 PROCEDURE — 99999 PR PBB SHADOW E&M-EST. PATIENT-LVL III: CPT | Mod: PBBFAC,,, | Performed by: INTERNAL MEDICINE

## 2019-12-10 PROCEDURE — 99213 OFFICE O/P EST LOW 20 MIN: CPT | Mod: S$GLB,,, | Performed by: INTERNAL MEDICINE

## 2019-12-10 PROCEDURE — 99999 PR PBB SHADOW E&M-EST. PATIENT-LVL III: ICD-10-PCS | Mod: PBBFAC,,, | Performed by: INTERNAL MEDICINE

## 2019-12-10 PROCEDURE — 3008F PR BODY MASS INDEX (BMI) DOCUMENTED: ICD-10-PCS | Mod: CPTII,S$GLB,, | Performed by: INTERNAL MEDICINE

## 2019-12-10 RX ORDER — OXYCODONE HYDROCHLORIDE 10 MG/1
TABLET ORAL
COMMUNITY
End: 2020-10-07

## 2019-12-10 RX ORDER — ALBUTEROL SULFATE 90 UG/1
AEROSOL, METERED RESPIRATORY (INHALATION)
COMMUNITY
Start: 2017-09-19 | End: 2020-03-17 | Stop reason: SDUPTHER

## 2019-12-10 NOTE — PROGRESS NOTES
CC: followup of fibromyalgia  HPI:  The patient is a 43 y.o. year old female who presents to the office for followup of fibromyalgia.  She reports 2 falls recently.  She is unsure what has caused the falls.  She complains of diffuse body aches.  In addition, she reports intermittent sharp pain, that causes her to double over.  The symptoms occur while she is at work.      PAST MEDICAL HISTORY:  Past Medical History:   Diagnosis Date    Anemia     Asthma     B12 deficiency     Crohn's disease     History of shingles     Lupus     Migraine headache     Raynaud disease     Reactive hypoglycemia     Seizures 2004    no medication     Sleep apnea     Non compliant with CPAP       SURGICAL HISTORY:  Past Surgical History:   Procedure Laterality Date    ABDOMINAL SURGERY      COLON SURGERY      95% of colon removed 2010    COLONOSCOPY      COLOSTOMY      HYSTERECTOMY      due to endometriosis    ILEOSCOPY N/A 4/10/2019    Procedure: ILEOSCOPY through stoma;  Surgeon: Eve Currie MD;  Location: [address];  Service: Endoscopy;  Laterality: N/A;  Schedule as 30 minute case, schedule in April or May 2019    ILEOSTOMY      LAPAROSCOPY W/ MINI-LAPAROTOMY      large intestine removed      just a portion    pelvic mass removal      REVISION COLOSTOMY  2010    STOMACH SURGERY      TONSILLECTOMY, ADENOIDECTOMY         MEDS:  Medcard reviewed and updated    ALLERGIES: Allergy Card reviewed and updated    SOCIAL HISTORY:   The patient is a nonsmoker.    PE:   APPEARANCE: Well nourished, well developed, in no acute distress.    CHEST: Lungs clear to auscultation with unlabored respirations.  CARDIOVASCULAR: Normal S1, S2. No murmurs. No carotid bruits. No pedal edema.  ABDOMEN: Bowel sounds normal. Not distended. Soft. No tenderness or masses. Positive colostomy.  PSYCHIATRIC: The patient is oriented to person, place, and time and has a pleasant affect.        ASSESSMENT/PLAN:  Emi was seen today  for form completion.    Diagnoses and all orders for this visit:    Fibromyalgia  -     continue pain control  -     FMLA paperwork completed  -     accommodations request completed as well, limiting patient to lifting of 5 lb or less and sitting 2-4 hours in an 8 hr shift

## 2019-12-11 ENCOUNTER — PATIENT MESSAGE (OUTPATIENT)
Dept: GASTROENTEROLOGY | Facility: CLINIC | Age: 44
End: 2019-12-11

## 2019-12-16 DIAGNOSIS — K50.00 CROHN'S DISEASE OF SMALL INTESTINE WITHOUT COMPLICATION: ICD-10-CM

## 2019-12-16 DIAGNOSIS — M79.7 FIBROMYALGIA: ICD-10-CM

## 2019-12-16 NOTE — TELEPHONE ENCOUNTER
----- Message from Stefania Sorensen sent at 12/16/2019  8:26 AM CST -----  Contact: self   Patient is calling for an RX refill or new RX.  Is this a refill or new RX:  refill  RX name and strength: oxyCODONE-acetaminophen (PERCOCET)  mg per tablet  Directions (copy/paste from chart):  Take 1 tablet by mouth every 6 (six) hours as needed for Pain (Greater than 7 day supply medically necessary). - Oral  Is this a 30 day or 90 day RX:  30  Local pharmacy or mail order pharmacy:  local  Pharmacy name and phone # (copy/paste from chart):   [id number] - [place] - [address] [phone] (Phone)  [phone] (Fax)  Comments:  Pt would prefer to  printed Rx

## 2019-12-17 ENCOUNTER — PATIENT MESSAGE (OUTPATIENT)
Dept: INTERNAL MEDICINE | Facility: CLINIC | Age: 44
End: 2019-12-17

## 2019-12-17 RX ORDER — OXYCODONE AND ACETAMINOPHEN 10; 325 MG/1; MG/1
1 TABLET ORAL EVERY 6 HOURS PRN
Qty: 120 TABLET | Refills: 0 | Status: SHIPPED | OUTPATIENT
Start: 2019-12-19 | End: 2019-12-18 | Stop reason: SDUPTHER

## 2019-12-18 ENCOUNTER — PATIENT MESSAGE (OUTPATIENT)
Dept: INTERNAL MEDICINE | Facility: CLINIC | Age: 44
End: 2019-12-18

## 2019-12-18 DIAGNOSIS — M79.7 FIBROMYALGIA: ICD-10-CM

## 2019-12-18 DIAGNOSIS — K50.00 CROHN'S DISEASE OF SMALL INTESTINE WITHOUT COMPLICATION: ICD-10-CM

## 2019-12-18 RX ORDER — OXYCODONE AND ACETAMINOPHEN 10; 325 MG/1; MG/1
1 TABLET ORAL EVERY 6 HOURS PRN
Qty: 120 TABLET | Refills: 0 | Status: SHIPPED | OUTPATIENT
Start: 2019-12-19 | End: 2020-01-13 | Stop reason: SDUPTHER

## 2020-01-10 ENCOUNTER — TELEPHONE (OUTPATIENT)
Dept: INTERNAL MEDICINE | Facility: CLINIC | Age: 45
End: 2020-01-10

## 2020-01-10 RX ORDER — BENZONATATE 100 MG/1
100 CAPSULE ORAL 3 TIMES DAILY PRN
Qty: 30 CAPSULE | Refills: 1 | Status: SHIPPED | OUTPATIENT
Start: 2020-01-10 | End: 2020-01-20

## 2020-01-10 NOTE — TELEPHONE ENCOUNTER
Please inform patient that prescription for Tessalon has been sent to the pharmacy electronically.

## 2020-01-10 NOTE — TELEPHONE ENCOUNTER
----- Message from Alycia Rincon sent at 1/10/2020  7:53 AM CST -----  Contact: self/[phone]  Pt called in regards to getting a Rx due to having a mild cough.    [facility] [phone]   Please advise

## 2020-01-13 ENCOUNTER — PATIENT OUTREACH (OUTPATIENT)
Dept: ADMINISTRATIVE | Facility: OTHER | Age: 45
End: 2020-01-13

## 2020-01-13 DIAGNOSIS — K50.00 CROHN'S DISEASE OF SMALL INTESTINE WITHOUT COMPLICATION: ICD-10-CM

## 2020-01-13 DIAGNOSIS — M79.7 FIBROMYALGIA: ICD-10-CM

## 2020-01-13 RX ORDER — OXYCODONE AND ACETAMINOPHEN 10; 325 MG/1; MG/1
1 TABLET ORAL EVERY 6 HOURS PRN
Qty: 120 TABLET | Refills: 0 | Status: SHIPPED | OUTPATIENT
Start: 2020-01-16 | End: 2020-02-11 | Stop reason: SDUPTHER

## 2020-01-13 NOTE — TELEPHONE ENCOUNTER
----- Message from Ashley Juan sent at 1/13/2020  7:13 AM CST -----  Contact: Pt [phone]  Is this a refill or new RX:  Refill    RX name and strength: oxyCODONE-acetaminophen (PERCOCET)  mg per tablet     Pharmacy name and phone # [id number] - [place] - [address] AT [facility] [address] [phone] (Phone) [phone] (Fax)

## 2020-01-30 ENCOUNTER — OFFICE VISIT (OUTPATIENT)
Dept: INTERNAL MEDICINE | Facility: CLINIC | Age: 45
End: 2020-01-30
Payer: COMMERCIAL

## 2020-01-30 VITALS
WEIGHT: 181 LBS | HEIGHT: 63 IN | BODY MASS INDEX: 32.07 KG/M2 | DIASTOLIC BLOOD PRESSURE: 82 MMHG | SYSTOLIC BLOOD PRESSURE: 118 MMHG | TEMPERATURE: 99 F | HEART RATE: 68 BPM | RESPIRATION RATE: 17 BRPM

## 2020-01-30 DIAGNOSIS — E16.1 REACTIVE HYPOGLYCEMIA: ICD-10-CM

## 2020-01-30 DIAGNOSIS — J01.90 ACUTE SINUSITIS, RECURRENCE NOT SPECIFIED, UNSPECIFIED LOCATION: Primary | ICD-10-CM

## 2020-01-30 PROCEDURE — 3008F PR BODY MASS INDEX (BMI) DOCUMENTED: ICD-10-PCS | Mod: CPTII,S$GLB,, | Performed by: INTERNAL MEDICINE

## 2020-01-30 PROCEDURE — 99213 OFFICE O/P EST LOW 20 MIN: CPT | Mod: S$GLB,,, | Performed by: INTERNAL MEDICINE

## 2020-01-30 PROCEDURE — 99999 PR PBB SHADOW E&M-EST. PATIENT-LVL III: CPT | Mod: PBBFAC,,, | Performed by: INTERNAL MEDICINE

## 2020-01-30 PROCEDURE — 99213 PR OFFICE/OUTPT VISIT, EST, LEVL III, 20-29 MIN: ICD-10-PCS | Mod: S$GLB,,, | Performed by: INTERNAL MEDICINE

## 2020-01-30 PROCEDURE — 3008F BODY MASS INDEX DOCD: CPT | Mod: CPTII,S$GLB,, | Performed by: INTERNAL MEDICINE

## 2020-01-30 PROCEDURE — 99999 PR PBB SHADOW E&M-EST. PATIENT-LVL III: ICD-10-PCS | Mod: PBBFAC,,, | Performed by: INTERNAL MEDICINE

## 2020-01-30 RX ORDER — AMOXICILLIN 875 MG/1
875 TABLET, FILM COATED ORAL 2 TIMES DAILY
Qty: 20 TABLET | Refills: 0 | Status: SHIPPED | OUTPATIENT
Start: 2020-01-30 | End: 2020-10-07 | Stop reason: SDUPTHER

## 2020-01-30 RX ORDER — HYDROCODONE BITARTRATE AND HOMATROPINE METHYLBROMIDE ORAL SOLUTION 5; 1.5 MG/5ML; MG/5ML
5 LIQUID ORAL NIGHTLY PRN
Qty: 120 ML | Refills: 0 | Status: SHIPPED | OUTPATIENT
Start: 2020-01-30 | End: 2020-11-05 | Stop reason: SDUPTHER

## 2020-01-30 RX ORDER — LANCETS
EACH MISCELLANEOUS
Qty: 100 EACH | Refills: 3 | Status: SHIPPED | OUTPATIENT
Start: 2020-01-30 | End: 2020-07-06 | Stop reason: SDUPTHER

## 2020-01-30 NOTE — PROGRESS NOTES
CC: sinusitis  HPI:  The patient is a 44 y.o. year old female who presents to the office for sinusitis.  she complains of nasal congestion, postnasal drip, rhinorrhea and headache.  Symptoms started about 4 to 5 days ago.  she also reports cough and gagging sensation, but denies sore throat and fever.  The patient has not taken any medication.    PAST MEDICAL HISTORY:  Past Medical History:   Diagnosis Date    Anemia     Asthma     B12 deficiency     Crohn's disease     History of shingles     Lupus     Migraine headache     Raynaud disease     Reactive hypoglycemia     Seizures 2004    no medication     Sleep apnea     Non compliant with CPAP       SURGICAL HISTORY:  Past Surgical History:   Procedure Laterality Date    ABDOMINAL SURGERY      COLON SURGERY      95% of colon removed 2010    COLONOSCOPY      COLOSTOMY      HYSTERECTOMY      due to endometriosis    ILEOSCOPY N/A 4/10/2019    Procedure: ILEOSCOPY through stoma;  Surgeon: Eve Currie MD;  Location: [address];  Service: Endoscopy;  Laterality: N/A;  Schedule as 30 minute case, schedule in April or May 2019    ILEOSTOMY      LAPAROSCOPY W/ MINI-LAPAROTOMY      large intestine removed      just a portion    pelvic mass removal      REVISION COLOSTOMY  2010    STOMACH SURGERY      TONSILLECTOMY, ADENOIDECTOMY         MEDS:  Medcard reviewed and updated    ALLERGIES: Allergy Card reviewed and updated    SOCIAL HISTORY:   The patient is a nonsmoker.    PE:   APPEARANCE: Well nourished, well developed, in no acute distress.    EARS: TM's intact. No retraction or perforation.    NOSE: Mucosa pink. Airway clear.  MOUTH & THROAT: No tonsillar enlargement. No pharyngeal erythema or exudate. No stridor.  CHEST: Lungs clear to auscultation with unlabored respirations.  CARDIOVASCULAR: Normal S1, S2. No murmurs. No carotid bruits. No pedal edema.  ABDOMEN: Bowel sounds normal. Not distended. Soft. No tenderness or masses.    PSYCHIATRIC: The patient is oriented to person, place, and time and has a pleasant affect.        ASSESSMENT/PLAN:  Emi was seen today for cough and sinus problem.    Diagnoses and all orders for this visit:    Acute sinusitis, recurrence not specified, unspecified location  -    prescribed amoxicillin    Other orders  -     amoxicillin (AMOXIL) 875 MG tablet; Take 1 tablet (875 mg total) by mouth 2 (two) times daily. for 10 days  -     hydrocodone-homatropine 5-1.5 mg/5 ml (HYCODAN) 5-1.5 mg/5 mL Syrp; Take 5 mLs by mouth nightly as needed (caution sedatin.).

## 2020-02-03 ENCOUNTER — TELEPHONE (OUTPATIENT)
Dept: INTERNAL MEDICINE | Facility: CLINIC | Age: 45
End: 2020-02-03

## 2020-02-03 ENCOUNTER — PATIENT MESSAGE (OUTPATIENT)
Dept: INTERNAL MEDICINE | Facility: CLINIC | Age: 45
End: 2020-02-03

## 2020-02-03 NOTE — TELEPHONE ENCOUNTER
----- Message from Kaykay Garcia sent at 2/3/2020 10:46 AM CST -----  Type:  Needs Medical Advice    Who Called: YANELY       Would the patient rather a call back or a response via [facility]? CALL BACK     Best Call Back Number: [phone]    Additional Information: YANELY WOULD LIKE TO SPEAK WITH THE NURSE ABOUT COMING IN TO BE SEEN TO SPEAK ABOUT A WORK NOTE THAT SHE NEEDS.

## 2020-02-03 NOTE — LETTER
February 4, 2020                 [place] [facility]                                                                                                                                                       [address] [facility].  [place] [address]  Phone: [phone]  Fax: [phone] February 4, 2020     Patient: Emi Johnson    YOB: 1975   Date of Visit: 1/30/2020       To Whom It May Concern:    It is my medical opinion that Emi Johnson  is unable to lift greater than 20 lb with a hernia belt.  She also needs to sit for 2-4 hours per shift during a Crohn's flare-up.    If you have any questions or concerns, please don't hesitate to call.    Sincerely,        Bianca Rutledge MD

## 2020-02-04 ENCOUNTER — PATIENT MESSAGE (OUTPATIENT)
Dept: INTERNAL MEDICINE | Facility: CLINIC | Age: 45
End: 2020-02-04

## 2020-02-04 NOTE — TELEPHONE ENCOUNTER
Letter has been sent through the patient portal.  Copy is available with the signature.  Please confirm patient has received a letter.

## 2020-02-11 ENCOUNTER — PATIENT MESSAGE (OUTPATIENT)
Dept: INTERNAL MEDICINE | Facility: CLINIC | Age: 45
End: 2020-02-11

## 2020-02-11 DIAGNOSIS — K50.00 CROHN'S DISEASE OF SMALL INTESTINE WITHOUT COMPLICATION: ICD-10-CM

## 2020-02-11 DIAGNOSIS — M79.7 FIBROMYALGIA: ICD-10-CM

## 2020-02-12 RX ORDER — OXYCODONE AND ACETAMINOPHEN 10; 325 MG/1; MG/1
1 TABLET ORAL EVERY 6 HOURS PRN
Qty: 120 TABLET | Refills: 0 | Status: SHIPPED | OUTPATIENT
Start: 2020-02-13 | End: 2020-03-09 | Stop reason: SDUPTHER

## 2020-03-09 ENCOUNTER — PATIENT MESSAGE (OUTPATIENT)
Dept: INTERNAL MEDICINE | Facility: CLINIC | Age: 45
End: 2020-03-09

## 2020-03-09 DIAGNOSIS — M79.7 FIBROMYALGIA: ICD-10-CM

## 2020-03-09 DIAGNOSIS — K50.00 CROHN'S DISEASE OF SMALL INTESTINE WITHOUT COMPLICATION: ICD-10-CM

## 2020-03-09 RX ORDER — OXYCODONE AND ACETAMINOPHEN 10; 325 MG/1; MG/1
1 TABLET ORAL EVERY 6 HOURS PRN
Qty: 120 TABLET | Refills: 0 | Status: SHIPPED | OUTPATIENT
Start: 2020-03-13 | End: 2020-04-06 | Stop reason: SDUPTHER

## 2020-03-12 RX ORDER — ZOLPIDEM TARTRATE 10 MG/1
10 TABLET ORAL NIGHTLY PRN
Qty: 30 TABLET | Refills: 3 | Status: SHIPPED | OUTPATIENT
Start: 2020-03-12 | End: 2020-03-17 | Stop reason: SDUPTHER

## 2020-03-12 NOTE — TELEPHONE ENCOUNTER
----- Message from Ashley Juan sent at 3/8/2019  7:35 AM CST -----  Contact: Patient [phone]  Is this a refill or new RX:  Refill    RX name and strength: oxyCODONE-acetaminophen (PERCOCET)  mg per tablet          
[Negative] : Heme/Lymph

## 2020-03-17 ENCOUNTER — PATIENT MESSAGE (OUTPATIENT)
Dept: INTERNAL MEDICINE | Facility: CLINIC | Age: 45
End: 2020-03-17

## 2020-03-17 RX ORDER — ZOLPIDEM TARTRATE 10 MG/1
10 TABLET ORAL NIGHTLY PRN
Qty: 30 TABLET | Refills: 3 | Status: SHIPPED | OUTPATIENT
Start: 2020-03-17 | End: 2020-03-23 | Stop reason: SDUPTHER

## 2020-03-17 RX ORDER — ALBUTEROL SULFATE 90 UG/1
AEROSOL, METERED RESPIRATORY (INHALATION)
Qty: 18 G | Refills: 3 | Status: SHIPPED | OUTPATIENT
Start: 2020-03-17 | End: 2021-09-15

## 2020-03-20 ENCOUNTER — TELEPHONE (OUTPATIENT)
Dept: INTERNAL MEDICINE | Facility: CLINIC | Age: 45
End: 2020-03-20

## 2020-03-20 ENCOUNTER — PATIENT MESSAGE (OUTPATIENT)
Dept: INTERNAL MEDICINE | Facility: CLINIC | Age: 45
End: 2020-03-20

## 2020-03-21 ENCOUNTER — PATIENT MESSAGE (OUTPATIENT)
Dept: INTERNAL MEDICINE | Facility: CLINIC | Age: 45
End: 2020-03-21

## 2020-03-22 ENCOUNTER — PATIENT MESSAGE (OUTPATIENT)
Dept: INTERNAL MEDICINE | Facility: CLINIC | Age: 45
End: 2020-03-22

## 2020-03-23 ENCOUNTER — OFFICE VISIT (OUTPATIENT)
Dept: INTERNAL MEDICINE | Facility: CLINIC | Age: 45
End: 2020-03-23
Payer: COMMERCIAL

## 2020-03-23 ENCOUNTER — TELEPHONE (OUTPATIENT)
Dept: INTERNAL MEDICINE | Facility: CLINIC | Age: 45
End: 2020-03-23

## 2020-03-23 VITALS
SYSTOLIC BLOOD PRESSURE: 124 MMHG | RESPIRATION RATE: 16 BRPM | HEIGHT: 63 IN | HEART RATE: 60 BPM | TEMPERATURE: 98 F | DIASTOLIC BLOOD PRESSURE: 60 MMHG | WEIGHT: 183.63 LBS | BODY MASS INDEX: 32.54 KG/M2

## 2020-03-23 DIAGNOSIS — R05.9 COUGH: ICD-10-CM

## 2020-03-23 DIAGNOSIS — J45.909 ASTHMA, UNSPECIFIED ASTHMA SEVERITY, UNSPECIFIED WHETHER COMPLICATED, UNSPECIFIED WHETHER PERSISTENT: Primary | ICD-10-CM

## 2020-03-23 PROCEDURE — 3008F BODY MASS INDEX DOCD: CPT | Mod: CPTII,S$GLB,, | Performed by: INTERNAL MEDICINE

## 2020-03-23 PROCEDURE — 99213 PR OFFICE/OUTPT VISIT, EST, LEVL III, 20-29 MIN: ICD-10-PCS | Mod: S$GLB,,, | Performed by: INTERNAL MEDICINE

## 2020-03-23 PROCEDURE — 99213 OFFICE O/P EST LOW 20 MIN: CPT | Mod: S$GLB,,, | Performed by: INTERNAL MEDICINE

## 2020-03-23 PROCEDURE — 3008F PR BODY MASS INDEX (BMI) DOCUMENTED: ICD-10-PCS | Mod: CPTII,S$GLB,, | Performed by: INTERNAL MEDICINE

## 2020-03-23 PROCEDURE — 99999 PR PBB SHADOW E&M-EST. PATIENT-LVL III: CPT | Mod: PBBFAC,,, | Performed by: INTERNAL MEDICINE

## 2020-03-23 PROCEDURE — 99999 PR PBB SHADOW E&M-EST. PATIENT-LVL III: ICD-10-PCS | Mod: PBBFAC,,, | Performed by: INTERNAL MEDICINE

## 2020-03-23 RX ORDER — PROMETHAZINE HYDROCHLORIDE AND DEXTROMETHORPHAN HYDROBROMIDE 6.25; 15 MG/5ML; MG/5ML
5 SYRUP ORAL NIGHTLY PRN
Qty: 120 ML | Refills: 0 | Status: SHIPPED | OUTPATIENT
Start: 2020-03-23 | End: 2021-09-15 | Stop reason: SDUPTHER

## 2020-03-23 RX ORDER — ALBUTEROL SULFATE 1.25 MG/3ML
1.25 SOLUTION RESPIRATORY (INHALATION) EVERY 6 HOURS PRN
Qty: 1 BOX | Refills: 0 | Status: SHIPPED | OUTPATIENT
Start: 2020-03-23 | End: 2021-03-23

## 2020-03-23 RX ORDER — ZOLPIDEM TARTRATE 10 MG/1
10 TABLET ORAL NIGHTLY PRN
Qty: 30 TABLET | Refills: 3 | Status: SHIPPED | OUTPATIENT
Start: 2020-03-23 | End: 2020-09-17 | Stop reason: SDUPTHER

## 2020-03-23 NOTE — PROGRESS NOTES
CC: shortness of breath  HPI:  The patient is a 44 y.o. year old female who presents to the office for  shortness of breath, cough and wheezing.  Her symptoms started about 2 days ago.  She also complains of headache, denies any fever.  She does report possible exposure to COVID 19, a coworker has tested positive.  She has been using her asthma inhaler.    PAST MEDICAL HISTORY:  Past Medical History:   Diagnosis Date    Anemia     Asthma     B12 deficiency     Crohn's disease     History of shingles     Lupus     Migraine headache     Raynaud disease     Reactive hypoglycemia     Seizures 2004    no medication     Sleep apnea     Non compliant with CPAP       SURGICAL HISTORY:  Past Surgical History:   Procedure Laterality Date    ABDOMINAL SURGERY      COLON SURGERY      95% of colon removed 2010    COLONOSCOPY      COLOSTOMY      HYSTERECTOMY      due to endometriosis    ILEOSCOPY N/A 4/10/2019    Procedure: ILEOSCOPY through stoma;  Surgeon: Eve Currie MD;  Location: [address];  Service: Endoscopy;  Laterality: N/A;  Schedule as 30 minute case, schedule in April or May 2019    ILEOSTOMY      LAPAROSCOPY W/ MINI-LAPAROTOMY      large intestine removed      just a portion    pelvic mass removal      REVISION COLOSTOMY  2010    STOMACH SURGERY      TONSILLECTOMY, ADENOIDECTOMY         MEDS:  Medcard reviewed and updated    ALLERGIES: Allergy Card reviewed and updated    SOCIAL HISTORY:   The patient is a nonsmoker.    PE:   APPEARANCE: Well nourished, well developed, in no acute distress.    CHEST: Lungs clear to auscultation with unlabored respirations.  CARDIOVASCULAR: Normal S1, S2. No murmurs. No carotid bruits. No pedal edema.  ABDOMEN: Bowel sounds normal. Not distended. Soft. No tenderness or masses.   PSYCHIATRIC: The patient is oriented to person, place, and time and has a pleasant affect.        ASSESSMENT/PLAN:  Emi was seen today for asthma.    Diagnoses and all  orders for this visit:    Asthma, unspecified asthma severity, unspecified whether complicated, unspecified whether persistent  -     prescribed albuterol    Cough  -     promethazine-dextromethorphan (PROMETHAZINE-DM) 6.25-15 mg/5 mL Syrp; Take 5 mLs by mouth nightly as needed.    Other orders  -     zolpidem (AMBIEN) 10 mg Tab; Take 1 tablet (10 mg total) by mouth nightly as needed.  -     albuterol (ACCUNEB) 1.25 mg/3 mL Nebu; Take 3 mLs (1.25 mg total) by nebulization every 6 (six) hours as needed. Rescue

## 2020-03-23 NOTE — LETTER
March 23, 2020    Emi Johnson  [address] [facility] [address]         [place] - [facility] Medicine                                                                                                                                                       [address] [facility] [address].  [facility] [address]  Phone: [phone]  Fax: [phone] March 23, 2020     Patient: Emi Johnson   YOB: 1975   Date of Visit: 3/23/2020       To Whom It May Concern:    Please be advised that Emi Johnson was seen in clinic today.  She can return 3/25/2020.    If you have any questions or concerns, please don't hesitate to call.    Sincerely,        Bianca Rutledge MD

## 2020-03-23 NOTE — LETTER
March 23, 2020                 [place] [facility]                                                                                                                                                       [address] [facility].  [place] [address]  Phone: [phone]  Fax: [phone] March 23, 2020     Patient: Emi Johnson    YOB: 1975   Date of Visit: 3/23/2020       To Whom It May Concern:    It is my medical opinion that Emi Johnson is at increased risk for adrianna and having an adverse outcome from COVID 19.  She is unable to work during this outbreak.  The patient asked that I share that she suffers from asthma, and in addition is immunocompromised.    If you have any questions or concerns, please don't hesitate to call.    Sincerely,        Bianca Rutledge MD

## 2020-04-01 ENCOUNTER — PATIENT MESSAGE (OUTPATIENT)
Dept: INTERNAL MEDICINE | Facility: CLINIC | Age: 45
End: 2020-04-01

## 2020-04-02 RX ORDER — FLUTICASONE PROPIONATE AND SALMETEROL 500; 50 UG/1; UG/1
POWDER RESPIRATORY (INHALATION)
Qty: 1 INHALER | Refills: 3 | Status: SHIPPED | OUTPATIENT
Start: 2020-04-02 | End: 2020-04-02

## 2020-04-02 RX ORDER — FLUTICASONE PROPIONATE AND SALMETEROL 500; 50 UG/1; UG/1
POWDER RESPIRATORY (INHALATION)
Qty: 1 INHALER | Refills: 3 | Status: SHIPPED | OUTPATIENT
Start: 2020-04-02 | End: 2020-04-02 | Stop reason: SDUPTHER

## 2020-04-02 RX ORDER — FLUTICASONE PROPIONATE AND SALMETEROL 500; 50 UG/1; UG/1
1 POWDER RESPIRATORY (INHALATION) 2 TIMES DAILY
Qty: 1 INHALER | Refills: 3 | Status: SHIPPED | OUTPATIENT
Start: 2020-04-02 | End: 2021-03-24

## 2020-04-02 NOTE — TELEPHONE ENCOUNTER
----- Message from Simone Aguilar sent at 4/2/2020  2:49 PM CDT -----  Contact: [facility]   Pharmacy is calling to clarify an RX.  RX name:  fluticasone-salmeterol diskus inhaler 500-50 mcg  What do they need to clarify:  Needs directions  Comments:

## 2020-04-05 ENCOUNTER — OFFICE VISIT (OUTPATIENT)
Dept: URGENT CARE | Facility: CLINIC | Age: 45
End: 2020-04-05
Payer: COMMERCIAL

## 2020-04-05 VITALS
TEMPERATURE: 98 F | WEIGHT: 183 LBS | OXYGEN SATURATION: 95 % | RESPIRATION RATE: 16 BRPM | HEIGHT: 63 IN | HEART RATE: 116 BPM | BODY MASS INDEX: 32.43 KG/M2

## 2020-04-05 DIAGNOSIS — S93.491A SPRAIN OF ANTERIOR TALOFIBULAR LIGAMENT OF RIGHT ANKLE, INITIAL ENCOUNTER: Primary | ICD-10-CM

## 2020-04-05 DIAGNOSIS — M79.671 RIGHT FOOT PAIN: ICD-10-CM

## 2020-04-05 DIAGNOSIS — M25.571 ACUTE RIGHT ANKLE PAIN: ICD-10-CM

## 2020-04-05 PROCEDURE — 73610 X-RAY EXAM OF ANKLE: CPT | Mod: FY,RT,S$GLB, | Performed by: RADIOLOGY

## 2020-04-05 PROCEDURE — 73630 X-RAY EXAM OF FOOT: CPT | Mod: FY,RT,S$GLB, | Performed by: RADIOLOGY

## 2020-04-05 PROCEDURE — 73610 XR ANKLE COMPLETE 3 VIEW RIGHT: ICD-10-PCS | Mod: FY,RT,S$GLB, | Performed by: RADIOLOGY

## 2020-04-05 PROCEDURE — 73630 XR FOOT COMPLETE 3 VIEW RIGHT: ICD-10-PCS | Mod: FY,RT,S$GLB, | Performed by: RADIOLOGY

## 2020-04-05 PROCEDURE — 99214 PR OFFICE/OUTPT VISIT, EST, LEVL IV, 30-39 MIN: ICD-10-PCS | Mod: S$GLB,,, | Performed by: PHYSICIAN ASSISTANT

## 2020-04-05 PROCEDURE — 99214 OFFICE O/P EST MOD 30 MIN: CPT | Mod: S$GLB,,, | Performed by: PHYSICIAN ASSISTANT

## 2020-04-05 NOTE — PATIENT INSTRUCTIONS
PLEASE READ YOUR DISCHARGE INSTRUCTIONS ENTIRELY AS IT CONTAINS IMPORTANT INFORMATION.  Please wear the ankle brace while walking/up for the next two weeks.  You may take it off at night.  Ice & elevate.  - Rest.    - Drink plenty of fluids.    - Tylenol or Ibuprofen as directed as needed for fever/pain.    - If you were prescribed antibiotics, please take them to completion.  - If you are female and on birth control pills - please use additional methods of contraception to prevent pregnancy while on antibiotics and for one cycle after.   - If you were prescribed a narcotic medication or muscle relaxer, do not drive or operate heavy equipment or machinery while taking these medications, as they can cause drowsiness.   - If you smoke, please stop smoking.  -You must understand that you've received an Urgent Care treatment only and that you may be released before all your medical problems are known or treated. You, the patient, will    arrange for follow up care as instructed. Please arrange follow up with your primary medical clinic as soon as possible.   - Follow up with your PCP or specialty clinic as directed in the next 1-2 weeks if not improved or as needed.  You can call [phone] to schedule an appointment with the appropriate provider.    - Please return to Urgent Care or to the Emergency Department if your symptoms worsen.    Patient aware and verbalized understanding.    Ankle Sprain (Adult)  An ankle sprain is a stretching or tearing of the ligaments that hold the ankle joint together. There are no broken bones.  An ankle sprain is a common injury for both children and adults. It happens when the ankle turns, twists, or rolls in an awkward way. This can be caused by a sports injury. Or it can happen from doing something as simple as stepping on an uneven surface.  Ligaments are made of tough connective tissue. Normally, ligaments stretch a certain amount and then go back to their normal place. A  sprain happens when a ligament is forced to stretch more than the normal amount. A severe sprain can actually tear the ligaments. If you have a severe sprain, you may have felt or heard something like a pop when you were injured.  Ankle sprains are given a grade depending on whether they are mild, moderate, or severe:  · Grade 1 sprain. A mild sprain with minor stretching and damage to the ligament.  · Grade 2 sprain. A moderate sprain where the ligament is partly torn.  · Grade 3 sprain. The most severe kind of sprain. The ligament is completely torn.  Most sprains take about 4 to 6 weeks to heal. A severe sprain can take several months to recover.  Your healthcare provider may order X-rays to be sure you dont have a fracture, or broken bone.  The injured area will feel sore.  Swelling and pain may make it hard to walk. You may need crutches if walking is painful. Or your provider may have you use a cast boot or air splint. This will depend on the grade of ankle sprain that you have.    Home care  · For a Grade 1 sprain, use RICE (rest, ice, compression, and elevation):  · Rest your ankle. Dont walk on it.  · Ice should be used right away to help control swelling. Place an ice pack over the injured area for 20 minutes. Do this every 3 to 6 hours for the first 24 to 48 hours. Keep using ice packs to ease pain and swelling as needed. To make an ice pack, put ice cubes in a plastic bag that seals at the top. Wrap the bag in a clean, thin towel or cloth. Never put ice or an ice pack directly on the skin. The ice pack can be put right on the cast, bandage, or splint. As the ice melts, be careful that the cast, bandage, or splint doesnt get wet. If you have a boot, open it to apply an ice pack, unless told otherwise by your provider.  · Compression devices help to control swelling. They also keep the ankle from moving and support your injured ankle. These devices include dressings, bandages, and wraps.  · Elevate or  raise your ankle above the level of your heart when sitting or lying down. This is very important for the first 48 hours.  · Follow the RICE guidelines for a Grade 2 sprain. This type of sprain will take longer to heal. Your provider may have you wear a splint, cast, or brace to keep your ankle from moving.  ·  If you have a Grade 3 sprain, you are at risk for long-term ankle instability. In rare cases, surgery may be needed. Your provider may have you wear a short leg cast or a walking boot for 2 to 3 weeks.  · After 48 hours, it may be helpful to apply heat for 20 minutes several times a day. You can do this with a heating pad or warm compress. Or you may want to go back and forth between using ice and heat. Never apply heat directly to the skin. Always wrap the heating pad or warm compress in a clean, thin towel or cloth.  · You may use over-the-counter pain medicine (NSAIDS or nonsteroidal anti-inflammatory drugs) to control pain, unless another pain medicine was prescribed. Talk with your provider before using these medicines if you have chronic liver or kidney disease, or have ever had a stomach ulcer or GI (gastrointestinal) bleeding.  · Follow any rehabilitation exercises your provider gives you. These can help you be more flexible and improve your balance and coordination. This is helpful in preventing long-term ankle problems.  Prevention  To help prevent ankle sprains, its important to have good strength, balance, and flexibility. Be sure to:  · Always warm up before you exercise or do something very active  · Be careful when walking or running on uneven or cracked surfaces  · Wear shoes that are in good condition and fit well  · Listen to your bodys signals to slow down when you are in pain or tired  Follow-up care  Any X-rays you had today dont show any broken bones, breaks, or fractures. Sometimes fractures dont show up on the first X-ray. Bruises and sprains can sometimes hurt as much as a  fracture. These injuries can take time to heal completely. If your symptoms dont get better or they get worse, talk with your healthcare provider. You may need a repeat X-ray.  Follow up with your healthcare provider, or as advised. Check for any warning signs listed below.  When to seek medical advice  Call your healthcare provider right away if any of these occur:  · Fever of 100.4 F (38 C) or higher, or as directed by your healthcare provider  · The injury doesnt seem to be healing  · The swelling comes back  · The cast has a bad smell  · The plaster cast or splint gets wet or soft  · The fiberglass cast or splint gets wet and does not dry for 24 hours  · The pain or swelling increases, or redness appears  · Your toes become cold, blue, numb, or tingly  · The skin is discolored (looks blue, purple, or gray), has blisters, or is irritated  · You re-injure your ankle  Date Last Reviewed: 11/20/2015  © 7025-0197 [employer]. [address] [place] [address]. All rights reserved. This information is not intended as a substitute for professional medical care. Always follow your healthcare professional's instructions.        Ankle Sprain (Adult)  An ankle sprain is a stretching or tearing of the ligaments that hold the ankle joint together. There are no broken bones.  An ankle sprain is a common injury for both children and adults. It happens when the ankle turns, twists, or rolls in an awkward way. This can be caused by a sports injury. Or it can happen from doing something as simple as stepping on an uneven surface.  Ligaments are made of tough connective tissue. Normally, ligaments stretch a certain amount and then go back to their normal place. A sprain happens when a ligament is forced to stretch more than the normal amount. A severe sprain can actually tear the ligaments. If you have a severe sprain, you may have felt or heard something like a pop when you were injured.  Ankle sprains are  given a grade depending on whether they are mild, moderate, or severe:  · Grade 1 sprain. A mild sprain with minor stretching and damage to the ligament.  · Grade 2 sprain. A moderate sprain where the ligament is partly torn.  · Grade 3 sprain. The most severe kind of sprain. The ligament is completely torn.  Most sprains take about 4 to 6 weeks to heal. A severe sprain can take several months to recover.  Your healthcare provider may order X-rays to be sure you dont have a fracture, or broken bone.  The injured area will feel sore.  Swelling and pain may make it hard to walk. You may need crutches if walking is painful. Or your provider may have you use a cast boot or air splint. This will depend on the grade of ankle sprain that you have.    Home care  · For a Grade 1 sprain, use RICE (rest, ice, compression, and elevation):  · Rest your ankle. Dont walk on it.  · Ice should be used right away to help control swelling. Place an ice pack over the injured area for 20 minutes. Do this every 3 to 6 hours for the first 24 to 48 hours. Keep using ice packs to ease pain and swelling as needed. To make an ice pack, put ice cubes in a plastic bag that seals at the top. Wrap the bag in a clean, thin towel or cloth. Never put ice or an ice pack directly on the skin. The ice pack can be put right on the cast, bandage, or splint. As the ice melts, be careful that the cast, bandage, or splint doesnt get wet. If you have a boot, open it to apply an ice pack, unless told otherwise by your provider.  · Compression devices help to control swelling. They also keep the ankle from moving and support your injured ankle. These devices include dressings, bandages, and wraps.  · Elevate or raise your ankle above the level of your heart when sitting or lying down. This is very important for the first 48 hours.  · Follow the RICE guidelines for a Grade 2 sprain. This type of sprain will take longer to heal. Your provider may have you  wear a splint, cast, or brace to keep your ankle from moving.  ·  If you have a Grade 3 sprain, you are at risk for long-term ankle instability. In rare cases, surgery may be needed. Your provider may have you wear a short leg cast or a walking boot for 2 to 3 weeks.  · After 48 hours, it may be helpful to apply heat for 20 minutes several times a day. You can do this with a heating pad or warm compress. Or you may want to go back and forth between using ice and heat. Never apply heat directly to the skin. Always wrap the heating pad or warm compress in a clean, thin towel or cloth.  · You may use over-the-counter pain medicine (NSAIDS or nonsteroidal anti-inflammatory drugs) to control pain, unless another pain medicine was prescribed. Talk with your provider before using these medicines if you have chronic liver or kidney disease, or have ever had a stomach ulcer or GI (gastrointestinal) bleeding.  · Follow any rehabilitation exercises your provider gives you. These can help you be more flexible and improve your balance and coordination. This is helpful in preventing long-term ankle problems.  Prevention  To help prevent ankle sprains, its important to have good strength, balance, and flexibility. Be sure to:  · Always warm up before you exercise or do something very active  · Be careful when walking or running on uneven or cracked surfaces  · Wear shoes that are in good condition and fit well  · Listen to your bodys signals to slow down when you are in pain or tired  Follow-up care  Any X-rays you had today dont show any broken bones, breaks, or fractures. Sometimes fractures dont show up on the first X-ray. Bruises and sprains can sometimes hurt as much as a fracture. These injuries can take time to heal completely. If your symptoms dont get better or they get worse, talk with your healthcare provider. You may need a repeat X-ray.  Follow up with your healthcare provider, or as advised. Check for any  warning signs listed below.  When to seek medical advice  Call your healthcare provider right away if any of these occur:  · Fever of 100.4 F (38 C) or higher, or as directed by your healthcare provider  · The injury doesnt seem to be healing  · The swelling comes back  · The cast has a bad smell  · The plaster cast or splint gets wet or soft  · The fiberglass cast or splint gets wet and does not dry for 24 hours  · The pain or swelling increases, or redness appears  · Your toes become cold, blue, numb, or tingly  · The skin is discolored (looks blue, purple, or gray), has blisters, or is irritated  · You re-injure your ankle  Date Last Reviewed: 11/20/2015  © 2355-1526 [email or web address]. [address] [place] [address]. All rights reserved. This information is not intended as a substitute for professional medical care. Always follow your healthcare professional's instructions.        Foot Sprain    A sprain is a stretching or tearing of the ligaments that hold a joint together. There are no broken bones. Sprains generally take from 3-6 weeks to heal. A sprain may be treated with a splint, walking cast, or special boot. Mild sprains may not need any additional support.  Home care  The following guidelines will help you care for your injury at home:  · Keep your leg elevated when sitting or lying down. This is very important during the first 48 hours to reduce swelling. Stay off the injured foot as much as possible until you can walk on it without pain. If needed, you may use crutches during the first week for this purpose. Crutches can be rented at many pharmacies or surgical/orthopedic supply stores.  · You may be given a cast shoe to wear to prevent movement in your foot. If not, you can use a sandal or any shoe that does not put pressure on the injured area until the swelling and pain go away. If using a sandal, be careful not to hit your foot against anything, since another injury could make  the sprain worse.  · Apply an ice pack over the injured area for 15 to 20 minutes every 3 to 6 hours. You should do this for the first 24 to 48 hours. You can make an ice pack by filling a plastic bag that seals at the top with ice cubes and then wrapping it with a thin towel. Continue to use ice packs for relief of pain and swelling as needed. As the ice melts, avoid getting any wrap, splint, or cast wet. After 48 hours, apply heat from a warm shower or bath for 20 minutes several times daily. Alternating ice and heat may also be helpful.  · You may use over-the-counter pain medicine to control pain, unless another medicine was prescribed. If you have chronic liver or kidney disease or ever had a stomach ulcer or GI bleeding, talk with your healthcare provider before using these medicines.  · If you were given a splint or cast, keep it dry. Bathe with your splint or cast well out of the water, protected with 2 large plastic bags, rubber-banded at the top end. If a fiberglass splint or cast gets wet, you can dry it with a hair dryer.  · You may return to sports after healing, when you can run without pain.  Follow-up care  Follow up with your healthcare provider as directed. Sometimes fractures dont show up on the first X-ray. Bruises and sprains can sometimes hurt as much as a fracture. These injuries can take time to heal completely. If your symptoms dont improve or they get worse, talk with your healthcare provider. You may need a repeat X-ray.  When to seek medical advice  Call your healthcare provider right away if any of these occur:  · The plaster cast or splint gets wet or soft  · The fiberglass cast or splint gets wet and does not dry for 24 hours  · Pain or swelling increases, or redness appears  · A bad odor comes from within the cast  · Fever of 100.4°F (38°C) or above lasting for 24 to 48 hours  · Toes on the injured foot become cold, blue, numb, or tingly  Date Last Reviewed: 11/20/2015  © 9408-2008  [employer]. [address] [place] [address], [place] [address]. All rights reserved. This information is not intended as a substitute for professional medical care. Always follow your healthcare professional's instructions.

## 2020-04-05 NOTE — PROGRESS NOTES
"Subjective:       Patient ID: Emi Johnson is a 44 y.o. female.    Vitals:  height is 5' 3" (1.6 m) and weight is 83 kg (183 lb). Her tympanic temperature is 98.3 °F (36.8 °C). Her pulse is 116 (abnormal). Her respiration is 16 and oxygen saturation is 95%.     Chief Complaint: Ankle Injury (R ANKLE)    Ms. Johnson presents for evaluation of right ankle and foot pain.  She had an inversion injury yesterday on uneven concrete.  She is having lateral foot and lateral ankle pain since that time.  She denies any paresthesias.  She has been able to bear weight, but is very painful.  She takes Percocet for her Crohn's disease and has taken Percocet with some relief.    Ankle Injury    The incident occurred 12 to 24 hours ago. The incident occurred at home. The injury mechanism was a twisting injury. The pain is present in the right ankle. The quality of the pain is described as aching. The pain is at a severity of 7/10. The pain is mild. The pain has been constant since onset. Associated symptoms include muscle weakness. She reports no foreign bodies present. The symptoms are aggravated by movement, palpation and weight bearing. She has tried acetaminophen (Rx PAIN MED) for the symptoms. The treatment provided mild relief.       Constitution: Negative for fatigue.   HENT: Negative for facial swelling and facial trauma.    Neck: Negative for neck stiffness.   Cardiovascular: Negative for chest trauma.   Eyes: Negative for eye trauma, double vision and blurred vision.   Gastrointestinal: Negative for abdominal trauma, abdominal pain and rectal bleeding.   Genitourinary: Negative for hematuria, missed menses, genital trauma and pelvic pain.   Musculoskeletal: Positive for pain, trauma, joint pain and joint swelling. Negative for abnormal ROM of joint.   Skin: Negative for color change, wound, abrasion, laceration and bruising.   Neurological: Negative for dizziness, history of vertigo, light-headedness, coordination " disturbances, altered mental status and loss of consciousness.   Hematologic/Lymphatic: Negative for history of bleeding disorder.   Psychiatric/Behavioral: Negative for altered mental status.       Objective:      Physical Exam   Constitutional: She is oriented to person, place, and time. She appears well-developed and well-nourished. She is cooperative.  Non-toxic appearance. She does not appear ill. No distress.   HENT:   Head: Normocephalic and atraumatic.   Right Ear: Hearing and external ear normal.   Left Ear: Hearing and external ear normal.   Nose: Nose normal. No rhinorrhea or nasal deformity. No epistaxis.   Mouth/Throat: Uvula is midline and mucous membranes are normal.   Eyes: Conjunctivae and lids are normal. Right eye exhibits no discharge. Left eye exhibits no discharge. No scleral icterus.   Neck: Trachea normal, normal range of motion, full passive range of motion without pain and phonation normal. Neck supple.   Cardiovascular: Normal rate, regular rhythm, normal heart sounds, intact distal pulses and normal pulses.   Pulmonary/Chest: Effort normal and breath sounds normal. No respiratory distress.   Abdominal: Soft. Normal appearance and bowel sounds are normal. She exhibits no distension, no pulsatile midline mass and no mass. There is no tenderness.   Musculoskeletal: Normal range of motion. She exhibits no edema or deformity.        Right ankle: She exhibits swelling. She exhibits normal range of motion, no ecchymosis, no deformity, no laceration and normal pulse. Tenderness. Lateral malleolus tenderness found. No medial malleolus, no AITFL, no CF ligament, no posterior TFL, no head of 5th metatarsal and no proximal fibula tenderness found. Achilles tendon normal. Achilles tendon exhibits no pain, no defect and normal Jacobs's test results.        Feet:    TTP lateral malleolus with mild edema.  TTP mid shaft 5th MT.  No edema or ecchymosis.  DP & PT pulse 2+.  Cap refill WNL.  ROM intact,  but painful.   Neurological: She is alert and oriented to person, place, and time. She exhibits normal muscle tone. Coordination normal.   Skin: Skin is warm, dry, intact, not diaphoretic and not pale. not right ankle  Psychiatric: She has a normal mood and affect. Her speech is normal and behavior is normal. Judgment and thought content normal. Cognition and memory are normal.   Nursing note and vitals reviewed.      R ankle XR - Mortise joint appears intact.  Talar dome maintains rounded contour.  I detect no fracture, dislocation, radiopaque retained foreign body, lytic or blastic lesion, erosion or chondrocalcinosis.  R foot XR - Three views: No fracture dislocation bone destruction seen.    Assessment:       1. Sprain of anterior talofibular ligament of right ankle, initial encounter    2. Acute right ankle pain    3. Right foot pain        Plan:         Sprain of anterior talofibular ligament of right ankle, initial encounter  -     IMMOBILIZER FOR HOME USE    Acute right ankle pain  -     XR ANKLE COMPLETE 3 VIEW RIGHT; Future; Expected date: 04/05/2020    Right foot pain  -     XR FOOT COMPLETE 3 VIEW RIGHT; Future; Expected date: 04/05/2020    XR neg.  Ankle brace placed.  Patient has Percocet at home that she will take PRN for pain.     Patient Instructions   PLEASE READ YOUR DISCHARGE INSTRUCTIONS ENTIRELY AS IT CONTAINS IMPORTANT INFORMATION.  Please wear the ankle brace while walking/up for the next two weeks.  You may take it off at night.  Ice & elevate.  - Rest.    - Drink plenty of fluids.    - Tylenol or Ibuprofen as directed as needed for fever/pain.    - If you were prescribed antibiotics, please take them to completion.  - If you are female and on birth control pills - please use additional methods of contraception to prevent pregnancy while on antibiotics and for one cycle after.   - If you were prescribed a narcotic medication or muscle relaxer, do not drive or operate heavy equipment or  machinery while taking these medications, as they can cause drowsiness.   - If you smoke, please stop smoking.  -You must understand that you've received an Urgent Care treatment only and that you may be released before all your medical problems are known or treated. You, the patient, will    arrange for follow up care as instructed. Please arrange follow up with your primary medical clinic as soon as possible.   - Follow up with your PCP or specialty clinic as directed in the next 1-2 weeks if not improved or as needed.  You can call [phone] to schedule an appointment with the appropriate provider.    - Please return to Urgent Care or to the Emergency Department if your symptoms worsen.    Patient aware and verbalized understanding.    Ankle Sprain (Adult)  An ankle sprain is a stretching or tearing of the ligaments that hold the ankle joint together. There are no broken bones.  An ankle sprain is a common injury for both children and adults. It happens when the ankle turns, twists, or rolls in an awkward way. This can be caused by a sports injury. Or it can happen from doing something as simple as stepping on an uneven surface.  Ligaments are made of tough connective tissue. Normally, ligaments stretch a certain amount and then go back to their normal place. A sprain happens when a ligament is forced to stretch more than the normal amount. A severe sprain can actually tear the ligaments. If you have a severe sprain, you may have felt or heard something like a pop when you were injured.  Ankle sprains are given a grade depending on whether they are mild, moderate, or severe:  · Grade 1 sprain. A mild sprain with minor stretching and damage to the ligament.  · Grade 2 sprain. A moderate sprain where the ligament is partly torn.  · Grade 3 sprain. The most severe kind of sprain. The ligament is completely torn.  Most sprains take about 4 to 6 weeks to heal. A severe sprain can take several months to  recover.  Your healthcare provider may order X-rays to be sure you dont have a fracture, or broken bone.  The injured area will feel sore.  Swelling and pain may make it hard to walk. You may need crutches if walking is painful. Or your provider may have you use a cast boot or air splint. This will depend on the grade of ankle sprain that you have.    Home care  · For a Grade 1 sprain, use RICE (rest, ice, compression, and elevation):  · Rest your ankle. Dont walk on it.  · Ice should be used right away to help control swelling. Place an ice pack over the injured area for 20 minutes. Do this every 3 to 6 hours for the first 24 to 48 hours. Keep using ice packs to ease pain and swelling as needed. To make an ice pack, put ice cubes in a plastic bag that seals at the top. Wrap the bag in a clean, thin towel or cloth. Never put ice or an ice pack directly on the skin. The ice pack can be put right on the cast, bandage, or splint. As the ice melts, be careful that the cast, bandage, or splint doesnt get wet. If you have a boot, open it to apply an ice pack, unless told otherwise by your provider.  · Compression devices help to control swelling. They also keep the ankle from moving and support your injured ankle. These devices include dressings, bandages, and wraps.  · Elevate or raise your ankle above the level of your heart when sitting or lying down. This is very important for the first 48 hours.  · Follow the RICE guidelines for a Grade 2 sprain. This type of sprain will take longer to heal. Your provider may have you wear a splint, cast, or brace to keep your ankle from moving.  ·  If you have a Grade 3 sprain, you are at risk for long-term ankle instability. In rare cases, surgery may be needed. Your provider may have you wear a short leg cast or a walking boot for 2 to 3 weeks.  · After 48 hours, it may be helpful to apply heat for 20 minutes several times a day. You can do this with a heating pad or warm  compress. Or you may want to go back and forth between using ice and heat. Never apply heat directly to the skin. Always wrap the heating pad or warm compress in a clean, thin towel or cloth.  · You may use over-the-counter pain medicine (NSAIDS or nonsteroidal anti-inflammatory drugs) to control pain, unless another pain medicine was prescribed. Talk with your provider before using these medicines if you have chronic liver or kidney disease, or have ever had a stomach ulcer or GI (gastrointestinal) bleeding.  · Follow any rehabilitation exercises your provider gives you. These can help you be more flexible and improve your balance and coordination. This is helpful in preventing long-term ankle problems.  Prevention  To help prevent ankle sprains, its important to have good strength, balance, and flexibility. Be sure to:  · Always warm up before you exercise or do something very active  · Be careful when walking or running on uneven or cracked surfaces  · Wear shoes that are in good condition and fit well  · Listen to your bodys signals to slow down when you are in pain or tired  Follow-up care  Any X-rays you had today dont show any broken bones, breaks, or fractures. Sometimes fractures dont show up on the first X-ray. Bruises and sprains can sometimes hurt as much as a fracture. These injuries can take time to heal completely. If your symptoms dont get better or they get worse, talk with your healthcare provider. You may need a repeat X-ray.  Follow up with your healthcare provider, or as advised. Check for any warning signs listed below.  When to seek medical advice  Call your healthcare provider right away if any of these occur:  · Fever of 100.4 F (38 C) or higher, or as directed by your healthcare provider  · The injury doesnt seem to be healing  · The swelling comes back  · The cast has a bad smell  · The plaster cast or splint gets wet or soft  · The fiberglass cast or splint gets wet and does not dry  for 24 hours  · The pain or swelling increases, or redness appears  · Your toes become cold, blue, numb, or tingly  · The skin is discolored (looks blue, purple, or gray), has blisters, or is irritated  · You re-injure your ankle  Date Last Reviewed: 11/20/2015  © 0523-7510 [employer]. [address] [place] [address]. All rights reserved. This information is not intended as a substitute for professional medical care. Always follow your healthcare professional's instructions.        Ankle Sprain (Adult)  An ankle sprain is a stretching or tearing of the ligaments that hold the ankle joint together. There are no broken bones.  An ankle sprain is a common injury for both children and adults. It happens when the ankle turns, twists, or rolls in an awkward way. This can be caused by a sports injury. Or it can happen from doing something as simple as stepping on an uneven surface.  Ligaments are made of tough connective tissue. Normally, ligaments stretch a certain amount and then go back to their normal place. A sprain happens when a ligament is forced to stretch more than the normal amount. A severe sprain can actually tear the ligaments. If you have a severe sprain, you may have felt or heard something like a pop when you were injured.  Ankle sprains are given a grade depending on whether they are mild, moderate, or severe:  · Grade 1 sprain. A mild sprain with minor stretching and damage to the ligament.  · Grade 2 sprain. A moderate sprain where the ligament is partly torn.  · Grade 3 sprain. The most severe kind of sprain. The ligament is completely torn.  Most sprains take about 4 to 6 weeks to heal. A severe sprain can take several months to recover.  Your healthcare provider may order X-rays to be sure you dont have a fracture, or broken bone.  The injured area will feel sore.  Swelling and pain may make it hard to walk. You may need crutches if walking is painful. Or your provider may  have you use a cast boot or air splint. This will depend on the grade of ankle sprain that you have.    Home care  · For a Grade 1 sprain, use RICE (rest, ice, compression, and elevation):  · Rest your ankle. Dont walk on it.  · Ice should be used right away to help control swelling. Place an ice pack over the injured area for 20 minutes. Do this every 3 to 6 hours for the first 24 to 48 hours. Keep using ice packs to ease pain and swelling as needed. To make an ice pack, put ice cubes in a plastic bag that seals at the top. Wrap the bag in a clean, thin towel or cloth. Never put ice or an ice pack directly on the skin. The ice pack can be put right on the cast, bandage, or splint. As the ice melts, be careful that the cast, bandage, or splint doesnt get wet. If you have a boot, open it to apply an ice pack, unless told otherwise by your provider.  · Compression devices help to control swelling. They also keep the ankle from moving and support your injured ankle. These devices include dressings, bandages, and wraps.  · Elevate or raise your ankle above the level of your heart when sitting or lying down. This is very important for the first 48 hours.  · Follow the RICE guidelines for a Grade 2 sprain. This type of sprain will take longer to heal. Your provider may have you wear a splint, cast, or brace to keep your ankle from moving.  ·  If you have a Grade 3 sprain, you are at risk for long-term ankle instability. In rare cases, surgery may be needed. Your provider may have you wear a short leg cast or a walking boot for 2 to 3 weeks.  · After 48 hours, it may be helpful to apply heat for 20 minutes several times a day. You can do this with a heating pad or warm compress. Or you may want to go back and forth between using ice and heat. Never apply heat directly to the skin. Always wrap the heating pad or warm compress in a clean, thin towel or cloth.  · You may use over-the-counter pain medicine (NSAIDS or  nonsteroidal anti-inflammatory drugs) to control pain, unless another pain medicine was prescribed. Talk with your provider before using these medicines if you have chronic liver or kidney disease, or have ever had a stomach ulcer or GI (gastrointestinal) bleeding.  · Follow any rehabilitation exercises your provider gives you. These can help you be more flexible and improve your balance and coordination. This is helpful in preventing long-term ankle problems.  Prevention  To help prevent ankle sprains, its important to have good strength, balance, and flexibility. Be sure to:  · Always warm up before you exercise or do something very active  · Be careful when walking or running on uneven or cracked surfaces  · Wear shoes that are in good condition and fit well  · Listen to your bodys signals to slow down when you are in pain or tired  Follow-up care  Any X-rays you had today dont show any broken bones, breaks, or fractures. Sometimes fractures dont show up on the first X-ray. Bruises and sprains can sometimes hurt as much as a fracture. These injuries can take time to heal completely. If your symptoms dont get better or they get worse, talk with your healthcare provider. You may need a repeat X-ray.  Follow up with your healthcare provider, or as advised. Check for any warning signs listed below.  When to seek medical advice  Call your healthcare provider right away if any of these occur:  · Fever of 100.4 F (38 C) or higher, or as directed by your healthcare provider  · The injury doesnt seem to be healing  · The swelling comes back  · The cast has a bad smell  · The plaster cast or splint gets wet or soft  · The fiberglass cast or splint gets wet and does not dry for 24 hours  · The pain or swelling increases, or redness appears  · Your toes become cold, blue, numb, or tingly  · The skin is discolored (looks blue, purple, or gray), has blisters, or is irritated  · You re-injure your ankle  Date Last  Reviewed: 11/20/2015  © 1909-5152 [employer]. [address] [place] [address], [place] [address]. All rights reserved. This information is not intended as a substitute for professional medical care. Always follow your healthcare professional's instructions.        Foot Sprain    A sprain is a stretching or tearing of the ligaments that hold a joint together. There are no broken bones. Sprains generally take from 3-6 weeks to heal. A sprain may be treated with a splint, walking cast, or special boot. Mild sprains may not need any additional support.  Home care  The following guidelines will help you care for your injury at home:  · Keep your leg elevated when sitting or lying down. This is very important during the first 48 hours to reduce swelling. Stay off the injured foot as much as possible until you can walk on it without pain. If needed, you may use crutches during the first week for this purpose. Crutches can be rented at many pharmacies or surgical/orthopedic supply stores.  · You may be given a cast shoe to wear to prevent movement in your foot. If not, you can use a sandal or any shoe that does not put pressure on the injured area until the swelling and pain go away. If using a sandal, be careful not to hit your foot against anything, since another injury could make the sprain worse.  · Apply an ice pack over the injured area for 15 to 20 minutes every 3 to 6 hours. You should do this for the first 24 to 48 hours. You can make an ice pack by filling a plastic bag that seals at the top with ice cubes and then wrapping it with a thin towel. Continue to use ice packs for relief of pain and swelling as needed. As the ice melts, avoid getting any wrap, splint, or cast wet. After 48 hours, apply heat from a warm shower or bath for 20 minutes several times daily. Alternating ice and heat may also be helpful.  · You may use over-the-counter pain medicine to control pain, unless another medicine was  prescribed. If you have chronic liver or kidney disease or ever had a stomach ulcer or GI bleeding, talk with your healthcare provider before using these medicines.  · If you were given a splint or cast, keep it dry. Bathe with your splint or cast well out of the water, protected with 2 large plastic bags, rubber-banded at the top end. If a fiberglass splint or cast gets wet, you can dry it with a hair dryer.  · You may return to sports after healing, when you can run without pain.  Follow-up care  Follow up with your healthcare provider as directed. Sometimes fractures dont show up on the first X-ray. Bruises and sprains can sometimes hurt as much as a fracture. These injuries can take time to heal completely. If your symptoms dont improve or they get worse, talk with your healthcare provider. You may need a repeat X-ray.  When to seek medical advice  Call your healthcare provider right away if any of these occur:  · The plaster cast or splint gets wet or soft  · The fiberglass cast or splint gets wet and does not dry for 24 hours  · Pain or swelling increases, or redness appears  · A bad odor comes from within the cast  · Fever of 100.4°F (38°C) or above lasting for 24 to 48 hours  · Toes on the injured foot become cold, blue, numb, or tingly  Date Last Reviewed: 11/20/2015  © 8554-1348 [employer]. [address] [place] [address]. All rights reserved. This information is not intended as a substitute for professional medical care. Always follow your healthcare professional's instructions.

## 2020-04-06 ENCOUNTER — PATIENT MESSAGE (OUTPATIENT)
Dept: INTERNAL MEDICINE | Facility: CLINIC | Age: 45
End: 2020-04-06

## 2020-04-06 ENCOUNTER — OFFICE VISIT (OUTPATIENT)
Dept: INTERNAL MEDICINE | Facility: CLINIC | Age: 45
End: 2020-04-06
Payer: COMMERCIAL

## 2020-04-06 VITALS
HEART RATE: 86 BPM | RESPIRATION RATE: 18 BRPM | TEMPERATURE: 98 F | OXYGEN SATURATION: 98 % | SYSTOLIC BLOOD PRESSURE: 110 MMHG | WEIGHT: 183.19 LBS | DIASTOLIC BLOOD PRESSURE: 84 MMHG | HEIGHT: 63 IN | BODY MASS INDEX: 32.46 KG/M2

## 2020-04-06 DIAGNOSIS — J45.909 ASTHMA, UNSPECIFIED ASTHMA SEVERITY, UNSPECIFIED WHETHER COMPLICATED, UNSPECIFIED WHETHER PERSISTENT: Primary | ICD-10-CM

## 2020-04-06 DIAGNOSIS — M79.7 FIBROMYALGIA: ICD-10-CM

## 2020-04-06 DIAGNOSIS — K50.00 CROHN'S DISEASE OF SMALL INTESTINE WITHOUT COMPLICATION: ICD-10-CM

## 2020-04-06 PROCEDURE — 99999 PR PBB SHADOW E&M-EST. PATIENT-LVL IV: CPT | Mod: PBBFAC,,, | Performed by: INTERNAL MEDICINE

## 2020-04-06 PROCEDURE — 99213 PR OFFICE/OUTPT VISIT, EST, LEVL III, 20-29 MIN: ICD-10-PCS | Mod: S$GLB,,, | Performed by: INTERNAL MEDICINE

## 2020-04-06 PROCEDURE — 3008F PR BODY MASS INDEX (BMI) DOCUMENTED: ICD-10-PCS | Mod: CPTII,S$GLB,, | Performed by: INTERNAL MEDICINE

## 2020-04-06 PROCEDURE — 99213 OFFICE O/P EST LOW 20 MIN: CPT | Mod: S$GLB,,, | Performed by: INTERNAL MEDICINE

## 2020-04-06 PROCEDURE — 3008F BODY MASS INDEX DOCD: CPT | Mod: CPTII,S$GLB,, | Performed by: INTERNAL MEDICINE

## 2020-04-06 PROCEDURE — 99999 PR PBB SHADOW E&M-EST. PATIENT-LVL IV: ICD-10-PCS | Mod: PBBFAC,,, | Performed by: INTERNAL MEDICINE

## 2020-04-06 RX ORDER — OXYCODONE AND ACETAMINOPHEN 10; 325 MG/1; MG/1
1 TABLET ORAL EVERY 6 HOURS PRN
Qty: 120 TABLET | Refills: 0 | Status: SHIPPED | OUTPATIENT
Start: 2020-04-06 | End: 2020-05-04 | Stop reason: SDUPTHER

## 2020-04-06 NOTE — PROGRESS NOTES
CC: shortness of breath and chest tightness  HPI:  The patient is a 44 y.o. year old female who presents to the office for shortness of breath and chest tightness.  She also reports non productive cough, but denies any wheezing or fever.  She has been using her inhaler and breathing treatment.  Her symptoms started about 2 weeks ago after her last visit.  She uses her albuterol daily, but denies any nighttime awakenings.      PAST MEDICAL HISTORY:  Past Medical History:   Diagnosis Date    Anemia     Asthma     B12 deficiency     Crohn's disease     History of shingles     Lupus     Migraine headache     Raynaud disease     Reactive hypoglycemia     Seizures 2004    no medication     Sleep apnea     Non compliant with CPAP       SURGICAL HISTORY:  Past Surgical History:   Procedure Laterality Date    ABDOMINAL SURGERY      COLON SURGERY      95% of colon removed 2010    COLONOSCOPY      COLOSTOMY      HYSTERECTOMY      due to endometriosis    ILEOSCOPY N/A 4/10/2019    Procedure: ILEOSCOPY through stoma;  Surgeon: Eve Currie MD;  Location: [address];  Service: Endoscopy;  Laterality: N/A;  Schedule as 30 minute case, schedule in April or May 2019    ILEOSTOMY      LAPAROSCOPY W/ MINI-LAPAROTOMY      large intestine removed      just a portion    pelvic mass removal      REVISION COLOSTOMY  2010    STOMACH SURGERY      TONSILLECTOMY, ADENOIDECTOMY         MEDS:  Medcard reviewed and updated    ALLERGIES: Allergy Card reviewed and updated    SOCIAL HISTORY:   The patient is a nonsmoker.    PE:   APPEARANCE: Well nourished, well developed, in no acute distress.    CHEST: Lungs clear to auscultation with unlabored respirations.  CARDIOVASCULAR: Normal S1, S2. No murmurs. No carotid bruits. No pedal edema.  ABDOMEN: Bowel sounds normal. Not distended. Soft. No tenderness or masses. Positive colostomy.  PSYCHIATRIC: The patient is oriented to person, place, and time and has a  pleasant affect.        ASSESSMENT/PLAN:  Emi was seen today for asthma and cough.    Diagnoses and all orders for this visit:    Asthma, unspecified asthma severity, unspecified whether complicated, unspecified whether persistent  -     Ambulatory referral/consult to Allergy; Future

## 2020-04-06 NOTE — LETTER
April 6, 2020                 [place] [facility]  [address] [facility].  [place] [address]  Phone: [phone]  Fax: [phone] April 6, 2020     Patient: Emi Johnson    YOB: 1975   Date of Visit: 4/6/2020       To Whom It May Concern:    It is my medical opinion that Emi Johnson is at increased risk for adrianna and having an adverse outcome from COVID 19.  She is unable to work currently.  She can return to work on April 20, 2020.  The patient asked that I share that she suffers from asthma, and in addition is immunocompromised.    If you have any questions or concerns, please don't hesitate to call.    Sincerely,        Bianca Rutledge MD

## 2020-05-04 ENCOUNTER — PATIENT MESSAGE (OUTPATIENT)
Dept: INTERNAL MEDICINE | Facility: CLINIC | Age: 45
End: 2020-05-04

## 2020-05-04 DIAGNOSIS — K50.00 CROHN'S DISEASE OF SMALL INTESTINE WITHOUT COMPLICATION: ICD-10-CM

## 2020-05-04 DIAGNOSIS — M79.7 FIBROMYALGIA: ICD-10-CM

## 2020-05-04 RX ORDER — OXYCODONE AND ACETAMINOPHEN 10; 325 MG/1; MG/1
1 TABLET ORAL EVERY 6 HOURS PRN
Qty: 120 TABLET | Refills: 0 | Status: SHIPPED | OUTPATIENT
Start: 2020-05-07 | End: 2020-06-02

## 2020-05-04 RX ORDER — OXYCODONE AND ACETAMINOPHEN 10; 325 MG/1; MG/1
1 TABLET ORAL EVERY 6 HOURS PRN
Qty: 120 TABLET | Refills: 0 | Status: SHIPPED | OUTPATIENT
Start: 2020-05-07 | End: 2020-05-04 | Stop reason: SDUPTHER

## 2020-05-11 ENCOUNTER — LAB VISIT (OUTPATIENT)
Dept: INTERNAL MEDICINE | Facility: CLINIC | Age: 45
End: 2020-05-11
Payer: COMMERCIAL

## 2020-05-11 ENCOUNTER — TELEPHONE (OUTPATIENT)
Dept: INFECTIOUS DISEASES | Facility: HOSPITAL | Age: 45
End: 2020-05-11

## 2020-05-11 ENCOUNTER — PATIENT MESSAGE (OUTPATIENT)
Dept: GASTROENTEROLOGY | Facility: CLINIC | Age: 45
End: 2020-05-11

## 2020-05-11 DIAGNOSIS — R19.8 HIGH OUTPUT ILEOSTOMY: ICD-10-CM

## 2020-05-11 DIAGNOSIS — K50.00 CROHN'S DISEASE OF SMALL INTESTINE WITHOUT COMPLICATION: ICD-10-CM

## 2020-05-11 DIAGNOSIS — K50.00 CROHN'S DISEASE OF SMALL INTESTINE WITHOUT COMPLICATION: Primary | ICD-10-CM

## 2020-05-11 DIAGNOSIS — E86.0 DEHYDRATION: Primary | ICD-10-CM

## 2020-05-11 DIAGNOSIS — Z93.2 HIGH OUTPUT ILEOSTOMY: ICD-10-CM

## 2020-05-11 PROCEDURE — U0002 COVID-19 LAB TEST NON-CDC: HCPCS

## 2020-05-11 RX ORDER — SODIUM CHLORIDE 0.9 % (FLUSH) 0.9 %
10 SYRINGE (ML) INJECTION
Status: CANCELLED | OUTPATIENT
Start: 2020-05-11

## 2020-05-11 RX ORDER — HEPARIN 100 UNIT/ML
500 SYRINGE INTRAVENOUS
Status: CANCELLED | OUTPATIENT
Start: 2020-05-11

## 2020-05-11 NOTE — TELEPHONE ENCOUNTER
Called & spoke to pt  - States she has had intermittent chest pain for several years:  -States 7-8/10 pain starts between breasts and goes up her chest w/ very rare radiation to her back usually lasting 1-2 days  - Recently notes burping w/ rotten egg taste, but denies eating eggs  - Denies SOB, vomiting, food getting stuck in her throat, known triggers, or changes in meds/ diet    - Additional c/o increase in bowel symptoms for 2-3 weeks:  - States she empties her bag 15-20 x when 1/2+ full of thick liquid stool; denies blood or mucus  - Has pain near her stoma 8/10 which is relieved w/ prescribed Percocet 10/325 q 6 hours; pt unsure if gas pains or Crohn's related  - C/o dry mouth & feeling tired  - Not on any meds for Crohn's  -12/23 no show MRE; last appt 3/2017; has appt w/ internal med 5/13  - 4/2019 ileoscopy normal; last labs 11/2019  - Will discuss w/ Dr. Currie

## 2020-05-11 NOTE — TELEPHONE ENCOUNTER
Called & spoke to pt  - Pt to f/u w/ PCP regarding her chest pain and belching; pt has appt 5/13  - Scheduled for labs & stool studies 5/12/2020  - Transferred pt to infusion to get scheduled for IV fluids  - Pt expressed understanding & thanked me

## 2020-05-11 NOTE — TELEPHONE ENCOUNTER
Contacted patientto discuss infusion appt.  Pt denied any contact with anyone who is sick and denied any recent travel.  Advised patient on temperature screening upon arrival and restrictions on visitor policy.     Discussed the following with the patient:     In an effort to protect our immunocompromised patients from potential exposure to COVID-19, [facility] will now require all patients receiving an infusion or injection to be tested for COVID-19 prior to their appointment.  All patients must be tested within 72 hours of their appointment.     Pt verbalized understanding and COVID-19 screening appt scheduled.  Will contact patient with results prior to scheduled infusion.

## 2020-05-12 ENCOUNTER — PATIENT MESSAGE (OUTPATIENT)
Dept: GASTROENTEROLOGY | Facility: CLINIC | Age: 45
End: 2020-05-12

## 2020-05-12 ENCOUNTER — LAB VISIT (OUTPATIENT)
Dept: LAB | Facility: HOSPITAL | Age: 45
End: 2020-05-12
Attending: INTERNAL MEDICINE
Payer: COMMERCIAL

## 2020-05-12 ENCOUNTER — PATIENT MESSAGE (OUTPATIENT)
Dept: WOUND CARE | Facility: CLINIC | Age: 45
End: 2020-05-12

## 2020-05-12 DIAGNOSIS — K50.00 CROHN'S DISEASE OF SMALL INTESTINE WITHOUT COMPLICATION: ICD-10-CM

## 2020-05-12 DIAGNOSIS — E86.0 DEHYDRATION: ICD-10-CM

## 2020-05-12 LAB
ALBUMIN SERPL BCP-MCNC: 4 G/DL (ref 3.5–5.2)
ALP SERPL-CCNC: 102 U/L (ref 55–135)
ALT SERPL W/O P-5'-P-CCNC: 61 U/L (ref 10–44)
ANION GAP SERPL CALC-SCNC: 9 MMOL/L (ref 8–16)
AST SERPL-CCNC: 30 U/L (ref 10–40)
BASOPHILS # BLD AUTO: 0.04 K/UL (ref 0–0.2)
BASOPHILS NFR BLD: 0.7 % (ref 0–1.9)
BILIRUB SERPL-MCNC: 0.7 MG/DL (ref 0.1–1)
BUN SERPL-MCNC: 23 MG/DL (ref 6–20)
CALCIUM SERPL-MCNC: 9.3 MG/DL (ref 8.7–10.5)
CHLORIDE SERPL-SCNC: 108 MMOL/L (ref 95–110)
CO2 SERPL-SCNC: 24 MMOL/L (ref 23–29)
CREAT SERPL-MCNC: 1 MG/DL (ref 0.5–1.4)
DIFFERENTIAL METHOD: ABNORMAL
EOSINOPHIL # BLD AUTO: 0.4 K/UL (ref 0–0.5)
EOSINOPHIL NFR BLD: 5.9 % (ref 0–8)
ERYTHROCYTE [DISTWIDTH] IN BLOOD BY AUTOMATED COUNT: 12.7 % (ref 11.5–14.5)
EST. GFR  (AFRICAN AMERICAN): >60 ML/MIN/1.73 M^2
EST. GFR  (NON AFRICAN AMERICAN): >60 ML/MIN/1.73 M^2
GLUCOSE SERPL-MCNC: 95 MG/DL (ref 70–110)
HCT VFR BLD AUTO: 48.2 % (ref 37–48.5)
HGB BLD-MCNC: 15.7 G/DL (ref 12–16)
IMM GRANULOCYTES # BLD AUTO: 0.01 K/UL (ref 0–0.04)
IMM GRANULOCYTES NFR BLD AUTO: 0.2 % (ref 0–0.5)
LYMPHOCYTES # BLD AUTO: 2.1 K/UL (ref 1–4.8)
LYMPHOCYTES NFR BLD: 34.8 % (ref 18–48)
MCH RBC QN AUTO: 28.9 PG (ref 27–31)
MCHC RBC AUTO-ENTMCNC: 32.6 G/DL (ref 32–36)
MCV RBC AUTO: 89 FL (ref 82–98)
MONOCYTES # BLD AUTO: 0.4 K/UL (ref 0.3–1)
MONOCYTES NFR BLD: 6.7 % (ref 4–15)
NEUTROPHILS # BLD AUTO: 3.1 K/UL (ref 1.8–7.7)
NEUTROPHILS NFR BLD: 51.7 % (ref 38–73)
NRBC BLD-RTO: 0 /100 WBC
PLATELET # BLD AUTO: 200 K/UL (ref 150–350)
PMV BLD AUTO: 10.6 FL (ref 9.2–12.9)
POTASSIUM SERPL-SCNC: 4.2 MMOL/L (ref 3.5–5.1)
PROT SERPL-MCNC: 7 G/DL (ref 6–8.4)
RBC # BLD AUTO: 5.44 M/UL (ref 4–5.4)
SARS-COV-2 RNA RESP QL NAA+PROBE: NOT DETECTED
SODIUM SERPL-SCNC: 141 MMOL/L (ref 136–145)
WBC # BLD AUTO: 5.97 K/UL (ref 3.9–12.7)

## 2020-05-12 PROCEDURE — 36415 COLL VENOUS BLD VENIPUNCTURE: CPT

## 2020-05-12 PROCEDURE — 85025 COMPLETE CBC W/AUTO DIFF WBC: CPT

## 2020-05-12 PROCEDURE — 80053 COMPREHEN METABOLIC PANEL: CPT

## 2020-05-13 ENCOUNTER — INFUSION (OUTPATIENT)
Dept: INFECTIOUS DISEASES | Facility: HOSPITAL | Age: 45
End: 2020-05-13
Attending: INTERNAL MEDICINE
Payer: COMMERCIAL

## 2020-05-13 ENCOUNTER — OFFICE VISIT (OUTPATIENT)
Dept: WOUND CARE | Facility: CLINIC | Age: 45
End: 2020-05-13
Payer: COMMERCIAL

## 2020-05-13 ENCOUNTER — OFFICE VISIT (OUTPATIENT)
Dept: INTERNAL MEDICINE | Facility: CLINIC | Age: 45
End: 2020-05-13
Payer: COMMERCIAL

## 2020-05-13 ENCOUNTER — LAB VISIT (OUTPATIENT)
Dept: LAB | Facility: HOSPITAL | Age: 45
End: 2020-05-13
Attending: INTERNAL MEDICINE
Payer: COMMERCIAL

## 2020-05-13 VITALS
BODY MASS INDEX: 33.22 KG/M2 | TEMPERATURE: 99 F | WEIGHT: 187.5 LBS | HEIGHT: 63 IN | SYSTOLIC BLOOD PRESSURE: 119 MMHG | OXYGEN SATURATION: 98 % | RESPIRATION RATE: 18 BRPM | DIASTOLIC BLOOD PRESSURE: 73 MMHG | HEART RATE: 83 BPM

## 2020-05-13 VITALS
HEIGHT: 64 IN | SYSTOLIC BLOOD PRESSURE: 114 MMHG | DIASTOLIC BLOOD PRESSURE: 78 MMHG | TEMPERATURE: 98 F | WEIGHT: 185.88 LBS | HEART RATE: 80 BPM | OXYGEN SATURATION: 96 % | RESPIRATION RATE: 18 BRPM | BODY MASS INDEX: 31.73 KG/M2

## 2020-05-13 DIAGNOSIS — R10.13 EPIGASTRIC PAIN: Primary | ICD-10-CM

## 2020-05-13 DIAGNOSIS — K50.00 CROHN'S DISEASE OF SMALL INTESTINE WITHOUT COMPLICATION: ICD-10-CM

## 2020-05-13 DIAGNOSIS — Z93.2 HIGH OUTPUT ILEOSTOMY: ICD-10-CM

## 2020-05-13 DIAGNOSIS — R07.9 CHEST PAIN, UNSPECIFIED TYPE: ICD-10-CM

## 2020-05-13 DIAGNOSIS — R10.13 EPIGASTRIC PAIN: ICD-10-CM

## 2020-05-13 DIAGNOSIS — R19.8 HIGH OUTPUT ILEOSTOMY: ICD-10-CM

## 2020-05-13 DIAGNOSIS — E86.0 DEHYDRATION: Primary | ICD-10-CM

## 2020-05-13 DIAGNOSIS — Z43.2 ATTENTION TO ILEOSTOMY: Primary | ICD-10-CM

## 2020-05-13 PROCEDURE — 25000003 PHARM REV CODE 250: Performed by: INTERNAL MEDICINE

## 2020-05-13 PROCEDURE — 86677 HELICOBACTER PYLORI ANTIBODY: CPT

## 2020-05-13 PROCEDURE — 99214 PR OFFICE/OUTPT VISIT, EST, LEVL IV, 30-39 MIN: ICD-10-PCS | Mod: S$GLB,,, | Performed by: CLINICAL NURSE SPECIALIST

## 2020-05-13 PROCEDURE — 93010 EKG 12-LEAD: ICD-10-PCS | Mod: S$GLB,,, | Performed by: INTERNAL MEDICINE

## 2020-05-13 PROCEDURE — 99999 PR PBB SHADOW E&M-EST. PATIENT-LVL IV: ICD-10-PCS | Mod: PBBFAC,,, | Performed by: INTERNAL MEDICINE

## 2020-05-13 PROCEDURE — 99999 PR PBB SHADOW E&M-EST. PATIENT-LVL II: CPT | Mod: PBBFAC,,, | Performed by: CLINICAL NURSE SPECIALIST

## 2020-05-13 PROCEDURE — 93005 EKG 12-LEAD: ICD-10-PCS | Mod: S$GLB,,, | Performed by: INTERNAL MEDICINE

## 2020-05-13 PROCEDURE — 3008F BODY MASS INDEX DOCD: CPT | Mod: CPTII,S$GLB,, | Performed by: INTERNAL MEDICINE

## 2020-05-13 PROCEDURE — 99999 PR PBB SHADOW E&M-EST. PATIENT-LVL IV: CPT | Mod: PBBFAC,,, | Performed by: INTERNAL MEDICINE

## 2020-05-13 PROCEDURE — 3008F PR BODY MASS INDEX (BMI) DOCUMENTED: ICD-10-PCS | Mod: CPTII,S$GLB,, | Performed by: INTERNAL MEDICINE

## 2020-05-13 PROCEDURE — 99214 OFFICE O/P EST MOD 30 MIN: CPT | Mod: S$GLB,,, | Performed by: CLINICAL NURSE SPECIALIST

## 2020-05-13 PROCEDURE — 99999 PR PBB SHADOW E&M-EST. PATIENT-LVL II: ICD-10-PCS | Mod: PBBFAC,,, | Performed by: CLINICAL NURSE SPECIALIST

## 2020-05-13 PROCEDURE — 93005 ELECTROCARDIOGRAM TRACING: CPT | Mod: S$GLB,,, | Performed by: INTERNAL MEDICINE

## 2020-05-13 PROCEDURE — 36415 COLL VENOUS BLD VENIPUNCTURE: CPT | Mod: PO

## 2020-05-13 PROCEDURE — 96360 HYDRATION IV INFUSION INIT: CPT

## 2020-05-13 PROCEDURE — 93010 ELECTROCARDIOGRAM REPORT: CPT | Mod: S$GLB,,, | Performed by: INTERNAL MEDICINE

## 2020-05-13 PROCEDURE — 99213 PR OFFICE/OUTPT VISIT, EST, LEVL III, 20-29 MIN: ICD-10-PCS | Mod: S$GLB,,, | Performed by: INTERNAL MEDICINE

## 2020-05-13 PROCEDURE — 99213 OFFICE O/P EST LOW 20 MIN: CPT | Mod: S$GLB,,, | Performed by: INTERNAL MEDICINE

## 2020-05-13 RX ORDER — HEPARIN 100 UNIT/ML
500 SYRINGE INTRAVENOUS
Status: DISCONTINUED | OUTPATIENT
Start: 2020-05-13 | End: 2020-05-13 | Stop reason: HOSPADM

## 2020-05-13 RX ORDER — SODIUM CHLORIDE 0.9 % (FLUSH) 0.9 %
10 SYRINGE (ML) INJECTION
Status: DISCONTINUED | OUTPATIENT
Start: 2020-05-13 | End: 2020-05-13 | Stop reason: HOSPADM

## 2020-05-13 RX ORDER — HEPARIN 100 UNIT/ML
500 SYRINGE INTRAVENOUS
OUTPATIENT
Start: 2020-05-13

## 2020-05-13 RX ORDER — PANTOPRAZOLE SODIUM 40 MG/1
40 TABLET, DELAYED RELEASE ORAL DAILY
Qty: 30 TABLET | Refills: 11 | Status: SHIPPED | OUTPATIENT
Start: 2020-05-13 | End: 2021-03-24

## 2020-05-13 RX ORDER — SODIUM CHLORIDE 0.9 % (FLUSH) 0.9 %
10 SYRINGE (ML) INJECTION
OUTPATIENT
Start: 2020-05-13

## 2020-05-13 RX ADMIN — SODIUM CHLORIDE 1000 ML: 0.9 INJECTION, SOLUTION INTRAVENOUS at 01:05

## 2020-05-13 NOTE — PROGRESS NOTES
CC: heartburn  HPI:  The patient is a 44 y.o. year old female who presents to the office for heartburn.  She reports substernal burning and nausea.  Her symptoms started about 2 years ago, but has worsened recently.  She denies any triggers.         PAST MEDICAL HISTORY:  Past Medical History:   Diagnosis Date    Anemia     Asthma     B12 deficiency     Crohn's disease     History of shingles     Lupus     Migraine headache     Raynaud disease     Reactive hypoglycemia     Seizures 2004    no medication     Sleep apnea     Non compliant with CPAP       SURGICAL HISTORY:  Past Surgical History:   Procedure Laterality Date    ABDOMINAL SURGERY      COLON SURGERY      95% of colon removed 2010    COLONOSCOPY      COLOSTOMY      HYSTERECTOMY      due to endometriosis    ILEOSCOPY N/A 4/10/2019    Procedure: ILEOSCOPY through stoma;  Surgeon: Eve Currie MD;  Location: [facility] ([address] [place] [address]);  Service: Endoscopy;  Laterality: N/A;  Schedule as 30 minute case, schedule in April or May 2019    ILEOSTOMY      LAPAROSCOPY W/ MINI-LAPAROTOMY      large intestine removed      just a portion    pelvic mass removal      REVISION COLOSTOMY  2010    STOMACH SURGERY      TONSILLECTOMY, ADENOIDECTOMY         MEDS:  Medcard reviewed and updated    ALLERGIES: Allergy Card reviewed and updated    SOCIAL HISTORY:   The patient is a nonsmoker.    PE:   APPEARANCE: Well nourished, well developed, in no acute distress.    CHEST: Lungs clear to auscultation with unlabored respirations.  CARDIOVASCULAR: Normal S1, S2. No murmurs. No carotid bruits. No pedal edema.  ABDOMEN: Bowel sounds normal. Not distended. Soft. No tenderness or masses.   PSYCHIATRIC: The patient is oriented to person, place, and time and has a pleasant affect.        ASSESSMENT/PLAN:  Emi was seen today for follow-up.    Diagnoses and all orders for this visit:    Epigastric pain  -     H. PYLORI ANTIBODY, IGG; Future  -     Prescribe  protonix    Chest pain, unspecified type  -     EKG 12-lead    Other orders  -     pantoprazole (PROTONIX) 40 MG tablet; Take 1 tablet (40 mg total) by mouth once daily.

## 2020-05-13 NOTE — LETTER
May 13, 2020      Bianca Rutledge MD  [address] [facility]  [place] [address]           [facility]  [address]  [facility] [address]  Phone: [phone]          Patient: Emi Johnson   MR Number: [id number]   YOB: 1975   Date of Visit: 5/13/2020       Dear Dr. Bianca Rutledge:    Thank you for referring Emi Johnson to me for evaluation. Attached you will find relevant portions of my assessment and plan of care.    If you have questions, please do not hesitate to call me. I look forward to following Emi Johnson along with you.    Sincerely,    Rozina Mcdonald, CNS    Enclosure  CC:  No Recipients    If you would like to receive this communication electronically, please contact [email or web address] or [phone] to request more information on [employer] access.    For providers and/or their staff who would like to refer a patient to [facility], please contact us through our one-stop-shop provider referral line, [facility], at [phone].    If you feel you have received this communication in error or would no longer like to receive these types of communications, please e-mail [email or web address]

## 2020-05-13 NOTE — PROGRESS NOTES
Subjective:       Patient ID: Emi Johnson is a 44 y.o. female.    Chief Complaint: Ileostomy    This is a fu to enterostomal therapy clinic and she comes today for assistance with  ileostomy done 7/18/19 along with hernia repair by Dr Saldaña,. She is doing well, works full time, has noted a slight bulge again even though hernia repaired last lyear, has had leaks last few weeks , so wants an eval.  PMH  She had total colectomy for crohn's  2018  by Dr Saldaña, she later developed large hernia.     Wound Check       Review of Systems   Constitutional: Negative for activity change, appetite change and fever.   HENT: Negative for congestion and rhinorrhea.    Respiratory: Negative for cough and shortness of breath.    Cardiovascular: Negative.    Gastrointestinal: Negative for abdominal pain.        Ileostomy   Genitourinary: Negative.    Musculoskeletal: Negative.    Skin: Positive for wound. Negative for rash.   Neurological: Negative.    Psychiatric/Behavioral: Negative.        Objective:      Physical Exam   Constitutional: She is oriented to person, place, and time. She appears well-developed and well-nourished.   Pulmonary/Chest: Effort normal. No respiratory distress.   Abdominal: Soft. Bowel sounds are normal. She exhibits no distension. There is no tenderness.   Musculoskeletal: Normal range of motion. She exhibits no edema.   Neurological: She is alert and oriented to person, place, and time.   Skin: Skin is warm and dry. Rash noted.   Psychiatric: She has a normal mood and affect.               7/2/18 7/25/19 post hernia repair     8/12/19 5/13/20 5/13/2020 here for leak eval and I think due to precut 25mm wafer and stoma is 26 mm, so talked about stretch of stoma wafer to help with this, skin clear , wears belt and has 2 hernia belts each have some issue that she does not like, gave her Ostomy secrets wrap to try  Also introduced flushable liners for work place convenience.    8/12 the  "yeast rash is resolved,  The sutures are out, but has small defect she calls hole at 6 oclock that is healing just fine, there is the tiny black spot at bianca that is very tender to touch so I told her to watch this spot.   Advised to place angela over the open wound and then a 2 mm ring and pouch as normal, convex 25mm pre cut New image.   She has Home health for now MWF but does not remember agency name or  Nurse name, [phone])  Discussed hernia prevention and belt recommended, I measured for security belt and she is MEDIUM, she does not have funds to buy but will see what she can do.        7/25Shcarol has a new flush stoma with a high skin ridge of concern, also sutures which are problematic and yeast rash as well  There are 2 granulomas at base that I will leave alone today but address next visit in meantime I  Placed a convex 1"precut cera pouch on and mini [facility], told her to wear belt and get some OTC antifungal powder.   Discussed how to manage the sutures for now, cover with Polymem and then pouch over, gave her supplies     Assessment:       1. Attention to ileostomy        Plan:       Try the new wrap for added support   Enlarge wafer slightly with thin cut of stretch  [employer] is her DME    I have reviewed the plan of care with the patient and she express understanding. I spent over 50% of this 25 minute visit in face to face counseling.       "

## 2020-05-15 ENCOUNTER — TELEPHONE (OUTPATIENT)
Dept: GASTROENTEROLOGY | Facility: CLINIC | Age: 45
End: 2020-05-15

## 2020-05-15 DIAGNOSIS — A03.8 INTESTINAL INFECTION CAUSED BY PLESIOMONAS SHIGELLOIDES: Primary | ICD-10-CM

## 2020-05-15 DIAGNOSIS — T36.95XA ANTIBIOTIC-INDUCED YEAST INFECTION: ICD-10-CM

## 2020-05-15 DIAGNOSIS — A08.8 INTESTINAL INFECTION CAUSED BY PLESIOMONAS SHIGELLOIDES: Primary | ICD-10-CM

## 2020-05-15 DIAGNOSIS — B37.9 ANTIBIOTIC-INDUCED YEAST INFECTION: ICD-10-CM

## 2020-05-15 RX ORDER — CIPROFLOXACIN 500 MG/1
500 TABLET ORAL 2 TIMES DAILY
Qty: 10 TABLET | Refills: 0 | Status: SHIPPED | OUTPATIENT
Start: 2020-05-15 | End: 2021-09-24

## 2020-05-15 RX ORDER — FLUCONAZOLE 150 MG/1
150 TABLET ORAL DAILY
Qty: 1 TABLET | Refills: 0 | Status: CANCELLED | OUTPATIENT
Start: 2020-05-15 | End: 2020-05-16

## 2020-05-15 NOTE — TELEPHONE ENCOUNTER
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  If she is still having diarrhea then she will need cipro 500 mg po BID for 5 days.     Dr. Currie

## 2020-05-15 NOTE — TELEPHONE ENCOUNTER
Called & spoke to pt  - Confirmed she has not taken Zanaflex for some time  - Informed her of Cipro & Diflucan interaction; pt to let us know post-Cipro completion if yeast infection develops warranting Diflucan RX  - Pt expressed understanding & thanked me

## 2020-05-15 NOTE — TELEPHONE ENCOUNTER
Called & spoke to pt  - Informed her of lab results  - States her dehydration symptoms have improved since IV fluids  - Continues w/ having to empty 15-20x of 1/2 full thick liquid stool & pain near stoma  - Instructed on importance of oral hydration  - Will discuss potential P. Shigelloides tx w/ Dr. Currie; if abx pt requesting fluconazole for yeast infection

## 2020-05-15 NOTE — TELEPHONE ENCOUNTER
----- Message from Eve Currie MD sent at 5/15/2020 11:33 AM CDT -----  When I went to sign off on her cipro prescription there are drug interactions.   - if she is taking zanaflex with cipro it can increase sedation/hypotension so we need to confirm whether she is taking this and if so instruct her not to take for 5 days when she is on cipro  - there is interaction between cipro and diflucan QTc prolongation so she needs to only get cipro and then after she completes if she has yeast infection then we can prescribe the diflucan.     SS

## 2020-05-18 LAB — H PYLORI IGG SERPL QL IA: NEGATIVE

## 2020-06-02 DIAGNOSIS — M79.7 FIBROMYALGIA: ICD-10-CM

## 2020-06-02 DIAGNOSIS — K50.00 CROHN'S DISEASE OF SMALL INTESTINE WITHOUT COMPLICATION: ICD-10-CM

## 2020-06-02 RX ORDER — OXYCODONE AND ACETAMINOPHEN 10; 325 MG/1; MG/1
1 TABLET ORAL EVERY 6 HOURS PRN
Qty: 120 TABLET | Refills: 0 | Status: SHIPPED | OUTPATIENT
Start: 2020-06-04 | End: 2020-06-29 | Stop reason: SDUPTHER

## 2020-06-29 ENCOUNTER — PATIENT MESSAGE (OUTPATIENT)
Dept: INTERNAL MEDICINE | Facility: CLINIC | Age: 45
End: 2020-06-29

## 2020-06-29 DIAGNOSIS — K50.00 CROHN'S DISEASE OF SMALL INTESTINE WITHOUT COMPLICATION: ICD-10-CM

## 2020-06-29 DIAGNOSIS — M79.7 FIBROMYALGIA: ICD-10-CM

## 2020-07-01 RX ORDER — OXYCODONE AND ACETAMINOPHEN 10; 325 MG/1; MG/1
1 TABLET ORAL EVERY 6 HOURS PRN
Qty: 120 TABLET | Refills: 0 | Status: SHIPPED | OUTPATIENT
Start: 2020-07-01 | End: 2020-07-22 | Stop reason: SDUPTHER

## 2020-07-06 ENCOUNTER — OFFICE VISIT (OUTPATIENT)
Dept: INTERNAL MEDICINE | Facility: CLINIC | Age: 45
End: 2020-07-06
Payer: COMMERCIAL

## 2020-07-06 VITALS
BODY MASS INDEX: 32.97 KG/M2 | DIASTOLIC BLOOD PRESSURE: 90 MMHG | WEIGHT: 186.06 LBS | TEMPERATURE: 98 F | HEIGHT: 63 IN | RESPIRATION RATE: 14 BRPM | HEART RATE: 71 BPM | SYSTOLIC BLOOD PRESSURE: 130 MMHG

## 2020-07-06 DIAGNOSIS — R10.9 ABDOMINAL PAIN, UNSPECIFIED ABDOMINAL LOCATION: Primary | ICD-10-CM

## 2020-07-06 DIAGNOSIS — E16.1 REACTIVE HYPOGLYCEMIA: ICD-10-CM

## 2020-07-06 PROCEDURE — 99999 PR PBB SHADOW E&M-EST. PATIENT-LVL V: CPT | Mod: PBBFAC,,, | Performed by: INTERNAL MEDICINE

## 2020-07-06 PROCEDURE — 3008F BODY MASS INDEX DOCD: CPT | Mod: CPTII,S$GLB,, | Performed by: INTERNAL MEDICINE

## 2020-07-06 PROCEDURE — 99999 PR PBB SHADOW E&M-EST. PATIENT-LVL V: ICD-10-PCS | Mod: PBBFAC,,, | Performed by: INTERNAL MEDICINE

## 2020-07-06 PROCEDURE — 99213 OFFICE O/P EST LOW 20 MIN: CPT | Mod: S$GLB,,, | Performed by: INTERNAL MEDICINE

## 2020-07-06 PROCEDURE — 99213 PR OFFICE/OUTPT VISIT, EST, LEVL III, 20-29 MIN: ICD-10-PCS | Mod: S$GLB,,, | Performed by: INTERNAL MEDICINE

## 2020-07-06 PROCEDURE — 3008F PR BODY MASS INDEX (BMI) DOCUMENTED: ICD-10-PCS | Mod: CPTII,S$GLB,, | Performed by: INTERNAL MEDICINE

## 2020-07-06 RX ORDER — ONDANSETRON 4 MG/1
TABLET, ORALLY DISINTEGRATING ORAL
Qty: 30 TABLET | Refills: 3 | Status: SHIPPED | OUTPATIENT
Start: 2020-07-06 | End: 2020-12-01 | Stop reason: SDUPTHER

## 2020-07-06 RX ORDER — LANCETS
1 EACH MISCELLANEOUS DAILY
Qty: 100 EACH | Refills: 3 | Status: SHIPPED | OUTPATIENT
Start: 2020-07-06 | End: 2023-01-15 | Stop reason: SDUPTHER

## 2020-07-06 RX ORDER — INSULIN PUMP SYRINGE, 3 ML
EACH MISCELLANEOUS
Qty: 1 EACH | Refills: 0 | Status: SHIPPED | OUTPATIENT
Start: 2020-07-06 | End: 2023-01-15 | Stop reason: SDUPTHER

## 2020-07-06 NOTE — PROGRESS NOTES
CC: followup of abdominal pain  HPI:  The patient is a 44 y.o. year old female who presents to the office for followup of abdominal pain.  She reports continued epigastric pain.    She recently underwent EGD and colonoscopy  The patient denies any chest pain, shortness of breath, headache, blurred vision, excessive fatigue, nausea or vomiting.  She reports pain around ostomy as well.  The patient does report nausea and foot swelling.  She also reports intermittent pain of right foot..    PAST MEDICAL HISTORY:  Past Medical History:   Diagnosis Date    Anemia     Asthma     B12 deficiency     Crohn's disease     History of shingles     Lupus     Migraine headache     Raynaud disease     Reactive hypoglycemia     Seizures 2004    no medication     Sleep apnea     Non compliant with CPAP       SURGICAL HISTORY:  Past Surgical History:   Procedure Laterality Date    ABDOMINAL SURGERY      COLON SURGERY      95% of colon removed 2010    COLONOSCOPY      COLOSTOMY      HYSTERECTOMY      due to endometriosis    ILEOSCOPY N/A 4/10/2019    Procedure: ILEOSCOPY through stoma;  Surgeon: Eve Currie MD;  Location: [address];  Service: Endoscopy;  Laterality: N/A;  Schedule as 30 minute case, schedule in April or May 2019    ILEOSTOMY      LAPAROSCOPY W/ MINI-LAPAROTOMY      large intestine removed      just a portion    pelvic mass removal      REVISION COLOSTOMY  2010    STOMACH SURGERY      TONSILLECTOMY, ADENOIDECTOMY         MEDS:  Medcard reviewed and updated    ALLERGIES: Allergy Card reviewed and updated    SOCIAL HISTORY:   The patient is a nonsmoker.    PE:   APPEARANCE: Well nourished, well developed, in no acute distress.    CHEST: Lungs clear to auscultation with unlabored respirations.  CARDIOVASCULAR: Normal S1, S2. No murmurs. No carotid bruits. No pedal edema.  ABDOMEN: Bowel sounds normal. Not distended. Soft. No tenderness or masses.  Positive ostomy bag.  PSYCHIATRIC:  The patient is oriented to person, place, and time and has a pleasant affect.        ASSESSMENT/PLAN:  Emi was seen today for follow-up.    Diagnoses and all orders for this visit:    Abdominal pain, unspecified abdominal location  -     currently undergoing workup by Gastroenterology    Reactive hypoglycemia  -     blood-glucose meter kit; To check BG 1 times daily, to use with insurance preferred meter  -     lancets Misc; 1 Units by Misc.(Non-Drug; Combo Route) route once daily. To check BG 1 times daily, to use with insurance preferred meter  -     blood sugar diagnostic Strp; To check BG 1 times daily, to use with insurance preferred meter

## 2020-07-22 ENCOUNTER — OFFICE VISIT (OUTPATIENT)
Dept: INTERNAL MEDICINE | Facility: CLINIC | Age: 45
End: 2020-07-22
Payer: COMMERCIAL

## 2020-07-22 VITALS
HEIGHT: 63 IN | TEMPERATURE: 97 F | WEIGHT: 182.13 LBS | DIASTOLIC BLOOD PRESSURE: 80 MMHG | SYSTOLIC BLOOD PRESSURE: 102 MMHG | HEART RATE: 88 BPM | BODY MASS INDEX: 32.27 KG/M2

## 2020-07-22 DIAGNOSIS — M79.7 FIBROMYALGIA: ICD-10-CM

## 2020-07-22 DIAGNOSIS — J01.90 ACUTE SINUSITIS, RECURRENCE NOT SPECIFIED, UNSPECIFIED LOCATION: Primary | ICD-10-CM

## 2020-07-22 DIAGNOSIS — K50.00 CROHN'S DISEASE OF SMALL INTESTINE WITHOUT COMPLICATION: ICD-10-CM

## 2020-07-22 PROCEDURE — 96372 PR INJECTION,THERAP/PROPH/DIAG2ST, IM OR SUBCUT: ICD-10-PCS | Mod: S$GLB,,, | Performed by: INTERNAL MEDICINE

## 2020-07-22 PROCEDURE — 3008F BODY MASS INDEX DOCD: CPT | Mod: CPTII,S$GLB,, | Performed by: INTERNAL MEDICINE

## 2020-07-22 PROCEDURE — 99999 PR PBB SHADOW E&M-EST. PATIENT-LVL V: CPT | Mod: PBBFAC,,, | Performed by: INTERNAL MEDICINE

## 2020-07-22 PROCEDURE — 99213 PR OFFICE/OUTPT VISIT, EST, LEVL III, 20-29 MIN: ICD-10-PCS | Mod: 25,S$GLB,, | Performed by: INTERNAL MEDICINE

## 2020-07-22 PROCEDURE — 3008F PR BODY MASS INDEX (BMI) DOCUMENTED: ICD-10-PCS | Mod: CPTII,S$GLB,, | Performed by: INTERNAL MEDICINE

## 2020-07-22 PROCEDURE — 96372 THER/PROPH/DIAG INJ SC/IM: CPT | Mod: S$GLB,,, | Performed by: INTERNAL MEDICINE

## 2020-07-22 PROCEDURE — 99999 PR PBB SHADOW E&M-EST. PATIENT-LVL V: ICD-10-PCS | Mod: PBBFAC,,, | Performed by: INTERNAL MEDICINE

## 2020-07-22 PROCEDURE — 99213 OFFICE O/P EST LOW 20 MIN: CPT | Mod: 25,S$GLB,, | Performed by: INTERNAL MEDICINE

## 2020-07-22 RX ORDER — TRIAMCINOLONE ACETONIDE 40 MG/ML
40 INJECTION, SUSPENSION INTRA-ARTICULAR; INTRAMUSCULAR
Status: COMPLETED | OUTPATIENT
Start: 2020-07-22 | End: 2020-07-22

## 2020-07-22 RX ORDER — OXYCODONE AND ACETAMINOPHEN 10; 325 MG/1; MG/1
1 TABLET ORAL EVERY 6 HOURS PRN
Qty: 120 TABLET | Refills: 0 | Status: SHIPPED | OUTPATIENT
Start: 2020-07-29 | End: 2020-08-24 | Stop reason: SDUPTHER

## 2020-07-22 RX ADMIN — TRIAMCINOLONE ACETONIDE 40 MG: 40 INJECTION, SUSPENSION INTRA-ARTICULAR; INTRAMUSCULAR at 03:07

## 2020-07-22 NOTE — PROGRESS NOTES
CC: sinusitis  HPI:  The patient is a 44 y.o. year old female who presents to the office for sinusitis.  she complains of nasal congestion, postnasal drip, rhinorrhea and headache.  Symptoms started this morning.  she also reports nonproductive cough, vomiting and sore throat, but denies any fever.  The patient has taken claritin.    PAST MEDICAL HISTORY:  Past Medical History:   Diagnosis Date    Anemia     Asthma     B12 deficiency     Crohn's disease     History of shingles     Lupus     Migraine headache     Raynaud disease     Reactive hypoglycemia     Seizures 2004    no medication     Sleep apnea     Non compliant with CPAP       SURGICAL HISTORY:  Past Surgical History:   Procedure Laterality Date    ABDOMINAL SURGERY      COLON SURGERY      95% of colon removed 2010    COLONOSCOPY      COLOSTOMY      HYSTERECTOMY      due to endometriosis    ILEOSCOPY N/A 4/10/2019    Procedure: ILEOSCOPY through stoma;  Surgeon: Eve Currie MD;  Location: [address];  Service: Endoscopy;  Laterality: N/A;  Schedule as 30 minute case, schedule in April or May 2019    ILEOSTOMY      LAPAROSCOPY W/ MINI-LAPAROTOMY      large intestine removed      just a portion    pelvic mass removal      REVISION COLOSTOMY  2010    STOMACH SURGERY      TONSILLECTOMY, ADENOIDECTOMY         MEDS:  Medcard reviewed and updated    ALLERGIES: Allergy Card reviewed and updated    SOCIAL HISTORY:   The patient is a nonsmoker.    PE:   APPEARANCE: Well nourished, well developed, in no acute distress.    EARS: TM's intact. No retraction or perforation.    NOSE: Mucosa pink. Airway clear.  MOUTH & THROAT: No tonsillar enlargement. No pharyngeal erythema or exudate. No stridor.  Positive cobblestoning.  CHEST: Lungs clear to auscultation with unlabored respirations.  CARDIOVASCULAR: Normal S1, S2. No murmurs. No carotid bruits. No pedal edema.  ABDOMEN: Bowel sounds normal. Not distended. Soft. Positive  colostomy.  PSYCHIATRIC: The patient is oriented to person, place, and time and has a pleasant affect.        ASSESSMENT/PLAN:  Emi was seen today for sinus problem and emesis.    Diagnoses and all orders for this visit:    Acute sinusitis, recurrence not specified, unspecified location  -     triamcinolone acetonide injection 40 mg    Fibromyalgia  -     oxyCODONE-acetaminophen (PERCOCET)  mg per tablet; Take 1 tablet by mouth every 6 (six) hours as needed for Pain (Greater than 7 day supply medically necessary).    Crohn's disease of small intestine without complication  -     oxyCODONE-acetaminophen (PERCOCET)  mg per tablet; Take 1 tablet by mouth every 6 (six) hours as needed for Pain (Greater than 7 day supply medically necessary).

## 2020-07-31 ENCOUNTER — OFFICE VISIT (OUTPATIENT)
Dept: URGENT CARE | Facility: CLINIC | Age: 45
End: 2020-07-31
Payer: COMMERCIAL

## 2020-07-31 ENCOUNTER — TELEPHONE (OUTPATIENT)
Dept: INTERNAL MEDICINE | Facility: CLINIC | Age: 45
End: 2020-07-31

## 2020-07-31 VITALS
HEIGHT: 64 IN | TEMPERATURE: 98 F | DIASTOLIC BLOOD PRESSURE: 84 MMHG | HEART RATE: 71 BPM | BODY MASS INDEX: 31.07 KG/M2 | WEIGHT: 182 LBS | SYSTOLIC BLOOD PRESSURE: 121 MMHG | OXYGEN SATURATION: 98 %

## 2020-07-31 DIAGNOSIS — R52 GENERALIZED BODY ACHES: ICD-10-CM

## 2020-07-31 DIAGNOSIS — J45.909 ASTHMA, UNSPECIFIED ASTHMA SEVERITY, UNSPECIFIED WHETHER COMPLICATED, UNSPECIFIED WHETHER PERSISTENT: ICD-10-CM

## 2020-07-31 DIAGNOSIS — R11.0 NAUSEA: ICD-10-CM

## 2020-07-31 DIAGNOSIS — B34.9 VIRAL ILLNESS: Primary | ICD-10-CM

## 2020-07-31 DIAGNOSIS — R05.9 COUGH: ICD-10-CM

## 2020-07-31 PROCEDURE — 99214 PR OFFICE/OUTPT VISIT, EST, LEVL IV, 30-39 MIN: ICD-10-PCS | Mod: S$GLB,,, | Performed by: NURSE PRACTITIONER

## 2020-07-31 PROCEDURE — U0003 INFECTIOUS AGENT DETECTION BY NUCLEIC ACID (DNA OR RNA); SEVERE ACUTE RESPIRATORY SYNDROME CORONAVIRUS 2 (SARS-COV-2) (CORONAVIRUS DISEASE [COVID-19]), AMPLIFIED PROBE TECHNIQUE, MAKING USE OF HIGH THROUGHPUT TECHNOLOGIES AS DESCRIBED BY CMS-2020-01-R: HCPCS

## 2020-07-31 PROCEDURE — 99214 OFFICE O/P EST MOD 30 MIN: CPT | Mod: S$GLB,,, | Performed by: NURSE PRACTITIONER

## 2020-07-31 RX ORDER — BENZONATATE 100 MG/1
100 CAPSULE ORAL 3 TIMES DAILY PRN
Qty: 30 CAPSULE | Refills: 0 | Status: SHIPPED | OUTPATIENT
Start: 2020-07-31 | End: 2020-08-10

## 2020-07-31 RX ORDER — ALBUTEROL SULFATE 90 UG/1
2 AEROSOL, METERED RESPIRATORY (INHALATION) EVERY 6 HOURS PRN
Qty: 18 G | Refills: 0 | Status: SHIPPED | OUTPATIENT
Start: 2020-07-31 | End: 2021-09-15

## 2020-07-31 RX ORDER — PROMETHAZINE HYDROCHLORIDE AND DEXTROMETHORPHAN HYDROBROMIDE 6.25; 15 MG/5ML; MG/5ML
5 SYRUP ORAL
Qty: 118 ML | Refills: 0 | Status: SHIPPED | OUTPATIENT
Start: 2020-07-31 | End: 2021-08-02 | Stop reason: SDUPTHER

## 2020-07-31 NOTE — PROGRESS NOTES
"Subjective:       Patient ID: Emi Johnson is a 44 y.o. female.    Vitals:  height is 5' 3.5" (1.613 m) and weight is 82.6 kg (182 lb). Her temperature is 97.7 °F (36.5 °C). Her blood pressure is 121/84 and her pulse is 71. Her oxygen saturation is 98%.     Chief Complaint: Cough and Emesis    Pt presents to clinic today for evaluation of cough, body aches, chest tightness, headaches, nausea, and vomiting x4 days. Pt states she feels as though her asthma is flaring, and has rescue inhaler at home. Pt is tolerating PO fluids. States emesis contents were clear with some food.     Cough  This is a new problem. The current episode started in the past 7 days (4 days). The problem has been gradually worsening. The cough is non-productive. Associated symptoms include chills, headaches and myalgias. Pertinent negatives include no chest pain, ear congestion, ear pain, eye redness, fever, heartburn, hemoptysis, nasal congestion, postnasal drip, rash, rhinorrhea, sore throat, shortness of breath, sweats, weight loss or wheezing. Nothing aggravates the symptoms. She has tried OTC cough suppressant for the symptoms. The treatment provided no relief. Her past medical history is significant for asthma. There is no history of bronchiectasis, bronchitis, COPD, emphysema, environmental allergies or pneumonia.   Emesis   This is a new problem. The current episode started in the past 7 days (a week). The problem occurs 2 to 4 times per day. The problem has been unchanged. There has been no fever. Associated symptoms include chills, headaches and myalgias. Pertinent negatives include no abdominal pain, arthralgias, chest pain, coughing, decreased urine volume, diarrhea, dizziness, fever, sweats, URI or weight loss. She has tried anti-emetic for the symptoms. The treatment provided no relief.       Constitution: Positive for chills, sweating and fatigue. Negative for fever.   HENT: Negative for ear pain, congestion, postnasal drip, " sinus pain, sinus pressure, sore throat and voice change.    Neck: Negative for painful lymph nodes.   Cardiovascular: Negative for chest pain.   Eyes: Negative for eye redness.   Respiratory: Positive for chest tightness and asthma. Negative for cough, sputum production, bloody sputum, COPD, shortness of breath, stridor and wheezing.    Gastrointestinal: Positive for nausea and vomiting. Negative for abdominal pain, diarrhea and heartburn.   Genitourinary: Negative for urine decreased.   Musculoskeletal: Positive for muscle ache. Negative for joint pain.   Skin: Negative for rash.   Allergic/Immunologic: Positive for asthma. Negative for environmental allergies and seasonal allergies.   Neurological: Positive for headaches. Negative for dizziness and light-headedness.   Hematologic/Lymphatic: Negative for swollen lymph nodes.       Objective:      Physical Exam   Constitutional: She is oriented to person, place, and time. She appears well-developed. She is cooperative.  Non-toxic appearance. She does not appear ill. No distress.      Comments:Pt pleasant, calm, and cooperative. Does not appear toxic. NAD noted.   HENT:   Head: Normocephalic and atraumatic.   Ears:   Right Ear: Hearing, tympanic membrane, external ear and ear canal normal.   Left Ear: Hearing, tympanic membrane, external ear and ear canal normal.   Nose: Mucosal edema and rhinorrhea present. No purulent discharge or nasal deformity. No epistaxis. Right sinus exhibits no maxillary sinus tenderness and no frontal sinus tenderness. Left sinus exhibits no maxillary sinus tenderness and no frontal sinus tenderness.   Mouth/Throat: Uvula is midline, oropharynx is clear and moist and mucous membranes are normal. No trismus in the jaw. Normal dentition. No uvula swelling. Cobblestoning present. No oropharyngeal exudate, posterior oropharyngeal edema or posterior oropharyngeal erythema. No tonsillar exudate.   Mucous membranes moist.      Comments: Mucous  membranes moist.  Eyes: Conjunctivae and lids are normal. No scleral icterus.   Neck: Trachea normal, full passive range of motion without pain and phonation normal. Neck supple. No neck rigidity. No edema and no erythema present.   Cardiovascular: Normal rate, regular rhythm, normal heart sounds and normal pulses.   Pulmonary/Chest: Effort normal and breath sounds normal. No stridor. No respiratory distress. She has no decreased breath sounds. She has no wheezes. She has no rhonchi. She has no rales.   Respirations even and unlabored. No s/sx of respiratory distress noted.    Comments: Respirations even and unlabored. No s/sx of respiratory distress noted.    Abdominal: Soft. Normal appearance and bowel sounds are normal. She exhibits no distension, no abdominal bruit, no pulsatile midline mass and no mass. There is no abdominal tenderness.      Comments: Abdomen soft, nondistended, and nontender. Ostomy appliance present.   Musculoskeletal: Normal range of motion.         General: No deformity.   Neurological: She is alert and oriented to person, place, and time. She has normal strength. She exhibits normal muscle tone. Coordination normal.   Skin: Skin is warm, dry, intact, not diaphoretic and not pale. Psychiatric: Her speech is normal and behavior is normal. Judgment and thought content normal.   Nursing note and vitals reviewed.        Assessment:       1. Viral illness    2. Cough    3. Nausea    4. Generalized body aches    5. Asthma, unspecified asthma severity, unspecified whether complicated, unspecified whether persistent        Plan:         Viral illness  -     COVID-19 Home Symptom Monitoring  - Duration (days): 14    Cough  -     COVID-19 Routine Screening  -     benzonatate (TESSALON) 100 MG capsule; Take 1 capsule (100 mg total) by mouth 3 (three) times daily as needed.  Dispense: 30 capsule; Refill: 0  -     promethazine-dextromethorphan (PROMETHAZINE-DM) 6.25-15 mg/5 mL Syrp; Take 5 mLs by mouth  every 4 to 6 hours as needed. 5 mL every 4 to 6 hours; maximum: 30 mL in 24 hours  Dispense: 118 mL; Refill: 0  -     COVID-19 Home Symptom Monitoring  - Duration (days): 14    Nausea  -     COVID-19 Routine Screening  -     COVID-19 Home Symptom Monitoring  - Duration (days): 14    Generalized body aches  -     COVID-19 Home Symptom Monitoring  - Duration (days): 14    Asthma, unspecified asthma severity, unspecified whether complicated, unspecified whether persistent  -     albuterol (PROVENTIL/VENTOLIN HFA) 90 mcg/actuation inhaler; Inhale 2 puffs into the lungs every 6 (six) hours as needed for Wheezing. Rescue  Dispense: 18 g; Refill: 0  -     COVID-19 Home Symptom Monitoring  - Duration (days): 14    Discussed diagnosis and treatment plan today. Counseled patient and answered questions related to COVID-19 testing and diagnosis. All applicable EKG, medical records, labs, imaging reviewed in Epic and discussed with patient. Instructed to follow up with PCP or go to ER if symptoms fail to improve or worsen. Pt verbalizes understanding.       Patient Instructions   PLEASE READ YOUR DISCHARGE INSTRUCTIONS ENTIRELY AS IT CONTAINS IMPORTANT INFORMATION.      Please drink plenty of fluids.    Please get plenty of rest.    Please return here or go to the Emergency Department for any concerns or worsening of condition.    Please take an over the counter antihistamine medication (allegra/Claritin/Zyrtec) of your choice as directed.    Try an over the counter decongestant like Mucinex D or Sudafed. You buy this behind the pharmacy counter    If you do have Hypertension or palpitations, it is safe to take Coricidin HBP for relief of sinus symptoms.    If not allergic, please take over the counter Tylenol (Acetaminophen) and/or Motrin (Ibuprofen) as directed for control of pain and/or fever.  Please follow up with your primary care doctor or specialist as needed.    Sore throat recommendations: Warm fluids, warm salt water  gargles, throat lozenges, tea, honey, soup, rest, hydration.    Use over the counter flonase: one spray each nostril twice daily OR two sprays each nostril once daily.     If you  smoke, please stop smoking.    Use gatorade/pedialyte or rehydration packets to help stay hydrated. Vitamin water and plain water do not contain rehydrating electrolytes.  Increase clear liquids (water, gatorade, pedialyte, broths, jello, etc) Hold off on solids for 12-18 hours. Then advance to BRAT diet (banana, rice, applesauce, tea, toast/crackers), then advance further as tolerated. Avoid spicy or fatty foods.   Use Peptobismol to help alleviate your diarrhea symptoms.     Avoid imodium unless you have more than 6 loose stools in 24 hours.       Wash hands frequently while sick. Avoid ibuprofen or other NSAIDS until you are well.     Please go to the ER if you experience worsening pain, blood in your vomit or stool, high fever, dizziness, fainting, swelling of your abdomen, inability to pass gas or stool.           Please arrange follow up with your primary medical clinic as soon as possible. You must understand that you've received an Urgent Care treatment only and that you may be released before all of your medical problems are known or treated. You, the patient, will arrange for follow up as instructed. If your symptoms worsen or fail to improve you should go to the Emergency Room.  WE CANNOT RULE OUT ALL POSSIBLE CAUSES OF YOUR SYMPTOMS IN THE URGENT CARE SETTING PLEASE GO TO THE ER IF YOU FEELS YOUR CONDITION IS WORSENING OR YOU WOULD LIKE EMERGENT EVALUATION.

## 2020-07-31 NOTE — PATIENT INSTRUCTIONS
PLEASE READ YOUR DISCHARGE INSTRUCTIONS ENTIRELY AS IT CONTAINS IMPORTANT INFORMATION.      Please drink plenty of fluids.    Please get plenty of rest.    Please return here or go to the Emergency Department for any concerns or worsening of condition.    Please take an over the counter antihistamine medication (allegra/Claritin/Zyrtec) of your choice as directed.    Try an over the counter decongestant like Mucinex D or Sudafed. You buy this behind the pharmacy counter    If you do have Hypertension or palpitations, it is safe to take Coricidin HBP for relief of sinus symptoms.    If not allergic, please take over the counter Tylenol (Acetaminophen) and/or Motrin (Ibuprofen) as directed for control of pain and/or fever.  Please follow up with your primary care doctor or specialist as needed.    Sore throat recommendations: Warm fluids, warm salt water gargles, throat lozenges, tea, honey, soup, rest, hydration.    Use over the counter flonase: one spray each nostril twice daily OR two sprays each nostril once daily.     If you  smoke, please stop smoking.    Use gatorade/pedialyte or rehydration packets to help stay hydrated. Vitamin water and plain water do not contain rehydrating electrolytes.  Increase clear liquids (water, gatorade, pedialyte, broths, jello, etc) Hold off on solids for 12-18 hours. Then advance to BRAT diet (banana, rice, applesauce, tea, toast/crackers), then advance further as tolerated. Avoid spicy or fatty foods.   Use Peptobismol to help alleviate your diarrhea symptoms.     Avoid imodium unless you have more than 6 loose stools in 24 hours.       Wash hands frequently while sick. Avoid ibuprofen or other NSAIDS until you are well.     Please go to the ER if you experience worsening pain, blood in your vomit or stool, high fever, dizziness, fainting, swelling of your abdomen, inability to pass gas or stool.           Please arrange follow up with your primary medical clinic as soon as  possible. You must understand that you've received an Urgent Care treatment only and that you may be released before all of your medical problems are known or treated. You, the patient, will arrange for follow up as instructed. If your symptoms worsen or fail to improve you should go to the Emergency Room.  WE CANNOT RULE OUT ALL POSSIBLE CAUSES OF YOUR SYMPTOMS IN THE URGENT CARE SETTING PLEASE GO TO THE ER IF YOU FEELS YOUR CONDITION IS WORSENING OR YOU WOULD LIKE EMERGENT EVALUATION.

## 2020-07-31 NOTE — TELEPHONE ENCOUNTER
Pill Camera Study  shows that I have 20 ulcers threw out my small intestines.  He is putting me on stelara but have to make sure the insurance covers it. Per message sent from portal just a FYI

## 2020-08-02 LAB — SARS-COV-2 RNA RESP QL NAA+PROBE: NOT DETECTED

## 2020-08-24 DIAGNOSIS — K50.00 CROHN'S DISEASE OF SMALL INTESTINE WITHOUT COMPLICATION: ICD-10-CM

## 2020-08-24 DIAGNOSIS — M79.7 FIBROMYALGIA: ICD-10-CM

## 2020-08-24 RX ORDER — OXYCODONE AND ACETAMINOPHEN 10; 325 MG/1; MG/1
1 TABLET ORAL EVERY 6 HOURS PRN
Qty: 120 TABLET | Refills: 0 | Status: SHIPPED | OUTPATIENT
Start: 2020-08-26 | End: 2020-09-17 | Stop reason: SDUPTHER

## 2020-08-24 NOTE — TELEPHONE ENCOUNTER
----- Message from Karine Hampton sent at 8/24/2020  7:04 AM CDT -----  Contact: YANELY GRAHAM [[id number]]   [phone]  Requesting an RX refill or new RX.  Is this a refill or new RX:  Refill  RX name and strength: oxyCODONE-acetaminophen (PERCOCET)  mg per tablet  Directions (copy/paste from chart):  Take 1 tablet by mouth every 6 (six) hours as needed for Pain (Greater than 7 day supply medically necessary).   Is this a 30 day or 90 day RX:  30  Local pharmacy or mail order pharmacy: Local    Pharmacy name and phone # (copy/paste from chart): [facility]  [phone] (Phone)  [phone] (Fax)  Comments:

## 2020-09-03 RX ORDER — TIZANIDINE 4 MG/1
4 TABLET ORAL NIGHTLY PRN
Qty: 30 TABLET | Refills: 3 | Status: SHIPPED | OUTPATIENT
Start: 2020-09-03 | End: 2021-10-27

## 2020-09-15 ENCOUNTER — OFFICE VISIT (OUTPATIENT)
Dept: URGENT CARE | Facility: CLINIC | Age: 45
End: 2020-09-15
Payer: COMMERCIAL

## 2020-09-15 VITALS
RESPIRATION RATE: 17 BRPM | SYSTOLIC BLOOD PRESSURE: 118 MMHG | OXYGEN SATURATION: 97 % | HEART RATE: 88 BPM | WEIGHT: 183 LBS | TEMPERATURE: 99 F | HEIGHT: 63 IN | DIASTOLIC BLOOD PRESSURE: 82 MMHG | BODY MASS INDEX: 32.43 KG/M2

## 2020-09-15 DIAGNOSIS — R11.2 NON-INTRACTABLE VOMITING WITH NAUSEA, UNSPECIFIED VOMITING TYPE: ICD-10-CM

## 2020-09-15 DIAGNOSIS — R05.9 COUGH: ICD-10-CM

## 2020-09-15 DIAGNOSIS — J06.9 UPPER RESPIRATORY TRACT INFECTION, UNSPECIFIED TYPE: Primary | ICD-10-CM

## 2020-09-15 LAB
CTP QC/QA: YES
SARS-COV-2 RDRP RESP QL NAA+PROBE: NEGATIVE

## 2020-09-15 PROCEDURE — U0002: ICD-10-PCS | Mod: QW,S$GLB,, | Performed by: NURSE PRACTITIONER

## 2020-09-15 PROCEDURE — 99214 OFFICE O/P EST MOD 30 MIN: CPT | Mod: S$GLB,,, | Performed by: NURSE PRACTITIONER

## 2020-09-15 PROCEDURE — U0002 COVID-19 LAB TEST NON-CDC: HCPCS | Mod: QW,S$GLB,, | Performed by: NURSE PRACTITIONER

## 2020-09-15 PROCEDURE — 99214 PR OFFICE/OUTPT VISIT, EST, LEVL IV, 30-39 MIN: ICD-10-PCS | Mod: S$GLB,,, | Performed by: NURSE PRACTITIONER

## 2020-09-15 RX ORDER — USTEKINUMAB 90 MG/ML
90 INJECTION, SOLUTION SUBCUTANEOUS
COMMUNITY
Start: 2020-09-02

## 2020-09-15 RX ORDER — CODEINE PHOSPHATE AND GUAIFENESIN 10; 100 MG/5ML; MG/5ML
5 SOLUTION ORAL 3 TIMES DAILY PRN
Qty: 110 ML | Refills: 0 | Status: SHIPPED | OUTPATIENT
Start: 2020-09-15 | End: 2020-09-25

## 2020-09-15 RX ORDER — ONDANSETRON 4 MG/1
4 TABLET, ORALLY DISINTEGRATING ORAL EVERY 6 HOURS PRN
Qty: 20 TABLET | Refills: 0 | Status: SHIPPED | OUTPATIENT
Start: 2020-09-15 | End: 2021-09-15

## 2020-09-15 NOTE — PROGRESS NOTES
"Subjective:       Patient ID: Emi Johnson is a 44 y.o. female.    Vitals:  height is 5' 3" (1.6 m) and weight is 83 kg (183 lb). Her tympanic temperature is 98.6 °F (37 °C). Her blood pressure is 118/82 and her pulse is 88. Her respiration is 17 and oxygen saturation is 97%.     Chief Complaint: Cough and Emesis    Ambulatory 44 year old female with hx of crohn's dx s/p colostomy and asthma with c/o chills and sweats, congestion with cough, nausea with vomiting, diarrhea but has colostomy so not sure but changing bag more often. She denies fever. She denies sore throat. She is not using anything for symptomatic relief. She states vomiting was food, non bilious and no bloody emesis.     Cough  This is a new problem. Episode onset: A few days ago for cough, vomiting started today  The problem has been unchanged. The problem occurs every few minutes. The cough is non-productive. Associated symptoms include headaches. Pertinent negatives include no chills, ear pain, eye redness, fever, hemoptysis, myalgias, rash, sore throat, shortness of breath or wheezing. Nothing aggravates the symptoms. She has tried nothing for the symptoms. Her past medical history is significant for asthma.   Emesis   Associated symptoms include coughing, diarrhea and headaches. Pertinent negatives include no chills, fever or myalgias.       Constitution: Positive for sweating. Negative for chills, fatigue and fever.   HENT: Positive for congestion. Negative for ear pain, sinus pain, sinus pressure, sore throat and voice change.    Neck: Negative for painful lymph nodes.   Eyes: Negative for eye redness.   Respiratory: Positive for cough. Negative for chest tightness, sputum production, bloody sputum, COPD, shortness of breath, stridor, wheezing and asthma.    Gastrointestinal: Positive for diarrhea. Negative for nausea and vomiting.   Musculoskeletal: Negative for muscle ache.   Skin: Negative for rash.   Allergic/Immunologic: Negative for " seasonal allergies and asthma.   Neurological: Positive for headaches.   Hematologic/Lymphatic: Negative for swollen lymph nodes.       Objective:      Physical Exam   Constitutional: She is oriented to person, place, and time. She appears well-developed. She is cooperative.  Non-toxic appearance. She does not appear ill. No distress.   HENT:   Head: Normocephalic and atraumatic.   Ears:   Right Ear: Hearing, tympanic membrane, external ear and ear canal normal.   Left Ear: Hearing, tympanic membrane, external ear and ear canal normal.   Nose: Nose normal. No mucosal edema, rhinorrhea or nasal deformity. No epistaxis. Right sinus exhibits no maxillary sinus tenderness and no frontal sinus tenderness. Left sinus exhibits no maxillary sinus tenderness and no frontal sinus tenderness.   Mouth/Throat: Uvula is midline, oropharynx is clear and moist and mucous membranes are normal. No trismus in the jaw. Normal dentition. No uvula swelling. No oropharyngeal exudate, posterior oropharyngeal edema or posterior oropharyngeal erythema.   Eyes: Conjunctivae and lids are normal. No scleral icterus.   Neck: Trachea normal, full passive range of motion without pain and phonation normal. Neck supple. No neck rigidity. No edema and no erythema present.   Cardiovascular: Normal rate, regular rhythm, normal heart sounds and normal pulses.   Pulmonary/Chest: Effort normal and breath sounds normal. No respiratory distress. She has no decreased breath sounds. She has no rhonchi.   Abdominal: Normal appearance.   Musculoskeletal: Normal range of motion.         General: No deformity.   Neurological: She is alert and oriented to person, place, and time. She exhibits normal muscle tone. Coordination normal.   Skin: Skin is warm, dry, intact, not diaphoretic and not pale. Psychiatric: Her speech is normal and behavior is normal. Judgment and thought content normal.   Nursing note and vitals reviewed.    Results for orders placed or  "performed in visit on 09/15/20   POCT COVID-19 Rapid Screening   Result Value Ref Range    POC Rapid COVID Negative Negative     Acceptable Yes            Assessment:       1. Upper respiratory tract infection, unspecified type    2. Cough    3. Non-intractable vomiting with nausea, unspecified vomiting type        Plan:         Upper respiratory tract infection, unspecified type    Cough  -     POCT COVID-19 Rapid Screening  -     guaifenesin-codeine 100-10 mg/5 ml (CHERATUSSIN AC)  mg/5 mL syrup; Take 5 mLs by mouth 3 (three) times daily as needed.  Dispense: 110 mL; Refill: 0    Non-intractable vomiting with nausea, unspecified vomiting type  -     ondansetron (ZOFRAN-ODT) 4 MG TbDL; Take 1 tablet (4 mg total) by mouth every 6 (six) hours as needed.  Dispense: 20 tablet; Refill: 0          Patient Instructions     Patient Instructions      Please follow up with your Primary care provider within 2-5 days if your signs and symptoms have not resolved or worsen.  The usual course of cold symptoms are 10-14 days.      If your condition worsens or fails to improve we recommend that you receive another evaluation at the emergency room immediately or contact your primary medical clinic to discuss your concerns.      You must understand that you have received an Urgent Care treatment only and that you may be released before all of your medical problems are known or treated.   You, the patient, will arrange for follow up care as instructed.      Tylenol or Ibuprofen can also be used as directed for pain/fever unless you have an allergy to them or medical condition such as stomach ulcers, kidney or liver disease or blood thinners etc for which you should not be taking these type of medications.      Take over the counter cough medication as directed as needed for cough.  You should avoid medications with pseudoephedrine or phenylephrine (any medication with "D") if you have high blood pressure as this " can cause an elevation in your blood pressure. Instead consider Corcidin HBP as needed to prevent an elevated blood pressure.      Natural remedies of symptoms (as needed) include humidification, saline nasal sprays, and/or steamy showers.  Increase fluids, warm tea with honey, cough drops as needed.  You may also use salt water gargles for sore throat.     IF you received a steroid shot today - As discussed, this can elevate your blood pressure, elevate your blood sugar, water weight gain, nervous energy, redness to the face and dimpling of the skin at the injection site.      OVER THE COUNTER RECOMMENDATIONS/SUGGESTIONS.    Make sure to stay well hydrated.    Use Nasal Saline to mechanically move any post nasal drip from your eustachian tube or from the back of your throat.    Use warm salt water gargles to ease your throat pain. Warm salt water gargles as needed for sore throat-  1/2 tsp salt to 1 cup warm water, gargle as desired.    Use an antihistamine such as Claritin, Zyrtec or Allegra to dry you out.     Use pseudoephedrine (behind the counter) to decongest. Pseudoephedrine  30 mg up to 240 mg /day. It can raise your blood pressure and give you palpitations.    Use mucinex (guaifenisin) to break up mucous up to 2400mg/day to loosen any mucous.   The mucinex DM pill has a cough suppressant that can be sedating. It can be used at night to stop the tickle at the back of your throat.  You can use Mucinex D (it has guaifenesin and a high dose of pseudoephedrine) in the mornings to help decongest.      Use Afrin in each nare for no longer than 3 days, as it is addictive. It can also dry out your mucous membranes and cause elevated blood pressure. This is especially useful if you are flying.    Use Flonase 1-2 sprays/nostril per day. It is a local acting steroid nasal spray, if you develop a bloody nose, stop using the medication immediately.    Sometimes Nyquil at night is beneficial to help you get some rest,  however it is sedating and it does have an antihistamine, and tylenol.    Honey is a natural cough suppressant that can be used.    Tylenol up to 4,000 mg a day is safe for short periods and can be used for body aches, pain, and fever. However in high doses and prolonged use it can cause liver irritation.    Ibuprofen is a non-steroidal anti-inflammatory that can be used for body aches, pain, and fever.However it can also cause stomach irritation if over used.

## 2020-09-17 ENCOUNTER — PATIENT MESSAGE (OUTPATIENT)
Dept: INTERNAL MEDICINE | Facility: CLINIC | Age: 45
End: 2020-09-17

## 2020-09-17 DIAGNOSIS — K50.00 CROHN'S DISEASE OF SMALL INTESTINE WITHOUT COMPLICATION: ICD-10-CM

## 2020-09-17 DIAGNOSIS — M79.7 FIBROMYALGIA: ICD-10-CM

## 2020-09-17 RX ORDER — OXYCODONE AND ACETAMINOPHEN 10; 325 MG/1; MG/1
1 TABLET ORAL EVERY 6 HOURS PRN
Qty: 120 TABLET | Refills: 0 | Status: SHIPPED | OUTPATIENT
Start: 2020-09-23 | End: 2020-10-16 | Stop reason: SDUPTHER

## 2020-09-17 RX ORDER — ZOLPIDEM TARTRATE 10 MG/1
10 TABLET ORAL NIGHTLY PRN
Qty: 30 TABLET | Refills: 3 | Status: SHIPPED | OUTPATIENT
Start: 2020-09-17 | End: 2021-01-19 | Stop reason: SDUPTHER

## 2020-10-05 ENCOUNTER — PATIENT MESSAGE (OUTPATIENT)
Dept: ADMINISTRATIVE | Facility: HOSPITAL | Age: 45
End: 2020-10-05

## 2020-10-05 ENCOUNTER — PATIENT MESSAGE (OUTPATIENT)
Dept: WOUND CARE | Facility: CLINIC | Age: 45
End: 2020-10-05

## 2020-10-06 ENCOUNTER — PATIENT OUTREACH (OUTPATIENT)
Dept: ADMINISTRATIVE | Facility: OTHER | Age: 45
End: 2020-10-06

## 2020-10-06 NOTE — PROGRESS NOTES
Care Everywhere: updated  Immunization: updated  Health Maintenance: updated  Media Review: review for outside colon cancer and mammogram report   [facility] Review:   Order placed:   Upcoming appts:

## 2020-10-07 ENCOUNTER — OFFICE VISIT (OUTPATIENT)
Dept: WOUND CARE | Facility: CLINIC | Age: 45
End: 2020-10-07
Payer: COMMERCIAL

## 2020-10-07 ENCOUNTER — OFFICE VISIT (OUTPATIENT)
Dept: INTERNAL MEDICINE | Facility: CLINIC | Age: 45
End: 2020-10-07
Payer: COMMERCIAL

## 2020-10-07 ENCOUNTER — TELEPHONE (OUTPATIENT)
Dept: INTERNAL MEDICINE | Facility: CLINIC | Age: 45
End: 2020-10-07

## 2020-10-07 VITALS
HEIGHT: 64 IN | DIASTOLIC BLOOD PRESSURE: 80 MMHG | WEIGHT: 214.5 LBS | BODY MASS INDEX: 36.62 KG/M2 | OXYGEN SATURATION: 97 % | TEMPERATURE: 97 F | HEART RATE: 79 BPM | SYSTOLIC BLOOD PRESSURE: 102 MMHG | RESPIRATION RATE: 17 BRPM

## 2020-10-07 DIAGNOSIS — M79.7 FIBROMYALGIA: ICD-10-CM

## 2020-10-07 DIAGNOSIS — J01.90 ACUTE SINUSITIS, RECURRENCE NOT SPECIFIED, UNSPECIFIED LOCATION: Primary | ICD-10-CM

## 2020-10-07 DIAGNOSIS — Z43.2 ATTENTION TO ILEOSTOMY: Primary | ICD-10-CM

## 2020-10-07 PROCEDURE — 90686 FLU VACCINE (QUAD) GREATER THAN OR EQUAL TO 3YO PRESERVATIVE FREE IM: ICD-10-PCS | Mod: S$GLB,,, | Performed by: INTERNAL MEDICINE

## 2020-10-07 PROCEDURE — 3008F PR BODY MASS INDEX (BMI) DOCUMENTED: ICD-10-PCS | Mod: CPTII,S$GLB,, | Performed by: INTERNAL MEDICINE

## 2020-10-07 PROCEDURE — 99214 OFFICE O/P EST MOD 30 MIN: CPT | Mod: S$GLB,,, | Performed by: CLINICAL NURSE SPECIALIST

## 2020-10-07 PROCEDURE — 99999 PR PBB SHADOW E&M-EST. PATIENT-LVL V: CPT | Mod: PBBFAC,,, | Performed by: INTERNAL MEDICINE

## 2020-10-07 PROCEDURE — 99999 PR PBB SHADOW E&M-EST. PATIENT-LVL V: ICD-10-PCS | Mod: PBBFAC,,, | Performed by: INTERNAL MEDICINE

## 2020-10-07 PROCEDURE — 90471 FLU VACCINE (QUAD) GREATER THAN OR EQUAL TO 3YO PRESERVATIVE FREE IM: ICD-10-PCS | Mod: S$GLB,,, | Performed by: INTERNAL MEDICINE

## 2020-10-07 PROCEDURE — 99213 OFFICE O/P EST LOW 20 MIN: CPT | Mod: 25,S$GLB,, | Performed by: INTERNAL MEDICINE

## 2020-10-07 PROCEDURE — 99213 PR OFFICE/OUTPT VISIT, EST, LEVL III, 20-29 MIN: ICD-10-PCS | Mod: 25,S$GLB,, | Performed by: INTERNAL MEDICINE

## 2020-10-07 PROCEDURE — 99999 PR PBB SHADOW E&M-EST. PATIENT-LVL II: ICD-10-PCS | Mod: PBBFAC,,, | Performed by: CLINICAL NURSE SPECIALIST

## 2020-10-07 PROCEDURE — 99999 PR PBB SHADOW E&M-EST. PATIENT-LVL II: CPT | Mod: PBBFAC,,, | Performed by: CLINICAL NURSE SPECIALIST

## 2020-10-07 PROCEDURE — 3008F BODY MASS INDEX DOCD: CPT | Mod: CPTII,S$GLB,, | Performed by: INTERNAL MEDICINE

## 2020-10-07 PROCEDURE — 99214 PR OFFICE/OUTPT VISIT, EST, LEVL IV, 30-39 MIN: ICD-10-PCS | Mod: S$GLB,,, | Performed by: CLINICAL NURSE SPECIALIST

## 2020-10-07 PROCEDURE — 90471 IMMUNIZATION ADMIN: CPT | Mod: S$GLB,,, | Performed by: INTERNAL MEDICINE

## 2020-10-07 PROCEDURE — 90686 IIV4 VACC NO PRSV 0.5 ML IM: CPT | Mod: S$GLB,,, | Performed by: INTERNAL MEDICINE

## 2020-10-07 RX ORDER — OXYCODONE HYDROCHLORIDE 5 MG/1
5 TABLET ORAL
Qty: 30 TABLET | Refills: 0 | Status: SHIPPED | OUTPATIENT
Start: 2020-10-07 | End: 2020-12-01 | Stop reason: SDUPTHER

## 2020-10-07 RX ORDER — AMOXICILLIN 875 MG/1
875 TABLET, FILM COATED ORAL 2 TIMES DAILY
Qty: 20 TABLET | Refills: 0 | Status: SHIPPED | OUTPATIENT
Start: 2020-10-07 | End: 2020-12-01 | Stop reason: SDUPTHER

## 2020-10-07 NOTE — PROGRESS NOTES
CC: sinusitis  HPI:  The patient is a 44 y.o. year old female who presents to the office for sinusitis.  she complains of nasal congestion, postnasal drip, productive cough, sore throat and headache.  Symptoms started about 1 month ago.  she denies any fever.  The patient has taken claritin with temporary relief.  The patient reports recent pill endoscopy reveals multiple ulcers.  She has started a new therapy for Crohn's Disease.  She also reports intermittent pain on the lateral aspect of right foot, specifically when she wears athletic shoes.  She also reports intermittent episodes of losing her balance, feeling as if her knees are going to give out on her.    PAST MEDICAL HISTORY:  Past Medical History:   Diagnosis Date    Anemia     Asthma     B12 deficiency     Crohn's disease     History of shingles     Lupus     Migraine headache     Raynaud disease     Reactive hypoglycemia     Seizures 2004    no medication     Sleep apnea     Non compliant with CPAP       SURGICAL HISTORY:  Past Surgical History:   Procedure Laterality Date    ABDOMINAL SURGERY      COLON SURGERY      95% of colon removed 2010    COLONOSCOPY      COLOSTOMY      HYSTERECTOMY      due to endometriosis    ILEOSCOPY N/A 4/10/2019    Procedure: ILEOSCOPY through stoma;  Surgeon: Eve Currie MD;  Location: [address];  Service: Endoscopy;  Laterality: N/A;  Schedule as 30 minute case, schedule in April or May 2019    ILEOSTOMY      LAPAROSCOPY W/ MINI-LAPAROTOMY      large intestine removed      just a portion    pelvic mass removal      REVISION COLOSTOMY  2010    STOMACH SURGERY      TONSILLECTOMY, ADENOIDECTOMY         MEDS:  Medcard reviewed and updated    ALLERGIES: Allergy Card reviewed and updated    SOCIAL HISTORY:   The patient is a nonsmoker.    PE:   APPEARANCE: Well nourished, well developed, in no acute distress.    EARS: TM's intact. No retraction or perforation.    NOSE: Mucosa pink. Airway  clear.  MOUTH & THROAT: No tonsillar enlargement. No pharyngeal erythema or exudate. No stridor.  CHEST: Lungs clear to auscultation with unlabored respirations.  CARDIOVASCULAR: Normal S1, S2. No murmurs. No carotid bruits. No pedal edema.  ABDOMEN: Bowel sounds normal. Not distended. Soft. No tenderness or masses. Positive colostomy.  PSYCHIATRIC: The patient is oriented to person, place, and time and has a pleasant affect.        ASSESSMENT/PLAN:  Emi was seen today for follow-up, immunizations, advice only, sinusitis, dehydration and potassuim.    Diagnoses and all orders for this visit:    Acute sinusitis, recurrence not specified, unspecified location  -    prescribed amoxicillin    Fibromyalgia  -     oxyCODONE (ROXICODONE) 5 MG immediate release tablet; Take 1 tablet (5 mg total) by mouth every 24 hours as needed for Pain.    Other orders  -     amoxicillin (AMOXIL) 875 MG tablet; Take 1 tablet (875 mg total) by mouth 2 (two) times daily. for 10 days

## 2020-10-07 NOTE — PROGRESS NOTES
"Subjective:       Patient ID: Emi Johnson is a 44 y.o. female.    Chief Complaint: Ileostomy    This is another visit to enterostomal therapy clinic and she comes today for assistance with  Ileostomy which is leaking of late , the revision was done 7/18/19 along with hernia repair by Dr Saldaña,. She is doing well, works full time, she just learned she has flare of crohns and will need to start stellara.  PMH  She had total colectomy for crohn's  2018  by Dr Saldaña, she later developed large hernia.     Wound Check      Review of Systems   Constitutional: Negative for activity change, appetite change and fever.   HENT: Negative for congestion and rhinorrhea.    Respiratory: Negative for cough and shortness of breath.    Cardiovascular: Negative.    Gastrointestinal: Negative.         Ileostomy   Genitourinary: Negative.    Musculoskeletal: Negative.    Skin: Negative for rash and wound.   Neurological: Negative.    Psychiatric/Behavioral: Negative.        Objective:      Physical Exam  Constitutional:       Appearance: She is well-developed.   Pulmonary:      Effort: Pulmonary effort is normal. No respiratory distress.   Abdominal:      General: Bowel sounds are normal. There is no distension.      Palpations: Abdomen is soft.      Tenderness: There is no abdominal tenderness.   Musculoskeletal: Normal range of motion.   Skin:     General: Skin is warm and dry.      Findings: No rash.   Neurological:      Mental Status: She is alert and oriented to person, place, and time.                 7/2/18 7/25/19 post hernia repair     8/12/19      5/13/20          10/7/20  Stoma is 28 mm and she is still using 25 mm cut so this again is the issue I believe, but she does also have pasty stool that too is contributory. So we cut her cut to fit beyond the 1" line and it seems to fit well  See pic   She is try this with no ring for now and if problem persists to try to add a half dose of glycolax daily and see if " "pancaking is reduced.   I will have samples of deep convex from [facility] sent to her at her request.  Gave her small scissors for cutting       5/13/2020 here for leak eval and I think due to precut 25mm wafer and stoma is 26 mm, so talked about stretch of stoma wafer to help with this, skin clear , wears belt and has 2 hernia belts each have some issue that she does not like, gave her Ostomy secrets wrap to try  Also introduced flushable liners for work place convenience.  8/12 the yeast rash is resolved,  The sutures are out, but has small defect she calls hole at 6 oclock that is healing just fine, there is the tiny black spot at bianca that is very tender to touch so I told her to watch this spot.   Advised to place angela over the open wound and then a 2 mm ring and pouch as normal, convex 25mm pre cut New image.   She has Home health for now MWF but does not remember agency name or  Nurse name, ([phone])  Discussed hernia prevention and belt recommended, I measured for security belt and she is MEDIUM, she does not have funds to buy but will see what she can do.    7/25Shcarol has a new flush stoma with a high skin ridge of concern, also sutures which are problematic and yeast rash as well  There are 2 granulomas at base that I will leave alone today but address next visit in meantime I  Placed a convex 1"precut cera pouch on and mini [facility], told her to wear belt and get some OTC antifungal powder.   Discussed how to manage the sutures for now, cover with Polymem and then pouch over, gave her supplies     Assessment:       1. Attention to ileostomy        Plan:         Enlarge wafer to 28 mm  With her 13/4 wafer if possible to keep from moving up in size   Samples requested from [facility]  [employer] is her DME    I have reviewed the plan of care with the patient and she express understanding. I spent over 50% of this 25 minute visit in face to face counseling.         "

## 2020-10-15 ENCOUNTER — PATIENT MESSAGE (OUTPATIENT)
Dept: INTERNAL MEDICINE | Facility: CLINIC | Age: 45
End: 2020-10-15

## 2020-10-16 ENCOUNTER — PATIENT MESSAGE (OUTPATIENT)
Dept: INTERNAL MEDICINE | Facility: CLINIC | Age: 45
End: 2020-10-16

## 2020-10-16 DIAGNOSIS — K50.00 CROHN'S DISEASE OF SMALL INTESTINE WITHOUT COMPLICATION: ICD-10-CM

## 2020-10-16 DIAGNOSIS — M79.7 FIBROMYALGIA: ICD-10-CM

## 2020-10-16 RX ORDER — FLASH GLUCOSE SENSOR
1 KIT MISCELLANEOUS DAILY PRN
Qty: 2 KIT | Refills: 6 | Status: SHIPPED | OUTPATIENT
Start: 2020-10-16 | End: 2021-10-27 | Stop reason: SDUPTHER

## 2020-10-16 RX ORDER — FLASH GLUCOSE SCANNING READER
1 EACH MISCELLANEOUS DAILY PRN
Qty: 1 EACH | Refills: 0 | Status: SHIPPED | OUTPATIENT
Start: 2020-10-16 | End: 2021-10-27 | Stop reason: SDUPTHER

## 2020-10-16 RX ORDER — OXYCODONE AND ACETAMINOPHEN 10; 325 MG/1; MG/1
1 TABLET ORAL EVERY 6 HOURS PRN
Qty: 120 TABLET | Refills: 0 | Status: SHIPPED | OUTPATIENT
Start: 2020-10-16 | End: 2020-11-13 | Stop reason: SDUPTHER

## 2020-11-05 ENCOUNTER — PATIENT MESSAGE (OUTPATIENT)
Dept: INTERNAL MEDICINE | Facility: CLINIC | Age: 45
End: 2020-11-05

## 2020-11-06 RX ORDER — HYDROCODONE BITARTRATE AND HOMATROPINE METHYLBROMIDE ORAL SOLUTION 5; 1.5 MG/5ML; MG/5ML
5 LIQUID ORAL NIGHTLY PRN
Qty: 120 ML | Refills: 0 | Status: SHIPPED | OUTPATIENT
Start: 2020-11-06 | End: 2020-12-10 | Stop reason: SDUPTHER

## 2020-11-13 ENCOUNTER — PATIENT MESSAGE (OUTPATIENT)
Dept: INTERNAL MEDICINE | Facility: CLINIC | Age: 45
End: 2020-11-13

## 2020-11-13 DIAGNOSIS — K50.00 CROHN'S DISEASE OF SMALL INTESTINE WITHOUT COMPLICATION: ICD-10-CM

## 2020-11-13 DIAGNOSIS — M79.7 FIBROMYALGIA: ICD-10-CM

## 2020-11-16 RX ORDER — OXYCODONE AND ACETAMINOPHEN 10; 325 MG/1; MG/1
1 TABLET ORAL EVERY 6 HOURS PRN
Qty: 120 TABLET | Refills: 0 | Status: SHIPPED | OUTPATIENT
Start: 2020-11-16 | End: 2020-12-10 | Stop reason: SDUPTHER

## 2020-11-30 ENCOUNTER — OFFICE VISIT (OUTPATIENT)
Dept: URGENT CARE | Facility: CLINIC | Age: 45
End: 2020-11-30
Payer: COMMERCIAL

## 2020-11-30 VITALS
DIASTOLIC BLOOD PRESSURE: 83 MMHG | TEMPERATURE: 99 F | OXYGEN SATURATION: 97 % | WEIGHT: 198 LBS | BODY MASS INDEX: 35.08 KG/M2 | HEIGHT: 63 IN | HEART RATE: 84 BPM | SYSTOLIC BLOOD PRESSURE: 125 MMHG

## 2020-11-30 DIAGNOSIS — J45.901 EXACERBATION OF ASTHMA, UNSPECIFIED ASTHMA SEVERITY, UNSPECIFIED WHETHER PERSISTENT: Primary | ICD-10-CM

## 2020-11-30 DIAGNOSIS — R05.9 COUGH: ICD-10-CM

## 2020-11-30 DIAGNOSIS — J06.9 UPPER RESPIRATORY TRACT INFECTION, UNSPECIFIED TYPE: ICD-10-CM

## 2020-11-30 DIAGNOSIS — J02.9 SORE THROAT: ICD-10-CM

## 2020-11-30 LAB
CTP QC/QA: YES
SARS-COV-2 RDRP RESP QL NAA+PROBE: NEGATIVE

## 2020-11-30 PROCEDURE — 99214 PR OFFICE/OUTPT VISIT, EST, LEVL IV, 30-39 MIN: ICD-10-PCS | Mod: S$GLB,CS,, | Performed by: PHYSICIAN ASSISTANT

## 2020-11-30 PROCEDURE — U0002: ICD-10-PCS | Mod: QW,S$GLB,, | Performed by: PHYSICIAN ASSISTANT

## 2020-11-30 PROCEDURE — 3008F PR BODY MASS INDEX (BMI) DOCUMENTED: ICD-10-PCS | Mod: CPTII,S$GLB,, | Performed by: PHYSICIAN ASSISTANT

## 2020-11-30 PROCEDURE — U0002 COVID-19 LAB TEST NON-CDC: HCPCS | Mod: QW,S$GLB,, | Performed by: PHYSICIAN ASSISTANT

## 2020-11-30 PROCEDURE — 3008F BODY MASS INDEX DOCD: CPT | Mod: CPTII,S$GLB,, | Performed by: PHYSICIAN ASSISTANT

## 2020-11-30 PROCEDURE — 99214 OFFICE O/P EST MOD 30 MIN: CPT | Mod: S$GLB,CS,, | Performed by: PHYSICIAN ASSISTANT

## 2020-11-30 RX ORDER — PREDNISONE 20 MG/1
20 TABLET ORAL 2 TIMES DAILY
Qty: 14 TABLET | Refills: 0 | Status: SHIPPED | OUTPATIENT
Start: 2020-11-30 | End: 2020-12-07

## 2020-11-30 RX ORDER — PROMETHAZINE HYDROCHLORIDE AND DEXTROMETHORPHAN HYDROBROMIDE 6.25; 15 MG/5ML; MG/5ML
5 SYRUP ORAL 3 TIMES DAILY PRN
Qty: 180 ML | Refills: 0 | Status: SHIPPED | OUTPATIENT
Start: 2020-11-30 | End: 2020-12-10

## 2020-11-30 NOTE — PROGRESS NOTES
"Subjective:       Patient ID: Emi Johnson is a 44 y.o. female.    Vitals:  height is 5' 3" (1.6 m) and weight is 89.8 kg (198 lb). Her temperature is 98.7 °F (37.1 °C). Her blood pressure is 125/83 and her pulse is 84. Her oxygen saturation is 97%.     Chief Complaint: COVID-19 Concerns    Patient presents to urgent care with complaints of cough, body aches, chest tightness, sore throat, fatigue and headache.  Patient has a history of asthma and states she has had use her rescue inhaler more frequently and had to take a nebulizer treatment this morning.  Denies chest pain, GI upset or abdominal pain.  Denies any sick contacts that she is aware of.    URI   This is a new problem. The current episode started in the past 7 days. The problem has been gradually worsening. There has been no fever. Associated symptoms include coughing, headaches, nausea, neck pain and a sore throat. Pertinent negatives include no abdominal pain, chest pain, congestion, diarrhea, dysuria, ear pain, joint pain, joint swelling, plugged ear sensation, rash, rhinorrhea, sinus pain, sneezing, swollen glands, vomiting or wheezing. She has tried acetaminophen (Tylenol) for the symptoms.       Constitution: Positive for chills and fatigue. Negative for sweating and fever.   HENT: Positive for sore throat. Negative for ear pain, congestion, sinus pain, sinus pressure and voice change.    Neck: Positive for neck pain. Negative for painful lymph nodes.   Cardiovascular: Negative for chest pain, palpitations and sob on exertion.   Eyes: Negative for eye redness.   Respiratory: Positive for cough. Negative for chest tightness, sputum production, bloody sputum, COPD, shortness of breath, stridor, wheezing and asthma.    Gastrointestinal: Positive for nausea. Negative for abdominal pain, vomiting and diarrhea.   Genitourinary: Negative for dysuria.   Musculoskeletal: Positive for muscle ache.   Skin: Negative for rash.   Allergic/Immunologic: Negative " for seasonal allergies, asthma and sneezing.   Neurological: Positive for headaches.   Hematologic/Lymphatic: Negative for swollen lymph nodes.       Objective:      Physical Exam   Constitutional: She is oriented to person, place, and time. She appears well-developed. She is cooperative.  Non-toxic appearance. She does not appear ill. No distress.   HENT:   Head: Normocephalic and atraumatic.   Ears:   Right Ear: Hearing, tympanic membrane, external ear and ear canal normal. Tympanic membrane is not erythematous and not bulging. No middle ear effusion. impacted cerumen  Left Ear: Hearing, tympanic membrane, external ear and ear canal normal. Tympanic membrane is not erythematous and not bulging.  No middle ear effusion. impacted cerumen  Nose: Nose normal. No mucosal edema, rhinorrhea, nasal deformity or congestion. No epistaxis. Right sinus exhibits no maxillary sinus tenderness and no frontal sinus tenderness. Left sinus exhibits no maxillary sinus tenderness and no frontal sinus tenderness.   Mouth/Throat: Uvula is midline, oropharynx is clear and moist and mucous membranes are normal. No trismus in the jaw. Normal dentition. No uvula swelling. No oropharyngeal exudate, posterior oropharyngeal edema, posterior oropharyngeal erythema, tonsillar abscesses or cobblestoning.   Eyes: Conjunctivae and lids are normal. Right eye exhibits no discharge. Left eye exhibits no discharge. No scleral icterus.   Neck: Trachea normal, full passive range of motion without pain and phonation normal. Neck supple. No neck rigidity. No edema and no erythema present.   Cardiovascular: Normal rate, regular rhythm, normal heart sounds and normal pulses. Exam reveals no gallop and no friction rub.   No murmur heard.  Pulmonary/Chest: Effort normal and breath sounds normal. No stridor. No respiratory distress. She has no decreased breath sounds. She has no wheezes. She has no rhonchi. She has no rales.   Abdominal: Normal appearance.    Musculoskeletal: Normal range of motion.         General: No deformity.   Lymphadenopathy:     She has no cervical adenopathy.   Neurological: She is alert and oriented to person, place, and time. She exhibits normal muscle tone. Coordination normal.   Skin: Skin is warm, dry, intact, not diaphoretic and not pale. Psychiatric: Her speech is normal and behavior is normal. Judgment and thought content normal.   Nursing note and vitals reviewed.        Results for orders placed or performed in visit on 11/30/20   POCT COVID-19 Rapid Screening   Result Value Ref Range    POC Rapid COVID Negative Negative     Acceptable Yes        Assessment:       1. Exacerbation of asthma, unspecified asthma severity, unspecified whether persistent    2. Sore throat    3. Cough    4. Upper respiratory tract infection, unspecified type        Plan:     COVID test negative at time of visit and reviewed results with patient.  Discussed to continue using nebulizer and rescue inhaler as clinically warranted for resolution of cough and wheeze.  Cough Suppressant called in.  Discussed ER precautions should she develop chest pain or shortness of breath that does not resolve after inhaler use.  Patient verbalized understanding and all of her questions were answered.    Exacerbation of asthma, unspecified asthma severity, unspecified whether persistent  -     predniSONE (DELTASONE) 20 MG tablet; Take 1 tablet (20 mg total) by mouth 2 (two) times daily. for 7 days  Dispense: 14 tablet; Refill: 0    Sore throat  -     POCT COVID-19 Rapid Screening    Cough  -     promethazine-dextromethorphan (PROMETHAZINE-DM) 6.25-15 mg/5 mL Syrp; Take 5 mLs by mouth 3 (three) times daily as needed (cough).  Dispense: 180 mL; Refill: 0    Upper respiratory tract infection, unspecified type      Patient Instructions     Asthma (Adult)  Asthma is a disease where the medium and  small air passages within the lung go into spasm and restrict the flow  "of air. Inflammation and swelling of the airways cause further restriction. During an acute asthma attack, these factors cause difficulty breathing, wheezing, cough and chest tightness.    An asthma attack can be triggered by many things. Common triggers include infections such as the common cold, bronchitis, pneumonia. Irritants such as smoke or pollutants in the air, emotional upset, and exercise can also trigger an attack. In many adults with asthma, allergies to dust, mold, pollen and animal dander can cause an asthma attack. Skipping doses of daily asthma medicine can also bring on an asthma attack.  Asthma can be controlled using the proper medicines prescribed by your healthcare provider and avoiding exposure to known triggers including allergens and irritants.  Home care  · Take prescribed medicine exactly at the times advised. If you need medicine such as from a hand held inhaler or aerosol breathing machine more than every 4 hours, contact your healthcare provider or seek immediate medical attention. If prescribed an antibiotic or prednisone, take all of the medicine as prescribed, even if you are feeling better after a few days.  · Do not smoke. Avoid being exposed to the smoke of others.  · Some people with asthma have worsening of their symptoms when they take aspirin and non-steroidal or fever-reducing medicines like ibuprofen and naproxen. Talk to your healthcare provider if you think this may apply to you.  Follow-up care  Follow up with your healthcare provider, or as advised. Always bring all of your current medicines to any appointments with your healthcare provider. Also bring a complete list of medications even those not taken for asthma. If you do not already have one, talk to your healthcare provider about developing a personalized "Asthma Action Plan."  A pneumococcal (pneumonia) vaccine and yearly flu shot (every fall) are recommended. Ask your doctor about this.  When to seek medical " advice  Call your healthcare provider right away if any of these occur:   · Increased wheezing or shortness of breath  · Need to use your inhalers more often than usual without relief  · Fever of 100.4ºF (38ºC) or higher, or as directed by your healthcare provider  · Coughing up lots of dark-colored or bloody sputum (mucus)  · Chest pain with each breath  · If you use a peak flow meter as part of an Asthma Action Plan, and you are still in the yellow zone (50% to 80%) 15 minutes after using inhaler medicine.  Call 911  Call 911 if any of the following occur  · Trouble walking or talking because of shortness of breath  · If you use a peak flow meter as part of an Asthma Action Plan and you are still in the red zone (less than 50%) 15 minutes after using inhaler medicine  · Lips or fingernails turning gray or blue  Date Last Reviewed: 12/2/2015  © 1360-4850 [employer]. [address] [place] [address], [place] [address]. All rights reserved. This information is not intended as a substitute for professional medical care. Always follow your healthcare professional's instructions.      Be sure to use your nebulizer every 6-8 hours as needed for any cough, wheeze or shortness of breath.  Make sure you have your rescue inhaler with you and you may use your inhaler as needed every 4-6 hours. Take 2 puffs for any cough, wheeze or shortness of breath.      Please follow up with your Primary care provider within 2-5 days if your signs and symptoms have not resolved or worsen.     If your condition worsens or fails to improve we recommend that you receive another evaluation at the emergency room immediately or contact your primary medical clinic to discuss your concerns.   You must understand that you have received an Urgent Care treatment only and that you may be released before all of your medical problems are known or treated. You, the patient, will arrange for follow up care as instructed.     RED FLAGS/WARNING  SYMPTOMS DISCUSSED WITH PATIENT THAT WOULD WARRANT EMERGENT MEDICAL ATTENTION. PATIENT VERBALIZED UNDERSTANDING.

## 2020-11-30 NOTE — LETTER
November 30, 2020      [facility]  [address]  [place] [address]  Phone: [phone]  Fax: [phone]       Patient: Emi Johnson   YOB: 1975  Date of Visit: 11/30/2020    To Whom It May Concern:    Rodri Johnson  was at [facility] on 11/30/2020. She may return to work/school on 12/2/2020 with no restrictions. If you have any questions or concerns, or if I can be of further assistance, please do not hesitate to contact me.    Sincerely,      Gaby Carson PA-C

## 2020-11-30 NOTE — PATIENT INSTRUCTIONS
"  Asthma (Adult)  Asthma is a disease where the medium and  small air passages within the lung go into spasm and restrict the flow of air. Inflammation and swelling of the airways cause further restriction. During an acute asthma attack, these factors cause difficulty breathing, wheezing, cough and chest tightness.    An asthma attack can be triggered by many things. Common triggers include infections such as the common cold, bronchitis, pneumonia. Irritants such as smoke or pollutants in the air, emotional upset, and exercise can also trigger an attack. In many adults with asthma, allergies to dust, mold, pollen and animal dander can cause an asthma attack. Skipping doses of daily asthma medicine can also bring on an asthma attack.  Asthma can be controlled using the proper medicines prescribed by your healthcare provider and avoiding exposure to known triggers including allergens and irritants.  Home care  · Take prescribed medicine exactly at the times advised. If you need medicine such as from a hand held inhaler or aerosol breathing machine more than every 4 hours, contact your healthcare provider or seek immediate medical attention. If prescribed an antibiotic or prednisone, take all of the medicine as prescribed, even if you are feeling better after a few days.  · Do not smoke. Avoid being exposed to the smoke of others.  · Some people with asthma have worsening of their symptoms when they take aspirin and non-steroidal or fever-reducing medicines like ibuprofen and naproxen. Talk to your healthcare provider if you think this may apply to you.  Follow-up care  Follow up with your healthcare provider, or as advised. Always bring all of your current medicines to any appointments with your healthcare provider. Also bring a complete list of medications even those not taken for asthma. If you do not already have one, talk to your healthcare provider about developing a personalized "Asthma Action Plan."  A " pneumococcal (pneumonia) vaccine and yearly flu shot (every fall) are recommended. Ask your doctor about this.  When to seek medical advice  Call your healthcare provider right away if any of these occur:   · Increased wheezing or shortness of breath  · Need to use your inhalers more often than usual without relief  · Fever of 100.4ºF (38ºC) or higher, or as directed by your healthcare provider  · Coughing up lots of dark-colored or bloody sputum (mucus)  · Chest pain with each breath  · If you use a peak flow meter as part of an Asthma Action Plan, and you are still in the yellow zone (50% to 80%) 15 minutes after using inhaler medicine.  Call 911  Call 911 if any of the following occur  · Trouble walking or talking because of shortness of breath  · If you use a peak flow meter as part of an Asthma Action Plan and you are still in the red zone (less than 50%) 15 minutes after using inhaler medicine  · Lips or fingernails turning gray or blue  Date Last Reviewed: 12/2/2015  © 0961-9882 [employer]. [address] [place] [address]. All rights reserved. This information is not intended as a substitute for professional medical care. Always follow your healthcare professional's instructions.      Be sure to use your nebulizer every 6-8 hours as needed for any cough, wheeze or shortness of breath.  Make sure you have your rescue inhaler with you and you may use your inhaler as needed every 4-6 hours. Take 2 puffs for any cough, wheeze or shortness of breath.      Please follow up with your Primary care provider within 2-5 days if your signs and symptoms have not resolved or worsen.     If your condition worsens or fails to improve we recommend that you receive another evaluation at the emergency room immediately or contact your primary medical clinic to discuss your concerns.   You must understand that you have received an Urgent Care treatment only and that you may be released before all of your  medical problems are known or treated. You, the patient, will arrange for follow up care as instructed.     RED FLAGS/WARNING SYMPTOMS DISCUSSED WITH PATIENT THAT WOULD WARRANT EMERGENT MEDICAL ATTENTION. PATIENT VERBALIZED UNDERSTANDING.

## 2020-12-01 ENCOUNTER — OFFICE VISIT (OUTPATIENT)
Dept: INTERNAL MEDICINE | Facility: CLINIC | Age: 45
End: 2020-12-01
Payer: COMMERCIAL

## 2020-12-01 ENCOUNTER — LAB VISIT (OUTPATIENT)
Dept: LAB | Facility: HOSPITAL | Age: 45
End: 2020-12-01
Attending: INTERNAL MEDICINE
Payer: COMMERCIAL

## 2020-12-01 VITALS
SYSTOLIC BLOOD PRESSURE: 98 MMHG | RESPIRATION RATE: 20 BRPM | DIASTOLIC BLOOD PRESSURE: 70 MMHG | WEIGHT: 186.31 LBS | TEMPERATURE: 98 F | HEART RATE: 74 BPM | BODY MASS INDEX: 31.81 KG/M2 | HEIGHT: 64 IN | OXYGEN SATURATION: 96 %

## 2020-12-01 DIAGNOSIS — G43.909 MIGRAINE SYNDROME: ICD-10-CM

## 2020-12-01 DIAGNOSIS — J01.00 ACUTE MAXILLARY SINUSITIS, RECURRENCE NOT SPECIFIED: Primary | ICD-10-CM

## 2020-12-01 LAB
ANION GAP SERPL CALC-SCNC: 6 MMOL/L (ref 8–16)
BUN SERPL-MCNC: 21 MG/DL (ref 6–20)
CALCIUM SERPL-MCNC: 9.8 MG/DL (ref 8.7–10.5)
CHLORIDE SERPL-SCNC: 107 MMOL/L (ref 95–110)
CO2 SERPL-SCNC: 30 MMOL/L (ref 23–29)
CREAT SERPL-MCNC: 1.1 MG/DL (ref 0.5–1.4)
EST. GFR  (AFRICAN AMERICAN): >60 ML/MIN/1.73 M^2
EST. GFR  (NON AFRICAN AMERICAN): >60 ML/MIN/1.73 M^2
GLUCOSE SERPL-MCNC: 94 MG/DL (ref 70–110)
POTASSIUM SERPL-SCNC: 4.5 MMOL/L (ref 3.5–5.1)
SODIUM SERPL-SCNC: 143 MMOL/L (ref 136–145)

## 2020-12-01 PROCEDURE — 36415 COLL VENOUS BLD VENIPUNCTURE: CPT | Mod: PO

## 2020-12-01 PROCEDURE — 3008F BODY MASS INDEX DOCD: CPT | Mod: CPTII,S$GLB,, | Performed by: INTERNAL MEDICINE

## 2020-12-01 PROCEDURE — 99213 OFFICE O/P EST LOW 20 MIN: CPT | Mod: S$GLB,,, | Performed by: INTERNAL MEDICINE

## 2020-12-01 PROCEDURE — 3008F PR BODY MASS INDEX (BMI) DOCUMENTED: ICD-10-PCS | Mod: CPTII,S$GLB,, | Performed by: INTERNAL MEDICINE

## 2020-12-01 PROCEDURE — 80048 BASIC METABOLIC PNL TOTAL CA: CPT

## 2020-12-01 PROCEDURE — 1125F AMNT PAIN NOTED PAIN PRSNT: CPT | Mod: S$GLB,,, | Performed by: INTERNAL MEDICINE

## 2020-12-01 PROCEDURE — 99999 PR PBB SHADOW E&M-EST. PATIENT-LVL V: ICD-10-PCS | Mod: PBBFAC,,, | Performed by: INTERNAL MEDICINE

## 2020-12-01 PROCEDURE — 99213 PR OFFICE/OUTPT VISIT, EST, LEVL III, 20-29 MIN: ICD-10-PCS | Mod: S$GLB,,, | Performed by: INTERNAL MEDICINE

## 2020-12-01 PROCEDURE — 99999 PR PBB SHADOW E&M-EST. PATIENT-LVL V: CPT | Mod: PBBFAC,,, | Performed by: INTERNAL MEDICINE

## 2020-12-01 PROCEDURE — 1125F PR PAIN SEVERITY QUANTIFIED, PAIN PRESENT: ICD-10-PCS | Mod: S$GLB,,, | Performed by: INTERNAL MEDICINE

## 2020-12-01 RX ORDER — BLOOD-GLUCOSE,RECEIVER,CONT
1 EACH MISCELLANEOUS DAILY
Qty: 1 EACH | Refills: 3 | Status: SHIPPED | OUTPATIENT
Start: 2020-12-01 | End: 2022-05-11

## 2020-12-01 RX ORDER — ESTRADIOL 0.1 MG/G
CREAM VAGINAL
COMMUNITY
Start: 2020-11-12 | End: 2021-09-15

## 2020-12-01 RX ORDER — BLOOD-GLUCOSE TRANSMITTER
1 EACH MISCELLANEOUS DAILY
Qty: 1 DEVICE | Refills: 3 | Status: SHIPPED | OUTPATIENT
Start: 2020-12-01 | End: 2023-10-02 | Stop reason: SDUPTHER

## 2020-12-01 RX ORDER — AMOXICILLIN 875 MG/1
875 TABLET, FILM COATED ORAL 2 TIMES DAILY
Qty: 20 TABLET | Refills: 0 | Status: SHIPPED | OUTPATIENT
Start: 2020-12-01 | End: 2020-12-11

## 2020-12-01 RX ORDER — ESTRADIOL 0.07 MG/D
FILM, EXTENDED RELEASE TRANSDERMAL
COMMUNITY
Start: 2020-11-29 | End: 2021-09-15

## 2020-12-01 RX ORDER — BUTALBITAL, ACETAMINOPHEN AND CAFFEINE 50; 325; 40 MG/1; MG/1; MG/1
1 TABLET ORAL EVERY 6 HOURS PRN
Qty: 30 TABLET | Refills: 1 | Status: SHIPPED | OUTPATIENT
Start: 2020-12-01 | End: 2020-12-31

## 2020-12-01 RX ORDER — BLOOD-GLUCOSE SENSOR
1 EACH MISCELLANEOUS DAILY
Qty: 4 DEVICE | Refills: 3 | Status: SHIPPED | OUTPATIENT
Start: 2020-12-01 | End: 2023-10-02 | Stop reason: SDUPTHER

## 2020-12-01 NOTE — LETTER
December 1, 2020      [facility]  [address]  [place] [address]  Phone: [phone]  Fax: [phone]       Patient: Emi Johnson   YOB: 1975  Date of Visit: 12/01/2020    To Whom It May Concern:    Rodri Johnson  was at [facility] on 12/01/2020. She may return to work/school on 12/03/2020. If you have any questions or concerns, or if I can be of further assistance, please do not hesitate to contact me.    Sincerely,    Bianca Rutledge MD

## 2020-12-10 ENCOUNTER — HOSPITAL ENCOUNTER (EMERGENCY)
Facility: HOSPITAL | Age: 45
Discharge: HOME | End: 2020-12-10
Attending: EMERGENCY MEDICINE
Payer: COMMERCIAL

## 2020-12-10 ENCOUNTER — PATIENT MESSAGE (OUTPATIENT)
Dept: INTERNAL MEDICINE | Facility: CLINIC | Age: 45
End: 2020-12-10

## 2020-12-10 VITALS
OXYGEN SATURATION: 98 % | HEART RATE: 69 BPM | DIASTOLIC BLOOD PRESSURE: 82 MMHG | RESPIRATION RATE: 16 BRPM | SYSTOLIC BLOOD PRESSURE: 132 MMHG | HEIGHT: 63 IN | BODY MASS INDEX: 32.96 KG/M2 | WEIGHT: 186 LBS | TEMPERATURE: 98 F

## 2020-12-10 DIAGNOSIS — K50.00 CROHN'S DISEASE OF SMALL INTESTINE WITHOUT COMPLICATION: ICD-10-CM

## 2020-12-10 DIAGNOSIS — G43.909 MIGRAINE WITHOUT STATUS MIGRAINOSUS, NOT INTRACTABLE, UNSPECIFIED MIGRAINE TYPE: Primary | ICD-10-CM

## 2020-12-10 DIAGNOSIS — M79.7 FIBROMYALGIA: ICD-10-CM

## 2020-12-10 LAB
BILIRUB UR QL STRIP: NEGATIVE
CLARITY UR: CLEAR
COLOR UR: YELLOW
GLUCOSE UR QL STRIP: NEGATIVE
HGB UR QL STRIP: NEGATIVE
KETONES UR QL STRIP: NEGATIVE
LEUKOCYTE ESTERASE UR QL STRIP: NEGATIVE
NITRITE UR QL STRIP: NEGATIVE
PH UR STRIP: 5 [PH] (ref 5–8)
PROT UR QL STRIP: NEGATIVE
SP GR UR STRIP: 1.03 (ref 1–1.03)
URN SPEC COLLECT METH UR: NORMAL
UROBILINOGEN UR STRIP-ACNC: NEGATIVE EU/DL

## 2020-12-10 PROCEDURE — 81003 URINALYSIS AUTO W/O SCOPE: CPT

## 2020-12-10 PROCEDURE — 99283 EMERGENCY DEPT VISIT LOW MDM: CPT

## 2020-12-10 RX ORDER — PROMETHAZINE HYDROCHLORIDE 25 MG/1
TABLET ORAL
Qty: 30 TABLET | Refills: 3 | Status: SHIPPED | OUTPATIENT
Start: 2020-12-10 | End: 2021-09-15

## 2020-12-10 RX ORDER — ONDANSETRON 4 MG/1
4 TABLET, ORALLY DISINTEGRATING ORAL
Status: DISCONTINUED | OUTPATIENT
Start: 2020-12-10 | End: 2020-12-10 | Stop reason: HOSPADM

## 2020-12-10 RX ORDER — DIPHENHYDRAMINE HYDROCHLORIDE 50 MG/ML
12.5 INJECTION INTRAMUSCULAR; INTRAVENOUS
Status: DISCONTINUED | OUTPATIENT
Start: 2020-12-10 | End: 2020-12-10 | Stop reason: HOSPADM

## 2020-12-10 RX ORDER — SUMATRIPTAN 6 MG/.5ML
6 INJECTION, SOLUTION SUBCUTANEOUS
Status: DISCONTINUED | OUTPATIENT
Start: 2020-12-10 | End: 2020-12-10

## 2020-12-10 RX ORDER — OXYCODONE AND ACETAMINOPHEN 10; 325 MG/1; MG/1
1 TABLET ORAL EVERY 6 HOURS PRN
Qty: 120 TABLET | Refills: 0 | Status: SHIPPED | OUTPATIENT
Start: 2020-12-14 | End: 2021-01-06 | Stop reason: SDUPTHER

## 2020-12-10 RX ORDER — PROMETHAZINE HYDROCHLORIDE 25 MG/1
TABLET ORAL
Qty: 60 TABLET | Refills: 3 | Status: SHIPPED | OUTPATIENT
Start: 2020-12-10 | End: 2023-06-07

## 2020-12-10 RX ORDER — SODIUM CHLORIDE 9 MG/ML
INJECTION, SOLUTION INTRAVENOUS
Status: DISCONTINUED | OUTPATIENT
Start: 2020-12-10 | End: 2020-12-10 | Stop reason: HOSPADM

## 2020-12-10 RX ORDER — PROCHLORPERAZINE EDISYLATE 5 MG/ML
10 INJECTION INTRAMUSCULAR; INTRAVENOUS ONCE
Status: DISCONTINUED | OUTPATIENT
Start: 2020-12-10 | End: 2020-12-10 | Stop reason: HOSPADM

## 2020-12-10 NOTE — ED PROVIDER NOTES
Encounter Date: 12/10/2020       History     Chief Complaint   Patient presents with    Migraine     Patient presents to the ED with reports of having a migraine headache that started x 3 days ago. States pain is felt behing the right eye and radiates to the back of the head. Patient states having a hx of migraines and has been treating the pain with Rx medications. Symptoms at this time include nausea and sensitive to light/noise.      44-year-old past medical history of anemia, B12 deficiency, Crohn's, migraine headaches, Raynaud's disease presenting with 3 days of migraine-type headache.  Patient mentions headache was indolent and onset starting on right side frontal of head radiating to occiput and neck throbbing in nature.  Denies any aggravating or alleviating factors associated photophobia and phonophobia.  Mentions pain is exactly like prior migraine headaches and past denies any new onset numbness, tingling, weakness, fevers, chills.+ nausea        Review of patient's allergies indicates:   Allergen Reactions    Aspirin      Other reaction(s): vomiting, contraindicated for bleeding from crohns  Other reaction(s): bleeding    Sulfa (sulfonamide antibiotics) Hives and Rash    Iodinated contrast media Other (See Comments)    Adhesive     Aspirin     Remicade [infliximab] Other (See Comments)     Medical induced lupus    Latex Rash     Other reaction(s): Hives     Past Medical History:   Diagnosis Date    Anemia     Asthma     B12 deficiency     Crohn's disease     History of shingles     Lupus     Migraine headache     Raynaud disease     Reactive hypoglycemia     Seizures 2004    no medication     Sleep apnea     Non compliant with CPAP     Past Surgical History:   Procedure Laterality Date    ABDOMINAL SURGERY      COLON SURGERY      95% of colon removed 2010    COLONOSCOPY      COLOSTOMY      HYSTERECTOMY      due to endometriosis    ILEOSCOPY N/A 4/10/2019    Procedure: ILEOSCOPY  through stoma;  Surgeon: Eve Currie MD;  Location: [facility] ([address] [place] [address]);  Service: Endoscopy;  Laterality: N/A;  Schedule as 30 minute case, schedule in April or May 2019    ILEOSTOMY      LAPAROSCOPY W/ MINI-LAPAROTOMY      large intestine removed      just a portion    pelvic mass removal      REVISION COLOSTOMY  2010    STOMACH SURGERY      TONSILLECTOMY, ADENOIDECTOMY       Family History   Problem Relation Age of Onset    Colon cancer Maternal Grandmother     Cancer Mother         anal    Breast cancer Sister     Migraines Sister     Seizures Sister     Cancer Maternal Aunt     Cancer Paternal Grandmother     Celiac disease Neg Hx     Cirrhosis Neg Hx     Colon polyps Neg Hx     Crohn's disease Neg Hx     Cystic fibrosis Neg Hx     Hemochromatosis Neg Hx     Esophageal cancer Neg Hx     Inflammatory bowel disease Neg Hx     Irritable bowel syndrome Neg Hx     Liver cancer Neg Hx     Liver disease Neg Hx     Rectal cancer Neg Hx     Stomach cancer Neg Hx     Ulcerative colitis Neg Hx     Hugo's disease Neg Hx      Social History     Tobacco Use    Smoking status: Never Smoker    Smokeless tobacco: Never Used   Substance Use Topics    Alcohol use: No     Frequency: Never     Drinks per session: Patient refused     Binge frequency: Never    Drug use: No     Review of Systems   Constitutional: Negative for fever.   HENT: Negative for congestion.    Respiratory: Negative for cough and shortness of breath.    Cardiovascular: Negative for chest pain.   Gastrointestinal: Negative for abdominal pain and nausea.   Genitourinary: Negative for dysuria.   Skin: Negative for color change.   Neurological: Positive for headaches. Negative for dizziness.       Physical Exam     Initial Vitals [12/10/20 0425]   BP Pulse Resp Temp SpO2   128/68 69 16 97.8 °F (36.6 °C) 98 %      MAP       --         Physical Exam    Constitutional: She appears well-developed and well-nourished. She is not  diaphoretic. No distress.   HENT:   Head: Normocephalic and atraumatic.   Eyes: Conjunctivae and EOM are normal. Pupils are equal, round, and reactive to light. Right eye exhibits no discharge. Left eye exhibits no discharge. No scleral icterus.   Neck: Normal range of motion. Neck supple.   Cardiovascular: Normal rate and regular rhythm. Exam reveals no friction rub.    No murmur heard.  Pulmonary/Chest: Breath sounds normal. No respiratory distress. She has no wheezes. She has no rales.   Abdominal: Soft. Bowel sounds are normal. She exhibits no distension. There is no abdominal tenderness. There is no rebound and no guarding.   Musculoskeletal: Normal range of motion.   Neurological: She is alert and oriented to person, place, and time. She has normal strength. No cranial nerve deficit or sensory deficit. GCS score is 15. GCS eye subscore is 4. GCS verbal subscore is 5. GCS motor subscore is 6.   Normal rapid alternating movements  Negative Romberg  Normal finger-to-nose     Skin: Skin is warm and dry. No erythema. No pallor.         ED Course   Procedures  Labs Reviewed   URINALYSIS          Imaging Results    None          Medical Decision Making:   History:   Old Medical Records: I decided to obtain old medical records.  Initial Assessment:   44-year-old past medical history of migraine burden with migraine-type headache.  Vital signs stable within normal limits.  Differential Diagnosis:   DX includes migraine, tension headache, cluster headache,  ED Management:  Plan:  Sumatriptan, Zofran and reassess.                   ED Course as of Dec 11 1306   Thu Dec 10, 2020   0713 Patient would not like sumatriptan at this time will give alternative treatment continue to reassess.    [DC]   0751 Offered multiple drug regimens for patients headache however pt mentions she does nto want to stay in the emergency room any longer. Mentions she does not want any medications and has been in the ED for too long. Will d/c  home with follow up instructions and return precautions pt has no further questions at this time.     [DC]      ED Course User Index  [DC] Mariela Avila Jr., MD            Clinical Impression:     ICD-10-CM ICD-9-CM   1. Migraine without status migrainosus, not intractable, unspecified migraine type  G43.909 346.90                          ED Disposition Condition    Discharge Stable        ED Prescriptions     None        Follow-up Information     Follow up With Specialties Details Why Contact Info    Bianca Rutledge MD Internal Medicine In 1 week  [address] [facility] [id number]  [phone]      [facility] Emergency Medicine In 1 day  [address] [facility] [address] [phone]                                       Mariela Avila Jr., MD  12/11/20 3126

## 2020-12-10 NOTE — ED NOTES
Patient presents to the ED with c/o migraine x3 days. Pain begins near right eye and radiates to the back of her head. Reports hx of migraines and has been using home rx without any relief. +nausea and sensitivity to light    Patient identifiers are verified and correct for Emi Johnson     LOC: The patient is awake, alert, and aware of environment with appropriate affect. The patient is oriented x3 and speaking appropriately.   APPEARANCE: Patient is clean and well groomed. Patients clothing are properly fastened.   SKIN: Skin is normal for race, warm, and dry. Normal skin turgor.  CARDIAC: Normal rate.  RESPIRATORY:Normal rate and effort. Respirations are equal and unlabored.    GASTRO: Abdomen is soft and non tender. +ostomy   MUSCLE: Full ROM. No bony tenderness or soft tissue tenderness. No obvious deformity.  PERIPHERAL VASCULAR: peripheral pulses present. Normal cap refill. No edema. Warm to touch.  ENT: EARS: no obvious drainage. NOSE: no active bleeding. THROAT: no redness or swelling.  : Voids without complication  NEURO: +migraine    Review of patient's allergies indicates:   Allergen Reactions    Aspirin      Other reaction(s): vomiting, contraindicated for bleeding from crohns  Other reaction(s): bleeding    Sulfa (sulfonamide antibiotics) Hives and Rash    Iodinated contrast media Other (See Comments)    Adhesive     Aspirin     Remicade [infliximab] Other (See Comments)     Medical induced lupus    Latex Rash     Other reaction(s): Hives

## 2020-12-11 ENCOUNTER — PATIENT MESSAGE (OUTPATIENT)
Dept: INTERNAL MEDICINE | Facility: CLINIC | Age: 45
End: 2020-12-11

## 2020-12-11 DIAGNOSIS — R53.83 FATIGUE, UNSPECIFIED TYPE: Primary | ICD-10-CM

## 2020-12-11 NOTE — TELEPHONE ENCOUNTER
Pt c/o feeling really tired and weak. Pt asking if there is blood work she could do the find something, please advise.    LOV:12/01/2020  RTC:01/19/2021

## 2020-12-14 RX ORDER — HYDROCODONE BITARTRATE AND HOMATROPINE METHYLBROMIDE ORAL SOLUTION 5; 1.5 MG/5ML; MG/5ML
5 LIQUID ORAL NIGHTLY PRN
Qty: 120 ML | Refills: 0 | Status: SHIPPED | OUTPATIENT
Start: 2020-12-14 | End: 2021-04-19 | Stop reason: SDUPTHER

## 2020-12-15 RX ORDER — HYDROCODONE BITARTRATE AND HOMATROPINE METHYLBROMIDE ORAL SOLUTION 5; 1.5 MG/5ML; MG/5ML
5 LIQUID ORAL NIGHTLY PRN
Qty: 120 ML | Refills: 0 | OUTPATIENT
Start: 2020-12-15

## 2020-12-17 DIAGNOSIS — Z12.31 OTHER SCREENING MAMMOGRAM: ICD-10-CM

## 2020-12-28 ENCOUNTER — LAB VISIT (OUTPATIENT)
Dept: LAB | Facility: HOSPITAL | Age: 45
End: 2020-12-28
Attending: INTERNAL MEDICINE
Payer: COMMERCIAL

## 2020-12-28 DIAGNOSIS — R53.83 FATIGUE, UNSPECIFIED TYPE: ICD-10-CM

## 2020-12-28 LAB
BASOPHILS # BLD AUTO: 0.03 K/UL (ref 0–0.2)
BASOPHILS NFR BLD: 0.5 % (ref 0–1.9)
DIFFERENTIAL METHOD: ABNORMAL
EOSINOPHIL # BLD AUTO: 0.2 K/UL (ref 0–0.5)
EOSINOPHIL NFR BLD: 3.4 % (ref 0–8)
ERYTHROCYTE [DISTWIDTH] IN BLOOD BY AUTOMATED COUNT: 12.6 % (ref 11.5–14.5)
HCT VFR BLD AUTO: 48.6 % (ref 37–48.5)
HGB BLD-MCNC: 15.7 G/DL (ref 12–16)
IMM GRANULOCYTES # BLD AUTO: 0.02 K/UL (ref 0–0.04)
IMM GRANULOCYTES NFR BLD AUTO: 0.3 % (ref 0–0.5)
LYMPHOCYTES # BLD AUTO: 2 K/UL (ref 1–4.8)
LYMPHOCYTES NFR BLD: 30.5 % (ref 18–48)
MCH RBC QN AUTO: 29.7 PG (ref 27–31)
MCHC RBC AUTO-ENTMCNC: 32.3 G/DL (ref 32–36)
MCV RBC AUTO: 92 FL (ref 82–98)
MONOCYTES # BLD AUTO: 0.5 K/UL (ref 0.3–1)
MONOCYTES NFR BLD: 8 % (ref 4–15)
NEUTROPHILS # BLD AUTO: 3.7 K/UL (ref 1.8–7.7)
NEUTROPHILS NFR BLD: 57.3 % (ref 38–73)
NRBC BLD-RTO: 0 /100 WBC
PLATELET # BLD AUTO: 181 K/UL (ref 150–350)
PMV BLD AUTO: 11.3 FL (ref 9.2–12.9)
RBC # BLD AUTO: 5.28 M/UL (ref 4–5.4)
WBC # BLD AUTO: 6.5 K/UL (ref 3.9–12.7)

## 2020-12-28 PROCEDURE — 84443 ASSAY THYROID STIM HORMONE: CPT

## 2020-12-28 PROCEDURE — 85025 COMPLETE CBC W/AUTO DIFF WBC: CPT

## 2020-12-28 PROCEDURE — 36415 COLL VENOUS BLD VENIPUNCTURE: CPT | Mod: PO

## 2020-12-28 PROCEDURE — 82607 VITAMIN B-12: CPT

## 2020-12-29 LAB
TSH SERPL DL<=0.005 MIU/L-ACNC: 3.52 UIU/ML (ref 0.4–4)
VIT B12 SERPL-MCNC: 592 PG/ML (ref 210–950)

## 2021-01-04 ENCOUNTER — PATIENT MESSAGE (OUTPATIENT)
Dept: ADMINISTRATIVE | Facility: HOSPITAL | Age: 46
End: 2021-01-04

## 2021-01-06 ENCOUNTER — PATIENT MESSAGE (OUTPATIENT)
Dept: INTERNAL MEDICINE | Facility: CLINIC | Age: 46
End: 2021-01-06

## 2021-01-06 DIAGNOSIS — M79.7 FIBROMYALGIA: ICD-10-CM

## 2021-01-06 DIAGNOSIS — K50.00 CROHN'S DISEASE OF SMALL INTESTINE WITHOUT COMPLICATION: ICD-10-CM

## 2021-01-06 RX ORDER — OXYCODONE AND ACETAMINOPHEN 10; 325 MG/1; MG/1
1 TABLET ORAL EVERY 6 HOURS PRN
Qty: 120 TABLET | Refills: 0 | Status: SHIPPED | OUTPATIENT
Start: 2021-01-11 | End: 2021-02-03 | Stop reason: SDUPTHER

## 2021-01-19 ENCOUNTER — OFFICE VISIT (OUTPATIENT)
Dept: INTERNAL MEDICINE | Facility: CLINIC | Age: 46
End: 2021-01-19
Payer: COMMERCIAL

## 2021-01-19 VITALS
TEMPERATURE: 98 F | OXYGEN SATURATION: 97 % | DIASTOLIC BLOOD PRESSURE: 82 MMHG | SYSTOLIC BLOOD PRESSURE: 110 MMHG | WEIGHT: 192.25 LBS | HEART RATE: 79 BPM | BODY MASS INDEX: 34.06 KG/M2 | HEIGHT: 63 IN | RESPIRATION RATE: 18 BRPM

## 2021-01-19 DIAGNOSIS — K50.00 CROHN'S DISEASE OF SMALL INTESTINE WITHOUT COMPLICATION: Primary | ICD-10-CM

## 2021-01-19 PROCEDURE — 3008F PR BODY MASS INDEX (BMI) DOCUMENTED: ICD-10-PCS | Mod: CPTII,S$GLB,, | Performed by: INTERNAL MEDICINE

## 2021-01-19 PROCEDURE — 1125F PR PAIN SEVERITY QUANTIFIED, PAIN PRESENT: ICD-10-PCS | Mod: S$GLB,,, | Performed by: INTERNAL MEDICINE

## 2021-01-19 PROCEDURE — 1125F AMNT PAIN NOTED PAIN PRSNT: CPT | Mod: S$GLB,,, | Performed by: INTERNAL MEDICINE

## 2021-01-19 PROCEDURE — 99213 OFFICE O/P EST LOW 20 MIN: CPT | Mod: S$GLB,,, | Performed by: INTERNAL MEDICINE

## 2021-01-19 PROCEDURE — 3008F BODY MASS INDEX DOCD: CPT | Mod: CPTII,S$GLB,, | Performed by: INTERNAL MEDICINE

## 2021-01-19 PROCEDURE — 99999 PR PBB SHADOW E&M-EST. PATIENT-LVL V: CPT | Mod: PBBFAC,,, | Performed by: INTERNAL MEDICINE

## 2021-01-19 PROCEDURE — 99213 PR OFFICE/OUTPT VISIT, EST, LEVL III, 20-29 MIN: ICD-10-PCS | Mod: S$GLB,,, | Performed by: INTERNAL MEDICINE

## 2021-01-19 PROCEDURE — 99999 PR PBB SHADOW E&M-EST. PATIENT-LVL V: ICD-10-PCS | Mod: PBBFAC,,, | Performed by: INTERNAL MEDICINE

## 2021-01-19 RX ORDER — ZOLPIDEM TARTRATE 10 MG/1
10 TABLET ORAL NIGHTLY PRN
Qty: 90 TABLET | Refills: 1 | Status: SHIPPED | OUTPATIENT
Start: 2021-01-19 | End: 2021-06-23

## 2021-01-19 RX ORDER — FROVATRIPTAN SUCCINATE 2.5 MG/1
2.5 TABLET, FILM COATED ORAL 2 TIMES DAILY PRN
COMMUNITY
Start: 2020-12-10 | End: 2021-03-24

## 2021-01-19 RX ORDER — CYANOCOBALAMIN 1000 UG/ML
INJECTION, SOLUTION INTRAMUSCULAR; SUBCUTANEOUS
Qty: 1 ML | Refills: 12 | Status: SHIPPED | OUTPATIENT
Start: 2021-01-19 | End: 2022-01-26 | Stop reason: SDUPTHER

## 2021-01-19 RX ORDER — NEEDLES, FILTER 19GX1 1/2"
1 NEEDLE, DISPOSABLE MISCELLANEOUS
Qty: 20 SYRINGE | Refills: 1 | Status: SHIPPED | OUTPATIENT
Start: 2021-01-19

## 2021-01-27 ENCOUNTER — TELEPHONE (OUTPATIENT)
Dept: INTERNAL MEDICINE | Facility: CLINIC | Age: 46
End: 2021-01-27

## 2021-01-27 DIAGNOSIS — M79.7 FIBROMYALGIA: ICD-10-CM

## 2021-01-27 RX ORDER — OXYCODONE HYDROCHLORIDE 5 MG/1
5 TABLET ORAL
Qty: 30 TABLET | Refills: 0 | Status: SHIPPED | OUTPATIENT
Start: 2021-01-27 | End: 2021-03-03 | Stop reason: SDUPTHER

## 2021-01-30 ENCOUNTER — CLINICAL SUPPORT (OUTPATIENT)
Dept: URGENT CARE | Facility: CLINIC | Age: 46
End: 2021-01-30
Payer: COMMERCIAL

## 2021-01-30 DIAGNOSIS — Z20.822 EXPOSURE TO COVID-19 VIRUS: Primary | ICD-10-CM

## 2021-01-30 LAB
CTP QC/QA: YES
SARS-COV-2 RDRP RESP QL NAA+PROBE: NEGATIVE

## 2021-01-30 PROCEDURE — U0002: ICD-10-PCS | Mod: QW,S$GLB,, | Performed by: INTERNAL MEDICINE

## 2021-01-30 PROCEDURE — U0002 COVID-19 LAB TEST NON-CDC: HCPCS | Mod: QW,S$GLB,, | Performed by: INTERNAL MEDICINE

## 2021-02-03 DIAGNOSIS — K50.00 CROHN'S DISEASE OF SMALL INTESTINE WITHOUT COMPLICATION: ICD-10-CM

## 2021-02-03 DIAGNOSIS — M79.7 FIBROMYALGIA: ICD-10-CM

## 2021-02-03 RX ORDER — OXYCODONE AND ACETAMINOPHEN 10; 325 MG/1; MG/1
1 TABLET ORAL EVERY 6 HOURS PRN
Qty: 120 TABLET | Refills: 0 | Status: SHIPPED | OUTPATIENT
Start: 2021-02-08 | End: 2021-03-03 | Stop reason: SDUPTHER

## 2021-02-11 ENCOUNTER — OFFICE VISIT (OUTPATIENT)
Dept: URGENT CARE | Facility: CLINIC | Age: 46
End: 2021-02-11
Payer: COMMERCIAL

## 2021-02-11 VITALS
SYSTOLIC BLOOD PRESSURE: 112 MMHG | HEIGHT: 63 IN | TEMPERATURE: 99 F | BODY MASS INDEX: 34.02 KG/M2 | WEIGHT: 192 LBS | RESPIRATION RATE: 14 BRPM | OXYGEN SATURATION: 97 % | DIASTOLIC BLOOD PRESSURE: 74 MMHG | HEART RATE: 77 BPM

## 2021-02-11 DIAGNOSIS — R05.9 COUGH: Primary | ICD-10-CM

## 2021-02-11 DIAGNOSIS — R68.83 CHILLS: ICD-10-CM

## 2021-02-11 LAB
CTP QC/QA: YES
SARS-COV-2 RDRP RESP QL NAA+PROBE: NEGATIVE

## 2021-02-11 PROCEDURE — U0002: ICD-10-PCS | Mod: QW,S$GLB,, | Performed by: NURSE PRACTITIONER

## 2021-02-11 PROCEDURE — 99214 OFFICE O/P EST MOD 30 MIN: CPT | Mod: S$GLB,,, | Performed by: NURSE PRACTITIONER

## 2021-02-11 PROCEDURE — 3008F BODY MASS INDEX DOCD: CPT | Mod: CPTII,S$GLB,, | Performed by: NURSE PRACTITIONER

## 2021-02-11 PROCEDURE — 3008F PR BODY MASS INDEX (BMI) DOCUMENTED: ICD-10-PCS | Mod: CPTII,S$GLB,, | Performed by: NURSE PRACTITIONER

## 2021-02-11 PROCEDURE — U0002 COVID-19 LAB TEST NON-CDC: HCPCS | Mod: QW,S$GLB,, | Performed by: NURSE PRACTITIONER

## 2021-02-11 PROCEDURE — 99214 PR OFFICE/OUTPT VISIT, EST, LEVL IV, 30-39 MIN: ICD-10-PCS | Mod: S$GLB,,, | Performed by: NURSE PRACTITIONER

## 2021-02-11 RX ORDER — BENZONATATE 200 MG/1
200 CAPSULE ORAL 3 TIMES DAILY PRN
Qty: 30 CAPSULE | Refills: 0 | Status: SHIPPED | OUTPATIENT
Start: 2021-02-11 | End: 2021-02-21

## 2021-02-11 RX ORDER — ALBUTEROL SULFATE 90 UG/1
2 AEROSOL, METERED RESPIRATORY (INHALATION) EVERY 6 HOURS PRN
Qty: 18 G | Refills: 0 | Status: SHIPPED | OUTPATIENT
Start: 2021-02-11 | End: 2021-09-15

## 2021-02-15 ENCOUNTER — TELEPHONE (OUTPATIENT)
Dept: INTERNAL MEDICINE | Facility: CLINIC | Age: 46
End: 2021-02-15

## 2021-02-16 ENCOUNTER — HOSPITAL ENCOUNTER (EMERGENCY)
Facility: HOSPITAL | Age: 46
Discharge: HOME | End: 2021-02-16
Attending: EMERGENCY MEDICINE
Payer: COMMERCIAL

## 2021-02-16 VITALS
TEMPERATURE: 99 F | SYSTOLIC BLOOD PRESSURE: 122 MMHG | DIASTOLIC BLOOD PRESSURE: 88 MMHG | HEART RATE: 88 BPM | BODY MASS INDEX: 33.13 KG/M2 | WEIGHT: 187 LBS | RESPIRATION RATE: 18 BRPM | HEIGHT: 63 IN | OXYGEN SATURATION: 100 %

## 2021-02-16 DIAGNOSIS — S92.351A DISPLACED FRACTURE OF FIFTH METATARSAL BONE, RIGHT FOOT, INITIAL ENCOUNTER FOR CLOSED FRACTURE: Primary | ICD-10-CM
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
## 2021-02-16 PROCEDURE — 29515 APPLICATION SHORT LEG SPLINT: CPT | Mod: RT

## 2021-02-16 PROCEDURE — 99283 EMERGENCY DEPT VISIT LOW MDM: CPT | Mod: 25

## 2021-02-16 PROCEDURE — 25000003 PHARM REV CODE 250: Performed by: EMERGENCY MEDICINE

## 2021-02-16 RX ORDER — OXYCODONE AND ACETAMINOPHEN 10; 325 MG/1; MG/1
1 TABLET ORAL ONCE
Status: COMPLETED | OUTPATIENT
Start: 2021-02-16 | End: 2021-02-16

## 2021-02-16 RX ADMIN — OXYCODONE HYDROCHLORIDE AND ACETAMINOPHEN 1 TABLET: 10; 325 TABLET ORAL at 09:02

## 2021-02-18 ENCOUNTER — TELEPHONE (OUTPATIENT)
Dept: PODIATRY | Facility: CLINIC | Age: 46
End: 2021-02-18

## 2021-02-18 ENCOUNTER — TELEPHONE (OUTPATIENT)
Dept: ORTHOPEDICS | Facility: CLINIC | Age: 46
End: 2021-02-18

## 2021-02-19 ENCOUNTER — PATIENT MESSAGE (OUTPATIENT)
Dept: INTERNAL MEDICINE | Facility: CLINIC | Age: 46
End: 2021-02-19

## 2021-02-19 ENCOUNTER — PATIENT OUTREACH (OUTPATIENT)
Dept: ADMINISTRATIVE | Facility: OTHER | Age: 46
End: 2021-02-19

## 2021-02-22 ENCOUNTER — OFFICE VISIT (OUTPATIENT)
Dept: INTERNAL MEDICINE | Facility: CLINIC | Age: 46
End: 2021-02-22
Payer: COMMERCIAL

## 2021-02-22 ENCOUNTER — LAB VISIT (OUTPATIENT)
Dept: LAB | Facility: HOSPITAL | Age: 46
End: 2021-02-22
Attending: INTERNAL MEDICINE
Payer: COMMERCIAL

## 2021-02-22 VITALS
RESPIRATION RATE: 16 BRPM | OXYGEN SATURATION: 99 % | HEIGHT: 63 IN | WEIGHT: 191.38 LBS | HEART RATE: 85 BPM | BODY MASS INDEX: 33.91 KG/M2 | TEMPERATURE: 98 F

## 2021-02-22 DIAGNOSIS — E86.0 DEHYDRATION: Primary | ICD-10-CM

## 2021-02-22 DIAGNOSIS — S92.901D CLOSED FRACTURE OF RIGHT FOOT WITH ROUTINE HEALING, SUBSEQUENT ENCOUNTER: ICD-10-CM

## 2021-02-22 DIAGNOSIS — E86.0 DEHYDRATION: ICD-10-CM

## 2021-02-22 LAB
ANION GAP SERPL CALC-SCNC: 9 MMOL/L (ref 8–16)
BUN SERPL-MCNC: 23 MG/DL (ref 6–20)
CALCIUM SERPL-MCNC: 9.2 MG/DL (ref 8.7–10.5)
CHLORIDE SERPL-SCNC: 108 MMOL/L (ref 95–110)
CO2 SERPL-SCNC: 26 MMOL/L (ref 23–29)
CREAT SERPL-MCNC: 1 MG/DL (ref 0.5–1.4)
EST. GFR  (AFRICAN AMERICAN): >60 ML/MIN/1.73 M^2
EST. GFR  (NON AFRICAN AMERICAN): >60 ML/MIN/1.73 M^2
GLUCOSE SERPL-MCNC: 89 MG/DL (ref 70–110)
POTASSIUM SERPL-SCNC: 4.1 MMOL/L (ref 3.5–5.1)
SODIUM SERPL-SCNC: 143 MMOL/L (ref 136–145)

## 2021-02-22 PROCEDURE — 80048 BASIC METABOLIC PNL TOTAL CA: CPT

## 2021-02-22 PROCEDURE — 99213 OFFICE O/P EST LOW 20 MIN: CPT | Mod: S$GLB,,, | Performed by: INTERNAL MEDICINE

## 2021-02-22 PROCEDURE — 99999 PR PBB SHADOW E&M-EST. PATIENT-LVL V: ICD-10-PCS | Mod: PBBFAC,,, | Performed by: INTERNAL MEDICINE

## 2021-02-22 PROCEDURE — 3008F PR BODY MASS INDEX (BMI) DOCUMENTED: ICD-10-PCS | Mod: CPTII,S$GLB,, | Performed by: INTERNAL MEDICINE

## 2021-02-22 PROCEDURE — 99213 PR OFFICE/OUTPT VISIT, EST, LEVL III, 20-29 MIN: ICD-10-PCS | Mod: S$GLB,,, | Performed by: INTERNAL MEDICINE

## 2021-02-22 PROCEDURE — 3008F BODY MASS INDEX DOCD: CPT | Mod: CPTII,S$GLB,, | Performed by: INTERNAL MEDICINE

## 2021-02-22 PROCEDURE — 99999 PR PBB SHADOW E&M-EST. PATIENT-LVL V: CPT | Mod: PBBFAC,,, | Performed by: INTERNAL MEDICINE

## 2021-02-22 PROCEDURE — 36415 COLL VENOUS BLD VENIPUNCTURE: CPT | Mod: PO

## 2021-02-25 ENCOUNTER — PATIENT MESSAGE (OUTPATIENT)
Dept: INTERNAL MEDICINE | Facility: CLINIC | Age: 46
End: 2021-02-25

## 2021-03-01 ENCOUNTER — OFFICE VISIT (OUTPATIENT)
Dept: URGENT CARE | Facility: CLINIC | Age: 46
End: 2021-03-01
Payer: COMMERCIAL

## 2021-03-01 VITALS
BODY MASS INDEX: 32.78 KG/M2 | HEART RATE: 95 BPM | TEMPERATURE: 99 F | WEIGHT: 185 LBS | RESPIRATION RATE: 19 BRPM | OXYGEN SATURATION: 96 % | SYSTOLIC BLOOD PRESSURE: 160 MMHG | DIASTOLIC BLOOD PRESSURE: 96 MMHG | HEIGHT: 63 IN

## 2021-03-01 DIAGNOSIS — Z87.09 HISTORY OF ASTHMA: ICD-10-CM

## 2021-03-01 DIAGNOSIS — R05.9 COUGH: Primary | ICD-10-CM

## 2021-03-01 LAB
CTP QC/QA: YES
SARS-COV-2 RDRP RESP QL NAA+PROBE: NEGATIVE

## 2021-03-01 PROCEDURE — 3008F BODY MASS INDEX DOCD: CPT | Mod: CPTII,S$GLB,, | Performed by: NURSE PRACTITIONER

## 2021-03-01 PROCEDURE — 99213 OFFICE O/P EST LOW 20 MIN: CPT | Mod: S$GLB,,, | Performed by: NURSE PRACTITIONER

## 2021-03-01 PROCEDURE — U0002 COVID-19 LAB TEST NON-CDC: HCPCS | Mod: QW,S$GLB,, | Performed by: NURSE PRACTITIONER

## 2021-03-01 PROCEDURE — 99213 PR OFFICE/OUTPT VISIT, EST, LEVL III, 20-29 MIN: ICD-10-PCS | Mod: S$GLB,,, | Performed by: NURSE PRACTITIONER

## 2021-03-01 PROCEDURE — 3008F PR BODY MASS INDEX (BMI) DOCUMENTED: ICD-10-PCS | Mod: CPTII,S$GLB,, | Performed by: NURSE PRACTITIONER

## 2021-03-01 PROCEDURE — U0002: ICD-10-PCS | Mod: QW,S$GLB,, | Performed by: NURSE PRACTITIONER

## 2021-03-01 RX ORDER — ALBUTEROL SULFATE 90 UG/1
2 AEROSOL, METERED RESPIRATORY (INHALATION) EVERY 6 HOURS PRN
Qty: 18 G | Refills: 0 | Status: SHIPPED | OUTPATIENT
Start: 2021-03-01 | End: 2022-01-26 | Stop reason: SDUPTHER

## 2021-03-01 RX ORDER — PROMETHAZINE HYDROCHLORIDE AND DEXTROMETHORPHAN HYDROBROMIDE 6.25; 15 MG/5ML; MG/5ML
5 SYRUP ORAL NIGHTLY PRN
Qty: 80 ML | Refills: 0 | Status: SHIPPED | OUTPATIENT
Start: 2021-03-01 | End: 2021-09-15

## 2021-03-02 ENCOUNTER — PATIENT MESSAGE (OUTPATIENT)
Dept: INTERNAL MEDICINE | Facility: CLINIC | Age: 46
End: 2021-03-02

## 2021-03-02 DIAGNOSIS — M79.7 FIBROMYALGIA: ICD-10-CM

## 2021-03-03 DIAGNOSIS — K50.00 CROHN'S DISEASE OF SMALL INTESTINE WITHOUT COMPLICATION: ICD-10-CM

## 2021-03-03 DIAGNOSIS — M79.7 FIBROMYALGIA: ICD-10-CM

## 2021-03-03 RX ORDER — OXYCODONE HYDROCHLORIDE 5 MG/1
5 TABLET ORAL
Qty: 30 TABLET | Refills: 0 | Status: SHIPPED | OUTPATIENT
Start: 2021-03-08 | End: 2021-03-12 | Stop reason: SDUPTHER

## 2021-03-03 RX ORDER — HYDROCODONE BITARTRATE AND HOMATROPINE METHYLBROMIDE ORAL SOLUTION 5; 1.5 MG/5ML; MG/5ML
5 LIQUID ORAL NIGHTLY PRN
Qty: 120 ML | Refills: 0 | OUTPATIENT
Start: 2021-03-03

## 2021-03-03 RX ORDER — OXYCODONE AND ACETAMINOPHEN 10; 325 MG/1; MG/1
1 TABLET ORAL EVERY 6 HOURS PRN
Qty: 120 TABLET | Refills: 0 | Status: SHIPPED | OUTPATIENT
Start: 2021-03-03 | End: 2021-03-30 | Stop reason: SDUPTHER

## 2021-03-08 ENCOUNTER — OFFICE VISIT (OUTPATIENT)
Dept: INTERNAL MEDICINE | Facility: CLINIC | Age: 46
End: 2021-03-08
Payer: COMMERCIAL

## 2021-03-08 ENCOUNTER — HOSPITAL ENCOUNTER (OUTPATIENT)
Dept: RADIOLOGY | Facility: HOSPITAL | Age: 46
Discharge: HOME | End: 2021-03-08
Attending: INTERNAL MEDICINE
Payer: COMMERCIAL

## 2021-03-08 ENCOUNTER — PATIENT MESSAGE (OUTPATIENT)
Dept: INTERNAL MEDICINE | Facility: CLINIC | Age: 46
End: 2021-03-08

## 2021-03-08 VITALS
TEMPERATURE: 97 F | HEIGHT: 63 IN | BODY MASS INDEX: 34.18 KG/M2 | OXYGEN SATURATION: 98 % | DIASTOLIC BLOOD PRESSURE: 62 MMHG | HEART RATE: 91 BPM | WEIGHT: 192.88 LBS | SYSTOLIC BLOOD PRESSURE: 120 MMHG

## 2021-03-08 DIAGNOSIS — Z01.818 PREOP EXAM FOR INTERNAL MEDICINE: ICD-10-CM

## 2021-03-08 DIAGNOSIS — S92.901D CLOSED FRACTURE OF RIGHT FOOT WITH ROUTINE HEALING, SUBSEQUENT ENCOUNTER: ICD-10-CM

## 2021-03-08 DIAGNOSIS — Z01.818 PREOP EXAM FOR INTERNAL MEDICINE: Primary | ICD-10-CM

## 2021-03-08 PROCEDURE — 3008F BODY MASS INDEX DOCD: CPT | Mod: CPTII,S$GLB,, | Performed by: INTERNAL MEDICINE

## 2021-03-08 PROCEDURE — 99213 PR OFFICE/OUTPT VISIT, EST, LEVL III, 20-29 MIN: ICD-10-PCS | Mod: S$GLB,,, | Performed by: INTERNAL MEDICINE

## 2021-03-08 PROCEDURE — 1125F PR PAIN SEVERITY QUANTIFIED, PAIN PRESENT: ICD-10-PCS | Mod: S$GLB,,, | Performed by: INTERNAL MEDICINE

## 2021-03-08 PROCEDURE — 93005 ELECTROCARDIOGRAM TRACING: CPT | Mod: S$GLB,,, | Performed by: INTERNAL MEDICINE

## 2021-03-08 PROCEDURE — 93005 EKG 12-LEAD: ICD-10-PCS | Mod: S$GLB,,, | Performed by: INTERNAL MEDICINE

## 2021-03-08 PROCEDURE — 99213 OFFICE O/P EST LOW 20 MIN: CPT | Mod: S$GLB,,, | Performed by: INTERNAL MEDICINE

## 2021-03-08 PROCEDURE — 99999 PR PBB SHADOW E&M-EST. PATIENT-LVL V: CPT | Mod: PBBFAC,,, | Performed by: INTERNAL MEDICINE

## 2021-03-08 PROCEDURE — 71046 X-RAY EXAM CHEST 2 VIEWS: CPT | Mod: 26,,, | Performed by: RADIOLOGY

## 2021-03-08 PROCEDURE — 3008F PR BODY MASS INDEX (BMI) DOCUMENTED: ICD-10-PCS | Mod: CPTII,S$GLB,, | Performed by: INTERNAL MEDICINE

## 2021-03-08 PROCEDURE — 99999 PR PBB SHADOW E&M-EST. PATIENT-LVL V: ICD-10-PCS | Mod: PBBFAC,,, | Performed by: INTERNAL MEDICINE

## 2021-03-08 PROCEDURE — 93010 EKG 12-LEAD: ICD-10-PCS | Mod: S$GLB,,, | Performed by: INTERNAL MEDICINE

## 2021-03-08 PROCEDURE — 71046 X-RAY EXAM CHEST 2 VIEWS: CPT | Mod: TC,PO

## 2021-03-08 PROCEDURE — 1125F AMNT PAIN NOTED PAIN PRSNT: CPT | Mod: S$GLB,,, | Performed by: INTERNAL MEDICINE

## 2021-03-08 PROCEDURE — 71046 XR CHEST PA AND LATERAL: ICD-10-PCS | Mod: 26,,, | Performed by: RADIOLOGY

## 2021-03-08 PROCEDURE — 93010 ELECTROCARDIOGRAM REPORT: CPT | Mod: S$GLB,,, | Performed by: INTERNAL MEDICINE

## 2021-03-09 ENCOUNTER — PATIENT MESSAGE (OUTPATIENT)
Dept: INTERNAL MEDICINE | Facility: CLINIC | Age: 46
End: 2021-03-09

## 2021-03-09 ENCOUNTER — TELEPHONE (OUTPATIENT)
Dept: INTERNAL MEDICINE | Facility: CLINIC | Age: 46
End: 2021-03-09

## 2021-03-11 ENCOUNTER — PATIENT MESSAGE (OUTPATIENT)
Dept: INTERNAL MEDICINE | Facility: CLINIC | Age: 46
End: 2021-03-11

## 2021-03-11 DIAGNOSIS — S92.901D CLOSED FRACTURE OF RIGHT FOOT WITH ROUTINE HEALING, SUBSEQUENT ENCOUNTER: Primary | ICD-10-CM

## 2021-03-11 DIAGNOSIS — M79.7 FIBROMYALGIA: ICD-10-CM

## 2021-03-12 RX ORDER — OXYCODONE HYDROCHLORIDE 5 MG/1
5 TABLET ORAL EVERY 12 HOURS PRN
Qty: 60 TABLET | Refills: 0 | Status: SHIPPED | OUTPATIENT
Start: 2021-03-12 | End: 2021-05-19 | Stop reason: SDUPTHER

## 2021-03-17 ENCOUNTER — PATIENT MESSAGE (OUTPATIENT)
Dept: INTERNAL MEDICINE | Facility: CLINIC | Age: 46
End: 2021-03-17

## 2021-03-24 RX ORDER — FLUCONAZOLE 150 MG/1
150 TABLET ORAL DAILY
Qty: 1 TABLET | Refills: 0 | Status: SHIPPED | OUTPATIENT
Start: 2021-03-24 | End: 2021-03-25

## 2021-03-30 ENCOUNTER — PATIENT MESSAGE (OUTPATIENT)
Dept: INTERNAL MEDICINE | Facility: CLINIC | Age: 46
End: 2021-03-30

## 2021-03-30 DIAGNOSIS — K50.00 CROHN'S DISEASE OF SMALL INTESTINE WITHOUT COMPLICATION: ICD-10-CM

## 2021-03-30 DIAGNOSIS — M79.7 FIBROMYALGIA: ICD-10-CM

## 2021-03-30 RX ORDER — OXYCODONE AND ACETAMINOPHEN 10; 325 MG/1; MG/1
1 TABLET ORAL EVERY 6 HOURS PRN
Qty: 120 TABLET | Refills: 0 | Status: SHIPPED | OUTPATIENT
Start: 2021-04-03 | End: 2021-04-26 | Stop reason: SDUPTHER

## 2021-03-31 ENCOUNTER — PATIENT MESSAGE (OUTPATIENT)
Dept: INTERNAL MEDICINE | Facility: CLINIC | Age: 46
End: 2021-03-31

## 2021-04-02 ENCOUNTER — IMMUNIZATION (OUTPATIENT)
Dept: PRIMARY CARE CLINIC | Facility: CLINIC | Age: 46
End: 2021-04-02
Payer: COMMERCIAL

## 2021-04-02 DIAGNOSIS — Z23 NEED FOR VACCINATION: Primary | ICD-10-CM

## 2021-04-02 PROCEDURE — 0001A PR IMMUNIZ ADMIN, SARS-COV-2 COVID-19 VACC, 30MCG/0.3ML, 1ST DOSE: CPT | Mod: CV19,S$GLB,, | Performed by: INTERNAL MEDICINE

## 2021-04-02 PROCEDURE — 91300 PR SARS-COV- 2 COVID-19 VACCINE, NO PRSV, 30MCG/0.3ML, IM: ICD-10-PCS | Mod: S$GLB,,, | Performed by: INTERNAL MEDICINE

## 2021-04-02 PROCEDURE — 0001A PR IMMUNIZ ADMIN, SARS-COV-2 COVID-19 VACC, 30MCG/0.3ML, 1ST DOSE: ICD-10-PCS | Mod: CV19,S$GLB,, | Performed by: INTERNAL MEDICINE

## 2021-04-02 PROCEDURE — 91300 PR SARS-COV- 2 COVID-19 VACCINE, NO PRSV, 30MCG/0.3ML, IM: CPT | Mod: S$GLB,,, | Performed by: INTERNAL MEDICINE

## 2021-04-02 RX ADMIN — Medication 0.3 ML: at 12:04

## 2021-04-05 ENCOUNTER — PATIENT MESSAGE (OUTPATIENT)
Dept: ADMINISTRATIVE | Facility: HOSPITAL | Age: 46
End: 2021-04-05

## 2021-04-19 ENCOUNTER — LAB VISIT (OUTPATIENT)
Dept: LAB | Facility: HOSPITAL | Age: 46
End: 2021-04-19
Attending: INTERNAL MEDICINE
Payer: COMMERCIAL

## 2021-04-19 ENCOUNTER — OFFICE VISIT (OUTPATIENT)
Dept: INTERNAL MEDICINE | Facility: CLINIC | Age: 46
End: 2021-04-19
Payer: COMMERCIAL

## 2021-04-19 VITALS
HEART RATE: 93 BPM | TEMPERATURE: 98 F | WEIGHT: 196 LBS | BODY MASS INDEX: 34.73 KG/M2 | HEIGHT: 63 IN | OXYGEN SATURATION: 99 % | DIASTOLIC BLOOD PRESSURE: 76 MMHG | SYSTOLIC BLOOD PRESSURE: 104 MMHG | RESPIRATION RATE: 18 BRPM

## 2021-04-19 DIAGNOSIS — R25.1 TREMOR: ICD-10-CM

## 2021-04-19 DIAGNOSIS — K50.00 CROHN'S DISEASE OF SMALL INTESTINE WITHOUT COMPLICATION: Primary | ICD-10-CM

## 2021-04-19 DIAGNOSIS — R05.9 COUGH: ICD-10-CM

## 2021-04-19 DIAGNOSIS — R21 RASH: ICD-10-CM

## 2021-04-19 DIAGNOSIS — S92.901D CLOSED FRACTURE OF RIGHT FOOT WITH ROUTINE HEALING, SUBSEQUENT ENCOUNTER: ICD-10-CM

## 2021-04-19 LAB
ANION GAP SERPL CALC-SCNC: 8 MMOL/L (ref 8–16)
BUN SERPL-MCNC: 18 MG/DL (ref 6–20)
CALCIUM SERPL-MCNC: 9.1 MG/DL (ref 8.7–10.5)
CHLORIDE SERPL-SCNC: 110 MMOL/L (ref 95–110)
CK SERPL-CCNC: 61 U/L (ref 20–180)
CO2 SERPL-SCNC: 23 MMOL/L (ref 23–29)
CREAT SERPL-MCNC: 0.9 MG/DL (ref 0.5–1.4)
EST. GFR  (AFRICAN AMERICAN): >60 ML/MIN/1.73 M^2
EST. GFR  (NON AFRICAN AMERICAN): >60 ML/MIN/1.73 M^2
GLUCOSE SERPL-MCNC: 95 MG/DL (ref 70–110)
MAGNESIUM SERPL-MCNC: 2.1 MG/DL (ref 1.6–2.6)
POTASSIUM SERPL-SCNC: 5.1 MMOL/L (ref 3.5–5.1)
SODIUM SERPL-SCNC: 141 MMOL/L (ref 136–145)

## 2021-04-19 PROCEDURE — 36415 COLL VENOUS BLD VENIPUNCTURE: CPT | Mod: PO | Performed by: INTERNAL MEDICINE

## 2021-04-19 PROCEDURE — 99213 PR OFFICE/OUTPT VISIT, EST, LEVL III, 20-29 MIN: ICD-10-PCS | Mod: S$GLB,,, | Performed by: INTERNAL MEDICINE

## 2021-04-19 PROCEDURE — 99213 OFFICE O/P EST LOW 20 MIN: CPT | Mod: S$GLB,,, | Performed by: INTERNAL MEDICINE

## 2021-04-19 PROCEDURE — 83735 ASSAY OF MAGNESIUM: CPT | Performed by: INTERNAL MEDICINE

## 2021-04-19 PROCEDURE — 82550 ASSAY OF CK (CPK): CPT | Performed by: INTERNAL MEDICINE

## 2021-04-19 PROCEDURE — 3008F BODY MASS INDEX DOCD: CPT | Mod: CPTII,S$GLB,, | Performed by: INTERNAL MEDICINE

## 2021-04-19 PROCEDURE — 3008F PR BODY MASS INDEX (BMI) DOCUMENTED: ICD-10-PCS | Mod: CPTII,S$GLB,, | Performed by: INTERNAL MEDICINE

## 2021-04-19 PROCEDURE — 99999 PR PBB SHADOW E&M-EST. PATIENT-LVL V: ICD-10-PCS | Mod: PBBFAC,,, | Performed by: INTERNAL MEDICINE

## 2021-04-19 PROCEDURE — 80048 BASIC METABOLIC PNL TOTAL CA: CPT | Performed by: INTERNAL MEDICINE

## 2021-04-19 PROCEDURE — 99999 PR PBB SHADOW E&M-EST. PATIENT-LVL V: CPT | Mod: PBBFAC,,, | Performed by: INTERNAL MEDICINE

## 2021-04-19 RX ORDER — HYDROCODONE BITARTRATE AND HOMATROPINE METHYLBROMIDE ORAL SOLUTION 5; 1.5 MG/5ML; MG/5ML
5 LIQUID ORAL NIGHTLY PRN
Qty: 120 ML | Refills: 0 | Status: SHIPPED | OUTPATIENT
Start: 2021-04-19 | End: 2024-01-24 | Stop reason: SDUPTHER

## 2021-04-23 ENCOUNTER — IMMUNIZATION (OUTPATIENT)
Dept: PRIMARY CARE CLINIC | Facility: CLINIC | Age: 46
End: 2021-04-23

## 2021-04-23 DIAGNOSIS — Z23 NEED FOR VACCINATION: Primary | ICD-10-CM

## 2021-04-23 PROCEDURE — 91300 PR SARS-COV- 2 COVID-19 VACCINE, NO PRSV, 30MCG/0.3ML, IM: CPT | Mod: S$GLB,,, | Performed by: INTERNAL MEDICINE

## 2021-04-23 PROCEDURE — 0002A PR IMMUNIZ ADMIN, SARS-COV-2 COVID-19 VACC, 30MCG/0.3ML, 2ND DOSE: CPT | Mod: CV19,S$GLB,, | Performed by: INTERNAL MEDICINE

## 2021-04-23 PROCEDURE — 0002A PR IMMUNIZ ADMIN, SARS-COV-2 COVID-19 VACC, 30MCG/0.3ML, 2ND DOSE: ICD-10-PCS | Mod: CV19,S$GLB,, | Performed by: INTERNAL MEDICINE

## 2021-04-23 PROCEDURE — 91300 PR SARS-COV- 2 COVID-19 VACCINE, NO PRSV, 30MCG/0.3ML, IM: ICD-10-PCS | Mod: S$GLB,,, | Performed by: INTERNAL MEDICINE

## 2021-04-23 RX ADMIN — Medication 0.3 ML: at 10:04

## 2021-04-26 ENCOUNTER — PATIENT MESSAGE (OUTPATIENT)
Dept: INTERNAL MEDICINE | Facility: CLINIC | Age: 46
End: 2021-04-26

## 2021-04-26 DIAGNOSIS — M79.7 FIBROMYALGIA: ICD-10-CM

## 2021-04-26 DIAGNOSIS — K50.00 CROHN'S DISEASE OF SMALL INTESTINE WITHOUT COMPLICATION: ICD-10-CM

## 2021-04-26 RX ORDER — OXYCODONE AND ACETAMINOPHEN 10; 325 MG/1; MG/1
1 TABLET ORAL EVERY 6 HOURS PRN
Qty: 120 TABLET | Refills: 0 | Status: SHIPPED | OUTPATIENT
Start: 2021-05-01 | End: 2021-05-24 | Stop reason: SDUPTHER

## 2021-04-27 ENCOUNTER — PATIENT MESSAGE (OUTPATIENT)
Dept: INTERNAL MEDICINE | Facility: CLINIC | Age: 46
End: 2021-04-27

## 2021-04-29 ENCOUNTER — PATIENT MESSAGE (OUTPATIENT)
Dept: INTERNAL MEDICINE | Facility: CLINIC | Age: 46
End: 2021-04-29

## 2021-04-30 ENCOUNTER — PATIENT MESSAGE (OUTPATIENT)
Dept: INTERNAL MEDICINE | Facility: CLINIC | Age: 46
End: 2021-04-30

## 2021-05-03 RX ORDER — OXYCODONE AND ACETAMINOPHEN 10; 325 MG/1; MG/1
1 TABLET ORAL EVERY 6 HOURS PRN
Qty: 120 TABLET | Refills: 0 | OUTPATIENT
Start: 2021-05-03

## 2021-05-18 ENCOUNTER — PATIENT OUTREACH (OUTPATIENT)
Dept: ADMINISTRATIVE | Facility: OTHER | Age: 46
End: 2021-05-18

## 2021-05-19 ENCOUNTER — PATIENT MESSAGE (OUTPATIENT)
Dept: INTERNAL MEDICINE | Facility: CLINIC | Age: 46
End: 2021-05-19

## 2021-05-19 ENCOUNTER — OFFICE VISIT (OUTPATIENT)
Dept: WOUND CARE | Facility: CLINIC | Age: 46
End: 2021-05-19
Payer: COMMERCIAL

## 2021-05-19 DIAGNOSIS — S92.901D CLOSED FRACTURE OF RIGHT FOOT WITH ROUTINE HEALING, SUBSEQUENT ENCOUNTER: ICD-10-CM

## 2021-05-19 DIAGNOSIS — Z43.2 ATTENTION TO ILEOSTOMY: Primary | ICD-10-CM

## 2021-05-19 PROCEDURE — 99214 OFFICE O/P EST MOD 30 MIN: CPT | Mod: S$GLB,,, | Performed by: CLINICAL NURSE SPECIALIST

## 2021-05-19 PROCEDURE — 99999 PR PBB SHADOW E&M-EST. PATIENT-LVL II: CPT | Mod: PBBFAC,,, | Performed by: CLINICAL NURSE SPECIALIST

## 2021-05-19 PROCEDURE — 99214 PR OFFICE/OUTPT VISIT, EST, LEVL IV, 30-39 MIN: ICD-10-PCS | Mod: S$GLB,,, | Performed by: CLINICAL NURSE SPECIALIST

## 2021-05-19 PROCEDURE — 99999 PR PBB SHADOW E&M-EST. PATIENT-LVL II: ICD-10-PCS | Mod: PBBFAC,,, | Performed by: CLINICAL NURSE SPECIALIST

## 2021-05-19 RX ORDER — OXYCODONE HYDROCHLORIDE 5 MG/1
5 TABLET ORAL EVERY 12 HOURS PRN
Qty: 60 TABLET | Refills: 0 | Status: SHIPPED | OUTPATIENT
Start: 2021-05-19 | End: 2021-07-27

## 2021-05-19 RX ORDER — OXYCODONE HYDROCHLORIDE 5 MG/1
5 TABLET ORAL EVERY 12 HOURS PRN
Qty: 60 TABLET | Refills: 0 | Status: SHIPPED | OUTPATIENT
Start: 2021-05-19 | End: 2021-05-19 | Stop reason: SDUPTHER

## 2021-05-24 ENCOUNTER — PATIENT MESSAGE (OUTPATIENT)
Dept: INTERNAL MEDICINE | Facility: CLINIC | Age: 46
End: 2021-05-24

## 2021-05-24 DIAGNOSIS — R05.9 COUGH: ICD-10-CM

## 2021-05-24 DIAGNOSIS — M79.7 FIBROMYALGIA: ICD-10-CM

## 2021-05-24 DIAGNOSIS — K50.00 CROHN'S DISEASE OF SMALL INTESTINE WITHOUT COMPLICATION: ICD-10-CM

## 2021-05-25 RX ORDER — OXYCODONE AND ACETAMINOPHEN 10; 325 MG/1; MG/1
1 TABLET ORAL EVERY 6 HOURS PRN
Qty: 120 TABLET | Refills: 0 | Status: SHIPPED | OUTPATIENT
Start: 2021-05-29 | End: 2021-05-31 | Stop reason: SDUPTHER

## 2021-05-29 ENCOUNTER — PATIENT MESSAGE (OUTPATIENT)
Dept: INTERNAL MEDICINE | Facility: CLINIC | Age: 46
End: 2021-05-29

## 2021-05-29 ENCOUNTER — NURSE TRIAGE (OUTPATIENT)
Dept: ADMINISTRATIVE | Facility: CLINIC | Age: 46
End: 2021-05-29

## 2021-05-29 DIAGNOSIS — M79.7 FIBROMYALGIA: ICD-10-CM

## 2021-05-29 DIAGNOSIS — K50.00 CROHN'S DISEASE OF SMALL INTESTINE WITHOUT COMPLICATION: ICD-10-CM

## 2021-05-31 ENCOUNTER — PATIENT MESSAGE (OUTPATIENT)
Dept: INTERNAL MEDICINE | Facility: CLINIC | Age: 46
End: 2021-05-31

## 2021-05-31 ENCOUNTER — TELEPHONE (OUTPATIENT)
Dept: INTERNAL MEDICINE | Facility: CLINIC | Age: 46
End: 2021-05-31

## 2021-05-31 DIAGNOSIS — K50.00 CROHN'S DISEASE OF SMALL INTESTINE WITHOUT COMPLICATION: ICD-10-CM

## 2021-05-31 DIAGNOSIS — M79.7 FIBROMYALGIA: ICD-10-CM

## 2021-05-31 RX ORDER — OXYCODONE AND ACETAMINOPHEN 10; 325 MG/1; MG/1
1 TABLET ORAL EVERY 6 HOURS PRN
Qty: 120 TABLET | Refills: 0 | Status: CANCELLED | OUTPATIENT
Start: 2021-05-31

## 2021-05-31 RX ORDER — OXYCODONE AND ACETAMINOPHEN 10; 325 MG/1; MG/1
1 TABLET ORAL EVERY 6 HOURS PRN
Qty: 120 TABLET | Refills: 0 | Status: SHIPPED | OUTPATIENT
Start: 2021-05-31 | End: 2021-06-23 | Stop reason: SDUPTHER

## 2021-06-23 ENCOUNTER — PATIENT MESSAGE (OUTPATIENT)
Dept: INTERNAL MEDICINE | Facility: CLINIC | Age: 46
End: 2021-06-23

## 2021-06-23 DIAGNOSIS — M79.7 FIBROMYALGIA: ICD-10-CM

## 2021-06-23 DIAGNOSIS — K50.00 CROHN'S DISEASE OF SMALL INTESTINE WITHOUT COMPLICATION: ICD-10-CM

## 2021-06-23 RX ORDER — OXYCODONE AND ACETAMINOPHEN 10; 325 MG/1; MG/1
1 TABLET ORAL EVERY 6 HOURS PRN
Qty: 120 TABLET | Refills: 0 | Status: SHIPPED | OUTPATIENT
Start: 2021-06-28 | End: 2021-06-28 | Stop reason: SDUPTHER

## 2021-06-24 ENCOUNTER — PATIENT OUTREACH (OUTPATIENT)
Dept: ADMINISTRATIVE | Facility: HOSPITAL | Age: 46
End: 2021-06-24

## 2021-06-25 RX ORDER — ZOLPIDEM TARTRATE 10 MG/1
10 TABLET ORAL NIGHTLY PRN
Qty: 90 TABLET | Refills: 1 | Status: SHIPPED | OUTPATIENT
Start: 2021-06-25 | End: 2022-01-06 | Stop reason: SDUPTHER

## 2021-06-28 ENCOUNTER — PATIENT MESSAGE (OUTPATIENT)
Dept: INTERNAL MEDICINE | Facility: CLINIC | Age: 46
End: 2021-06-28

## 2021-06-28 DIAGNOSIS — M79.7 FIBROMYALGIA: ICD-10-CM

## 2021-06-28 DIAGNOSIS — K50.00 CROHN'S DISEASE OF SMALL INTESTINE WITHOUT COMPLICATION: ICD-10-CM

## 2021-06-28 RX ORDER — OXYCODONE AND ACETAMINOPHEN 10; 325 MG/1; MG/1
1 TABLET ORAL EVERY 6 HOURS PRN
Qty: 120 TABLET | Refills: 0 | Status: SHIPPED | OUTPATIENT
Start: 2021-06-28 | End: 2021-06-28 | Stop reason: SDUPTHER

## 2021-06-28 RX ORDER — OXYCODONE AND ACETAMINOPHEN 10; 325 MG/1; MG/1
1 TABLET ORAL EVERY 6 HOURS PRN
Qty: 120 TABLET | Refills: 0 | Status: SHIPPED | OUTPATIENT
Start: 2021-06-28 | End: 2021-07-20 | Stop reason: SDUPTHER

## 2021-07-06 ENCOUNTER — PATIENT MESSAGE (OUTPATIENT)
Dept: ADMINISTRATIVE | Facility: HOSPITAL | Age: 46
End: 2021-07-06

## 2021-07-20 ENCOUNTER — PATIENT MESSAGE (OUTPATIENT)
Dept: INTERNAL MEDICINE | Facility: CLINIC | Age: 46
End: 2021-07-20

## 2021-07-20 DIAGNOSIS — M79.7 FIBROMYALGIA: ICD-10-CM

## 2021-07-20 DIAGNOSIS — K50.00 CROHN'S DISEASE OF SMALL INTESTINE WITHOUT COMPLICATION: ICD-10-CM

## 2021-07-20 RX ORDER — OXYCODONE AND ACETAMINOPHEN 10; 325 MG/1; MG/1
1 TABLET ORAL EVERY 6 HOURS PRN
Qty: 120 TABLET | Refills: 0 | Status: SHIPPED | OUTPATIENT
Start: 2021-07-27 | End: 2021-08-23 | Stop reason: SDUPTHER

## 2021-07-22 ENCOUNTER — PATIENT MESSAGE (OUTPATIENT)
Dept: INTERNAL MEDICINE | Facility: CLINIC | Age: 46
End: 2021-07-22

## 2021-07-27 ENCOUNTER — LAB VISIT (OUTPATIENT)
Dept: LAB | Facility: HOSPITAL | Age: 46
End: 2021-07-27
Payer: COMMERCIAL

## 2021-07-27 ENCOUNTER — PATIENT MESSAGE (OUTPATIENT)
Dept: INTERNAL MEDICINE | Facility: CLINIC | Age: 46
End: 2021-07-27

## 2021-07-27 ENCOUNTER — OFFICE VISIT (OUTPATIENT)
Dept: INTERNAL MEDICINE | Facility: CLINIC | Age: 46
End: 2021-07-27
Payer: COMMERCIAL

## 2021-07-27 VITALS
HEIGHT: 63 IN | SYSTOLIC BLOOD PRESSURE: 128 MMHG | DIASTOLIC BLOOD PRESSURE: 80 MMHG | TEMPERATURE: 97 F | WEIGHT: 196.19 LBS | HEART RATE: 91 BPM | BODY MASS INDEX: 34.76 KG/M2 | OXYGEN SATURATION: 99 %

## 2021-07-27 DIAGNOSIS — K76.0 HEPATIC STEATOSIS: ICD-10-CM

## 2021-07-27 DIAGNOSIS — M79.7 FIBROMYALGIA: ICD-10-CM

## 2021-07-27 DIAGNOSIS — K50.00 CROHN'S DISEASE OF SMALL INTESTINE WITHOUT COMPLICATION: ICD-10-CM

## 2021-07-27 DIAGNOSIS — Z23 NEED FOR DIPHTHERIA-TETANUS-PERTUSSIS (TDAP) VACCINE: ICD-10-CM

## 2021-07-27 DIAGNOSIS — K50.00 CROHN'S DISEASE OF SMALL INTESTINE WITHOUT COMPLICATION: Primary | ICD-10-CM

## 2021-07-27 LAB
ANION GAP SERPL CALC-SCNC: 11 MMOL/L (ref 8–16)
BUN SERPL-MCNC: 21 MG/DL (ref 6–20)
CALCIUM SERPL-MCNC: 10 MG/DL (ref 8.7–10.5)
CHLORIDE SERPL-SCNC: 112 MMOL/L (ref 95–110)
CHOLEST SERPL-MCNC: 180 MG/DL (ref 120–199)
CHOLEST/HDLC SERPL: 3.8 {RATIO} (ref 2–5)
CO2 SERPL-SCNC: 19 MMOL/L (ref 23–29)
CREAT SERPL-MCNC: 1.1 MG/DL (ref 0.5–1.4)
EST. GFR  (AFRICAN AMERICAN): >60 ML/MIN/1.73 M^2
EST. GFR  (NON AFRICAN AMERICAN): >60 ML/MIN/1.73 M^2
GLUCOSE SERPL-MCNC: 103 MG/DL (ref 70–110)
HDLC SERPL-MCNC: 48 MG/DL (ref 40–75)
HDLC SERPL: 26.7 % (ref 20–50)
LDLC SERPL CALC-MCNC: 107.2 MG/DL (ref 63–159)
NONHDLC SERPL-MCNC: 132 MG/DL
POTASSIUM SERPL-SCNC: 3.8 MMOL/L (ref 3.5–5.1)
SODIUM SERPL-SCNC: 142 MMOL/L (ref 136–145)
TRIGL SERPL-MCNC: 124 MG/DL (ref 30–150)

## 2021-07-27 PROCEDURE — 1160F RVW MEDS BY RX/DR IN RCRD: CPT | Mod: CPTII,S$GLB,, | Performed by: INTERNAL MEDICINE

## 2021-07-27 PROCEDURE — 99214 PR OFFICE/OUTPT VISIT, EST, LEVL IV, 30-39 MIN: ICD-10-PCS | Mod: 25,S$GLB,, | Performed by: INTERNAL MEDICINE

## 2021-07-27 PROCEDURE — 1159F PR MEDICATION LIST DOCUMENTED IN MEDICAL RECORD: ICD-10-PCS | Mod: CPTII,S$GLB,, | Performed by: INTERNAL MEDICINE

## 2021-07-27 PROCEDURE — 87389 HIV-1 AG W/HIV-1&-2 AB AG IA: CPT | Performed by: INTERNAL MEDICINE

## 2021-07-27 PROCEDURE — 90471 TDAP VACCINE GREATER THAN OR EQUAL TO 7YO IM: ICD-10-PCS | Mod: S$GLB,,, | Performed by: INTERNAL MEDICINE

## 2021-07-27 PROCEDURE — 90715 TDAP VACCINE 7 YRS/> IM: CPT | Mod: S$GLB,,, | Performed by: INTERNAL MEDICINE

## 2021-07-27 PROCEDURE — 99999 PR PBB SHADOW E&M-EST. PATIENT-LVL IV: ICD-10-PCS | Mod: PBBFAC,,, | Performed by: INTERNAL MEDICINE

## 2021-07-27 PROCEDURE — 80048 BASIC METABOLIC PNL TOTAL CA: CPT | Performed by: INTERNAL MEDICINE

## 2021-07-27 PROCEDURE — 1160F PR REVIEW ALL MEDS BY PRESCRIBER/CLIN PHARMACIST DOCUMENTED: ICD-10-PCS | Mod: CPTII,S$GLB,, | Performed by: INTERNAL MEDICINE

## 2021-07-27 PROCEDURE — 90471 IMMUNIZATION ADMIN: CPT | Mod: S$GLB,,, | Performed by: INTERNAL MEDICINE

## 2021-07-27 PROCEDURE — 1159F MED LIST DOCD IN RCRD: CPT | Mod: CPTII,S$GLB,, | Performed by: INTERNAL MEDICINE

## 2021-07-27 PROCEDURE — 90715 TDAP VACCINE GREATER THAN OR EQUAL TO 7YO IM: ICD-10-PCS | Mod: S$GLB,,, | Performed by: INTERNAL MEDICINE

## 2021-07-27 PROCEDURE — 80061 LIPID PANEL: CPT | Performed by: INTERNAL MEDICINE

## 2021-07-27 PROCEDURE — 3079F PR MOST RECENT DIASTOLIC BLOOD PRESSURE 80-89 MM HG: ICD-10-PCS | Mod: CPTII,S$GLB,, | Performed by: INTERNAL MEDICINE

## 2021-07-27 PROCEDURE — 86803 HEPATITIS C AB TEST: CPT | Performed by: INTERNAL MEDICINE

## 2021-07-27 PROCEDURE — 99999 PR PBB SHADOW E&M-EST. PATIENT-LVL IV: CPT | Mod: PBBFAC,,, | Performed by: INTERNAL MEDICINE

## 2021-07-27 PROCEDURE — 3074F PR MOST RECENT SYSTOLIC BLOOD PRESSURE < 130 MM HG: ICD-10-PCS | Mod: CPTII,S$GLB,, | Performed by: INTERNAL MEDICINE

## 2021-07-27 PROCEDURE — 36415 COLL VENOUS BLD VENIPUNCTURE: CPT | Mod: PO | Performed by: INTERNAL MEDICINE

## 2021-07-27 PROCEDURE — 99214 OFFICE O/P EST MOD 30 MIN: CPT | Mod: 25,S$GLB,, | Performed by: INTERNAL MEDICINE

## 2021-07-27 PROCEDURE — 3008F PR BODY MASS INDEX (BMI) DOCUMENTED: ICD-10-PCS | Mod: CPTII,S$GLB,, | Performed by: INTERNAL MEDICINE

## 2021-07-27 PROCEDURE — 3079F DIAST BP 80-89 MM HG: CPT | Mod: CPTII,S$GLB,, | Performed by: INTERNAL MEDICINE

## 2021-07-27 PROCEDURE — 3008F BODY MASS INDEX DOCD: CPT | Mod: CPTII,S$GLB,, | Performed by: INTERNAL MEDICINE

## 2021-07-27 PROCEDURE — 3074F SYST BP LT 130 MM HG: CPT | Mod: CPTII,S$GLB,, | Performed by: INTERNAL MEDICINE

## 2021-07-27 RX ORDER — ESTRADIOL 0.1 MG/D
1 FILM, EXTENDED RELEASE TRANSDERMAL
COMMUNITY
Start: 2021-07-15 | End: 2021-09-15

## 2021-07-27 RX ORDER — OXYCODONE HYDROCHLORIDE 10 MG/1
TABLET ORAL
COMMUNITY
End: 2022-05-11

## 2021-07-27 RX ORDER — BENZONATATE 100 MG/1
100 CAPSULE ORAL 3 TIMES DAILY PRN
Qty: 30 CAPSULE | Refills: 3 | Status: SHIPPED | OUTPATIENT
Start: 2021-07-27 | End: 2021-08-06

## 2021-07-28 LAB
HCV AB SERPL QL IA: NEGATIVE
HIV 1+2 AB+HIV1 P24 AG SERPL QL IA: NEGATIVE

## 2021-07-29 ENCOUNTER — PATIENT MESSAGE (OUTPATIENT)
Dept: INTERNAL MEDICINE | Facility: CLINIC | Age: 46
End: 2021-07-29

## 2021-07-29 DIAGNOSIS — K50.00 CROHN'S DISEASE OF SMALL INTESTINE WITHOUT COMPLICATION: Primary | ICD-10-CM

## 2021-07-30 ENCOUNTER — TELEPHONE (OUTPATIENT)
Dept: BARIATRICS | Facility: CLINIC | Age: 46
End: 2021-07-30

## 2021-07-30 ENCOUNTER — PROCEDURE VISIT (OUTPATIENT)
Dept: HEPATOLOGY | Facility: CLINIC | Age: 46
End: 2021-07-30
Payer: COMMERCIAL

## 2021-07-30 ENCOUNTER — OFFICE VISIT (OUTPATIENT)
Dept: HEPATOLOGY | Facility: CLINIC | Age: 46
End: 2021-07-30
Payer: COMMERCIAL

## 2021-07-30 ENCOUNTER — PATIENT OUTREACH (OUTPATIENT)
Dept: ADMINISTRATIVE | Facility: OTHER | Age: 46
End: 2021-07-30

## 2021-07-30 ENCOUNTER — PATIENT MESSAGE (OUTPATIENT)
Dept: INTERNAL MEDICINE | Facility: CLINIC | Age: 46
End: 2021-07-30

## 2021-07-30 ENCOUNTER — LAB VISIT (OUTPATIENT)
Dept: LAB | Facility: HOSPITAL | Age: 46
End: 2021-07-30
Payer: COMMERCIAL

## 2021-07-30 VITALS
RESPIRATION RATE: 18 BRPM | BODY MASS INDEX: 34.76 KG/M2 | DIASTOLIC BLOOD PRESSURE: 71 MMHG | TEMPERATURE: 98 F | HEART RATE: 90 BPM | OXYGEN SATURATION: 95 % | SYSTOLIC BLOOD PRESSURE: 118 MMHG | WEIGHT: 196.19 LBS | HEIGHT: 63 IN

## 2021-07-30 DIAGNOSIS — R05.9 COUGH: ICD-10-CM

## 2021-07-30 DIAGNOSIS — K76.0 HEPATIC STEATOSIS: ICD-10-CM

## 2021-07-30 DIAGNOSIS — K76.0 HEPATIC STEATOSIS: Primary | ICD-10-CM

## 2021-07-30 DIAGNOSIS — E66.9 OBESITY (BMI 30.0-34.9): ICD-10-CM

## 2021-07-30 PROBLEM — E66.811 OBESITY (BMI 30.0-34.9): Status: ACTIVE | Noted: 2021-07-30

## 2021-07-30 LAB
ALBUMIN SERPL BCP-MCNC: 4 G/DL (ref 3.5–5.2)
ALP SERPL-CCNC: 113 U/L (ref 55–135)
ALT SERPL W/O P-5'-P-CCNC: 145 U/L (ref 10–44)
AST SERPL-CCNC: 80 U/L (ref 10–40)
BILIRUB DIRECT SERPL-MCNC: 0.3 MG/DL (ref 0.1–0.3)
BILIRUB SERPL-MCNC: 0.7 MG/DL (ref 0.1–1)
PROT SERPL-MCNC: 7.4 G/DL (ref 6–8.4)

## 2021-07-30 PROCEDURE — 86706 HEP B SURFACE ANTIBODY: CPT | Performed by: NURSE PRACTITIONER

## 2021-07-30 PROCEDURE — 36415 COLL VENOUS BLD VENIPUNCTURE: CPT | Performed by: NURSE PRACTITIONER

## 2021-07-30 PROCEDURE — 99999 PR PBB SHADOW E&M-EST. PATIENT-LVL V: CPT | Mod: PBBFAC,,, | Performed by: NURSE PRACTITIONER

## 2021-07-30 PROCEDURE — 1160F RVW MEDS BY RX/DR IN RCRD: CPT | Mod: CPTII,S$GLB,, | Performed by: NURSE PRACTITIONER

## 2021-07-30 PROCEDURE — 1159F MED LIST DOCD IN RCRD: CPT | Mod: CPTII,S$GLB,, | Performed by: NURSE PRACTITIONER

## 2021-07-30 PROCEDURE — 91200 FIBROSCAN (VIBRATION CONTROLLED TRANSIENT ELASTOGRAPHY): ICD-10-PCS | Mod: S$GLB,,, | Performed by: NURSE PRACTITIONER

## 2021-07-30 PROCEDURE — 99214 OFFICE O/P EST MOD 30 MIN: CPT | Mod: S$GLB,,, | Performed by: NURSE PRACTITIONER

## 2021-07-30 PROCEDURE — 80076 HEPATIC FUNCTION PANEL: CPT | Performed by: NURSE PRACTITIONER

## 2021-07-30 PROCEDURE — 1125F AMNT PAIN NOTED PAIN PRSNT: CPT | Mod: CPTII,S$GLB,, | Performed by: NURSE PRACTITIONER

## 2021-07-30 PROCEDURE — 99214 PR OFFICE/OUTPT VISIT, EST, LEVL IV, 30-39 MIN: ICD-10-PCS | Mod: S$GLB,,, | Performed by: NURSE PRACTITIONER

## 2021-07-30 PROCEDURE — 3074F PR MOST RECENT SYSTOLIC BLOOD PRESSURE < 130 MM HG: ICD-10-PCS | Mod: CPTII,S$GLB,, | Performed by: NURSE PRACTITIONER

## 2021-07-30 PROCEDURE — 3078F PR MOST RECENT DIASTOLIC BLOOD PRESSURE < 80 MM HG: ICD-10-PCS | Mod: CPTII,S$GLB,, | Performed by: NURSE PRACTITIONER

## 2021-07-30 PROCEDURE — 86790 VIRUS ANTIBODY NOS: CPT | Performed by: NURSE PRACTITIONER

## 2021-07-30 PROCEDURE — 1160F PR REVIEW ALL MEDS BY PRESCRIBER/CLIN PHARMACIST DOCUMENTED: ICD-10-PCS | Mod: CPTII,S$GLB,, | Performed by: NURSE PRACTITIONER

## 2021-07-30 PROCEDURE — 1159F PR MEDICATION LIST DOCUMENTED IN MEDICAL RECORD: ICD-10-PCS | Mod: CPTII,S$GLB,, | Performed by: NURSE PRACTITIONER

## 2021-07-30 PROCEDURE — 3008F BODY MASS INDEX DOCD: CPT | Mod: CPTII,S$GLB,, | Performed by: NURSE PRACTITIONER

## 2021-07-30 PROCEDURE — 3008F PR BODY MASS INDEX (BMI) DOCUMENTED: ICD-10-PCS | Mod: CPTII,S$GLB,, | Performed by: NURSE PRACTITIONER

## 2021-07-30 PROCEDURE — 1125F PR PAIN SEVERITY QUANTIFIED, PAIN PRESENT: ICD-10-PCS | Mod: CPTII,S$GLB,, | Performed by: NURSE PRACTITIONER

## 2021-07-30 PROCEDURE — 3074F SYST BP LT 130 MM HG: CPT | Mod: CPTII,S$GLB,, | Performed by: NURSE PRACTITIONER

## 2021-07-30 PROCEDURE — 3078F DIAST BP <80 MM HG: CPT | Mod: CPTII,S$GLB,, | Performed by: NURSE PRACTITIONER

## 2021-07-30 PROCEDURE — 99999 PR PBB SHADOW E&M-EST. PATIENT-LVL V: ICD-10-PCS | Mod: PBBFAC,,, | Performed by: NURSE PRACTITIONER

## 2021-07-30 PROCEDURE — 91200 LIVER ELASTOGRAPHY: CPT | Mod: S$GLB,,, | Performed by: NURSE PRACTITIONER

## 2021-07-30 RX ORDER — AMOXICILLIN 400 MG/5ML
960 POWDER, FOR SUSPENSION ORAL
COMMUNITY
Start: 2021-07-29 | End: 2021-09-15 | Stop reason: SDUPTHER

## 2021-08-02 ENCOUNTER — PATIENT MESSAGE (OUTPATIENT)
Dept: HEPATOLOGY | Facility: CLINIC | Age: 46
End: 2021-08-02

## 2021-08-02 DIAGNOSIS — Z23 NEED FOR HEPATITIS B VACCINATION: Primary | ICD-10-CM

## 2021-08-02 LAB
HBV SURFACE AB SER QL IA: NEGATIVE
HBV SURFACE AB SERPL IA-ACNC: <3 MIU/ML
HEPATITIS A ANTIBODY, IGG: POSITIVE

## 2021-08-02 RX ORDER — PROMETHAZINE HYDROCHLORIDE AND DEXTROMETHORPHAN HYDROBROMIDE 6.25; 15 MG/5ML; MG/5ML
5 SYRUP ORAL
Qty: 118 ML | Refills: 0 | Status: SHIPPED | OUTPATIENT
Start: 2021-08-02 | End: 2021-09-15

## 2021-08-03 ENCOUNTER — PATIENT MESSAGE (OUTPATIENT)
Dept: HEPATOLOGY | Facility: CLINIC | Age: 46
End: 2021-08-03

## 2021-08-07 VITALS
BODY MASS INDEX: 34.54 KG/M2 | WEIGHT: 195 LBS | DIASTOLIC BLOOD PRESSURE: 90 MMHG | HEART RATE: 92 BPM | TEMPERATURE: 98 F | SYSTOLIC BLOOD PRESSURE: 123 MMHG | OXYGEN SATURATION: 97 % | RESPIRATION RATE: 18 BRPM

## 2021-08-07 PROCEDURE — U0002 COVID-19 LAB TEST NON-CDC: HCPCS | Performed by: EMERGENCY MEDICINE

## 2021-08-07 PROCEDURE — 99900041 HC LEFT WITHOUT BEING SEEN- EMERGENCY

## 2021-08-08 ENCOUNTER — HOSPITAL ENCOUNTER (EMERGENCY)
Facility: HOSPITAL | Age: 46
Discharge: LEFT BEFORE DISCHARGE | End: 2021-08-08
Attending: EMERGENCY MEDICINE
Payer: COMMERCIAL

## 2021-08-08 DIAGNOSIS — R05.9 COUGH: ICD-10-CM

## 2021-08-08 LAB — SARS-COV-2 RDRP RESP QL NAA+PROBE: NEGATIVE

## 2021-08-10 ENCOUNTER — PATIENT MESSAGE (OUTPATIENT)
Dept: NEUROLOGY | Facility: CLINIC | Age: 46
End: 2021-08-10

## 2021-08-10 ENCOUNTER — PATIENT MESSAGE (OUTPATIENT)
Dept: INTERNAL MEDICINE | Facility: CLINIC | Age: 46
End: 2021-08-10

## 2021-08-11 ENCOUNTER — PATIENT MESSAGE (OUTPATIENT)
Dept: INTERNAL MEDICINE | Facility: CLINIC | Age: 46
End: 2021-08-11

## 2021-08-11 ENCOUNTER — PATIENT MESSAGE (OUTPATIENT)
Dept: PHARMACY | Facility: CLINIC | Age: 46
End: 2021-08-11

## 2021-08-12 ENCOUNTER — TELEPHONE (OUTPATIENT)
Dept: HEPATOLOGY | Facility: CLINIC | Age: 46
End: 2021-08-12

## 2021-08-14 ENCOUNTER — PATIENT MESSAGE (OUTPATIENT)
Dept: HEPATOLOGY | Facility: CLINIC | Age: 46
End: 2021-08-14

## 2021-08-16 DIAGNOSIS — R10.11 RUQ PAIN: Primary | ICD-10-CM

## 2021-08-16 DIAGNOSIS — K76.0 HEPATIC STEATOSIS: ICD-10-CM

## 2021-08-22 ENCOUNTER — PATIENT MESSAGE (OUTPATIENT)
Dept: INTERNAL MEDICINE | Facility: CLINIC | Age: 46
End: 2021-08-22

## 2021-08-22 DIAGNOSIS — M79.7 FIBROMYALGIA: ICD-10-CM

## 2021-08-22 DIAGNOSIS — K50.00 CROHN'S DISEASE OF SMALL INTESTINE WITHOUT COMPLICATION: ICD-10-CM

## 2021-08-23 ENCOUNTER — HOSPITAL ENCOUNTER (OUTPATIENT)
Dept: RADIOLOGY | Facility: HOSPITAL | Age: 46
Discharge: HOME | End: 2021-08-23
Attending: NURSE PRACTITIONER
Payer: COMMERCIAL

## 2021-08-23 DIAGNOSIS — K76.0 HEPATIC STEATOSIS: ICD-10-CM

## 2021-08-23 DIAGNOSIS — R10.11 RUQ PAIN: ICD-10-CM

## 2021-08-23 PROCEDURE — 93975 VASCULAR STUDY: CPT | Mod: TC

## 2021-08-23 PROCEDURE — 93975 VASCULAR STUDY: CPT | Mod: 26,,, | Performed by: RADIOLOGY

## 2021-08-23 PROCEDURE — 93975 US ABDOMEN COMP WITH DOPPLER (XPD): ICD-10-PCS | Mod: 26,,, | Performed by: RADIOLOGY

## 2021-08-23 RX ORDER — OXYCODONE AND ACETAMINOPHEN 10; 325 MG/1; MG/1
1 TABLET ORAL EVERY 6 HOURS PRN
Qty: 120 TABLET | Refills: 0 | Status: SHIPPED | OUTPATIENT
Start: 2021-08-23 | End: 2021-09-15 | Stop reason: SDUPTHER

## 2021-08-24 ENCOUNTER — PATIENT MESSAGE (OUTPATIENT)
Dept: HEPATOLOGY | Facility: CLINIC | Age: 46
End: 2021-08-24

## 2021-08-24 ENCOUNTER — TELEPHONE (OUTPATIENT)
Dept: HEPATOLOGY | Facility: CLINIC | Age: 46
End: 2021-08-24

## 2021-08-24 DIAGNOSIS — K76.0 HEPATIC STEATOSIS: Primary | ICD-10-CM

## 2021-08-24 DIAGNOSIS — R74.8 ELEVATED LIVER ENZYMES: ICD-10-CM

## 2021-08-25 ENCOUNTER — TELEPHONE (OUTPATIENT)
Dept: BARIATRICS | Facility: CLINIC | Age: 46
End: 2021-08-25

## 2021-09-08 ENCOUNTER — PATIENT MESSAGE (OUTPATIENT)
Dept: INTERNAL MEDICINE | Facility: CLINIC | Age: 46
End: 2021-09-08

## 2021-09-14 ENCOUNTER — PATIENT MESSAGE (OUTPATIENT)
Dept: INTERNAL MEDICINE | Facility: CLINIC | Age: 46
End: 2021-09-14

## 2021-09-15 ENCOUNTER — OFFICE VISIT (OUTPATIENT)
Dept: PRIMARY CARE CLINIC | Facility: CLINIC | Age: 46
End: 2021-09-15
Payer: COMMERCIAL

## 2021-09-15 ENCOUNTER — PATIENT MESSAGE (OUTPATIENT)
Dept: INTERNAL MEDICINE | Facility: CLINIC | Age: 46
End: 2021-09-15

## 2021-09-15 ENCOUNTER — HOSPITAL ENCOUNTER (OUTPATIENT)
Dept: RADIOLOGY | Facility: HOSPITAL | Age: 46
Discharge: HOME | End: 2021-09-15
Attending: INTERNAL MEDICINE
Payer: COMMERCIAL

## 2021-09-15 VITALS
HEART RATE: 90 BPM | OXYGEN SATURATION: 97 % | HEIGHT: 63 IN | SYSTOLIC BLOOD PRESSURE: 110 MMHG | DIASTOLIC BLOOD PRESSURE: 78 MMHG | BODY MASS INDEX: 33.51 KG/M2 | WEIGHT: 189.13 LBS | RESPIRATION RATE: 16 BRPM | TEMPERATURE: 98 F

## 2021-09-15 DIAGNOSIS — J01.90 ACUTE SINUSITIS, RECURRENCE NOT SPECIFIED, UNSPECIFIED LOCATION: Primary | ICD-10-CM

## 2021-09-15 DIAGNOSIS — J45.909 CHRONIC ASTHMA WITHOUT COMPLICATION, UNSPECIFIED ASTHMA SEVERITY, UNSPECIFIED WHETHER PERSISTENT: ICD-10-CM

## 2021-09-15 DIAGNOSIS — R05.9 COUGH: ICD-10-CM

## 2021-09-15 DIAGNOSIS — M79.7 FIBROMYALGIA: ICD-10-CM

## 2021-09-15 DIAGNOSIS — G47.33 OSA (OBSTRUCTIVE SLEEP APNEA): ICD-10-CM

## 2021-09-15 DIAGNOSIS — K50.00 CROHN'S DISEASE OF SMALL INTESTINE WITHOUT COMPLICATION: ICD-10-CM

## 2021-09-15 PROCEDURE — 99213 OFFICE O/P EST LOW 20 MIN: CPT | Mod: S$GLB,,, | Performed by: INTERNAL MEDICINE

## 2021-09-15 PROCEDURE — 99213 PR OFFICE/OUTPT VISIT, EST, LEVL III, 20-29 MIN: ICD-10-PCS | Mod: S$GLB,,, | Performed by: INTERNAL MEDICINE

## 2021-09-15 PROCEDURE — 1160F PR REVIEW ALL MEDS BY PRESCRIBER/CLIN PHARMACIST DOCUMENTED: ICD-10-PCS | Mod: CPTII,S$GLB,, | Performed by: INTERNAL MEDICINE

## 2021-09-15 PROCEDURE — 99999 PR PBB SHADOW E&M-EST. PATIENT-LVL V: ICD-10-PCS | Mod: PBBFAC,,, | Performed by: INTERNAL MEDICINE

## 2021-09-15 PROCEDURE — 3078F DIAST BP <80 MM HG: CPT | Mod: CPTII,S$GLB,, | Performed by: INTERNAL MEDICINE

## 2021-09-15 PROCEDURE — 99999 PR PBB SHADOW E&M-EST. PATIENT-LVL V: CPT | Mod: PBBFAC,,, | Performed by: INTERNAL MEDICINE

## 2021-09-15 PROCEDURE — 3074F PR MOST RECENT SYSTOLIC BLOOD PRESSURE < 130 MM HG: ICD-10-PCS | Mod: CPTII,S$GLB,, | Performed by: INTERNAL MEDICINE

## 2021-09-15 PROCEDURE — 3078F PR MOST RECENT DIASTOLIC BLOOD PRESSURE < 80 MM HG: ICD-10-PCS | Mod: CPTII,S$GLB,, | Performed by: INTERNAL MEDICINE

## 2021-09-15 PROCEDURE — 3008F BODY MASS INDEX DOCD: CPT | Mod: CPTII,S$GLB,, | Performed by: INTERNAL MEDICINE

## 2021-09-15 PROCEDURE — 71046 X-RAY EXAM CHEST 2 VIEWS: CPT | Mod: 26,,, | Performed by: RADIOLOGY

## 2021-09-15 PROCEDURE — 1159F MED LIST DOCD IN RCRD: CPT | Mod: CPTII,S$GLB,, | Performed by: INTERNAL MEDICINE

## 2021-09-15 PROCEDURE — 1160F RVW MEDS BY RX/DR IN RCRD: CPT | Mod: CPTII,S$GLB,, | Performed by: INTERNAL MEDICINE

## 2021-09-15 PROCEDURE — 1159F PR MEDICATION LIST DOCUMENTED IN MEDICAL RECORD: ICD-10-PCS | Mod: CPTII,S$GLB,, | Performed by: INTERNAL MEDICINE

## 2021-09-15 PROCEDURE — 71046 X-RAY EXAM CHEST 2 VIEWS: CPT | Mod: TC,PN

## 2021-09-15 PROCEDURE — 71046 XR CHEST PA AND LATERAL: ICD-10-PCS | Mod: 26,,, | Performed by: RADIOLOGY

## 2021-09-15 PROCEDURE — 3074F SYST BP LT 130 MM HG: CPT | Mod: CPTII,S$GLB,, | Performed by: INTERNAL MEDICINE

## 2021-09-15 PROCEDURE — 3008F PR BODY MASS INDEX (BMI) DOCUMENTED: ICD-10-PCS | Mod: CPTII,S$GLB,, | Performed by: INTERNAL MEDICINE

## 2021-09-15 RX ORDER — OXYCODONE AND ACETAMINOPHEN 10; 325 MG/1; MG/1
1 TABLET ORAL EVERY 6 HOURS PRN
Qty: 120 TABLET | Refills: 0 | Status: SHIPPED | OUTPATIENT
Start: 2021-09-21 | End: 2021-10-18 | Stop reason: SDUPTHER

## 2021-09-15 RX ORDER — ONDANSETRON 4 MG/1
TABLET, ORALLY DISINTEGRATING ORAL
Qty: 30 TABLET | Refills: 3 | Status: SHIPPED | OUTPATIENT
Start: 2021-09-15 | End: 2022-04-04

## 2021-09-15 RX ORDER — AMOXICILLIN 400 MG/5ML
800 POWDER, FOR SUSPENSION ORAL EVERY 12 HOURS
Qty: 150 ML | Refills: 0 | Status: SHIPPED | OUTPATIENT
Start: 2021-09-15 | End: 2021-09-23

## 2021-09-15 RX ORDER — PROMETHAZINE HYDROCHLORIDE AND DEXTROMETHORPHAN HYDROBROMIDE 6.25; 15 MG/5ML; MG/5ML
5 SYRUP ORAL NIGHTLY PRN
Qty: 120 ML | Refills: 0 | Status: SHIPPED | OUTPATIENT
Start: 2021-09-15 | End: 2021-10-27

## 2021-09-16 ENCOUNTER — PATIENT MESSAGE (OUTPATIENT)
Dept: INTERNAL MEDICINE | Facility: CLINIC | Age: 46
End: 2021-09-16

## 2021-09-23 ENCOUNTER — PATIENT OUTREACH (OUTPATIENT)
Dept: ADMINISTRATIVE | Facility: OTHER | Age: 46
End: 2021-09-23

## 2021-10-01 ENCOUNTER — TELEPHONE (OUTPATIENT)
Dept: BARIATRICS | Facility: CLINIC | Age: 46
End: 2021-10-01

## 2021-10-04 ENCOUNTER — PATIENT MESSAGE (OUTPATIENT)
Dept: ADMINISTRATIVE | Facility: HOSPITAL | Age: 46
End: 2021-10-04

## 2021-10-18 ENCOUNTER — PATIENT MESSAGE (OUTPATIENT)
Dept: INTERNAL MEDICINE | Facility: CLINIC | Age: 46
End: 2021-10-18

## 2021-10-18 DIAGNOSIS — M79.7 FIBROMYALGIA: ICD-10-CM

## 2021-10-18 DIAGNOSIS — K50.00 CROHN'S DISEASE OF SMALL INTESTINE WITHOUT COMPLICATION: ICD-10-CM

## 2021-10-18 RX ORDER — OXYCODONE AND ACETAMINOPHEN 10; 325 MG/1; MG/1
1 TABLET ORAL EVERY 6 HOURS PRN
Qty: 120 TABLET | Refills: 0 | Status: SHIPPED | OUTPATIENT
Start: 2021-10-18 | End: 2021-11-10 | Stop reason: SDUPTHER

## 2021-10-27 ENCOUNTER — OFFICE VISIT (OUTPATIENT)
Dept: INTERNAL MEDICINE | Facility: CLINIC | Age: 46
End: 2021-10-27
Payer: COMMERCIAL

## 2021-10-27 ENCOUNTER — LAB VISIT (OUTPATIENT)
Dept: LAB | Facility: HOSPITAL | Age: 46
End: 2021-10-27
Attending: INTERNAL MEDICINE
Payer: COMMERCIAL

## 2021-10-27 ENCOUNTER — PATIENT MESSAGE (OUTPATIENT)
Dept: INTERNAL MEDICINE | Facility: CLINIC | Age: 46
End: 2021-10-27

## 2021-10-27 VITALS
TEMPERATURE: 98 F | OXYGEN SATURATION: 100 % | DIASTOLIC BLOOD PRESSURE: 90 MMHG | SYSTOLIC BLOOD PRESSURE: 116 MMHG | HEIGHT: 63 IN | WEIGHT: 189.13 LBS | HEART RATE: 80 BPM | RESPIRATION RATE: 20 BRPM | BODY MASS INDEX: 33.51 KG/M2

## 2021-10-27 DIAGNOSIS — M79.7 FIBROMYALGIA: ICD-10-CM

## 2021-10-27 DIAGNOSIS — K50.00 CROHN'S DISEASE OF SMALL INTESTINE WITHOUT COMPLICATION: ICD-10-CM

## 2021-10-27 DIAGNOSIS — R73.01 IMPAIRED FASTING GLUCOSE: Primary | ICD-10-CM

## 2021-10-27 DIAGNOSIS — K76.0 HEPATIC STEATOSIS: ICD-10-CM

## 2021-10-27 DIAGNOSIS — J45.909 ASTHMA, UNSPECIFIED ASTHMA SEVERITY, UNSPECIFIED WHETHER COMPLICATED, UNSPECIFIED WHETHER PERSISTENT: ICD-10-CM

## 2021-10-27 DIAGNOSIS — R73.01 IMPAIRED FASTING GLUCOSE: ICD-10-CM

## 2021-10-27 LAB
ANION GAP SERPL CALC-SCNC: 7 MMOL/L (ref 8–16)
BUN SERPL-MCNC: 16 MG/DL (ref 6–20)
CALCIUM SERPL-MCNC: 9.9 MG/DL (ref 8.7–10.5)
CHLORIDE SERPL-SCNC: 106 MMOL/L (ref 95–110)
CO2 SERPL-SCNC: 25 MMOL/L (ref 23–29)
CREAT SERPL-MCNC: 1 MG/DL (ref 0.5–1.4)
EST. GFR  (AFRICAN AMERICAN): >60 ML/MIN/1.73 M^2
EST. GFR  (NON AFRICAN AMERICAN): >60 ML/MIN/1.73 M^2
ESTIMATED AVG GLUCOSE: 97 MG/DL (ref 68–131)
GLUCOSE SERPL-MCNC: 93 MG/DL (ref 70–110)
HBA1C MFR BLD: 5 % (ref 4–5.6)
POTASSIUM SERPL-SCNC: 4 MMOL/L (ref 3.5–5.1)
SODIUM SERPL-SCNC: 138 MMOL/L (ref 136–145)

## 2021-10-27 PROCEDURE — 36415 COLL VENOUS BLD VENIPUNCTURE: CPT | Mod: PO | Performed by: INTERNAL MEDICINE

## 2021-10-27 PROCEDURE — 3008F BODY MASS INDEX DOCD: CPT | Mod: CPTII,S$GLB,, | Performed by: INTERNAL MEDICINE

## 2021-10-27 PROCEDURE — 1159F PR MEDICATION LIST DOCUMENTED IN MEDICAL RECORD: ICD-10-PCS | Mod: CPTII,S$GLB,, | Performed by: INTERNAL MEDICINE

## 2021-10-27 PROCEDURE — 1159F MED LIST DOCD IN RCRD: CPT | Mod: CPTII,S$GLB,, | Performed by: INTERNAL MEDICINE

## 2021-10-27 PROCEDURE — 99213 OFFICE O/P EST LOW 20 MIN: CPT | Mod: 25,S$GLB,, | Performed by: INTERNAL MEDICINE

## 2021-10-27 PROCEDURE — 80048 BASIC METABOLIC PNL TOTAL CA: CPT | Performed by: INTERNAL MEDICINE

## 2021-10-27 PROCEDURE — 90686 IIV4 VACC NO PRSV 0.5 ML IM: CPT | Mod: S$GLB,,, | Performed by: INTERNAL MEDICINE

## 2021-10-27 PROCEDURE — 3044F HG A1C LEVEL LT 7.0%: CPT | Mod: CPTII,S$GLB,, | Performed by: INTERNAL MEDICINE

## 2021-10-27 PROCEDURE — 3008F PR BODY MASS INDEX (BMI) DOCUMENTED: ICD-10-PCS | Mod: CPTII,S$GLB,, | Performed by: INTERNAL MEDICINE

## 2021-10-27 PROCEDURE — 90686 FLU VACCINE (QUAD) GREATER THAN OR EQUAL TO 3YO PRESERVATIVE FREE IM: ICD-10-PCS | Mod: S$GLB,,, | Performed by: INTERNAL MEDICINE

## 2021-10-27 PROCEDURE — 99213 PR OFFICE/OUTPT VISIT, EST, LEVL III, 20-29 MIN: ICD-10-PCS | Mod: 25,S$GLB,, | Performed by: INTERNAL MEDICINE

## 2021-10-27 PROCEDURE — 99999 PR PBB SHADOW E&M-EST. PATIENT-LVL IV: ICD-10-PCS | Mod: PBBFAC,,, | Performed by: INTERNAL MEDICINE

## 2021-10-27 PROCEDURE — 83036 HEMOGLOBIN GLYCOSYLATED A1C: CPT | Performed by: INTERNAL MEDICINE

## 2021-10-27 PROCEDURE — 90471 FLU VACCINE (QUAD) GREATER THAN OR EQUAL TO 3YO PRESERVATIVE FREE IM: ICD-10-PCS | Mod: S$GLB,,, | Performed by: INTERNAL MEDICINE

## 2021-10-27 PROCEDURE — 3080F PR MOST RECENT DIASTOLIC BLOOD PRESSURE >= 90 MM HG: ICD-10-PCS | Mod: CPTII,S$GLB,, | Performed by: INTERNAL MEDICINE

## 2021-10-27 PROCEDURE — 90471 IMMUNIZATION ADMIN: CPT | Mod: S$GLB,,, | Performed by: INTERNAL MEDICINE

## 2021-10-27 PROCEDURE — 3044F PR MOST RECENT HEMOGLOBIN A1C LEVEL <7.0%: ICD-10-PCS | Mod: CPTII,S$GLB,, | Performed by: INTERNAL MEDICINE

## 2021-10-27 PROCEDURE — 99999 PR PBB SHADOW E&M-EST. PATIENT-LVL IV: CPT | Mod: PBBFAC,,, | Performed by: INTERNAL MEDICINE

## 2021-10-27 PROCEDURE — 3080F DIAST BP >= 90 MM HG: CPT | Mod: CPTII,S$GLB,, | Performed by: INTERNAL MEDICINE

## 2021-10-27 PROCEDURE — 3074F PR MOST RECENT SYSTOLIC BLOOD PRESSURE < 130 MM HG: ICD-10-PCS | Mod: CPTII,S$GLB,, | Performed by: INTERNAL MEDICINE

## 2021-10-27 PROCEDURE — 1160F PR REVIEW ALL MEDS BY PRESCRIBER/CLIN PHARMACIST DOCUMENTED: ICD-10-PCS | Mod: CPTII,S$GLB,, | Performed by: INTERNAL MEDICINE

## 2021-10-27 PROCEDURE — 3074F SYST BP LT 130 MM HG: CPT | Mod: CPTII,S$GLB,, | Performed by: INTERNAL MEDICINE

## 2021-10-27 PROCEDURE — 1160F RVW MEDS BY RX/DR IN RCRD: CPT | Mod: CPTII,S$GLB,, | Performed by: INTERNAL MEDICINE

## 2021-10-27 RX ORDER — CYCLOBENZAPRINE HCL 10 MG
10 TABLET ORAL 2 TIMES DAILY PRN
COMMUNITY
Start: 2021-09-13 | End: 2024-01-19 | Stop reason: SDUPTHER

## 2021-10-27 RX ORDER — FLASH GLUCOSE SCANNING READER
1 EACH MISCELLANEOUS DAILY PRN
Qty: 1 EACH | Refills: 0 | Status: SHIPPED | OUTPATIENT
Start: 2021-10-27

## 2021-10-27 RX ORDER — FLASH GLUCOSE SENSOR
1 KIT MISCELLANEOUS DAILY PRN
Qty: 2 KIT | Refills: 6 | Status: SHIPPED | OUTPATIENT
Start: 2021-10-27 | End: 2021-10-27 | Stop reason: SDUPTHER

## 2021-10-27 RX ORDER — ESTRADIOL 0.1 MG/D
1 FILM, EXTENDED RELEASE TRANSDERMAL
COMMUNITY
Start: 2021-10-03 | End: 2022-05-11

## 2021-10-27 RX ORDER — FLASH GLUCOSE SCANNING READER
1 EACH MISCELLANEOUS DAILY PRN
Qty: 1 EACH | Refills: 0 | Status: SHIPPED | OUTPATIENT
Start: 2021-10-27 | End: 2021-10-27 | Stop reason: SDUPTHER

## 2021-10-27 RX ORDER — FLASH GLUCOSE SENSOR
1 KIT MISCELLANEOUS DAILY PRN
Qty: 2 KIT | Refills: 6 | Status: SHIPPED | OUTPATIENT
Start: 2021-10-27

## 2021-10-28 ENCOUNTER — PATIENT MESSAGE (OUTPATIENT)
Dept: INTERNAL MEDICINE | Facility: CLINIC | Age: 46
End: 2021-10-28
Payer: COMMERCIAL

## 2021-11-03 ENCOUNTER — HOSPITAL ENCOUNTER (OUTPATIENT)
Facility: HOSPITAL | Age: 46
Discharge: HOME | End: 2021-11-05
Attending: EMERGENCY MEDICINE | Admitting: FAMILY MEDICINE
Payer: COMMERCIAL

## 2021-11-03 DIAGNOSIS — R07.9 CHEST PAIN: ICD-10-CM

## 2021-11-03 DIAGNOSIS — N39.0 URINARY TRACT INFECTION WITHOUT HEMATURIA, SITE UNSPECIFIED: Primary | ICD-10-CM

## 2021-11-03 DIAGNOSIS — R11.2 INTRACTABLE VOMITING WITH NAUSEA, UNSPECIFIED VOMITING TYPE: ICD-10-CM

## 2021-11-03 PROBLEM — R11.10 INTRACTABLE VOMITING: Status: ACTIVE | Noted: 2021-11-03

## 2021-11-03 LAB
ALBUMIN SERPL BCP-MCNC: 4.1 G/DL (ref 3.5–5.2)
ALP SERPL-CCNC: 99 U/L (ref 55–135)
ALT SERPL W/O P-5'-P-CCNC: 51 U/L (ref 10–44)
ANION GAP SERPL CALC-SCNC: 12 MMOL/L (ref 8–16)
AST SERPL-CCNC: 34 U/L (ref 10–40)
BASOPHILS # BLD AUTO: 0.04 K/UL (ref 0–0.2)
BASOPHILS NFR BLD: 0.5 % (ref 0–1.9)
BILIRUB SERPL-MCNC: 0.5 MG/DL (ref 0.1–1)
BILIRUB UR QL STRIP: NEGATIVE
BUN SERPL-MCNC: 17 MG/DL (ref 6–20)
CALCIUM SERPL-MCNC: 9.4 MG/DL (ref 8.7–10.5)
CHLORIDE SERPL-SCNC: 108 MMOL/L (ref 95–110)
CLARITY UR: CLEAR
CO2 SERPL-SCNC: 20 MMOL/L (ref 23–29)
COLOR UR: YELLOW
CREAT SERPL-MCNC: 1.1 MG/DL (ref 0.5–1.4)
DIFFERENTIAL METHOD: ABNORMAL
EOSINOPHIL # BLD AUTO: 0.2 K/UL (ref 0–0.5)
EOSINOPHIL NFR BLD: 2.1 % (ref 0–8)
ERYTHROCYTE [DISTWIDTH] IN BLOOD BY AUTOMATED COUNT: 12.5 % (ref 11.5–14.5)
EST. GFR  (AFRICAN AMERICAN): >60 ML/MIN/1.73 M^2
EST. GFR  (NON AFRICAN AMERICAN): >60 ML/MIN/1.73 M^2
GLUCOSE SERPL-MCNC: 100 MG/DL (ref 70–110)
GLUCOSE UR QL STRIP: NEGATIVE
HCT VFR BLD AUTO: 46.9 % (ref 37–48.5)
HGB BLD-MCNC: 16.4 G/DL (ref 12–16)
HGB UR QL STRIP: NEGATIVE
HYALINE CASTS #/AREA URNS LPF: 1 /LPF
IMM GRANULOCYTES # BLD AUTO: 0.03 K/UL (ref 0–0.04)
IMM GRANULOCYTES NFR BLD AUTO: 0.4 % (ref 0–0.5)
KETONES UR QL STRIP: NEGATIVE
LACTATE SERPL-SCNC: 2.3 MMOL/L (ref 0.5–2.2)
LEUKOCYTE ESTERASE UR QL STRIP: ABNORMAL
LIPASE SERPL-CCNC: 27 U/L (ref 4–60)
LYMPHOCYTES # BLD AUTO: 1.9 K/UL (ref 1–4.8)
LYMPHOCYTES NFR BLD: 25.1 % (ref 18–48)
MCH RBC QN AUTO: 29.7 PG (ref 27–31)
MCHC RBC AUTO-ENTMCNC: 35 G/DL (ref 32–36)
MCV RBC AUTO: 85 FL (ref 82–98)
MICROSCOPIC COMMENT: ABNORMAL
MONOCYTES # BLD AUTO: 0.5 K/UL (ref 0.3–1)
MONOCYTES NFR BLD: 6.7 % (ref 4–15)
NEUTROPHILS # BLD AUTO: 5 K/UL (ref 1.8–7.7)
NEUTROPHILS NFR BLD: 65.2 % (ref 38–73)
NITRITE UR QL STRIP: NEGATIVE
NRBC BLD-RTO: 0 /100 WBC
PH UR STRIP: 6 [PH] (ref 5–8)
PLATELET # BLD AUTO: 195 K/UL (ref 150–450)
PMV BLD AUTO: 10.9 FL (ref 9.2–12.9)
POTASSIUM SERPL-SCNC: 4.6 MMOL/L (ref 3.5–5.1)
PROT SERPL-MCNC: 7.4 G/DL (ref 6–8.4)
PROT UR QL STRIP: ABNORMAL
RBC # BLD AUTO: 5.52 M/UL (ref 4–5.4)
RBC #/AREA URNS HPF: 1 /HPF (ref 0–4)
SODIUM SERPL-SCNC: 140 MMOL/L (ref 136–145)
SP GR UR STRIP: 1.03 (ref 1–1.03)
SQUAMOUS #/AREA URNS HPF: 2 /HPF
URN SPEC COLLECT METH UR: ABNORMAL
UROBILINOGEN UR STRIP-ACNC: NEGATIVE EU/DL
WBC # BLD AUTO: 7.6 K/UL (ref 3.9–12.7)
WBC #/AREA URNS HPF: 23 /HPF (ref 0–5)

## 2021-11-03 PROCEDURE — 99285 EMERGENCY DEPT VISIT HI MDM: CPT | Mod: 25

## 2021-11-03 PROCEDURE — G0378 HOSPITAL OBSERVATION PER HR: HCPCS

## 2021-11-03 PROCEDURE — 85025 COMPLETE CBC W/AUTO DIFF WBC: CPT | Performed by: EMERGENCY MEDICINE

## 2021-11-03 PROCEDURE — 25000003 PHARM REV CODE 250: Performed by: EMERGENCY MEDICINE

## 2021-11-03 PROCEDURE — 96365 THER/PROPH/DIAG IV INF INIT: CPT

## 2021-11-03 PROCEDURE — 96361 HYDRATE IV INFUSION ADD-ON: CPT

## 2021-11-03 PROCEDURE — 80053 COMPREHEN METABOLIC PANEL: CPT | Performed by: EMERGENCY MEDICINE

## 2021-11-03 PROCEDURE — 87086 URINE CULTURE/COLONY COUNT: CPT | Performed by: EMERGENCY MEDICINE

## 2021-11-03 PROCEDURE — 96375 TX/PRO/DX INJ NEW DRUG ADDON: CPT

## 2021-11-03 PROCEDURE — 83605 ASSAY OF LACTIC ACID: CPT | Performed by: EMERGENCY MEDICINE

## 2021-11-03 PROCEDURE — 83690 ASSAY OF LIPASE: CPT | Performed by: EMERGENCY MEDICINE

## 2021-11-03 PROCEDURE — 63600175 PHARM REV CODE 636 W HCPCS: Performed by: EMERGENCY MEDICINE

## 2021-11-03 PROCEDURE — 81000 URINALYSIS NONAUTO W/SCOPE: CPT | Performed by: EMERGENCY MEDICINE

## 2021-11-03 PROCEDURE — 96376 TX/PRO/DX INJ SAME DRUG ADON: CPT

## 2021-11-03 RX ORDER — ONDANSETRON 2 MG/ML
4 INJECTION INTRAMUSCULAR; INTRAVENOUS
Status: COMPLETED | OUTPATIENT
Start: 2021-11-03 | End: 2021-11-03

## 2021-11-03 RX ORDER — TALC
6 POWDER (GRAM) TOPICAL NIGHTLY PRN
Status: DISCONTINUED | OUTPATIENT
Start: 2021-11-04 | End: 2021-11-04

## 2021-11-03 RX ORDER — IPRATROPIUM BROMIDE AND ALBUTEROL SULFATE 2.5; .5 MG/3ML; MG/3ML
3 SOLUTION RESPIRATORY (INHALATION) EVERY 4 HOURS PRN
Status: DISCONTINUED | OUTPATIENT
Start: 2021-11-04 | End: 2021-11-05 | Stop reason: HOSPADM

## 2021-11-03 RX ORDER — POLYETHYLENE GLYCOL 3350 17 G/17G
17 POWDER, FOR SOLUTION ORAL DAILY PRN
Status: DISCONTINUED | OUTPATIENT
Start: 2021-11-04 | End: 2021-11-04

## 2021-11-03 RX ORDER — SODIUM,POTASSIUM PHOSPHATES 280-250MG
2 POWDER IN PACKET (EA) ORAL
Status: DISCONTINUED | OUTPATIENT
Start: 2021-11-04 | End: 2021-11-05 | Stop reason: HOSPADM

## 2021-11-03 RX ORDER — SODIUM CHLORIDE 0.9 % (FLUSH) 0.9 %
10 SYRINGE (ML) INJECTION EVERY 8 HOURS
Status: DISCONTINUED | OUTPATIENT
Start: 2021-11-04 | End: 2021-11-05 | Stop reason: HOSPADM

## 2021-11-03 RX ORDER — IBUPROFEN 200 MG
24 TABLET ORAL
Status: DISCONTINUED | OUTPATIENT
Start: 2021-11-04 | End: 2021-11-05 | Stop reason: HOSPADM

## 2021-11-03 RX ORDER — LANOLIN ALCOHOL/MO/W.PET/CERES
800 CREAM (GRAM) TOPICAL
Status: DISCONTINUED | OUTPATIENT
Start: 2021-11-04 | End: 2021-11-05 | Stop reason: HOSPADM

## 2021-11-03 RX ORDER — GLUCAGON 1 MG
1 KIT INJECTION
Status: DISCONTINUED | OUTPATIENT
Start: 2021-11-04 | End: 2021-11-04 | Stop reason: SDUPTHER

## 2021-11-03 RX ORDER — ACETAMINOPHEN 325 MG/1
650 TABLET ORAL EVERY 8 HOURS PRN
Status: DISCONTINUED | OUTPATIENT
Start: 2021-11-04 | End: 2021-11-05 | Stop reason: HOSPADM

## 2021-11-03 RX ORDER — NALOXONE HCL 0.4 MG/ML
0.02 VIAL (ML) INJECTION
Status: DISCONTINUED | OUTPATIENT
Start: 2021-11-04 | End: 2021-11-05 | Stop reason: HOSPADM

## 2021-11-03 RX ORDER — MORPHINE SULFATE 4 MG/ML
4 INJECTION, SOLUTION INTRAMUSCULAR; INTRAVENOUS
Status: COMPLETED | OUTPATIENT
Start: 2021-11-03 | End: 2021-11-04

## 2021-11-03 RX ORDER — ENOXAPARIN SODIUM 100 MG/ML
40 INJECTION SUBCUTANEOUS EVERY 24 HOURS
Status: DISCONTINUED | OUTPATIENT
Start: 2021-11-03 | End: 2021-11-05 | Stop reason: HOSPADM

## 2021-11-03 RX ORDER — IBUPROFEN 200 MG
16 TABLET ORAL
Status: DISCONTINUED | OUTPATIENT
Start: 2021-11-04 | End: 2021-11-05 | Stop reason: HOSPADM

## 2021-11-03 RX ORDER — MORPHINE SULFATE 4 MG/ML
4 INJECTION, SOLUTION INTRAMUSCULAR; INTRAVENOUS
Status: COMPLETED | OUTPATIENT
Start: 2021-11-03 | End: 2021-11-03

## 2021-11-03 RX ORDER — KETOROLAC TROMETHAMINE 30 MG/ML
15 INJECTION, SOLUTION INTRAMUSCULAR; INTRAVENOUS
Status: COMPLETED | OUTPATIENT
Start: 2021-11-03 | End: 2021-11-03

## 2021-11-03 RX ORDER — ONDANSETRON 2 MG/ML
4 INJECTION INTRAMUSCULAR; INTRAVENOUS EVERY 8 HOURS PRN
Status: DISCONTINUED | OUTPATIENT
Start: 2021-11-04 | End: 2021-11-05 | Stop reason: HOSPADM

## 2021-11-03 RX ADMIN — SODIUM CHLORIDE 1000 ML: 0.9 INJECTION, SOLUTION INTRAVENOUS at 09:11

## 2021-11-03 RX ADMIN — ONDANSETRON 4 MG: 2 INJECTION, SOLUTION INTRAMUSCULAR; INTRAVENOUS at 11:11

## 2021-11-03 RX ADMIN — MORPHINE SULFATE 4 MG: 4 INJECTION INTRAVENOUS at 09:11

## 2021-11-03 RX ADMIN — ONDANSETRON 4 MG: 2 INJECTION INTRAMUSCULAR; INTRAVENOUS at 09:11

## 2021-11-03 RX ADMIN — CEFTRIAXONE 1 G: 1 INJECTION, SOLUTION INTRAVENOUS at 11:11

## 2021-11-03 RX ADMIN — KETOROLAC TROMETHAMINE 15 MG: 30 INJECTION, SOLUTION INTRAMUSCULAR at 11:11

## 2021-11-04 PROBLEM — N39.0 URINARY TRACT INFECTION WITHOUT HEMATURIA: Status: ACTIVE | Noted: 2021-11-04

## 2021-11-04 LAB
ALBUMIN SERPL BCP-MCNC: 3.6 G/DL (ref 3.5–5.2)
ALP SERPL-CCNC: 82 U/L (ref 55–135)
ALT SERPL W/O P-5'-P-CCNC: 41 U/L (ref 10–44)
ANION GAP SERPL CALC-SCNC: 9 MMOL/L (ref 8–16)
AST SERPL-CCNC: 21 U/L (ref 10–40)
BASOPHILS # BLD AUTO: 0.03 K/UL (ref 0–0.2)
BASOPHILS NFR BLD: 0.5 % (ref 0–1.9)
BILIRUB SERPL-MCNC: 0.7 MG/DL (ref 0.1–1)
BUN SERPL-MCNC: 18 MG/DL (ref 6–20)
CALCIUM SERPL-MCNC: 8.6 MG/DL (ref 8.7–10.5)
CHLORIDE SERPL-SCNC: 111 MMOL/L (ref 95–110)
CO2 SERPL-SCNC: 22 MMOL/L (ref 23–29)
CREAT SERPL-MCNC: 1.1 MG/DL (ref 0.5–1.4)
DIFFERENTIAL METHOD: NORMAL
EOSINOPHIL # BLD AUTO: 0.2 K/UL (ref 0–0.5)
EOSINOPHIL NFR BLD: 3.3 % (ref 0–8)
ERYTHROCYTE [DISTWIDTH] IN BLOOD BY AUTOMATED COUNT: 12.6 % (ref 11.5–14.5)
EST. GFR  (AFRICAN AMERICAN): >60 ML/MIN/1.73 M^2
EST. GFR  (NON AFRICAN AMERICAN): >60 ML/MIN/1.73 M^2
GLUCOSE SERPL-MCNC: 87 MG/DL (ref 70–110)
HCT VFR BLD AUTO: 44 % (ref 37–48.5)
HGB BLD-MCNC: 15 G/DL (ref 12–16)
IMM GRANULOCYTES # BLD AUTO: 0.03 K/UL (ref 0–0.04)
IMM GRANULOCYTES NFR BLD AUTO: 0.5 % (ref 0–0.5)
LACTATE SERPL-SCNC: 0.8 MMOL/L (ref 0.5–2.2)
LYMPHOCYTES # BLD AUTO: 2.4 K/UL (ref 1–4.8)
LYMPHOCYTES NFR BLD: 37.7 % (ref 18–48)
MAGNESIUM SERPL-MCNC: 2.1 MG/DL (ref 1.6–2.6)
MCH RBC QN AUTO: 29.9 PG (ref 27–31)
MCHC RBC AUTO-ENTMCNC: 34.1 G/DL (ref 32–36)
MCV RBC AUTO: 88 FL (ref 82–98)
MONOCYTES # BLD AUTO: 0.6 K/UL (ref 0.3–1)
MONOCYTES NFR BLD: 8.7 % (ref 4–15)
NEUTROPHILS # BLD AUTO: 3.1 K/UL (ref 1.8–7.7)
NEUTROPHILS NFR BLD: 49.3 % (ref 38–73)
NRBC BLD-RTO: 0 /100 WBC
PLATELET # BLD AUTO: 176 K/UL (ref 150–450)
PMV BLD AUTO: 11.1 FL (ref 9.2–12.9)
POCT GLUCOSE: 88 MG/DL (ref 70–110)
POCT GLUCOSE: 91 MG/DL (ref 70–110)
POTASSIUM SERPL-SCNC: 4 MMOL/L (ref 3.5–5.1)
PROT SERPL-MCNC: 6.1 G/DL (ref 6–8.4)
RBC # BLD AUTO: 5.01 M/UL (ref 4–5.4)
SODIUM SERPL-SCNC: 142 MMOL/L (ref 136–145)
WBC # BLD AUTO: 6.32 K/UL (ref 3.9–12.7)

## 2021-11-04 PROCEDURE — 96376 TX/PRO/DX INJ SAME DRUG ADON: CPT

## 2021-11-04 PROCEDURE — 99900035 HC TECH TIME PER 15 MIN (STAT)

## 2021-11-04 PROCEDURE — 25000003 PHARM REV CODE 250: Performed by: INTERNAL MEDICINE

## 2021-11-04 PROCEDURE — 83735 ASSAY OF MAGNESIUM: CPT | Performed by: NURSE PRACTITIONER

## 2021-11-04 PROCEDURE — 63600175 PHARM REV CODE 636 W HCPCS: Performed by: EMERGENCY MEDICINE

## 2021-11-04 PROCEDURE — 25000003 PHARM REV CODE 250: Performed by: NURSE PRACTITIONER

## 2021-11-04 PROCEDURE — A4216 STERILE WATER/SALINE, 10 ML: HCPCS | Performed by: NURSE PRACTITIONER

## 2021-11-04 PROCEDURE — 63600175 PHARM REV CODE 636 W HCPCS: Performed by: NURSE PRACTITIONER

## 2021-11-04 PROCEDURE — 96372 THER/PROPH/DIAG INJ SC/IM: CPT | Mod: 59

## 2021-11-04 PROCEDURE — 96361 HYDRATE IV INFUSION ADD-ON: CPT

## 2021-11-04 PROCEDURE — 83605 ASSAY OF LACTIC ACID: CPT | Performed by: NURSE PRACTITIONER

## 2021-11-04 PROCEDURE — G0378 HOSPITAL OBSERVATION PER HR: HCPCS

## 2021-11-04 PROCEDURE — 27000190 HC CPAP FULL FACE MASK W/VALVE

## 2021-11-04 PROCEDURE — 99214 PR OFFICE/OUTPT VISIT, EST, LEVL IV, 30-39 MIN: ICD-10-PCS | Mod: ,,, | Performed by: INTERNAL MEDICINE

## 2021-11-04 PROCEDURE — 99214 OFFICE O/P EST MOD 30 MIN: CPT | Mod: ,,, | Performed by: INTERNAL MEDICINE

## 2021-11-04 PROCEDURE — 96375 TX/PRO/DX INJ NEW DRUG ADDON: CPT

## 2021-11-04 PROCEDURE — 85025 COMPLETE CBC W/AUTO DIFF WBC: CPT | Performed by: NURSE PRACTITIONER

## 2021-11-04 PROCEDURE — 94760 N-INVAS EAR/PLS OXIMETRY 1: CPT

## 2021-11-04 PROCEDURE — 94761 N-INVAS EAR/PLS OXIMETRY MLT: CPT

## 2021-11-04 PROCEDURE — 80053 COMPREHEN METABOLIC PANEL: CPT | Performed by: NURSE PRACTITIONER

## 2021-11-04 PROCEDURE — 94660 CPAP INITIATION&MGMT: CPT

## 2021-11-04 RX ORDER — GLUCAGON 1 MG
1 KIT INJECTION
Status: DISCONTINUED | OUTPATIENT
Start: 2021-11-04 | End: 2021-11-05 | Stop reason: HOSPADM

## 2021-11-04 RX ORDER — SODIUM CHLORIDE 9 MG/ML
INJECTION, SOLUTION INTRAVENOUS CONTINUOUS
Status: DISCONTINUED | OUTPATIENT
Start: 2021-11-04 | End: 2021-11-05 | Stop reason: HOSPADM

## 2021-11-04 RX ORDER — INSULIN ASPART 100 [IU]/ML
0-5 INJECTION, SOLUTION INTRAVENOUS; SUBCUTANEOUS EVERY 6 HOURS PRN
Status: DISCONTINUED | OUTPATIENT
Start: 2021-11-04 | End: 2021-11-05 | Stop reason: HOSPADM

## 2021-11-04 RX ORDER — ZOLPIDEM TARTRATE 5 MG/1
10 TABLET ORAL NIGHTLY PRN
Status: DISCONTINUED | OUTPATIENT
Start: 2021-11-04 | End: 2021-11-05 | Stop reason: HOSPADM

## 2021-11-04 RX ORDER — POLYETHYLENE GLYCOL 3350 17 G/17G
17 POWDER, FOR SOLUTION ORAL DAILY
Status: DISCONTINUED | OUTPATIENT
Start: 2021-11-04 | End: 2021-11-05 | Stop reason: HOSPADM

## 2021-11-04 RX ORDER — MORPHINE SULFATE 2 MG/ML
2 INJECTION, SOLUTION INTRAMUSCULAR; INTRAVENOUS EVERY 4 HOURS PRN
Status: DISCONTINUED | OUTPATIENT
Start: 2021-11-04 | End: 2021-11-05 | Stop reason: HOSPADM

## 2021-11-04 RX ADMIN — PROMETHAZINE HYDROCHLORIDE 25 MG: 25 INJECTION INTRAMUSCULAR; INTRAVENOUS at 05:11

## 2021-11-04 RX ADMIN — PROMETHAZINE HYDROCHLORIDE 25 MG: 25 INJECTION INTRAMUSCULAR; INTRAVENOUS at 12:11

## 2021-11-04 RX ADMIN — SODIUM CHLORIDE: 0.9 INJECTION, SOLUTION INTRAVENOUS at 01:11

## 2021-11-04 RX ADMIN — SODIUM CHLORIDE: 0.9 INJECTION, SOLUTION INTRAVENOUS at 12:11

## 2021-11-04 RX ADMIN — PROMETHAZINE HYDROCHLORIDE 25 MG: 25 INJECTION INTRAMUSCULAR; INTRAVENOUS at 08:11

## 2021-11-04 RX ADMIN — MORPHINE SULFATE 2 MG: 2 INJECTION, SOLUTION INTRAMUSCULAR; INTRAVENOUS at 09:11

## 2021-11-04 RX ADMIN — ZOLPIDEM TARTRATE 10 MG: 5 TABLET ORAL at 08:11

## 2021-11-04 RX ADMIN — MORPHINE SULFATE 4 MG: 4 INJECTION INTRAVENOUS at 12:11

## 2021-11-04 RX ADMIN — Medication 10 ML: at 02:11

## 2021-11-04 RX ADMIN — ENOXAPARIN SODIUM 40 MG: 40 INJECTION, SOLUTION INTRAVENOUS; SUBCUTANEOUS at 06:11

## 2021-11-04 RX ADMIN — MORPHINE SULFATE 2 MG: 2 INJECTION, SOLUTION INTRAMUSCULAR; INTRAVENOUS at 03:11

## 2021-11-04 RX ADMIN — Medication 10 ML: at 05:11

## 2021-11-04 RX ADMIN — MORPHINE SULFATE 2 MG: 2 INJECTION, SOLUTION INTRAMUSCULAR; INTRAVENOUS at 01:11

## 2021-11-04 RX ADMIN — ZOLPIDEM TARTRATE 10 MG: 5 TABLET ORAL at 01:11

## 2021-11-04 RX ADMIN — ONDANSETRON 4 MG: 2 INJECTION INTRAMUSCULAR; INTRAVENOUS at 04:11

## 2021-11-04 RX ADMIN — MORPHINE SULFATE 2 MG: 2 INJECTION, SOLUTION INTRAMUSCULAR; INTRAVENOUS at 06:11

## 2021-11-04 RX ADMIN — ENOXAPARIN SODIUM 40 MG: 40 INJECTION, SOLUTION INTRAVENOUS; SUBCUTANEOUS at 12:11

## 2021-11-04 RX ADMIN — POLYETHYLENE GLYCOL 3350 17 G: 17 POWDER, FOR SOLUTION ORAL at 06:11

## 2021-11-04 RX ADMIN — Medication 10 ML: at 09:11

## 2021-11-05 VITALS
HEIGHT: 63 IN | BODY MASS INDEX: 34.02 KG/M2 | HEART RATE: 78 BPM | OXYGEN SATURATION: 98 % | WEIGHT: 192 LBS | DIASTOLIC BLOOD PRESSURE: 73 MMHG | TEMPERATURE: 99 F | RESPIRATION RATE: 16 BRPM | SYSTOLIC BLOOD PRESSURE: 113 MMHG

## 2021-11-05 LAB
ALBUMIN SERPL BCP-MCNC: 3.7 G/DL (ref 3.5–5.2)
ALP SERPL-CCNC: 84 U/L (ref 55–135)
ALT SERPL W/O P-5'-P-CCNC: 42 U/L (ref 10–44)
ANION GAP SERPL CALC-SCNC: 9 MMOL/L (ref 8–16)
AST SERPL-CCNC: 22 U/L (ref 10–40)
BACTERIA UR CULT: NORMAL
BACTERIA UR CULT: NORMAL
BASOPHILS # BLD AUTO: 0.04 K/UL (ref 0–0.2)
BASOPHILS NFR BLD: 0.6 % (ref 0–1.9)
BILIRUB SERPL-MCNC: 1.1 MG/DL (ref 0.1–1)
BUN SERPL-MCNC: 13 MG/DL (ref 6–20)
CALCIUM SERPL-MCNC: 8.9 MG/DL (ref 8.7–10.5)
CHLORIDE SERPL-SCNC: 112 MMOL/L (ref 95–110)
CO2 SERPL-SCNC: 23 MMOL/L (ref 23–29)
CREAT SERPL-MCNC: 1.1 MG/DL (ref 0.5–1.4)
CRP SERPL-MCNC: 2.3 MG/L (ref 0–8.2)
DIFFERENTIAL METHOD: ABNORMAL
EOSINOPHIL # BLD AUTO: 0.3 K/UL (ref 0–0.5)
EOSINOPHIL NFR BLD: 3.7 % (ref 0–8)
ERYTHROCYTE [DISTWIDTH] IN BLOOD BY AUTOMATED COUNT: 12.6 % (ref 11.5–14.5)
EST. GFR  (AFRICAN AMERICAN): >60 ML/MIN/1.73 M^2
EST. GFR  (NON AFRICAN AMERICAN): >60 ML/MIN/1.73 M^2
GLUCOSE SERPL-MCNC: 83 MG/DL (ref 70–110)
HCT VFR BLD AUTO: 47.8 % (ref 37–48.5)
HGB BLD-MCNC: 16.2 G/DL (ref 12–16)
IMM GRANULOCYTES # BLD AUTO: 0.02 K/UL (ref 0–0.04)
IMM GRANULOCYTES NFR BLD AUTO: 0.3 % (ref 0–0.5)
LYMPHOCYTES # BLD AUTO: 1.8 K/UL (ref 1–4.8)
LYMPHOCYTES NFR BLD: 27.4 % (ref 18–48)
MAGNESIUM SERPL-MCNC: 2.2 MG/DL (ref 1.6–2.6)
MCH RBC QN AUTO: 29.5 PG (ref 27–31)
MCHC RBC AUTO-ENTMCNC: 33.9 G/DL (ref 32–36)
MCV RBC AUTO: 87 FL (ref 82–98)
MONOCYTES # BLD AUTO: 0.5 K/UL (ref 0.3–1)
MONOCYTES NFR BLD: 7 % (ref 4–15)
NEUTROPHILS # BLD AUTO: 4.1 K/UL (ref 1.8–7.7)
NEUTROPHILS NFR BLD: 61 % (ref 38–73)
NRBC BLD-RTO: 0 /100 WBC
PLATELET # BLD AUTO: 160 K/UL (ref 150–450)
PMV BLD AUTO: 11.1 FL (ref 9.2–12.9)
POCT GLUCOSE: 92 MG/DL (ref 70–110)
POTASSIUM SERPL-SCNC: 4.1 MMOL/L (ref 3.5–5.1)
PROT SERPL-MCNC: 6.4 G/DL (ref 6–8.4)
RBC # BLD AUTO: 5.49 M/UL (ref 4–5.4)
SODIUM SERPL-SCNC: 144 MMOL/L (ref 136–145)
WBC # BLD AUTO: 6.69 K/UL (ref 3.9–12.7)

## 2021-11-05 PROCEDURE — 36415 COLL VENOUS BLD VENIPUNCTURE: CPT | Performed by: INTERNAL MEDICINE

## 2021-11-05 PROCEDURE — 94761 N-INVAS EAR/PLS OXIMETRY MLT: CPT

## 2021-11-05 PROCEDURE — 25000003 PHARM REV CODE 250: Performed by: NURSE PRACTITIONER

## 2021-11-05 PROCEDURE — A4216 STERILE WATER/SALINE, 10 ML: HCPCS | Performed by: NURSE PRACTITIONER

## 2021-11-05 PROCEDURE — 96376 TX/PRO/DX INJ SAME DRUG ADON: CPT

## 2021-11-05 PROCEDURE — 80053 COMPREHEN METABOLIC PANEL: CPT | Performed by: NURSE PRACTITIONER

## 2021-11-05 PROCEDURE — 85025 COMPLETE CBC W/AUTO DIFF WBC: CPT | Performed by: NURSE PRACTITIONER

## 2021-11-05 PROCEDURE — 63600175 PHARM REV CODE 636 W HCPCS: Performed by: NURSE PRACTITIONER

## 2021-11-05 PROCEDURE — 83735 ASSAY OF MAGNESIUM: CPT | Performed by: NURSE PRACTITIONER

## 2021-11-05 PROCEDURE — 86140 C-REACTIVE PROTEIN: CPT | Performed by: INTERNAL MEDICINE

## 2021-11-05 PROCEDURE — 96374 THER/PROPH/DIAG INJ IV PUSH: CPT | Mod: 59

## 2021-11-05 PROCEDURE — G0378 HOSPITAL OBSERVATION PER HR: HCPCS

## 2021-11-05 RX ADMIN — MORPHINE SULFATE 2 MG: 2 INJECTION, SOLUTION INTRAMUSCULAR; INTRAVENOUS at 09:11

## 2021-11-05 RX ADMIN — MORPHINE SULFATE 2 MG: 2 INJECTION, SOLUTION INTRAMUSCULAR; INTRAVENOUS at 01:11

## 2021-11-05 RX ADMIN — MORPHINE SULFATE 2 MG: 2 INJECTION, SOLUTION INTRAMUSCULAR; INTRAVENOUS at 05:11

## 2021-11-05 RX ADMIN — PROMETHAZINE HYDROCHLORIDE 25 MG: 25 INJECTION INTRAMUSCULAR; INTRAVENOUS at 04:11

## 2021-11-05 RX ADMIN — Medication 10 ML: at 05:11

## 2021-11-08 ENCOUNTER — TELEPHONE (OUTPATIENT)
Dept: INTERNAL MEDICINE | Facility: CLINIC | Age: 46
End: 2021-11-08
Payer: COMMERCIAL

## 2021-11-08 DIAGNOSIS — N39.0 LOWER URINARY TRACT INFECTIOUS DISEASE: Primary | ICD-10-CM

## 2021-11-08 RX ORDER — CIPROFLOXACIN 250 MG/1
250 TABLET, FILM COATED ORAL 2 TIMES DAILY
Qty: 14 TABLET | Refills: 0 | Status: SHIPPED | OUTPATIENT
Start: 2021-11-08 | End: 2022-02-08 | Stop reason: SDUPTHER

## 2021-11-10 ENCOUNTER — PATIENT MESSAGE (OUTPATIENT)
Dept: INTERNAL MEDICINE | Facility: CLINIC | Age: 46
End: 2021-11-10
Payer: COMMERCIAL

## 2021-11-10 DIAGNOSIS — K50.00 CROHN'S DISEASE OF SMALL INTESTINE WITHOUT COMPLICATION: ICD-10-CM

## 2021-11-10 DIAGNOSIS — M79.7 FIBROMYALGIA: ICD-10-CM

## 2021-11-10 RX ORDER — OXYCODONE AND ACETAMINOPHEN 10; 325 MG/1; MG/1
1 TABLET ORAL EVERY 6 HOURS PRN
Qty: 120 TABLET | Refills: 0 | Status: CANCELLED | OUTPATIENT
Start: 2021-11-10

## 2021-11-11 ENCOUNTER — OFFICE VISIT (OUTPATIENT)
Dept: URGENT CARE | Facility: CLINIC | Age: 46
End: 2021-11-11
Payer: COMMERCIAL

## 2021-11-11 VITALS
SYSTOLIC BLOOD PRESSURE: 115 MMHG | DIASTOLIC BLOOD PRESSURE: 80 MMHG | RESPIRATION RATE: 18 BRPM | HEIGHT: 63 IN | TEMPERATURE: 99 F | OXYGEN SATURATION: 96 % | HEART RATE: 105 BPM | WEIGHT: 192 LBS | BODY MASS INDEX: 34.02 KG/M2

## 2021-11-11 DIAGNOSIS — R05.9 COUGH: Primary | ICD-10-CM

## 2021-11-11 DIAGNOSIS — J06.9 UPPER RESPIRATORY TRACT INFECTION, UNSPECIFIED TYPE: ICD-10-CM

## 2021-11-11 LAB
CTP QC/QA: YES
SARS-COV-2 RDRP RESP QL NAA+PROBE: NEGATIVE

## 2021-11-11 PROCEDURE — 3074F SYST BP LT 130 MM HG: CPT | Mod: CPTII,S$GLB,, | Performed by: FAMILY MEDICINE

## 2021-11-11 PROCEDURE — 3008F PR BODY MASS INDEX (BMI) DOCUMENTED: ICD-10-PCS | Mod: CPTII,S$GLB,, | Performed by: FAMILY MEDICINE

## 2021-11-11 PROCEDURE — 1159F MED LIST DOCD IN RCRD: CPT | Mod: CPTII,S$GLB,, | Performed by: FAMILY MEDICINE

## 2021-11-11 PROCEDURE — 3079F DIAST BP 80-89 MM HG: CPT | Mod: CPTII,S$GLB,, | Performed by: FAMILY MEDICINE

## 2021-11-11 PROCEDURE — 3044F PR MOST RECENT HEMOGLOBIN A1C LEVEL <7.0%: ICD-10-PCS | Mod: CPTII,S$GLB,, | Performed by: FAMILY MEDICINE

## 2021-11-11 PROCEDURE — 99213 OFFICE O/P EST LOW 20 MIN: CPT | Mod: S$GLB,,, | Performed by: FAMILY MEDICINE

## 2021-11-11 PROCEDURE — 1159F PR MEDICATION LIST DOCUMENTED IN MEDICAL RECORD: ICD-10-PCS | Mod: CPTII,S$GLB,, | Performed by: FAMILY MEDICINE

## 2021-11-11 PROCEDURE — U0002 COVID-19 LAB TEST NON-CDC: HCPCS | Mod: QW,S$GLB,, | Performed by: FAMILY MEDICINE

## 2021-11-11 PROCEDURE — 3044F HG A1C LEVEL LT 7.0%: CPT | Mod: CPTII,S$GLB,, | Performed by: FAMILY MEDICINE

## 2021-11-11 PROCEDURE — U0002: ICD-10-PCS | Mod: QW,S$GLB,, | Performed by: FAMILY MEDICINE

## 2021-11-11 PROCEDURE — 3079F PR MOST RECENT DIASTOLIC BLOOD PRESSURE 80-89 MM HG: ICD-10-PCS | Mod: CPTII,S$GLB,, | Performed by: FAMILY MEDICINE

## 2021-11-11 PROCEDURE — 1160F RVW MEDS BY RX/DR IN RCRD: CPT | Mod: CPTII,S$GLB,, | Performed by: FAMILY MEDICINE

## 2021-11-11 PROCEDURE — 3008F BODY MASS INDEX DOCD: CPT | Mod: CPTII,S$GLB,, | Performed by: FAMILY MEDICINE

## 2021-11-11 PROCEDURE — 3074F PR MOST RECENT SYSTOLIC BLOOD PRESSURE < 130 MM HG: ICD-10-PCS | Mod: CPTII,S$GLB,, | Performed by: FAMILY MEDICINE

## 2021-11-11 PROCEDURE — 99213 PR OFFICE/OUTPT VISIT, EST, LEVL III, 20-29 MIN: ICD-10-PCS | Mod: S$GLB,,, | Performed by: FAMILY MEDICINE

## 2021-11-11 PROCEDURE — 1160F PR REVIEW ALL MEDS BY PRESCRIBER/CLIN PHARMACIST DOCUMENTED: ICD-10-PCS | Mod: CPTII,S$GLB,, | Performed by: FAMILY MEDICINE

## 2021-11-11 RX ORDER — OXYCODONE AND ACETAMINOPHEN 10; 325 MG/1; MG/1
1 TABLET ORAL EVERY 6 HOURS PRN
Qty: 120 TABLET | Refills: 0 | Status: SHIPPED | OUTPATIENT
Start: 2021-11-15 | End: 2021-12-09 | Stop reason: SDUPTHER

## 2021-11-11 RX ORDER — PROMETHAZINE HYDROCHLORIDE AND DEXTROMETHORPHAN HYDROBROMIDE 6.25; 15 MG/5ML; MG/5ML
5 SYRUP ORAL
Qty: 118 ML | Refills: 0 | Status: SHIPPED | OUTPATIENT
Start: 2021-11-11 | End: 2022-05-11

## 2021-11-11 RX ORDER — AZELASTINE 1 MG/ML
1 SPRAY, METERED NASAL 2 TIMES DAILY
Qty: 30 ML | Refills: 0 | Status: SHIPPED | OUTPATIENT
Start: 2021-11-11 | End: 2022-05-15

## 2021-11-29 ENCOUNTER — PATIENT MESSAGE (OUTPATIENT)
Dept: INTERNAL MEDICINE | Facility: CLINIC | Age: 46
End: 2021-11-29
Payer: COMMERCIAL

## 2021-11-29 ENCOUNTER — TELEPHONE (OUTPATIENT)
Dept: BARIATRICS | Facility: CLINIC | Age: 46
End: 2021-11-29
Payer: COMMERCIAL

## 2021-11-30 ENCOUNTER — OFFICE VISIT (OUTPATIENT)
Dept: BARIATRICS | Facility: CLINIC | Age: 46
End: 2021-11-30
Payer: COMMERCIAL

## 2021-11-30 VITALS
WEIGHT: 190.88 LBS | HEIGHT: 63 IN | SYSTOLIC BLOOD PRESSURE: 108 MMHG | DIASTOLIC BLOOD PRESSURE: 64 MMHG | HEART RATE: 89 BPM | OXYGEN SATURATION: 100 % | BODY MASS INDEX: 33.82 KG/M2

## 2021-11-30 DIAGNOSIS — K76.0 FATTY LIVER: ICD-10-CM

## 2021-11-30 DIAGNOSIS — G47.33 OSA (OBSTRUCTIVE SLEEP APNEA): ICD-10-CM

## 2021-11-30 DIAGNOSIS — E66.9 OBESITY, CLASS I, BMI 30.0-34.9 (SEE ACTUAL BMI): Primary | ICD-10-CM

## 2021-11-30 PROCEDURE — 99215 OFFICE O/P EST HI 40 MIN: CPT | Mod: S$GLB,,, | Performed by: INTERNAL MEDICINE

## 2021-11-30 PROCEDURE — 99999 PR PBB SHADOW E&M-EST. PATIENT-LVL V: ICD-10-PCS | Mod: PBBFAC,,, | Performed by: INTERNAL MEDICINE

## 2021-11-30 PROCEDURE — 99999 PR PBB SHADOW E&M-EST. PATIENT-LVL V: CPT | Mod: PBBFAC,,, | Performed by: INTERNAL MEDICINE

## 2021-11-30 PROCEDURE — 99215 PR OFFICE/OUTPT VISIT, EST, LEVL V, 40-54 MIN: ICD-10-PCS | Mod: S$GLB,,, | Performed by: INTERNAL MEDICINE

## 2021-11-30 RX ORDER — PHENTERMINE HYDROCHLORIDE 37.5 MG/1
TABLET ORAL
Qty: 30 TABLET | Refills: 1 | Status: SHIPPED | OUTPATIENT
Start: 2021-11-30 | End: 2022-05-11

## 2021-12-09 ENCOUNTER — PATIENT MESSAGE (OUTPATIENT)
Dept: INTERNAL MEDICINE | Facility: CLINIC | Age: 46
End: 2021-12-09
Payer: COMMERCIAL

## 2021-12-09 DIAGNOSIS — M79.7 FIBROMYALGIA: ICD-10-CM

## 2021-12-09 DIAGNOSIS — K50.00 CROHN'S DISEASE OF SMALL INTESTINE WITHOUT COMPLICATION: ICD-10-CM

## 2021-12-09 RX ORDER — OXYCODONE AND ACETAMINOPHEN 10; 325 MG/1; MG/1
1 TABLET ORAL EVERY 6 HOURS PRN
Qty: 120 TABLET | Refills: 0 | Status: SHIPPED | OUTPATIENT
Start: 2021-12-09 | End: 2022-01-07 | Stop reason: SDUPTHER

## 2021-12-20 NOTE — LETTER
February 11, 2019      [facility] [place]  [address]  [place] [address]  Phone: [phone]  Fax: [phone]       Patient: Emi Johnson   YOB: 1975  Date of Visit: 02/11/2019    To Whom It May Concern:    Rodri Johnson  was at [facility] on 02/11/2019. She may return to work/school on 2/12/2019 with no restrictions. If you have any questions or concerns, or if I can be of further assistance, please do not hesitate to contact me.    Sincerely,    Char Resendiz MA      3

## 2021-12-28 ENCOUNTER — PATIENT MESSAGE (OUTPATIENT)
Dept: INTERNAL MEDICINE | Facility: CLINIC | Age: 46
End: 2021-12-28
Payer: COMMERCIAL

## 2022-01-06 ENCOUNTER — PATIENT MESSAGE (OUTPATIENT)
Dept: INTERNAL MEDICINE | Facility: CLINIC | Age: 47
End: 2022-01-06
Payer: COMMERCIAL

## 2022-01-06 DIAGNOSIS — M79.7 FIBROMYALGIA: ICD-10-CM

## 2022-01-06 DIAGNOSIS — K50.00 CROHN'S DISEASE OF SMALL INTESTINE WITHOUT COMPLICATION: ICD-10-CM

## 2022-01-06 NOTE — TELEPHONE ENCOUNTER
LOV on 10/27/21.  Last refilled on 6/25/21.  [facility] pharmacy queued by pt request.

## 2022-01-06 NOTE — TELEPHONE ENCOUNTER
LOV 10/27/21.  Last refilled on 12/9/21.  Pharmacy queued.    Second message following for another medication request.

## 2022-01-07 ENCOUNTER — LAB VISIT (OUTPATIENT)
Dept: PRIMARY CARE CLINIC | Facility: CLINIC | Age: 47
End: 2022-01-07
Payer: COMMERCIAL

## 2022-01-07 DIAGNOSIS — Z20.822 CONTACT WITH AND (SUSPECTED) EXPOSURE TO COVID-19: ICD-10-CM

## 2022-01-07 LAB
CTP QC/QA: YES
SARS-COV-2 AG RESP QL IA.RAPID: NEGATIVE

## 2022-01-07 PROCEDURE — 87811 SARS-COV-2 COVID19 W/OPTIC: CPT

## 2022-01-07 RX ORDER — OXYCODONE AND ACETAMINOPHEN 10; 325 MG/1; MG/1
1 TABLET ORAL EVERY 6 HOURS PRN
Qty: 120 TABLET | Refills: 0 | Status: SHIPPED | OUTPATIENT
Start: 2022-01-07 | End: 2022-01-10 | Stop reason: SDUPTHER

## 2022-01-07 RX ORDER — ZOLPIDEM TARTRATE 10 MG/1
10 TABLET ORAL NIGHTLY PRN
Qty: 90 TABLET | Refills: 1 | Status: SHIPPED | OUTPATIENT
Start: 2022-01-07 | End: 2022-07-06

## 2022-01-08 ENCOUNTER — PATIENT MESSAGE (OUTPATIENT)
Dept: INTERNAL MEDICINE | Facility: CLINIC | Age: 47
End: 2022-01-08
Payer: COMMERCIAL

## 2022-01-08 DIAGNOSIS — K50.00 CROHN'S DISEASE OF SMALL INTESTINE WITHOUT COMPLICATION: ICD-10-CM

## 2022-01-08 DIAGNOSIS — M79.7 FIBROMYALGIA: ICD-10-CM

## 2022-01-10 ENCOUNTER — PATIENT MESSAGE (OUTPATIENT)
Dept: INTERNAL MEDICINE | Facility: CLINIC | Age: 47
End: 2022-01-10
Payer: COMMERCIAL

## 2022-01-10 RX ORDER — OXYCODONE AND ACETAMINOPHEN 10; 325 MG/1; MG/1
1 TABLET ORAL EVERY 6 HOURS PRN
Qty: 120 TABLET | Refills: 0 | Status: SHIPPED | OUTPATIENT
Start: 2022-01-10 | End: 2022-02-03 | Stop reason: SDUPTHER

## 2022-01-13 ENCOUNTER — LAB VISIT (OUTPATIENT)
Dept: PRIMARY CARE CLINIC | Facility: CLINIC | Age: 47
End: 2022-01-13
Payer: COMMERCIAL

## 2022-01-13 DIAGNOSIS — Z20.822 CONTACT WITH AND (SUSPECTED) EXPOSURE TO COVID-19: ICD-10-CM

## 2022-01-13 LAB
CTP QC/QA: YES
SARS-COV-2 AG RESP QL IA.RAPID: NEGATIVE

## 2022-01-13 PROCEDURE — 87811 SARS-COV-2 COVID19 W/OPTIC: CPT

## 2022-01-15 ENCOUNTER — OFFICE VISIT (OUTPATIENT)
Dept: URGENT CARE | Facility: CLINIC | Age: 47
End: 2022-01-15
Payer: COMMERCIAL

## 2022-01-15 VITALS
SYSTOLIC BLOOD PRESSURE: 118 MMHG | DIASTOLIC BLOOD PRESSURE: 82 MMHG | OXYGEN SATURATION: 95 % | HEIGHT: 63 IN | HEART RATE: 101 BPM | RESPIRATION RATE: 18 BRPM | BODY MASS INDEX: 33.66 KG/M2 | TEMPERATURE: 98 F | WEIGHT: 190 LBS

## 2022-01-15 DIAGNOSIS — R09.81 NASAL CONGESTION: ICD-10-CM

## 2022-01-15 DIAGNOSIS — R05.9 COUGH: ICD-10-CM

## 2022-01-15 DIAGNOSIS — B34.9 VIRAL SYNDROME: Primary | ICD-10-CM

## 2022-01-15 DIAGNOSIS — R52 BODY ACHES: ICD-10-CM

## 2022-01-15 LAB
CTP QC/QA: YES
CTP QC/QA: YES
POC MOLECULAR INFLUENZA A AGN: NEGATIVE
POC MOLECULAR INFLUENZA B AGN: NEGATIVE
SARS-COV-2 RDRP RESP QL NAA+PROBE: NEGATIVE

## 2022-01-15 PROCEDURE — 99213 PR OFFICE/OUTPT VISIT, EST, LEVL III, 20-29 MIN: ICD-10-PCS | Mod: S$GLB,CS,, | Performed by: PHYSICIAN ASSISTANT

## 2022-01-15 PROCEDURE — 99213 OFFICE O/P EST LOW 20 MIN: CPT | Mod: S$GLB,CS,, | Performed by: PHYSICIAN ASSISTANT

## 2022-01-15 PROCEDURE — U0002: ICD-10-PCS | Mod: QW,S$GLB,, | Performed by: PHYSICIAN ASSISTANT

## 2022-01-15 PROCEDURE — 3008F BODY MASS INDEX DOCD: CPT | Mod: CPTII,S$GLB,, | Performed by: PHYSICIAN ASSISTANT

## 2022-01-15 PROCEDURE — 1160F RVW MEDS BY RX/DR IN RCRD: CPT | Mod: CPTII,S$GLB,, | Performed by: PHYSICIAN ASSISTANT

## 2022-01-15 PROCEDURE — 87502 INFLUENZA DNA AMP PROBE: CPT | Mod: QW,S$GLB,, | Performed by: PHYSICIAN ASSISTANT

## 2022-01-15 PROCEDURE — 1159F MED LIST DOCD IN RCRD: CPT | Mod: CPTII,S$GLB,, | Performed by: PHYSICIAN ASSISTANT

## 2022-01-15 PROCEDURE — 1159F PR MEDICATION LIST DOCUMENTED IN MEDICAL RECORD: ICD-10-PCS | Mod: CPTII,S$GLB,, | Performed by: PHYSICIAN ASSISTANT

## 2022-01-15 PROCEDURE — 1160F PR REVIEW ALL MEDS BY PRESCRIBER/CLIN PHARMACIST DOCUMENTED: ICD-10-PCS | Mod: CPTII,S$GLB,, | Performed by: PHYSICIAN ASSISTANT

## 2022-01-15 PROCEDURE — 3079F PR MOST RECENT DIASTOLIC BLOOD PRESSURE 80-89 MM HG: ICD-10-PCS | Mod: CPTII,S$GLB,, | Performed by: PHYSICIAN ASSISTANT

## 2022-01-15 PROCEDURE — 3079F DIAST BP 80-89 MM HG: CPT | Mod: CPTII,S$GLB,, | Performed by: PHYSICIAN ASSISTANT

## 2022-01-15 PROCEDURE — 87502 POCT INFLUENZA A/B MOLECULAR: ICD-10-PCS | Mod: QW,S$GLB,, | Performed by: PHYSICIAN ASSISTANT

## 2022-01-15 PROCEDURE — 3008F PR BODY MASS INDEX (BMI) DOCUMENTED: ICD-10-PCS | Mod: CPTII,S$GLB,, | Performed by: PHYSICIAN ASSISTANT

## 2022-01-15 PROCEDURE — 3074F PR MOST RECENT SYSTOLIC BLOOD PRESSURE < 130 MM HG: ICD-10-PCS | Mod: CPTII,S$GLB,, | Performed by: PHYSICIAN ASSISTANT

## 2022-01-15 PROCEDURE — 3074F SYST BP LT 130 MM HG: CPT | Mod: CPTII,S$GLB,, | Performed by: PHYSICIAN ASSISTANT

## 2022-01-15 PROCEDURE — U0002 COVID-19 LAB TEST NON-CDC: HCPCS | Mod: QW,S$GLB,, | Performed by: PHYSICIAN ASSISTANT

## 2022-01-15 RX ORDER — PROMETHAZINE HYDROCHLORIDE AND DEXTROMETHORPHAN HYDROBROMIDE 6.25; 15 MG/5ML; MG/5ML
5 SYRUP ORAL EVERY 4 HOURS PRN
Qty: 118 ML | Refills: 0 | Status: SHIPPED | OUTPATIENT
Start: 2022-01-15 | End: 2022-01-25

## 2022-01-15 NOTE — PROGRESS NOTES
"Subjective:       Patient ID: Emi Johnson is a 46 y.o. female.    Vitals:  height is 5' 3" (1.6 m) and weight is 86.2 kg (190 lb). Her temperature is 98.3 °F (36.8 °C). Her blood pressure is 118/82 and her pulse is 101. Her respiration is 18 and oxygen saturation is 95%.     Chief Complaint: Cough and COVID-19 Concerns    Pt c/o body aches, sore throat, sweats, cough, and bilateral ear pain for 5 days.  Pt tested positive for COVID with an at-home test on 1/13.  [facility] and had a negative test.  She has taken Claritin for postnasal drip.  Pt is fully vaccinated for COVID.    Cough  This is a new problem. The current episode started in the past 7 days. The problem has been unchanged. The problem occurs every few minutes. The cough is non-productive. Associated symptoms include chills, ear pain, headaches, myalgias, nasal congestion, postnasal drip and a sore throat. Pertinent negatives include no fever or shortness of breath. Associated symptoms comments: Body aches  . Nothing aggravates the symptoms. Treatments tried: otc sinus meds  The treatment provided mild relief. Her past medical history is significant for asthma. There is no history of bronchitis, COPD or pneumonia.       Constitution: Positive for chills and sweating. Negative for fatigue and fever.   HENT: Positive for ear pain, congestion, postnasal drip and sore throat.    Respiratory: Positive for cough. Negative for shortness of breath.    Musculoskeletal: Positive for muscle ache.   Neurological: Positive for headaches.       Objective:      Physical Exam   Constitutional: She is oriented to person, place, and time. She appears well-developed and well-nourished. She is cooperative.  Non-toxic appearance. She does not have a sickly appearance. She does not appear ill. No distress.   HENT:   Head: Normocephalic and atraumatic.   Ears:   Right Ear: Hearing, tympanic membrane, external ear and ear canal " normal.   Left Ear: Hearing, tympanic membrane, external ear and ear canal normal.   Nose: Rhinorrhea present. No mucosal edema or nasal deformity. No epistaxis. Right sinus exhibits no maxillary sinus tenderness and no frontal sinus tenderness. Left sinus exhibits no maxillary sinus tenderness and no frontal sinus tenderness.   Mouth/Throat: Uvula is midline, oropharynx is clear and moist and mucous membranes are normal. No trismus in the jaw. Normal dentition. No uvula swelling. No oropharyngeal exudate, posterior oropharyngeal edema or posterior oropharyngeal erythema.   Eyes: Conjunctivae and lids are normal. No scleral icterus.   Neck: Trachea normal and phonation normal. Neck supple. No edema present. No erythema present. No neck rigidity present.   Cardiovascular: Normal rate, regular rhythm, normal heart sounds, intact distal pulses and normal pulses.   Pulmonary/Chest: Effort normal and breath sounds normal. No stridor. No respiratory distress. She has no decreased breath sounds. She has no wheezes. She has no rhonchi. She has no rales.   Abdominal: Normal appearance.   Musculoskeletal: Normal range of motion.         General: No deformity or edema. Normal range of motion.   Neurological: no focal deficit. She is alert and oriented to person, place, and time. She exhibits normal muscle tone. Coordination normal.   Skin: Skin is warm, dry, intact, not diaphoretic and not pale.   Psychiatric: She has a normal mood and affect. Her speech is normal and behavior is normal. Judgment and thought content normal. Cognition and memory  Nursing note and vitals reviewed.    Results for orders placed or performed in visit on 01/15/22   POCT COVID-19 Rapid Screening   Result Value Ref Range    POC Rapid COVID Negative Negative     Acceptable Yes    POCT Influenza A/B MOLECULAR   Result Value Ref Range    POC Molecular Influenza A Ag Negative Negative, Not Reported    POC Molecular Influenza B Ag Negative  Negative, Not Reported     Acceptable Yes      *Note: Due to a large number of results and/or encounters for the requested time period, some results have not been displayed. A complete set of results can be found in Results Review.     Results reviewed with pt      Assessment:       1. Viral syndrome    2. Body aches    3. Cough    4. Nasal congestion          Plan:         Viral syndrome    Body aches  -     POCT COVID-19 Rapid Screening  -     POCT Influenza A/B MOLECULAR    Cough  -     POCT COVID-19 Rapid Screening  -     POCT Influenza A/B MOLECULAR  -     promethazine-dextromethorphan (PROMETHAZINE-DM) 6.25-15 mg/5 mL Syrp; Take 5 mLs by mouth every 4 (four) hours as needed (cough).  Dispense: 118 mL; Refill: 0    Nasal congestion  -     POCT COVID-19 Rapid Screening  -     POCT Influenza A/B MOLECULAR         Patient Instructions   Patient Education       Viral Syndrome Discharge Instructions   About this topic   Viral syndrome is an illness with signs like you would get with a cold or the flu. A tiny germ called a virus causes this infection. Most people get better in 1 to 2 weeks without treatment. This illness spreads easily from person to person.     What care is needed at home?   · Ask your doctor what you need to do when you go home. Make sure you ask questions if you do not understand what the doctor says. This way you will know what you need to do.  · Drink lots of water, juice, or broth to replace fluids lost in runny nose and fever. Suck on ice chips or popsicles to ease throat pain.  · Get lots of rest and sleep. Sleep helps your body get back the energy it needs.  · Stay away from others until you are feeling better.  What follow-up care is needed?   Your doctor may ask you to make visits to the office to check on your progress. Be sure to keep these visits.  What drugs may be needed?   The doctor may order drugs to:  · Help with pain  · Lower fever  · Help with pain from a sore  throat  · Help a runny or stuffy nose  · Ease or stop coughing  Will physical activity be limited?   You need to rest while you are getting better. This means you may need to limit your activity until you feel well.  What changes to diet are needed?   Eat foods that will not upset your stomach like chicken soup, bananas, rice, apples, or toast.  What problems could happen?   · Lung problems like pneumonia and bronchitis  · Too much fluid loss  · Infection  What can be done to prevent this health problem?   · If you are sick, cover your mouth and nose with tissue when you cough or sneeze. You can also cough into your elbow. Throw away tissues in the trash and wash your hands after touching used tissues.  · Wash your hands often with soap and water for at least 20 seconds, especially after coughing or sneezing. Alcohol-based hand sanitizers also work to kill the virus.  · Do not get too close (kissing, hugging) to people who are sick.  · Stay away from crowded places.  · Do not share towels or hankies with anyone who is sick. Clean commonly handled things like door handles, remotes, toys, and phones. Wipe them with a disinfectant.  · Take vitamin C to help build up your body's ability to fight disease.  · Get a flu shot each year.  When do I need to call the doctor?   · Signs of infection. These include a fever of 100.4°F (38°C) or higher, chills, very bad sore throat, ear or sinus pain, cough, more sputum or change in color of sputum, and mouth sores.  · Trouble breathing  · Very bad throwing up or throwing up that does not stop  · Health problem is not better or you are feeling worse  Teach Back: Helping You Understand   The Teach Back Method helps you understand the information we are giving you. After you talk with the staff, tell them in your own words what you learned. This helps to make sure the staff has described each thing clearly. It also helps to explain things that may have been confusing. Before going  home, make sure you can do these:  · I can tell you about my condition.  · I can tell you what may help ease my sore throat.  · I can tell you what I can do to help avoid passing the infection to others.  · I can tell you what I will do if I have trouble breathing, very bad throwing up, or throwing up that does not stop.  Last Reviewed Date   2020-08-24  Consumer Information Use and Disclaimer   This information is not specific medical advice and does not replace information you receive from your health care provider. This is only a brief summary of general information. It does NOT include all information about conditions, illnesses, injuries, tests, procedures, treatments, therapies, discharge instructions or life-style choices that may apply to you. You must talk with your health care provider for complete information about your health and treatment options. This information should not be used to decide whether or not to accept your health care providers advice, instructions or recommendations. Only your health care provider has the knowledge and training to provide advice that is right for you.  Copyright   Copyright © 2021 [employer] and its affiliates and/or licensors. All rights reserved.  Patient Education       Cough Discharge Instructions, Adult   About this topic   Many things can cause a cough. A cold or allergies can cause a cough. Other times, asthma or smoking can cause your cough. You can have a cough from mucus that drips down the back of your throat or from stomach acid that backs up into your throat. Sometimes a cough is a side effect from a drug. You may be waiting on some test results. If so, the staff will contact you if there are concerning results.  What care is needed at home?   · Ask your doctor what you need to do when you go home. Make sure you ask questions if you do not understand what the doctor says.  · To help you feel better:  ? Use a cool mist humidifier to avoid breathing dry  air.  ? Use hard candy or cough drops to soothe sore throat and cough.  ? Gargle with salt water (mix 1/2 teaspoon salt with 1 cup warm water) a few times a day.  ? Spray saltwater mist in each nostril. Any normal saline spray works.  ? Sip warm liquids to keep your throat moist.  ? Take warm, steamy showers to help soothe the cough.  · Do not smoke or be in smoke-filled places. Avoid things that may cause breathing problems like vaping, fumes, pollution, dust, and other common allergens.  · You may want to use over-the-counter medicines for allergies or acid reflux if your cough is due to one of these problems.  · You can also use an over-the-counter cough medicine.  What follow-up care is needed?   Your doctor may ask you to make visits to the office to check on your progress. Be sure to keep these visits.  What drugs may be needed?   The doctor may order drugs to:  · Control coughing  · Get rid of or thin mucus  · Block allergens if your cough is due to allergies  · Increase airflow if your cough is due to asthma or COPD  · Reduce swelling of the airways  · Reduce stomach acid if your cough is due to stomach acid problems  Will physical activity be limited?   Your cough may interfere with some of your activities.  What changes to diet are needed?   Some people feel milk products worsen their sputum production and worsen their cough. There is no proof that this is true. There is no need to reduce milk intake. Honey has been shown to be as effective as the leading over-the-counter (OTC) drug for calming coughs. Use 1/2 to 1 teaspoon (2.5 mL to 5 mL) of honey as needed. It can thin the secretions and loosen the cough. Never give honey to a child under the age of 1.  What can be done to prevent this health problem?   · Wash your hands often with soap and water for at least 20 seconds, especially after coughing or sneezing. Alcohol-based hand sanitizers also work to kill the virus.       · If you are sick, cover your  mouth and nose with tissue when you cough or sneeze. You can also cough into your elbow. Throw away tissues in the trash and wash your hands after touching used tissues.  · Clean items and surfaces you most often touch like door handles, remotes, phones, or toys. Wipe them with a disinfectant. This can help reduce the spread of infection.  · Do not get too close (kissing, hugging) to people who are sick.  · Do not share towels or hankies with anyone who is sick.  · Stay away from crowded places.  · Get a flu shot each year.     When do I need to call the doctor?   · You have chest pain when you cough or trouble breathing.  · You start to cough up blood or yellow or green mucus.  · You have a fever of 100.4°F (38°C) or higher or chills.  · You cough so hard you throw up.  · You are still coughing in 10 days.  Teach Back: Helping You Understand   The Teach Back Method helps you understand the information we are giving you. After you talk with the staff, tell them in your own words what you learned. This helps to make sure the staff has described each thing clearly. It also helps to explain things that may have been confusing. Before going home, make sure you can do these:  · I can tell you about my condition.  · I can tell you what I can do to help thin the mucus and ease my cough.  · I can tell you what I will do if I have wheezing, chest tightness, my lips turn blue with coughing, or I have other trouble breathing.  Where can I learn more?   [employer]  https://familydoctor.org/cough-medicine-understanding-your-otc-options/   [facility] Choices  https://www.nhs.uk/conditions/cough/   Last Reviewed Date   2021-06-10  Consumer Information Use and Disclaimer   This information is not specific medical advice and does not replace information you receive from your health care provider. This is only a brief summary of general information. It does NOT include all information about conditions, illnesses,  injuries, tests, procedures, treatments, therapies, discharge instructions or life-style choices that may apply to you. You must talk with your health care provider for complete information about your health and treatment options. This information should not be used to decide whether or not to accept your health care providers advice, instructions or recommendations. Only your health care provider has the knowledge and training to provide advice that is right for you.  Copyright   Copyright © 2021 [employer] and its affiliates and/or licensors. All rights reserved.      You must understand that you've received an Urgent Care treatment only and that you may be released before all your medical problems are known or treated. You, the patient, will arrange for follow up care as instructed.    Follow up with your PCP or specialty clinic as directed in the next 1-2 weeks if not improved or as needed. You can call [phone] to schedule an appointment with the appropriate provider.    If your condition worsens we recommend that you receive another evaluation at the emergency room immediately or contact your primary medical clinic's after hours call service to discuss your concerns.    Please go to the Emergency Department for any concerns or worsening of condition.

## 2022-01-15 NOTE — PATIENT INSTRUCTIONS
Patient Education       Viral Syndrome Discharge Instructions   About this topic   Viral syndrome is an illness with signs like you would get with a cold or the flu. A tiny germ called a virus causes this infection. Most people get better in 1 to 2 weeks without treatment. This illness spreads easily from person to person.     What care is needed at home?   · Ask your doctor what you need to do when you go home. Make sure you ask questions if you do not understand what the doctor says. This way you will know what you need to do.  · Drink lots of water, juice, or broth to replace fluids lost in runny nose and fever. Suck on ice chips or popsicles to ease throat pain.  · Get lots of rest and sleep. Sleep helps your body get back the energy it needs.  · Stay away from others until you are feeling better.  What follow-up care is needed?   Your doctor may ask you to make visits to the office to check on your progress. Be sure to keep these visits.  What drugs may be needed?   The doctor may order drugs to:  · Help with pain  · Lower fever  · Help with pain from a sore throat  · Help a runny or stuffy nose  · Ease or stop coughing  Will physical activity be limited?   You need to rest while you are getting better. This means you may need to limit your activity until you feel well.  What changes to diet are needed?   Eat foods that will not upset your stomach like chicken soup, bananas, rice, apples, or toast.  What problems could happen?   · Lung problems like pneumonia and bronchitis  · Too much fluid loss  · Infection  What can be done to prevent this health problem?   · If you are sick, cover your mouth and nose with tissue when you cough or sneeze. You can also cough into your elbow. Throw away tissues in the trash and wash your hands after touching used tissues.  · Wash your hands often with soap and water for at least 20 seconds, especially after coughing or sneezing. Alcohol-based hand sanitizers also work to kill  the virus.  · Do not get too close (kissing, hugging) to people who are sick.  · Stay away from crowded places.  · Do not share towels or hankies with anyone who is sick. Clean commonly handled things like door handles, remotes, toys, and phones. Wipe them with a disinfectant.  · Take vitamin C to help build up your body's ability to fight disease.  · Get a flu shot each year.  When do I need to call the doctor?   · Signs of infection. These include a fever of 100.4°F (38°C) or higher, chills, very bad sore throat, ear or sinus pain, cough, more sputum or change in color of sputum, and mouth sores.  · Trouble breathing  · Very bad throwing up or throwing up that does not stop  · Health problem is not better or you are feeling worse  Teach Back: Helping You Understand   The Teach Back Method helps you understand the information we are giving you. After you talk with the staff, tell them in your own words what you learned. This helps to make sure the staff has described each thing clearly. It also helps to explain things that may have been confusing. Before going home, make sure you can do these:  · I can tell you about my condition.  · I can tell you what may help ease my sore throat.  · I can tell you what I can do to help avoid passing the infection to others.  · I can tell you what I will do if I have trouble breathing, very bad throwing up, or throwing up that does not stop.  Last Reviewed Date   2020-08-24  Consumer Information Use and Disclaimer   This information is not specific medical advice and does not replace information you receive from your health care provider. This is only a brief summary of general information. It does NOT include all information about conditions, illnesses, injuries, tests, procedures, treatments, therapies, discharge instructions or life-style choices that may apply to you. You must talk with your health care provider for complete information about your health and treatment  options. This information should not be used to decide whether or not to accept your health care providers advice, instructions or recommendations. Only your health care provider has the knowledge and training to provide advice that is right for you.  Copyright   Copyright © 2021 [employer] and its affiliates and/or licensors. All rights reserved.  Patient Education       Cough Discharge Instructions, Adult   About this topic   Many things can cause a cough. A cold or allergies can cause a cough. Other times, asthma or smoking can cause your cough. You can have a cough from mucus that drips down the back of your throat or from stomach acid that backs up into your throat. Sometimes a cough is a side effect from a drug. You may be waiting on some test results. If so, the staff will contact you if there are concerning results.  What care is needed at home?   · Ask your doctor what you need to do when you go home. Make sure you ask questions if you do not understand what the doctor says.  · To help you feel better:  ? Use a cool mist humidifier to avoid breathing dry air.  ? Use hard candy or cough drops to soothe sore throat and cough.  ? Gargle with salt water (mix 1/2 teaspoon salt with 1 cup warm water) a few times a day.  ? Spray saltwater mist in each nostril. Any normal saline spray works.  ? Sip warm liquids to keep your throat moist.  ? Take warm, steamy showers to help soothe the cough.  · Do not smoke or be in smoke-filled places. Avoid things that may cause breathing problems like vaping, fumes, pollution, dust, and other common allergens.  · You may want to use over-the-counter medicines for allergies or acid reflux if your cough is due to one of these problems.  · You can also use an over-the-counter cough medicine.  What follow-up care is needed?   Your doctor may ask you to make visits to the office to check on your progress. Be sure to keep these visits.  What drugs may be needed?   The doctor may  order drugs to:  · Control coughing  · Get rid of or thin mucus  · Block allergens if your cough is due to allergies  · Increase airflow if your cough is due to asthma or COPD  · Reduce swelling of the airways  · Reduce stomach acid if your cough is due to stomach acid problems  Will physical activity be limited?   Your cough may interfere with some of your activities.  What changes to diet are needed?   Some people feel milk products worsen their sputum production and worsen their cough. There is no proof that this is true. There is no need to reduce milk intake. Honey has been shown to be as effective as the leading over-the-counter (OTC) drug for calming coughs. Use 1/2 to 1 teaspoon (2.5 mL to 5 mL) of honey as needed. It can thin the secretions and loosen the cough. Never give honey to a child under the age of 1.  What can be done to prevent this health problem?   · Wash your hands often with soap and water for at least 20 seconds, especially after coughing or sneezing. Alcohol-based hand sanitizers also work to kill the virus.       · If you are sick, cover your mouth and nose with tissue when you cough or sneeze. You can also cough into your elbow. Throw away tissues in the trash and wash your hands after touching used tissues.  · Clean items and surfaces you most often touch like door handles, remotes, phones, or toys. Wipe them with a disinfectant. This can help reduce the spread of infection.  · Do not get too close (kissing, hugging) to people who are sick.  · Do not share towels or hankies with anyone who is sick.  · Stay away from crowded places.  · Get a flu shot each year.     When do I need to call the doctor?   · You have chest pain when you cough or trouble breathing.  · You start to cough up blood or yellow or green mucus.  · You have a fever of 100.4°F (38°C) or higher or chills.  · You cough so hard you throw up.  · You are still coughing in 10 days.  Teach Back: Helping You Understand   The  Teach Back Method helps you understand the information we are giving you. After you talk with the staff, tell them in your own words what you learned. This helps to make sure the staff has described each thing clearly. It also helps to explain things that may have been confusing. Before going home, make sure you can do these:  · I can tell you about my condition.  · I can tell you what I can do to help thin the mucus and ease my cough.  · I can tell you what I will do if I have wheezing, chest tightness, my lips turn blue with coughing, or I have other trouble breathing.  Where can I learn more?   [employer]  https://familydoctor.org/cough-medicine-understanding-your-otc-options/   [facility] Choices  https://www.nhs.uk/conditions/cough/   Last Reviewed Date   2021-06-10  Consumer Information Use and Disclaimer   This information is not specific medical advice and does not replace information you receive from your health care provider. This is only a brief summary of general information. It does NOT include all information about conditions, illnesses, injuries, tests, procedures, treatments, therapies, discharge instructions or life-style choices that may apply to you. You must talk with your health care provider for complete information about your health and treatment options. This information should not be used to decide whether or not to accept your health care providers advice, instructions or recommendations. Only your health care provider has the knowledge and training to provide advice that is right for you.  Copyright   Copyright © 2021 [employer], [employer]. and its affiliates and/or licensors. All rights reserved.      You must understand that you've received an Urgent Care treatment only and that you may be released before all your medical problems are known or treated. You, the patient, will arrange for follow up care as instructed.    Follow up with your PCP or specialty clinic as directed  in the next 1-2 weeks if not improved or as needed. You can call [phone] to schedule an appointment with the appropriate provider.    If your condition worsens we recommend that you receive another evaluation at the emergency room immediately or contact your primary medical clinic's after hours call service to discuss your concerns.    Please go to the Emergency Department for any concerns or worsening of condition.

## 2022-01-18 ENCOUNTER — PATIENT MESSAGE (OUTPATIENT)
Dept: ADMINISTRATIVE | Facility: HOSPITAL | Age: 47
End: 2022-01-18
Payer: COMMERCIAL

## 2022-01-24 ENCOUNTER — LAB VISIT (OUTPATIENT)
Dept: PRIMARY CARE CLINIC | Facility: CLINIC | Age: 47
End: 2022-01-24
Payer: COMMERCIAL

## 2022-01-24 DIAGNOSIS — Z20.822 CONTACT WITH AND (SUSPECTED) EXPOSURE TO COVID-19: ICD-10-CM

## 2022-01-24 LAB
CTP QC/QA: YES
SARS-COV-2 AG RESP QL IA.RAPID: NEGATIVE

## 2022-01-24 PROCEDURE — 87811 SARS-COV-2 COVID19 W/OPTIC: CPT

## 2022-01-26 ENCOUNTER — LAB VISIT (OUTPATIENT)
Dept: LAB | Facility: HOSPITAL | Age: 47
End: 2022-01-26
Attending: INTERNAL MEDICINE
Payer: COMMERCIAL

## 2022-01-26 ENCOUNTER — OFFICE VISIT (OUTPATIENT)
Dept: INTERNAL MEDICINE | Facility: CLINIC | Age: 47
End: 2022-01-26
Payer: COMMERCIAL

## 2022-01-26 VITALS
BODY MASS INDEX: 33.94 KG/M2 | WEIGHT: 191.56 LBS | HEIGHT: 63 IN | RESPIRATION RATE: 18 BRPM | HEART RATE: 71 BPM | SYSTOLIC BLOOD PRESSURE: 110 MMHG | OXYGEN SATURATION: 99 % | DIASTOLIC BLOOD PRESSURE: 78 MMHG | TEMPERATURE: 98 F

## 2022-01-26 DIAGNOSIS — K76.0 HEPATIC STEATOSIS: ICD-10-CM

## 2022-01-26 DIAGNOSIS — K76.0 HEPATIC STEATOSIS: Primary | ICD-10-CM

## 2022-01-26 DIAGNOSIS — R05.9 COUGH: ICD-10-CM

## 2022-01-26 DIAGNOSIS — R30.0 DYSURIA: ICD-10-CM

## 2022-01-26 LAB
ALBUMIN SERPL BCP-MCNC: 4.3 G/DL (ref 3.5–5.2)
ALP SERPL-CCNC: 104 U/L (ref 55–135)
ALT SERPL W/O P-5'-P-CCNC: 38 U/L (ref 10–44)
AST SERPL-CCNC: 24 U/L (ref 10–40)
BILIRUB DIRECT SERPL-MCNC: 0.4 MG/DL (ref 0.1–0.3)
BILIRUB SERPL-MCNC: 1.3 MG/DL (ref 0.1–1)
PROT SERPL-MCNC: 7.3 G/DL (ref 6–8.4)

## 2022-01-26 PROCEDURE — 99214 PR OFFICE/OUTPT VISIT, EST, LEVL IV, 30-39 MIN: ICD-10-PCS | Mod: S$GLB,,, | Performed by: INTERNAL MEDICINE

## 2022-01-26 PROCEDURE — 3074F PR MOST RECENT SYSTOLIC BLOOD PRESSURE < 130 MM HG: ICD-10-PCS | Mod: CPTII,S$GLB,, | Performed by: INTERNAL MEDICINE

## 2022-01-26 PROCEDURE — 99999 PR PBB SHADOW E&M-EST. PATIENT-LVL V: CPT | Mod: PBBFAC,,, | Performed by: INTERNAL MEDICINE

## 2022-01-26 PROCEDURE — 99999 PR PBB SHADOW E&M-EST. PATIENT-LVL V: ICD-10-PCS | Mod: PBBFAC,,, | Performed by: INTERNAL MEDICINE

## 2022-01-26 PROCEDURE — 3074F SYST BP LT 130 MM HG: CPT | Mod: CPTII,S$GLB,, | Performed by: INTERNAL MEDICINE

## 2022-01-26 PROCEDURE — 80076 HEPATIC FUNCTION PANEL: CPT | Performed by: INTERNAL MEDICINE

## 2022-01-26 PROCEDURE — 99214 OFFICE O/P EST MOD 30 MIN: CPT | Mod: S$GLB,,, | Performed by: INTERNAL MEDICINE

## 2022-01-26 PROCEDURE — 3008F BODY MASS INDEX DOCD: CPT | Mod: CPTII,S$GLB,, | Performed by: INTERNAL MEDICINE

## 2022-01-26 PROCEDURE — 3078F DIAST BP <80 MM HG: CPT | Mod: CPTII,S$GLB,, | Performed by: INTERNAL MEDICINE

## 2022-01-26 PROCEDURE — 3078F PR MOST RECENT DIASTOLIC BLOOD PRESSURE < 80 MM HG: ICD-10-PCS | Mod: CPTII,S$GLB,, | Performed by: INTERNAL MEDICINE

## 2022-01-26 PROCEDURE — 36415 COLL VENOUS BLD VENIPUNCTURE: CPT | Mod: PO | Performed by: INTERNAL MEDICINE

## 2022-01-26 PROCEDURE — 3008F PR BODY MASS INDEX (BMI) DOCUMENTED: ICD-10-PCS | Mod: CPTII,S$GLB,, | Performed by: INTERNAL MEDICINE

## 2022-01-26 RX ORDER — CYANOCOBALAMIN 1000 UG/ML
INJECTION, SOLUTION INTRAMUSCULAR; SUBCUTANEOUS
Qty: 1 ML | Refills: 12 | Status: SHIPPED | OUTPATIENT
Start: 2022-01-26

## 2022-01-26 RX ORDER — ALBUTEROL SULFATE 90 UG/1
2 AEROSOL, METERED RESPIRATORY (INHALATION) EVERY 6 HOURS PRN
Qty: 18 G | Refills: 0 | Status: SHIPPED | OUTPATIENT
Start: 2022-01-26 | End: 2024-02-25

## 2022-01-26 NOTE — PROGRESS NOTES
Chief Complaint  Chief Complaint   Patient presents with    Follow-up     3 Mo    Abdominal Pain       HPI  Emi Johnson is a 46 y.o. female with multiple medical diagnoses as listed in the medical history and problem list that presents for ***.  Their last appointment with primary care was 10/27/2021.      ***  Vaginal pressure with urination  subprapubic abdominal pain, intermittent  Bilateral, intermittent flank pain   5/10  No dysuria    New blood tests for liver     Asthma  Breaking out in hives - twice this month  allergic to black mold  Emesis once per month when postnasal drip becomes so severe  Chronic dry cough, worse compared to baseline since black mold      Albuterol inhaler and nebulizer - hasn't been using these  meltable Claritin   Chest tightness with difficulty taking deep breaths    Reactive hyperglycemia - 165   About six months  Checks daily between 120-140  No recent change in diet, activity level, or stress levels    Fatigue - leg weakness and achiness  Muscle spasms infrequently     Headaches - tylenol no relief every other day     One episode of seeing black spots in vision while   Epigastric burning pain - TUMS with relief       PAST MEDICAL HISTORY:  Past Medical History:   Diagnosis Date    Anemia     Asthma     B12 deficiency     Crohn's disease     History of shingles     Lupus     Migraine headache     Raynaud disease     Reactive hypoglycemia     Seizures 2004    no medication     Sleep apnea     Non compliant with CPAP       PAST SURGICAL HISTORY:  Past Surgical History:   Procedure Laterality Date    ABDOMINAL SURGERY      COLON SURGERY      95% of colon removed 2010    COLONOSCOPY      COLOSTOMY      HYSTERECTOMY      due to endometriosis    ILEOSCOPY N/A 4/10/2019    Procedure: ILEOSCOPY through stoma;  Surgeon: Eve Currie MD;  Location: [address];  Service: Endoscopy;  Laterality: N/A;  Schedule as 30 minute case, schedule in April or May  2019    ILEOSTOMY      LAPAROSCOPY W/ MINI-LAPAROTOMY      large intestine removed      just a portion    pelvic mass removal      REVISION COLOSTOMY  2010    STOMACH SURGERY      TONSILLECTOMY, ADENOIDECTOMY         SOCIAL HISTORY:  Social History     Socioeconomic History    Marital status:    Tobacco Use    Smoking status: Never Smoker    Smokeless tobacco: Never Used   Substance and Sexual Activity    Alcohol use: No     Comment: Rare social use.   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 [employer]     Social Determinants of Health     Financial Resource Strain: High Risk    Difficulty of Paying Living Expenses: Hard   Food Insecurity: Food Insecurity Present    Worried About Running Out of Food in the Last Year: Often true    Ran Out of Food in the Last Year: Often true   Transportation Needs: No Transportation Needs    Lack of Transportation (Medical): No    Lack of Transportation (Non-Medical): No   Physical Activity: Unknown    Days of Exercise per Week: 5 days   Stress: Stress Concern Present    Feeling of Stress : Very much   Social Connections: Unknown    Frequency of Communication with Friends and Family: More than three times a week    Frequency of Social Gatherings with Friends and Family: Never    Active Member of Clubs or Organizations: No    Attends Club or Organization Meetings: Never    Marital Status:    Housing Stability: High Risk    Unable to Pay for Housing in the Last Year: Yes    Unstable Housing in the Last Year: No       FAMILY HISTORY:  Family History   Problem Relation Age of Onset    Colon cancer Maternal Grandmother 72    Cancer Mother 54        Anal cancer     Diabetes Mellitus Mother     Hypertension Mother     Hypertension Sister     Cancer Maternal Aunt     Cancer Paternal Grandmother     Heart attack Father     Cervical cancer Sister     Seizures Sister     Hypertension  Sister     Liver disease Sister         Fatty Liver     Celiac disease Neg Hx     Cirrhosis Neg Hx     Colon polyps Neg Hx     Crohn's disease Neg Hx     Cystic fibrosis Neg Hx     Hemochromatosis Neg Hx     Esophageal cancer Neg Hx     Inflammatory bowel disease Neg Hx     Irritable bowel syndrome Neg Hx     Liver cancer Neg Hx     Rectal cancer Neg Hx     Stomach cancer Neg Hx     Ulcerative colitis Neg Hx     Hugo's disease Neg Hx        ALLERGIES AND MEDICATIONS: updated and reviewed.  Review of patient's allergies indicates:   Allergen Reactions    Aspirin      Other reaction(s): vomiting, contraindicated for bleeding from crohns  Other reaction(s): bleeding    Mold Hives     Black mold     Sulfa (sulfonamide antibiotics) Hives and Rash    Iodinated contrast media Other (See Comments)    Adhesive     Aspirin     Iodine     Remicade [infliximab] Other (See Comments)     Medical induced lupus    Latex Rash     Other reaction(s): Hives     Current Outpatient Medications   Medication Sig Dispense Refill    AJOVY 225 mg/1.5 mL injection       albuterol (VENTOLIN HFA) 90 mcg/actuation inhaler Inhale 2 puffs into the lungs every 6 (six) hours as needed (cough). Rescue 18 g 0    azelastine (ASTELIN) 137 mcg (0.1 %) nasal spray 1 spray (137 mcg total) by Nasal route 2 (two) times daily. 30 mL 0    blood sugar diagnostic Strp To check BG 1 times daily, to use with insurance preferred meter 100 strip 3    cyanocobalamin 1,000 mcg/mL injection INJECT 1 ML INTO THE MUSCLE EVERY 28 DAYS. 1 mL 12    cyclobenzaprine (FLEXERIL) 10 MG tablet Take 10 mg by mouth 2 (two) times daily as needed.      estradioL (VIVELLE-DOT) 0.1 mg/24 hr PTSW Place 1 patch onto the skin twice a week.      flash glucose scanning reader (FREESTYLE PEDRO 14 DAY READER) Misc 1 Units by Misc.(Non-Drug; Combo Route) route daily as needed. 1 each 0    flash glucose sensor (FREESTYLE PEDRO 14 DAY SENSOR) Kit 1 Units by  "Misc.(Non-Drug; Combo Route) route daily as needed. 2 kit 6    hydrocodone-homatropine 5-1.5 mg/5 ml (HYCODAN) 5-1.5 mg/5 mL Syrp Take 5 mLs by mouth nightly as needed (caution sedatin.). 120 mL 0    lancets Misc 1 Units by Misc.(Non-Drug; Combo Route) route once daily. To check BG 1 times daily, to use with insurance preferred meter 100 each 3    ondansetron (ZOFRAN-ODT) 4 MG TbDL DISSOLVE 1 TABLET ON THE TONGUE EVERY 8 HOURS AS NEEDED 30 tablet 3    oxyCODONE (ROXICODONE) 10 mg Tab immediate release tablet 1 tablet as needed      oxyCODONE-acetaminophen (PERCOCET)  mg per tablet Take 1 tablet by mouth every 6 (six) hours as needed for Pain (Greater than 7 day supply medically necessary). 120 tablet 0    phentermine (ADIPEX-P) 37.5 mg tablet 1/2 tab po qam 30 tablet 1    promethazine (PHENERGAN) 25 MG tablet TAKE 1 TABLET (25 MG TOTAL) BY MOUTH EVERY 6 (SIX) HOURS AS NEEDED FOR NAUSEA. 60 tablet 3    promethazine-dextromethorphan (PROMETHAZINE-DM) 6.25-15 mg/5 mL Syrp Take 5 mLs by mouth every 4 to 6 hours as needed (cough). 118 mL 0    STELARA 90 mg/mL Syrg syringe Every 2 months      sumatriptan (IMITREX STATDOSE) 6 mg/0.5 mL kit INJECT 0.5 ML UNDER THE SKIN AS NEEDED ONE TIME FOR MIGRAINE  1    syringe with needle, safety (BD INTEGRA SYRINGE) 3 mL 25 gauge x 1" Syrg 1 Syringe by Misc.(Non-Drug; Combo Route) route every 30 days. 20 Syringe 1    zolpidem (AMBIEN) 10 mg Tab Take 1 tablet (10 mg total) by mouth nightly as needed. 90 tablet 1    blood-glucose meter kit To check BG 1 times daily, to use with insurance preferred meter 1 each 0    blood-glucose meter,continuous (DEXCOM G6 ) Misc 1 Units by Misc.(Non-Drug; Combo Route) route once daily. 1 each 3    blood-glucose sensor (DEXCOM G6 SENSOR) Jane 1 Device by Misc.(Non-Drug; Combo Route) route once daily. 4 Device 3    blood-glucose transmitter (DEXCOM G6 TRANSMITTER) Jane 1 Device by Misc.(Non-Drug; Combo Route) route once " "daily. 1 Device 3     No current facility-administered medications for this visit.         ROS  Review of Systems   HENT: Positive for ear pain (bilateral), postnasal drip, sinus pressure and sore throat. Negative for nosebleeds, rhinorrhea, trouble swallowing and voice change.    Eyes: Positive for visual disturbance. Negative for photophobia.   Respiratory: Positive for cough and chest tightness. Negative for shortness of breath and wheezing.    Cardiovascular: Negative for chest pain and palpitations.   Gastrointestinal: Positive for vomiting. Negative for abdominal pain, blood in stool, constipation, diarrhea and nausea.   Genitourinary: Positive for flank pain and frequency. Negative for difficulty urinating, dysuria and hematuria.   Musculoskeletal: Positive for neck pain.   Neurological: Positive for headaches. Negative for dizziness, weakness and light-headedness.   Psychiatric/Behavioral: Negative for decreased concentration. The patient is not nervous/anxious.         Increased stress   All other systems reviewed and are negative.          Physical Exam  Vitals:    01/26/22 1052   BP: 110/78   BP Location: Right arm   Patient Position: Sitting   BP Method: Large (Manual)   Pulse: 71   Resp: 18   Temp: 98.1 °F (36.7 °C)   TempSrc: Temporal   SpO2: 99%   Weight: 86.9 kg (191 lb 9.3 oz)   Height: 5' 3" (1.6 m)    Body mass index is 33.94 kg/m².  Weight: 86.9 kg (191 lb 9.3 oz)   Height: 5' 3" (160 cm)   Physical Exam      Health Maintenance       Date Due Completion Date    Mammogram 10/07/2012 10/7/2011    Colorectal Cancer Screening 10/12/2015 10/11/2015    Lipid Panel 07/27/2026 7/27/2021    TETANUS VACCINE 07/27/2031 7/27/2021    Pneumococcal Vaccines (Age 0-64) (2 of 2 - PPSV23) 12/22/2040 10/20/2015            Assessment and Plan:  There are no diagnoses linked to this encounter.        "

## 2022-01-26 NOTE — PROGRESS NOTES
CC:  Dysuria  HPI:  The patient is a 46 y.o. year old female who presents to the office for dysuria.  She reports intermittent suprapubic discomfort and bilateral flank pain.  She also reports pelvic pressure and increased urinary frequency.  The patient denies any fever, but does report nausea vomiting.  She also reports flare-up of her asthma.  Her bathroom has black mold.  She complains of chest pain and difficulty taking deep breath, as well as chest pressure and hives.  She has taken Claritin for her symptoms.  She also reports her blood sugars have been labile, 60s to 165. She denies any changes in diet or activity.  She complains of fatigue, lower extremity weakness and body aches.  The patient also reports worsening headaches.  She has discontinue migraine injections.  The patient also reports worsening acid reflux, for which she takes Tums and floaters.      PAST MEDICAL HISTORY:  Past Medical History:   Diagnosis Date    Anemia     Asthma     B12 deficiency     Crohn's disease     History of shingles     Lupus     Migraine headache     Raynaud disease     Reactive hypoglycemia     Seizures 2004    no medication     Sleep apnea     Non compliant with CPAP       SURGICAL HISTORY:  Past Surgical History:   Procedure Laterality Date    ABDOMINAL SURGERY      COLON SURGERY      95% of colon removed 2010    COLONOSCOPY      COLOSTOMY      HYSTERECTOMY      due to endometriosis    ILEOSCOPY N/A 4/10/2019    Procedure: ILEOSCOPY through stoma;  Surgeon: Eve Currie MD;  Location: [facility] ([address] [place] [address]);  Service: Endoscopy;  Laterality: N/A;  Schedule as 30 minute case, schedule in April or May 2019    ILEOSTOMY      LAPAROSCOPY W/ MINI-LAPAROTOMY      large intestine removed      just a portion    pelvic mass removal      REVISION COLOSTOMY  2010    STOMACH SURGERY      TONSILLECTOMY, ADENOIDECTOMY         MEDS:  Medcard reviewed and updated    ALLERGIES: Allergy Card reviewed and  updated    SOCIAL HISTORY:   The patient is a nonsmoker.    PE:   APPEARANCE: Well nourished, well developed, in no acute distress.    CHEST: Lungs clear to auscultation with unlabored respirations.  CARDIOVASCULAR: Normal S1, S2. No murmurs. No carotid bruits. No pedal edema.  ABDOMEN: Bowel sounds normal. Not distended. Soft. No tenderness or masses. PSYCHIATRIC: The patient is oriented to person, place, and time and has a pleasant affect.        ASSESSMENT/PLAN:    Emi was seen today for follow-up and abdominal pain.    Diagnoses and all orders for this visit:    Hepatic steatosis  -     Hepatic Function Panel; Future    Dysuria  -     Urinalysis; Future  -     Urine culture; Future    Cough  -     albuterol (VENTOLIN HFA) 90 mcg/actuation inhaler; Inhale 2 puffs into the lungs every 6 (six) hours as needed (cough). Rescue    Other orders  -     cyanocobalamin 1,000 mcg/mL injection; INJECT 1 ML INTO THE MUSCLE EVERY 28 DAYS.

## 2022-01-27 ENCOUNTER — PATIENT MESSAGE (OUTPATIENT)
Dept: INTERNAL MEDICINE | Facility: CLINIC | Age: 47
End: 2022-01-27
Payer: COMMERCIAL

## 2022-01-31 ENCOUNTER — PATIENT MESSAGE (OUTPATIENT)
Dept: INTERNAL MEDICINE | Facility: CLINIC | Age: 47
End: 2022-01-31
Payer: COMMERCIAL

## 2022-02-03 ENCOUNTER — PATIENT MESSAGE (OUTPATIENT)
Dept: INTERNAL MEDICINE | Facility: CLINIC | Age: 47
End: 2022-02-03
Payer: COMMERCIAL

## 2022-02-03 DIAGNOSIS — K50.00 CROHN'S DISEASE OF SMALL INTESTINE WITHOUT COMPLICATION: ICD-10-CM

## 2022-02-03 DIAGNOSIS — M79.7 FIBROMYALGIA: ICD-10-CM

## 2022-02-03 RX ORDER — OXYCODONE AND ACETAMINOPHEN 10; 325 MG/1; MG/1
1 TABLET ORAL EVERY 6 HOURS PRN
Qty: 120 TABLET | Refills: 0 | Status: SHIPPED | OUTPATIENT
Start: 2022-02-03 | End: 2022-02-07 | Stop reason: SDUPTHER

## 2022-02-07 ENCOUNTER — PATIENT MESSAGE (OUTPATIENT)
Dept: INTERNAL MEDICINE | Facility: CLINIC | Age: 47
End: 2022-02-07
Payer: COMMERCIAL

## 2022-02-07 DIAGNOSIS — K50.00 CROHN'S DISEASE OF SMALL INTESTINE WITHOUT COMPLICATION: ICD-10-CM

## 2022-02-07 DIAGNOSIS — M79.7 FIBROMYALGIA: ICD-10-CM

## 2022-02-07 RX ORDER — OXYCODONE AND ACETAMINOPHEN 10; 325 MG/1; MG/1
1 TABLET ORAL EVERY 6 HOURS PRN
Qty: 120 TABLET | Refills: 0 | Status: SHIPPED | OUTPATIENT
Start: 2022-02-07 | End: 2022-03-02 | Stop reason: SDUPTHER

## 2022-02-07 RX ORDER — OXYCODONE AND ACETAMINOPHEN 10; 325 MG/1; MG/1
1 TABLET ORAL EVERY 6 HOURS PRN
Qty: 120 TABLET | Refills: 0 | Status: SHIPPED | OUTPATIENT
Start: 2022-02-07 | End: 2022-02-07 | Stop reason: SDUPTHER

## 2022-02-08 ENCOUNTER — PATIENT MESSAGE (OUTPATIENT)
Dept: INTERNAL MEDICINE | Facility: CLINIC | Age: 47
End: 2022-02-08
Payer: COMMERCIAL

## 2022-02-08 RX ORDER — CIPROFLOXACIN 250 MG/1
250 TABLET, FILM COATED ORAL 2 TIMES DAILY
Qty: 14 TABLET | Refills: 0 | Status: SHIPPED | OUTPATIENT
Start: 2022-02-08 | End: 2022-02-15

## 2022-02-11 ENCOUNTER — PATIENT MESSAGE (OUTPATIENT)
Dept: PHARMACY | Facility: CLINIC | Age: 47
End: 2022-02-11
Payer: COMMERCIAL

## 2022-02-13 ENCOUNTER — OFFICE VISIT (OUTPATIENT)
Dept: URGENT CARE | Facility: CLINIC | Age: 47
End: 2022-02-13
Payer: COMMERCIAL

## 2022-02-13 VITALS
DIASTOLIC BLOOD PRESSURE: 84 MMHG | WEIGHT: 191 LBS | HEART RATE: 85 BPM | RESPIRATION RATE: 16 BRPM | BODY MASS INDEX: 33.84 KG/M2 | SYSTOLIC BLOOD PRESSURE: 119 MMHG | OXYGEN SATURATION: 95 % | TEMPERATURE: 99 F | HEIGHT: 63 IN

## 2022-02-13 DIAGNOSIS — H57.89 IRRITATION OF LEFT EYE: Primary | ICD-10-CM

## 2022-02-13 DIAGNOSIS — H53.19 VISUAL DISTORTIONS: ICD-10-CM

## 2022-02-13 PROCEDURE — 99213 OFFICE O/P EST LOW 20 MIN: CPT | Mod: S$GLB,,, | Performed by: PHYSICIAN ASSISTANT

## 2022-02-13 PROCEDURE — 1160F RVW MEDS BY RX/DR IN RCRD: CPT | Mod: CPTII,S$GLB,, | Performed by: PHYSICIAN ASSISTANT

## 2022-02-13 PROCEDURE — 3074F PR MOST RECENT SYSTOLIC BLOOD PRESSURE < 130 MM HG: ICD-10-PCS | Mod: CPTII,S$GLB,, | Performed by: PHYSICIAN ASSISTANT

## 2022-02-13 PROCEDURE — 3008F BODY MASS INDEX DOCD: CPT | Mod: CPTII,S$GLB,, | Performed by: PHYSICIAN ASSISTANT

## 2022-02-13 PROCEDURE — 1159F PR MEDICATION LIST DOCUMENTED IN MEDICAL RECORD: ICD-10-PCS | Mod: CPTII,S$GLB,, | Performed by: PHYSICIAN ASSISTANT

## 2022-02-13 PROCEDURE — 3079F PR MOST RECENT DIASTOLIC BLOOD PRESSURE 80-89 MM HG: ICD-10-PCS | Mod: CPTII,S$GLB,, | Performed by: PHYSICIAN ASSISTANT

## 2022-02-13 PROCEDURE — 1160F PR REVIEW ALL MEDS BY PRESCRIBER/CLIN PHARMACIST DOCUMENTED: ICD-10-PCS | Mod: CPTII,S$GLB,, | Performed by: PHYSICIAN ASSISTANT

## 2022-02-13 PROCEDURE — 3074F SYST BP LT 130 MM HG: CPT | Mod: CPTII,S$GLB,, | Performed by: PHYSICIAN ASSISTANT

## 2022-02-13 PROCEDURE — 3079F DIAST BP 80-89 MM HG: CPT | Mod: CPTII,S$GLB,, | Performed by: PHYSICIAN ASSISTANT

## 2022-02-13 PROCEDURE — 99213 PR OFFICE/OUTPT VISIT, EST, LEVL III, 20-29 MIN: ICD-10-PCS | Mod: S$GLB,,, | Performed by: PHYSICIAN ASSISTANT

## 2022-02-13 PROCEDURE — 1159F MED LIST DOCD IN RCRD: CPT | Mod: CPTII,S$GLB,, | Performed by: PHYSICIAN ASSISTANT

## 2022-02-13 PROCEDURE — 3008F PR BODY MASS INDEX (BMI) DOCUMENTED: ICD-10-PCS | Mod: CPTII,S$GLB,, | Performed by: PHYSICIAN ASSISTANT

## 2022-02-13 NOTE — PATIENT INSTRUCTIONS
You must understand that you've received an Urgent Care treatment only and that you may be released before all your medical problems are known or treated. You, the patient, will arrange for follow up care as instructed.      Follow up with your PCP or specialty clinic as instructed in the next 2-3 days if not improved or as needed. You can call [phone] to schedule an appointment with appropriate provider.      If you condition worsens, we recommend that you receive another evaluation at the emergency room immediately or contact your primary medical clinic's after hours call service to discuss your concerns.      Please return here or go to the Emergency Department for any concerns or worsening condition.      If you were prescribed a narcotic or controlled substance, do not drive or operate heavy equipment or machinery while taking these medications.       Patient Education       Artificial Tears (ar ti FISH il tears)   Brand Names:  Advanced Eye Relief Dry Eye Environmental [OTC]; Advanced Eye Relief Dry Eye Rejuvenation [OTC]; Altalube [OTC]; Artificial Eye [OTC]; Artificial Tears [OTC]; Bion Tears [OTC] [DSC]; FreshKote PF [OTC]; GenTeal Tears Mild [OTC]; GenTeal Tears Moderate [OTC]; GenTeal Tears Night-Time [OTC]; GenTeal Tears PF [OTC]; GenTeal Tears Severe Day/Night [OTC]; GoodSense Artificial Tears [OTC]; GoodSense Lubricant Eye Drops [OTC]; HypoTears [OTC]; LiquiTears [OTC] [DSC]; LubriFresh P.M. [OTC] [DSC]; Murine Tears [OTC]; Natural Balance Tears [OTC]; Natures Tears [OTC] [DSC]; Puralube [OTC]; Refresh Lacri-Lube [OTC]; Refresh P.M. [OTC]; Soothe Hydration [OTC]; Soothe [OTC]; Systane Balance [OTC]; Systane Complete [OTC]; Systane Hydration PF [OTC]; Systane Nighttime [OTC]; Systane Preservative Free [OTC]; Systane Ultra PF [OTC]; Systane Ultra [OTC]; Systane [OTC]; Tears Again [OTC] [DSC]; Tears Naturale Free [OTC] [DSC]; Tears Naturale II [OTC] [DSC]; Tears Naturale PM [OTC]; [place] Meibo  Tears [OTC]; Vista Tears [OTC]; Viva-Drops [OTC]   What is this drug used for?   · It is used to treat dry eyes.  · It is used to treat eye irritation.    What do I need to tell my doctor BEFORE I take this drug?   · If you are allergic to this drug; any part of this drug; or any other drugs, foods, or substances. Tell your doctor about the allergy and what signs you had.  This drug may interact with other drugs or health problems.  Tell your doctor and pharmacist about all of your drugs (prescription or OTC, natural products, vitamins) and health problems. You must check to make sure that it is safe for you to take this drug with all of your drugs and health problems. Do not start, stop, or change the dose of any drug without checking with your doctor.  What are some things I need to know or do while I take this drug?   · Tell all of your health care providers that you take this drug. This includes your doctors, nurses, pharmacists, and dentists.  · Do not take this drug by mouth. If this drug is put in the mouth or swallowed, call a doctor or poison control center right away.  · Tell your doctor if you are pregnant, plan on getting pregnant, or are breast-feeding. You will need to talk about the benefits and risks to you and the baby.    What are some side effects that I need to call my doctor about right away?   WARNING/CAUTION: Even though it may be rare, some people may have very bad and sometimes deadly side effects when taking a drug. Tell your doctor or get medical help right away if you have any of the following signs or symptoms that may be related to a very bad side effect:  · Signs of an allergic reaction, like rash; hives; itching; red, swollen, blistered, or peeling skin with or without fever; wheezing; tightness in the chest or throat; trouble breathing, swallowing, or talking; unusual hoarseness; or swelling of the mouth, face, lips, tongue, or throat.  · Change in eyesight, eye pain, or very bad eye  irritation.  What are some other side effects of this drug?   All drugs may cause side effects. However, many people have no side effects or only have minor side effects. Call your doctor or get medical help if you have any side effects that bother you or do not go away.  These are not all of the side effects that may occur. If you have questions about side effects, call your doctor. Call your doctor for medical advice about side effects.  You may report side effects to your national health agency.  You may report side effects to the FDA at 1-727.950.6965. You may also report side effects at https://www.fda.gov/medwatch.  How is this drug best taken?   Use this drug as ordered by your doctor. Read all information given to you. Follow all instructions closely.  All products:   · For the eye only.  · Some of these products are not for use if you are wearing contact lenses. Be sure you know if you need to avoid wearing contact lenses while using this product.  · Do not touch the container tip to the eye, lid, or other skin. This could lead to bacteria in the drug, which may cause severe eye problems or loss of eyesight.  · Put the cap back on after you are done using your dose.  · Wash your hands before and after use.  Eye ointment:   · Place drug inside the lower lid. Close the eye for 1 to 2 minutes. Roll eyeball around.  Eye drops:   · Tilt your head back and drop drug into the eye.  · After use, keep your eyes closed. Put pressure on the inside corner of the eye. Do this for 1 to 2 minutes. This keeps the drug in your eye.  · Some of these drugs need to be shaken before use. Be sure you know if this product needs to be shaken before using it.  · Do not use this drug if the solution changes color, is cloudy, or has particles. Get a new one.  What do I do if I miss a dose?   · If you use this drug on a regular basis, use a missed dose as soon as you think about it.  · If it is close to the time for your next dose,  skip the missed dose and go back to your normal time.  · Do not use 2 doses at the same time or extra doses.  · Many times this drug is used on an as needed basis. Do not use more often than told by the doctor.    How do I store and/or throw out this drug?   · Store at room temperature.  · Be sure you know how long you can store this drug before you need to throw it away.  · Keep all drugs in a safe place. Keep all drugs out of the reach of children and pets.  · Throw away unused or  drugs. Do not flush down a toilet or pour down a drain unless you are told to do so. Check with your pharmacist if you have questions about the best way to throw out drugs. There may be drug take-back programs in your area.    General drug facts   · If your symptoms or health problems do not get better or if they become worse, call your doctor.  · Do not share your drugs with others and do not take anyone else's drugs.  · Some drugs may have another patient information leaflet. If you have any questions about this drug, please talk with your doctor, nurse, pharmacist, or other health care provider.  · Some drugs may have another patient information leaflet. Check with your pharmacist. If you have any questions about this drug, please talk with your doctor, nurse, pharmacist, or other health care provider.  · If you think there has been an overdose, call your poison control center or get medical care right away. Be ready to tell or show what was taken, how much, and when it happened.    Consumer Information Use and Disclaimer   This generalized information is a limited summary of diagnosis, treatment, and/or medication information. It is not meant to be comprehensive and should be used as a tool to help the user understand and/or assess potential diagnostic and treatment options. It does NOT include all information about conditions, treatments, medications, side effects, or risks that may apply to a specific patient. It is not  intended to be medical advice or a substitute for the medical advice, diagnosis, or treatment of a health care provider based on the health care provider's examination and assessment of a patient's specific and unique circumstances. Patients must speak with a health care provider for complete information about their health, medical questions, and treatment options, including any risks or benefits regarding use of medications. This information does not endorse any treatments or medications as safe, effective, or approved for treating a specific patient. Up[employer] Inc[employer] and its affiliates disclaim any warranty or liability relating to this information or the use thereof. The use of this information is governed by the Terms of Use, available at https://www.[employer].com/en/solutions/lexicomp/about/rashaun.  Last Reviewed Date   2019-05-17  Copyright   © 2021 Up[employer] Inc[employer] and its affiliates and/or licensors. All rights reserved.

## 2022-02-13 NOTE — PROGRESS NOTES
"Subjective:       Patient ID: Emi Johnson is a 46 y.o. female.    Vitals:  height is 5' 3" (1.6 m) and weight is 86.6 kg (191 lb). Her temperature is 98.5 °F (36.9 °C). Her blood pressure is 119/84 and her pulse is 85. Her respiration is 16 and oxygen saturation is 95%.     Chief Complaint: Eye Pain    Pt states she has had a blind spot and light in left eye for 2 days. States she can see a black dot on her eye in the same spot where she has had trouble seeing. Also reports headaches.    Patient provider note starts here:  Patient presents with complaints of left eye pain, irritation and intermittent/sure for the past 2 days.  She reports that she knows of black dot to the lateral aspect of the iris of the left eye yesterday and is unsure if this is what is causing her problems.  She denies associated changes in vision, blurry vision.  Denies floaters or curtains.  Reports she has had a flash your occurs 3 times to the left lower quadrant of her field of vision the left eye.  Denies a history of ocular trauma.  Additionally denies ocular surgeries or the use of contact lenses.  She has not tried any medications for her symptoms. She also endorses frontal headaches associated with the eye irritation. Denies thunderclap in onset of the headaches. Denies N/V or fevers. No photphobia.     Eye Pain   The left eye is affected. This is a new problem. The current episode started in the past 7 days. There was no injury mechanism. There is no known exposure to pink eye. She does not wear contacts. Associated symptoms include itching. Pertinent negatives include no blurred vision, eye discharge, double vision, eye redness, fever, foreign body sensation, nausea, photophobia, recent URI or vomiting. She has tried water for the symptoms. The treatment provided no relief.       Constitution: Negative for fever.   Neck: Negative for neck pain.   Cardiovascular: Negative for chest pain, palpitations and sob on exertion.   Eyes: " Positive for eye itching and eye pain. Negative for eye trauma, eye discharge, eye redness, photophobia, vision loss, double vision, blurred vision and eyelid swelling.   Respiratory: Negative for chest tightness and wheezing.    Gastrointestinal: Negative for abdominal pain, nausea, vomiting and diarrhea.   Skin: Negative for color change and wound.   Neurological: Positive for headaches. Negative for numbness and tingling.       Objective:      Physical Exam   Constitutional: She is oriented to person, place, and time. She appears well-developed and well-nourished.   HENT:   Head: Normocephalic and atraumatic.   Ears:   Right Ear: External ear normal.   Left Ear: External ear normal.   Nose: Nose normal.   Mouth/Throat: Oropharynx is clear and moist.   Eyes: Conjunctivae, EOM and lids are normal. Pupils are equal, round, and reactive to light. Lids are everted and swept, no foreign bodies found. Right eye exhibits no discharge. Left eye exhibits no discharge and no exudate. No foreign body present in the left eye. Left conjunctiva is not injected. Left conjunctiva has no hemorrhage. No scleral icterus. Left eye exhibits normal extraocular motion.      extraocular movement intact vision grossly intact gaze aligned appropriately      Comments: Fluorescein was placed into the left eye and examined under Wood's lamp exam.  There is no forcing uptake appreciated to the left eye.  No foreign bodies appreciated.  The upper and lower eyelids were inverted and there were no foreign bodies noted.  Pupils round reactive to light.  There is no foreign body noted with examination of plain light.   Neck: Trachea normal and phonation normal. Neck supple.   Musculoskeletal: Normal range of motion.         General: Normal range of motion.   Neurological: She is alert and oriented to person, place, and time.   Skin: Skin is warm, dry and intact.   Psychiatric: She has a normal mood and affect. Her speech is normal and behavior is  normal. Judgment and thought content normal. Cognition and memory  Nursing note and vitals reviewed.        Assessment:       1. Irritation of left eye    2. Visual distortions         Hearing Screening    125Hz 250Hz 500Hz 1000Hz 2000Hz 3000Hz 4000Hz 6000Hz 8000Hz   Right ear:            Left ear:               Visual Acuity Screening    Right eye Left eye Both eyes   Without correction:      With correction: 20/25 20/25 20/20       Plan:         Irritation of left eye  -     Ambulatory referral/consult to Ophthalmology    Visual distortions  -     Ambulatory referral/consult to Ophthalmology           Medical Decision Making:   History:   Old Medical Records: I decided to obtain old medical records.  Differential Diagnosis:   Differential Diagnosis includes, but is not limited to:  Corneal abrasion, retained foreign body, periorbital or orbital cellulitis, keratitis, iritis, subconjunctival hemorrhage, conjunctivitis, hordeolum/chalazion, glaucoma, retinal detachment    Urgent Care Management:  Patient presents with complaints of left eye irritation for the past 2 days.  She does endorse a flash her to the left lower quadrant of the visual field which has been present 3 times in the past 2 days.  On exam, she is afebrile and nontoxic appearing.  Visual acuity is okay.  She does were glasses but not contacts.  There is no fluorescein uptake noted on Wood's lamp exam.  I have put in an urgent referral for Ophthalmology for patient to be seen tomorrow morning.  Patient was advised that if she cannot get in with Ophthalmology within the next day or if the pain and visual changes worsen, she should go to the emergency department for further evaluation management.  She verbalized understanding and agreed with plan.        Patient Instructions   You must understand that you've received an Urgent Care treatment only and that you may be released before all your medical problems are known or treated. You, the patient, will  arrange for follow up care as instructed.      Follow up with your PCP or specialty clinic as instructed in the next 2-3 days if not improved or as needed. You can call [phone] to schedule an appointment with appropriate provider.      If you condition worsens, we recommend that you receive another evaluation at the emergency room immediately or contact your primary medical clinic's after hours call service to discuss your concerns.      Please return here or go to the Emergency Department for any concerns or worsening condition.      If you were prescribed a narcotic or controlled substance, do not drive or operate heavy equipment or machinery while taking these medications.       Patient Education       Artificial Tears (ar ti FISH il tears)   Brand Names: [place] Advanced Eye Relief Dry Eye Environmental [OTC]; Advanced Eye Relief Dry Eye Rejuvenation [OTC]; Altalube [OTC]; Artificial Eye [OTC]; Artificial Tears [OTC]; Bion Tears [OTC] [DSC]; FreshKote PF [OTC]; GenTeal Tears Mild [OTC]; GenTeal Tears Moderate [OTC]; GenTeal Tears Night-Time [OTC]; GenTeal Tears PF [OTC]; GenTeal Tears Severe Day/Night [OTC]; GoodSense Artificial Tears [OTC]; GoodSense Lubricant Eye Drops [OTC]; HypoTears [OTC]; LiquiTears [OTC] [DSC]; LubriFresh P.M. [OTC] [DSC]; Murine Tears [OTC]; Natural Balance Tears [OTC]; Natures Tears [OTC] [DSC]; Puralube [OTC]; Refresh Lacri-Lube [OTC]; Refresh P.M. [OTC]; Soothe Hydration [OTC]; Soothe [OTC]; Systane Balance [OTC]; Systane Complete [OTC]; Systane Hydration PF [OTC]; Systane Nighttime [OTC]; Systane Preservative Free [OTC]; Systane Ultra PF [OTC]; Systane Ultra [OTC]; Systane [OTC]; Tears Again [OTC] [DSC]; Tears Naturale Free [OTC] [DSC]; Tears Naturale II [OTC] [DSC]; Tears Naturale PM [OTC]; [place] Meibo Tears [OTC]; Vista Tears [OTC]; Viva-Drops [OTC]   What is this drug used for?   · It is used to treat dry eyes.  · It is used to treat eye irritation.    What do I need to tell my  doctor BEFORE I take this drug?   · If you are allergic to this drug; any part of this drug; or any other drugs, foods, or substances. Tell your doctor about the allergy and what signs you had.  This drug may interact with other drugs or health problems.  Tell your doctor and pharmacist about all of your drugs (prescription or OTC, natural products, vitamins) and health problems. You must check to make sure that it is safe for you to take this drug with all of your drugs and health problems. Do not start, stop, or change the dose of any drug without checking with your doctor.  What are some things I need to know or do while I take this drug?   · Tell all of your health care providers that you take this drug. This includes your doctors, nurses, pharmacists, and dentists.  · Do not take this drug by mouth. If this drug is put in the mouth or swallowed, call a doctor or poison control center right away.  · Tell your doctor if you are pregnant, plan on getting pregnant, or are breast-feeding. You will need to talk about the benefits and risks to you and the baby.    What are some side effects that I need to call my doctor about right away?   WARNING/CAUTION: Even though it may be rare, some people may have very bad and sometimes deadly side effects when taking a drug. Tell your doctor or get medical help right away if you have any of the following signs or symptoms that may be related to a very bad side effect:  · Signs of an allergic reaction, like rash; hives; itching; red, swollen, blistered, or peeling skin with or without fever; wheezing; tightness in the chest or throat; trouble breathing, swallowing, or talking; unusual hoarseness; or swelling of the mouth, face, lips, tongue, or throat.  · Change in eyesight, eye pain, or very bad eye irritation.  What are some other side effects of this drug?   All drugs may cause side effects. However, many people have no side effects or only have minor side effects. Call your  doctor or get medical help if you have any side effects that bother you or do not go away.  These are not all of the side effects that may occur. If you have questions about side effects, call your doctor. Call your doctor for medical advice about side effects.  You may report side effects to your national health agency.  You may report side effects to the FDA at 1-385.282.1620. You may also report side effects at https://www.fda.gov/medwatch.  How is this drug best taken?   Use this drug as ordered by your doctor. Read all information given to you. Follow all instructions closely.  All products:   · For the eye only.  · Some of these products are not for use if you are wearing contact lenses. Be sure you know if you need to avoid wearing contact lenses while using this product.  · Do not touch the container tip to the eye, lid, or other skin. This could lead to bacteria in the drug, which may cause severe eye problems or loss of eyesight.  · Put the cap back on after you are done using your dose.  · Wash your hands before and after use.  Eye ointment:   · Place drug inside the lower lid. Close the eye for 1 to 2 minutes. Roll eyeball around.  Eye drops:   · Tilt your head back and drop drug into the eye.  · After use, keep your eyes closed. Put pressure on the inside corner of the eye. Do this for 1 to 2 minutes. This keeps the drug in your eye.  · Some of these drugs need to be shaken before use. Be sure you know if this product needs to be shaken before using it.  · Do not use this drug if the solution changes color, is cloudy, or has particles. Get a new one.  What do I do if I miss a dose?   · If you use this drug on a regular basis, use a missed dose as soon as you think about it.  · If it is close to the time for your next dose, skip the missed dose and go back to your normal time.  · Do not use 2 doses at the same time or extra doses.  · Many times this drug is used on an as needed basis. Do not use more  often than told by the doctor.    How do I store and/or throw out this drug?   · Store at room temperature.  · Be sure you know how long you can store this drug before you need to throw it away.  · Keep all drugs in a safe place. Keep all drugs out of the reach of children and pets.  · Throw away unused or  drugs. Do not flush down a toilet or pour down a drain unless you are told to do so. Check with your pharmacist if you have questions about the best way to throw out drugs. There may be drug take-back programs in your area.    General drug facts   · If your symptoms or health problems do not get better or if they become worse, call your doctor.  · Do not share your drugs with others and do not take anyone else's drugs.  · Some drugs may have another patient information leaflet. If you have any questions about this drug, please talk with your doctor, nurse, pharmacist, or other health care provider.  · Some drugs may have another patient information leaflet. Check with your pharmacist. If you have any questions about this drug, please talk with your doctor, nurse, pharmacist, or other health care provider.  · If you think there has been an overdose, call your poison control center or get medical care right away. Be ready to tell or show what was taken, how much, and when it happened.    Consumer Information Use and Disclaimer   This generalized information is a limited summary of diagnosis, treatment, and/or medication information. It is not meant to be comprehensive and should be used as a tool to help the user understand and/or assess potential diagnostic and treatment options. It does NOT include all information about conditions, treatments, medications, side effects, or risks that may apply to a specific patient. It is not intended to be medical advice or a substitute for the medical advice, diagnosis, or treatment of a health care provider based on the health care provider's examination and assessment  of a patient's specific and unique circumstances. Patients must speak with a health care provider for complete information about their health, medical questions, and treatment options, including any risks or benefits regarding use of medications. This information does not endorse any treatments or medications as safe, effective, or approved for treating a specific patient. UpToDate, Inc. and its affiliates disclaim any warranty or liability relating to this information or the use thereof. The use of this information is governed by the Terms of Use, available at https://www.[employer].com/en/solutions/lexicomp/about/rashaun.  Last Reviewed Date   2019-05-17  Copyright   © 2021 UpToDate, Inc. and its affiliates and/or licensors. All rights reserved.

## 2022-02-14 ENCOUNTER — OFFICE VISIT (OUTPATIENT)
Dept: OPTOMETRY | Facility: CLINIC | Age: 47
End: 2022-02-14
Payer: COMMERCIAL

## 2022-02-14 DIAGNOSIS — G93.2 IDIOPATHIC INTRACRANIAL HYPERTENSION: Primary | ICD-10-CM

## 2022-02-14 PROCEDURE — 92004 COMPRE OPH EXAM NEW PT 1/>: CPT | Mod: S$GLB,,, | Performed by: OPTOMETRIST

## 2022-02-14 PROCEDURE — 99999 PR PBB SHADOW E&M-EST. PATIENT-LVL II: ICD-10-PCS | Mod: PBBFAC,,, | Performed by: OPTOMETRIST

## 2022-02-14 PROCEDURE — 1159F PR MEDICATION LIST DOCUMENTED IN MEDICAL RECORD: ICD-10-PCS | Mod: CPTII,S$GLB,, | Performed by: OPTOMETRIST

## 2022-02-14 PROCEDURE — 92004 PR EYE EXAM, NEW PATIENT,COMPREHESV: ICD-10-PCS | Mod: S$GLB,,, | Performed by: OPTOMETRIST

## 2022-02-14 PROCEDURE — 99999 PR PBB SHADOW E&M-EST. PATIENT-LVL II: CPT | Mod: PBBFAC,,, | Performed by: OPTOMETRIST

## 2022-02-14 PROCEDURE — 1159F MED LIST DOCD IN RCRD: CPT | Mod: CPTII,S$GLB,, | Performed by: OPTOMETRIST

## 2022-02-14 NOTE — PROGRESS NOTES
HPI     Urgent visit  Headache and flashes OS for 3 days follwed by blurry vision     Last edited by Chong Joshi, OD on 2/14/2022  1:33 PM. (History)            Assessment /Plan     For exam results, see Encounter Report.    Idiopathic intracranial hypertension (Suspect)  -     Ambulatory referral/consult to Ophthalmology; Future; Expected date: 02/21/2022  -Normal IOP with croded nerves OS>>OD  -Normal Color, slight reduced VA OS  -Michelle WNL  -?Atypical Ocular Migraine    RT Neuro HVF, same day

## 2022-02-15 ENCOUNTER — PATIENT MESSAGE (OUTPATIENT)
Dept: OPHTHALMOLOGY | Facility: CLINIC | Age: 47
End: 2022-02-15
Payer: COMMERCIAL

## 2022-02-15 ENCOUNTER — PATIENT MESSAGE (OUTPATIENT)
Dept: OPTOMETRY | Facility: CLINIC | Age: 47
End: 2022-02-15
Payer: COMMERCIAL

## 2022-03-02 ENCOUNTER — PATIENT MESSAGE (OUTPATIENT)
Dept: OPHTHALMOLOGY | Facility: CLINIC | Age: 47
End: 2022-03-02
Payer: COMMERCIAL

## 2022-03-02 ENCOUNTER — PATIENT MESSAGE (OUTPATIENT)
Dept: INTERNAL MEDICINE | Facility: CLINIC | Age: 47
End: 2022-03-02
Payer: COMMERCIAL

## 2022-03-02 DIAGNOSIS — K50.00 CROHN'S DISEASE OF SMALL INTESTINE WITHOUT COMPLICATION: ICD-10-CM

## 2022-03-02 DIAGNOSIS — G93.2 IIH (IDIOPATHIC INTRACRANIAL HYPERTENSION): Primary | ICD-10-CM

## 2022-03-02 DIAGNOSIS — M79.7 FIBROMYALGIA: ICD-10-CM

## 2022-03-03 ENCOUNTER — PATIENT MESSAGE (OUTPATIENT)
Dept: INTERNAL MEDICINE | Facility: CLINIC | Age: 47
End: 2022-03-03
Payer: COMMERCIAL

## 2022-03-03 DIAGNOSIS — K50.00 CROHN'S DISEASE OF SMALL INTESTINE WITHOUT COMPLICATION: ICD-10-CM

## 2022-03-03 DIAGNOSIS — M79.7 FIBROMYALGIA: ICD-10-CM

## 2022-03-03 RX ORDER — OXYCODONE AND ACETAMINOPHEN 10; 325 MG/1; MG/1
1 TABLET ORAL EVERY 6 HOURS PRN
Qty: 120 TABLET | Refills: 0 | Status: SHIPPED | OUTPATIENT
Start: 2022-03-03 | End: 2022-03-03 | Stop reason: SDUPTHER

## 2022-03-03 RX ORDER — OXYCODONE AND ACETAMINOPHEN 10; 325 MG/1; MG/1
1 TABLET ORAL EVERY 6 HOURS PRN
Qty: 120 TABLET | Refills: 0 | Status: SHIPPED | OUTPATIENT
Start: 2022-03-03 | End: 2022-04-01 | Stop reason: SDUPTHER

## 2022-03-10 NOTE — PROGRESS NOTES
CC: sinusitis  HPI:  The patient is a 44 y.o. year old female who presents to the office for sinusitis.  she complains of nasal congestion, postnasal drip, rhinorrhea, nausea and headache.  Symptoms started about 1 week ago.  She denies any fever.  she also reports lightheadedness, aching sensation in her legs.  Cough is nonproductive.  The patient has taken cough syrup, prescribed by urgent care.    PAST MEDICAL HISTORY:  Past Medical History:   Diagnosis Date    Anemia     Asthma     B12 deficiency     Crohn's disease     History of shingles     Lupus     Migraine headache     Raynaud disease     Reactive hypoglycemia     Seizures 2004    no medication     Sleep apnea     Non compliant with CPAP       SURGICAL HISTORY:  Past Surgical History:   Procedure Laterality Date    ABDOMINAL SURGERY      COLON SURGERY      95% of colon removed 2010    COLONOSCOPY      COLOSTOMY      HYSTERECTOMY      due to endometriosis    ILEOSCOPY N/A 4/10/2019    Procedure: ILEOSCOPY through stoma;  Surgeon: Eve Currie MD;  Location: [address];  Service: Endoscopy;  Laterality: N/A;  Schedule as 30 minute case, schedule in April or May 2019    ILEOSTOMY      LAPAROSCOPY W/ MINI-LAPAROTOMY      large intestine removed      just a portion    pelvic mass removal      REVISION COLOSTOMY  2010    STOMACH SURGERY      TONSILLECTOMY, ADENOIDECTOMY         MEDS:  Medcard reviewed and updated    ALLERGIES: Allergy Card reviewed and updated    SOCIAL HISTORY:   The patient is a nonsmoker.    PE:   APPEARANCE: Well nourished, well developed, in no acute distress.        EARS: TM's intact. No retraction or perforation.    CHEST: Lungs clear to auscultation with unlabored respirations.  CARDIOVASCULAR: Normal S1, S2. No murmurs. No carotid bruits. No pedal edema.  ABDOMEN: Bowel sounds normal. Not distended. Soft. No tenderness or masses.   PSYCHIATRIC: The patient is oriented to person, place, and time and  has a pleasant affect.        ASSESSMENT/PLAN:  .Emi was seen today for headache, nausea, sinus problem, medication refill, dizziness, generalized body aches, cough and results.    Diagnoses and all orders for this visit:    Acute maxillary sinusitis, recurrence not specified  -      prescribed amoxicillin     Migraine syndrome  -     Basic Metabolic Panel; Future    Other orders  -     amoxicillin (AMOXIL) 875 MG tablet; Take 1 tablet (875 mg total) by mouth 2 (two) times daily. for 10 days  -     butalbital-acetaminophen-caffeine -40 mg (FIORICET, ESGIC) -40 mg per tablet; Take 1 tablet by mouth every 6 (six) hours as needed for Headaches.  -     blood-glucose meter,continuous (DEXCOM G6 ) Misc; 1 Units by Misc.(Non-Drug; Combo Route) route once daily.  -     blood-glucose sensor (DEXCOM G6 SENSOR) Jane; 1 Device by Misc.(Non-Drug; Combo Route) route once daily.  -     blood-glucose transmitter (DEXCOM G6 TRANSMITTER) Jane; 1 Device by Misc.(Non-Drug; Combo Route) route once daily.         No indicators present

## 2022-03-14 DIAGNOSIS — Z12.31 OTHER SCREENING MAMMOGRAM: ICD-10-CM

## 2022-03-21 ENCOUNTER — PATIENT OUTREACH (OUTPATIENT)
Dept: ADMINISTRATIVE | Facility: OTHER | Age: 47
End: 2022-03-21
Payer: COMMERCIAL

## 2022-03-21 NOTE — PROGRESS NOTES
LINKS immunization registry updated  Care Everywhere updated  Health Maintenance updated  DIS/Chart reviewed for overdue Proactive [facility] ([facility]) health maintenance testing (CRS, Breast Ca, Diabetic Eye Exam)   Orders entered:N/A  Portal message sent to patient with scheduling link for mammogram 1/18/22

## 2022-04-01 ENCOUNTER — PATIENT MESSAGE (OUTPATIENT)
Dept: INTERNAL MEDICINE | Facility: CLINIC | Age: 47
End: 2022-04-01
Payer: COMMERCIAL

## 2022-04-01 DIAGNOSIS — K50.00 CROHN'S DISEASE OF SMALL INTESTINE WITHOUT COMPLICATION: ICD-10-CM

## 2022-04-01 DIAGNOSIS — M79.7 FIBROMYALGIA: ICD-10-CM

## 2022-04-01 RX ORDER — OXYCODONE AND ACETAMINOPHEN 10; 325 MG/1; MG/1
1 TABLET ORAL EVERY 6 HOURS PRN
Qty: 120 TABLET | Refills: 0 | Status: SHIPPED | OUTPATIENT
Start: 2022-04-01 | End: 2022-04-29 | Stop reason: SDUPTHER

## 2022-04-01 NOTE — TELEPHONE ENCOUNTER
Last filled:03/03/2022  LOV:01/26/2022  RTC:04/26/2022    [facility] [address] [facility]

## 2022-04-04 DIAGNOSIS — G93.2 IIH (IDIOPATHIC INTRACRANIAL HYPERTENSION): Primary | ICD-10-CM

## 2022-04-19 ENCOUNTER — TELEPHONE (OUTPATIENT)
Dept: INTERNAL MEDICINE | Facility: CLINIC | Age: 47
End: 2022-04-19
Payer: COMMERCIAL

## 2022-04-19 NOTE — TELEPHONE ENCOUNTER
----- Message from Shelli Archuleta sent at 4/19/2022 11:34 AM CDT -----  Contact: Pt @ [phone]  Ashley with [facility] case management  would like to provide her contact information [phone] .

## 2022-04-26 ENCOUNTER — OFFICE VISIT (OUTPATIENT)
Dept: INTERNAL MEDICINE | Facility: CLINIC | Age: 47
End: 2022-04-26
Payer: COMMERCIAL

## 2022-04-26 ENCOUNTER — LAB VISIT (OUTPATIENT)
Dept: LAB | Facility: HOSPITAL | Age: 47
End: 2022-04-26
Attending: INTERNAL MEDICINE
Payer: COMMERCIAL

## 2022-04-26 VITALS
DIASTOLIC BLOOD PRESSURE: 84 MMHG | OXYGEN SATURATION: 97 % | HEIGHT: 63 IN | BODY MASS INDEX: 33.94 KG/M2 | SYSTOLIC BLOOD PRESSURE: 110 MMHG | RESPIRATION RATE: 18 BRPM | TEMPERATURE: 98 F | WEIGHT: 191.56 LBS | HEART RATE: 90 BPM

## 2022-04-26 DIAGNOSIS — R32 URINARY INCONTINENCE, UNSPECIFIED TYPE: ICD-10-CM

## 2022-04-26 DIAGNOSIS — E86.0 DEHYDRATION: ICD-10-CM

## 2022-04-26 DIAGNOSIS — M79.7 FIBROMYALGIA: Primary | ICD-10-CM

## 2022-04-26 LAB
ALBUMIN SERPL BCP-MCNC: 4.4 G/DL (ref 3.5–5.2)
ALP SERPL-CCNC: 98 U/L (ref 55–135)
ALT SERPL W/O P-5'-P-CCNC: 36 U/L (ref 10–44)
ANION GAP SERPL CALC-SCNC: 10 MMOL/L (ref 8–16)
AST SERPL-CCNC: 22 U/L (ref 10–40)
BILIRUB SERPL-MCNC: 0.9 MG/DL (ref 0.1–1)
BUN SERPL-MCNC: 20 MG/DL (ref 6–20)
CALCIUM SERPL-MCNC: 10.1 MG/DL (ref 8.7–10.5)
CHLORIDE SERPL-SCNC: 105 MMOL/L (ref 95–110)
CO2 SERPL-SCNC: 24 MMOL/L (ref 23–29)
CREAT SERPL-MCNC: 1 MG/DL (ref 0.5–1.4)
EST. GFR  (AFRICAN AMERICAN): >60 ML/MIN/1.73 M^2
EST. GFR  (NON AFRICAN AMERICAN): >60 ML/MIN/1.73 M^2
GLUCOSE SERPL-MCNC: 87 MG/DL (ref 70–110)
POTASSIUM SERPL-SCNC: 4 MMOL/L (ref 3.5–5.1)
PROT SERPL-MCNC: 7.5 G/DL (ref 6–8.4)
SODIUM SERPL-SCNC: 139 MMOL/L (ref 136–145)

## 2022-04-26 PROCEDURE — 99999 PR PBB SHADOW E&M-EST. PATIENT-LVL V: ICD-10-PCS | Mod: PBBFAC,,, | Performed by: INTERNAL MEDICINE

## 2022-04-26 PROCEDURE — 99213 OFFICE O/P EST LOW 20 MIN: CPT | Mod: S$GLB,,, | Performed by: INTERNAL MEDICINE

## 2022-04-26 PROCEDURE — 3079F PR MOST RECENT DIASTOLIC BLOOD PRESSURE 80-89 MM HG: ICD-10-PCS | Mod: CPTII,S$GLB,, | Performed by: INTERNAL MEDICINE

## 2022-04-26 PROCEDURE — 99213 PR OFFICE/OUTPT VISIT, EST, LEVL III, 20-29 MIN: ICD-10-PCS | Mod: S$GLB,,, | Performed by: INTERNAL MEDICINE

## 2022-04-26 PROCEDURE — 3074F SYST BP LT 130 MM HG: CPT | Mod: CPTII,S$GLB,, | Performed by: INTERNAL MEDICINE

## 2022-04-26 PROCEDURE — 3008F BODY MASS INDEX DOCD: CPT | Mod: CPTII,S$GLB,, | Performed by: INTERNAL MEDICINE

## 2022-04-26 PROCEDURE — 3074F PR MOST RECENT SYSTOLIC BLOOD PRESSURE < 130 MM HG: ICD-10-PCS | Mod: CPTII,S$GLB,, | Performed by: INTERNAL MEDICINE

## 2022-04-26 PROCEDURE — 1160F PR REVIEW ALL MEDS BY PRESCRIBER/CLIN PHARMACIST DOCUMENTED: ICD-10-PCS | Mod: CPTII,S$GLB,, | Performed by: INTERNAL MEDICINE

## 2022-04-26 PROCEDURE — 3079F DIAST BP 80-89 MM HG: CPT | Mod: CPTII,S$GLB,, | Performed by: INTERNAL MEDICINE

## 2022-04-26 PROCEDURE — 3008F PR BODY MASS INDEX (BMI) DOCUMENTED: ICD-10-PCS | Mod: CPTII,S$GLB,, | Performed by: INTERNAL MEDICINE

## 2022-04-26 PROCEDURE — 36415 COLL VENOUS BLD VENIPUNCTURE: CPT | Mod: PO | Performed by: INTERNAL MEDICINE

## 2022-04-26 PROCEDURE — 1159F PR MEDICATION LIST DOCUMENTED IN MEDICAL RECORD: ICD-10-PCS | Mod: CPTII,S$GLB,, | Performed by: INTERNAL MEDICINE

## 2022-04-26 PROCEDURE — 99999 PR PBB SHADOW E&M-EST. PATIENT-LVL V: CPT | Mod: PBBFAC,,, | Performed by: INTERNAL MEDICINE

## 2022-04-26 PROCEDURE — 80053 COMPREHEN METABOLIC PANEL: CPT | Performed by: INTERNAL MEDICINE

## 2022-04-26 PROCEDURE — 1160F RVW MEDS BY RX/DR IN RCRD: CPT | Mod: CPTII,S$GLB,, | Performed by: INTERNAL MEDICINE

## 2022-04-26 PROCEDURE — 1159F MED LIST DOCD IN RCRD: CPT | Mod: CPTII,S$GLB,, | Performed by: INTERNAL MEDICINE

## 2022-04-26 NOTE — PROGRESS NOTES
CC: followup of fibromyalgia  HPI:  The patient is a 46 y.o. year old female who presents to the office for followup of fibromyalgia.  She reports blurred vision recently and headaches.  She reports her symptoms have improved.  She reports she has black mold in her bathroom.  She reports constant cough, nasal congestion and postnasal drip.  The patient also reports diffuse pain due to fibromyalgia.  She also reports stress incontinence associated with coughing.    PAST MEDICAL HISTORY:  Past Medical History:   Diagnosis Date    Anemia     Asthma     B12 deficiency     Crohn's disease     History of shingles     Lupus     Migraine headache     Raynaud disease     Reactive hypoglycemia     Seizures 2004    no medication     Sleep apnea     Non compliant with CPAP       SURGICAL HISTORY:  Past Surgical History:   Procedure Laterality Date    ABDOMINAL SURGERY      COLON SURGERY      95% of colon removed 2010    COLONOSCOPY      COLOSTOMY      HYSTERECTOMY      due to endometriosis    ILEOSCOPY N/A 4/10/2019    Procedure: ILEOSCOPY through stoma;  Surgeon: Eve Currie MD;  Location: [address];  Service: Endoscopy;  Laterality: N/A;  Schedule as 30 minute case, schedule in April or May 2019    ILEOSTOMY      LAPAROSCOPY W/ MINI-LAPAROTOMY      large intestine removed      just a portion    pelvic mass removal      REVISION COLOSTOMY  2010    STOMACH SURGERY      TONSILLECTOMY, ADENOIDECTOMY         MEDS:  Medcard reviewed and updated    ALLERGIES: Allergy Card reviewed and updated    SOCIAL HISTORY:   The patient is a nonsmoker.    PE:   APPEARANCE: Well nourished, well developed, in no acute distress.    CHEST: Lungs clear to auscultation with unlabored respirations.  CARDIOVASCULAR: Normal S1, S2. No murmurs. No carotid bruits. No pedal edema.  ABDOMEN: Bowel sounds normal. Not distended. Soft. No tenderness or masses. No organomegaly.  Positive ostomy.  PSYCHIATRIC: The patient  is oriented to person, place, and time and has a pleasant affect.        ASSESSMENT/PLAN:  Emi was seen today for follow-up.    Diagnoses and all orders for this visit:    Fibromyalgia  -     continue pain control    Urinary incontinence, unspecified type  -     Urinalysis; Future  -     Urine culture; Future    Dehydration  -     Comprehensive Metabolic Panel; Future

## 2022-04-29 ENCOUNTER — PATIENT MESSAGE (OUTPATIENT)
Dept: INTERNAL MEDICINE | Facility: CLINIC | Age: 47
End: 2022-04-29
Payer: COMMERCIAL

## 2022-04-29 DIAGNOSIS — M79.7 FIBROMYALGIA: ICD-10-CM

## 2022-04-29 DIAGNOSIS — K50.00 CROHN'S DISEASE OF SMALL INTESTINE WITHOUT COMPLICATION: ICD-10-CM

## 2022-04-29 RX ORDER — OXYCODONE AND ACETAMINOPHEN 10; 325 MG/1; MG/1
1 TABLET ORAL EVERY 6 HOURS PRN
Qty: 120 TABLET | Refills: 0 | Status: SHIPPED | OUTPATIENT
Start: 2022-04-29 | End: 2022-05-11

## 2022-05-11 ENCOUNTER — LAB VISIT (OUTPATIENT)
Dept: LAB | Facility: HOSPITAL | Age: 47
End: 2022-05-11
Attending: INTERNAL MEDICINE
Payer: COMMERCIAL

## 2022-05-11 ENCOUNTER — OFFICE VISIT (OUTPATIENT)
Dept: GASTROENTEROLOGY | Facility: CLINIC | Age: 47
End: 2022-05-11
Payer: COMMERCIAL

## 2022-05-11 VITALS
OXYGEN SATURATION: 98 % | WEIGHT: 194 LBS | SYSTOLIC BLOOD PRESSURE: 116 MMHG | HEART RATE: 72 BPM | BODY MASS INDEX: 34.37 KG/M2 | DIASTOLIC BLOOD PRESSURE: 82 MMHG | TEMPERATURE: 99 F

## 2022-05-11 DIAGNOSIS — K50.80 CROHN'S DISEASE OF BOTH SMALL AND LARGE INTESTINE WITHOUT COMPLICATION: ICD-10-CM

## 2022-05-11 DIAGNOSIS — K43.5 PARA-ILEOSTOMY HERNIA: ICD-10-CM

## 2022-05-11 DIAGNOSIS — R10.13 ABDOMINAL PAIN, EPIGASTRIC: ICD-10-CM

## 2022-05-11 DIAGNOSIS — Z93.2 ILEOSTOMY IN PLACE: ICD-10-CM

## 2022-05-11 DIAGNOSIS — K50.80 CROHN'S DISEASE OF BOTH SMALL AND LARGE INTESTINE WITHOUT COMPLICATION: Primary | ICD-10-CM

## 2022-05-11 DIAGNOSIS — K94.19 PARA-ILEOSTOMY HERNIA: ICD-10-CM

## 2022-05-11 DIAGNOSIS — R10.13 EPIGASTRIC PAIN: ICD-10-CM

## 2022-05-11 DIAGNOSIS — K62.89 RECTAL PAIN: ICD-10-CM

## 2022-05-11 DIAGNOSIS — K52.9 IBD (INFLAMMATORY BOWEL DISEASE): ICD-10-CM

## 2022-05-11 LAB
25(OH)D3+25(OH)D2 SERPL-MCNC: 31 NG/ML (ref 30–96)
CRP SERPL-MCNC: 1 MG/L (ref 0–8.2)
VIT B12 SERPL-MCNC: 443 PG/ML (ref 210–950)

## 2022-05-11 PROCEDURE — 86704 HEP B CORE ANTIBODY TOTAL: CPT | Performed by: INTERNAL MEDICINE

## 2022-05-11 PROCEDURE — 3008F BODY MASS INDEX DOCD: CPT | Mod: CPTII,S$GLB,, | Performed by: INTERNAL MEDICINE

## 2022-05-11 PROCEDURE — 1159F MED LIST DOCD IN RCRD: CPT | Mod: CPTII,S$GLB,, | Performed by: INTERNAL MEDICINE

## 2022-05-11 PROCEDURE — 1160F RVW MEDS BY RX/DR IN RCRD: CPT | Mod: CPTII,S$GLB,, | Performed by: INTERNAL MEDICINE

## 2022-05-11 PROCEDURE — 1159F PR MEDICATION LIST DOCUMENTED IN MEDICAL RECORD: ICD-10-PCS | Mod: CPTII,S$GLB,, | Performed by: INTERNAL MEDICINE

## 2022-05-11 PROCEDURE — 1160F PR REVIEW ALL MEDS BY PRESCRIBER/CLIN PHARMACIST DOCUMENTED: ICD-10-PCS | Mod: CPTII,S$GLB,, | Performed by: INTERNAL MEDICINE

## 2022-05-11 PROCEDURE — 3079F PR MOST RECENT DIASTOLIC BLOOD PRESSURE 80-89 MM HG: ICD-10-PCS | Mod: CPTII,S$GLB,, | Performed by: INTERNAL MEDICINE

## 2022-05-11 PROCEDURE — 3079F DIAST BP 80-89 MM HG: CPT | Mod: CPTII,S$GLB,, | Performed by: INTERNAL MEDICINE

## 2022-05-11 PROCEDURE — 99215 OFFICE O/P EST HI 40 MIN: CPT | Mod: S$GLB,,, | Performed by: INTERNAL MEDICINE

## 2022-05-11 PROCEDURE — 82607 VITAMIN B-12: CPT | Performed by: INTERNAL MEDICINE

## 2022-05-11 PROCEDURE — 99215 PR OFFICE/OUTPT VISIT, EST, LEVL V, 40-54 MIN: ICD-10-PCS | Mod: S$GLB,,, | Performed by: INTERNAL MEDICINE

## 2022-05-11 PROCEDURE — 3008F PR BODY MASS INDEX (BMI) DOCUMENTED: ICD-10-PCS | Mod: CPTII,S$GLB,, | Performed by: INTERNAL MEDICINE

## 2022-05-11 PROCEDURE — 36415 COLL VENOUS BLD VENIPUNCTURE: CPT | Performed by: INTERNAL MEDICINE

## 2022-05-11 PROCEDURE — 86706 HEP B SURFACE ANTIBODY: CPT | Performed by: INTERNAL MEDICINE

## 2022-05-11 PROCEDURE — 86140 C-REACTIVE PROTEIN: CPT | Performed by: INTERNAL MEDICINE

## 2022-05-11 PROCEDURE — 82306 VITAMIN D 25 HYDROXY: CPT | Performed by: INTERNAL MEDICINE

## 2022-05-11 PROCEDURE — 87340 HEPATITIS B SURFACE AG IA: CPT | Performed by: INTERNAL MEDICINE

## 2022-05-11 PROCEDURE — 86480 TB TEST CELL IMMUN MEASURE: CPT | Performed by: INTERNAL MEDICINE

## 2022-05-11 PROCEDURE — 3074F PR MOST RECENT SYSTOLIC BLOOD PRESSURE < 130 MM HG: ICD-10-PCS | Mod: CPTII,S$GLB,, | Performed by: INTERNAL MEDICINE

## 2022-05-11 PROCEDURE — 3074F SYST BP LT 130 MM HG: CPT | Mod: CPTII,S$GLB,, | Performed by: INTERNAL MEDICINE

## 2022-05-11 RX ORDER — PANTOPRAZOLE SODIUM 40 MG/1
40 TABLET, DELAYED RELEASE ORAL DAILY
Qty: 30 TABLET | Refills: 11 | Status: SHIPPED | OUTPATIENT
Start: 2022-05-11 | End: 2022-05-15

## 2022-05-11 NOTE — PROGRESS NOTES
[facility]          Inflammatory Bowel Disease New Patient Consultation Note         TODAY'S VISIT DATE:  5/11/2022    Reason for Consult:    Chief Complaint   Patient presents with    Crohn's Disease       PCP: Bianca Rutledge      Referring MD:   Aaareferral Self    History of Present Illness:  Emi Johnson who is a 46 y.o. female is being seen today at the [facility] on 05/11/2022 for inflammatory bowel disease- inflammatory bowel disease-unspecified.  She is here today to establish care for her multiple gastrointestinal issues.  She has intermittent epigastric pain this started about a week ago.  This pain is not exacerbated by bowel movements or eating.  She has used Tums which helps with the pain sometimes but has not tried anything else.  Sometimes this pain is associated with a sensation burning coming up into her chest.  She also reports a chronic pain around her ostomy bag.  She also has bulging around the ileostomy from her hernia and sometimes this becomes very firm and hard.  It will spontaneously returned back to baseline.  For last few weeks she has been having pain in the perineal area or which she describes as the rectum.  She saw her gynecologist who did not feel like this was related to any gynecological issue.  The pain is intermittent and she again has not noticed any exacerbating or alleviating factors.  Her stool output has been stable to the ileostomy.  She has thick stool output most of the time.  She enters her back frequently but not because it is overly full.  She has been on Stelara for about 2 years.  She has not had any issues with the injections.    IBD History:  Ms Johnson is a 41 y.o. with inflammatory bowel disease with pouch dysfunction who has since undergone pouch excision.  She had initial symptoms in 2005 and diagnosed in 2006 with colonoscopy c/w CD initially and then though to be UC.  She was  failed asacol, lialda, sulfasalazine, rowasa enemas, canasa suppositories, humira (allergic reaction- injection site reaction, rash), prednisone, cipro/flagyl.  Due to medically refractory disease she had a 2 step restorative proctocolectomy in 2011 with pathology most consistent with UC.  Since surgery though she has had unrelenting ongoing diarrhea up to 15-20 BMs/day despite 10 immodium daily. Pouchoscopy 4/2012, 2/2014 were normal and she has taken several courses of cipro/flagyl without relief and also failed entocort in early 2014. Pt saw Dr. Ray at [facility] who recommended temporary loop ileostomy and to consider biofeedback due to anorectal manometry with concerns for pelvic floor dysfunction.  He also recommended Valium rectal supp if any issues of muscle spasms during biofeedback.  Patient had additional testing in April/May 2014 which showed normal gastrograffin enema, MRI abd/pelvis normal and pouchoscopy on 6/2014 showed normal appearing mucosa, dislodged staples at the anastomosis, and pt felt pain when staples touched. Therefore, the staples were removed by biopsy forcep. Anterior anastomosis staple removal area was put one endoclip to prevent sinus formation. MRI abd/pelvis normal 11/2015. She did not return for appts with Dr. Ray due to cost. She had hysterectomy in 2009 due to endometriosis though 2/2015 bilateral oophorectomy with lysis of adhesions- post op SBO from adhesions. She is on chronic narcotics (RLQ and subumbilical pain) and chronic antiemetics (due to nausea/vomiting).  Her last pouchoscopy 10/8/15 showed some ulcerations in the J pouch along suture line but normal neoterminal ileum and overall this was mild in activity. At her previous visits I felt that her symptoms were out of proportion to the findings on endoscopy.  She saw Dr. Mcdaniel who recommended starting remicade which she has been on since 12/7/15. Stool studies were negative for infection 1/2016. She had a  "pouchoscopy 3/18/15 which was normal with no ulcerations though overall friable mucosa. She had ongoing symptoms of abdominal pain, diarrhea and missed f/u appts due to work issues and lost lomotil prescriptions given.  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 We had recommended biofeedback therapy with Dr. Emmanuel but she did not wish to proceed with these recommendations and reported to us by phone on 6/21/16 that "I don't like enemas or suppositories and I am in too much pain." She had several ER visits and had been prescribed prednisone which we recommended that she should taper off of.  We recommended pouch excision with end ileostomy but she was not willing to proceed with this at the time but ultimately had a pouch excision by Dr. Gaspar at .  She ultimately underwent a pouch excision with intersphincteric pouchectomy and permanent end ileostomy on 11/11/16. Surgical pathology showed normal SB with fibrosis and chronic inflammation with foreign body giant cell reaction and edema. She restablished care with us on 3/31/16 at which time we proceeded with scheduling ileoscopy to assess for recurrence of possible Crohn's disease and see if treatment was warranted.   Follow-up ileoscopy was performed in 2017, 2018, and 2019.  These did not show any evidence of active disease.  In spite of that she continued to have multiple gastrointestinal complaints.  She began seeing Dr. Alvarez at [facility] and underwent an upper endoscopy and ileoscopy in June of 2020.  I do not have the report from the upper endoscopy but biopsies of the stomach and duodenum revealed chronic gastritis and duodenitis.  The ileoscopy report notes that there were a few small erosions identified.  Biopsies of these did not show any active inflammation.  A subsequent capsule endoscopy was performed in July of 2020 and mentions multiple small ulcers throughout the small intestine consistent with mildly active " Crohn's disease.  She was started on Stelara and has continued it since that time.  She has not noticed any significant change in her symptoms with the use of Stelara.  She also underwent parastomal hernia repair 2 or 3 years ago but has had a recurrence.    IBD Details:  Dx Date:  2005  Disease type/distribution:  Nonspecific colitis/pancolitis  Current Treatment:  Stelara 90 mg every 8 weeks  Start Date:  2020 Response:  Unknown  Optimized:  No  Adverse reactions:  None  Prior surgeries:  Prior total proctocolectomy with two-stage IPAA placement, pouch excision with end ileostomy  CRP Elevation:  Unknown   Disease Complications:  None  Extraintestinal manifestations:  None  Prior treatments:   Steroids:  Unclear response  5ASA:  Inadequate response  IMM:  None  TNF Inh:  Humira-good response, developed a rash at the injection site    Infliximab-unclear response-developed drug-induced lupus   Anti-Integrin:  None   IL 12/23:  Currently on Stelara  MARIE Inh:  None    Previous Clinical Trials:  None    Last Colonoscopy:  Last ileoscopy in June 2020 with superficial erosions-biopsies normal    Other Endoscopies:  EGD done in 2020-report not available-biopsies with chronic gastritis and duodenitis    Imaging:   MRE:  None   CT:  CT from November 2021 reviewed-significant peristomal hernia, other findings noted-no evidence of active inflammatory bowel disease   Other:  Other studies reviewed    Pertinent Labs:  Lab Results   Component Value Date    SEDRATE 2 05/27/2019    CRP 2.3 11/05/2021     Lab Results   Component Value Date    TTGIGA 4 03/10/2016     03/10/2016     Lab Results   Component Value Date    TSH 3.518 12/28/2020    FREET4 0.88 03/10/2016     Lab Results   Component Value Date    LUGVPDOS69LC 33 06/20/2018    RSUYSRZC68 592 12/28/2020     Lab Results   Component Value Date    HEPBSAG Negative 03/10/2016    HEPBCAB Negative 03/10/2016    HEPCAB Negative 07/27/2021     Lab Results   Component Value  Date    SSK03CKUA Negative 07/27/2021     No results found for: NIL, TBAG, TBAGNIL, MITOGENNIL, TBGOLD, TSPOTSCREN  No results found for: TPTMINTERP, TPMTRESULT  Lab Results   Component Value Date    STOOLCULTURE  05/12/2020     No Salmonella,Shigella,Vibrio,Campylobacter,Yersinia isolated.    STOOLCULTURE PLESIOMONAS SHIGELLOIDES (A) 05/12/2020    SKYDMGCKCB7L Negative 05/12/2020    ICPHWEBHUD2D Negative 05/12/2020    CDIFFICILEAN Negative 05/12/2020    CDIFFTOX Negative 05/12/2020     No results found for: CALPROTECTIN    Therapeutic Drug Monitoring Labs:  No results found for: PROMETH  No results found for: ANSADAINIT, INFLIXIMAB, INFLIXINTERP    Vaccinations:  Lab Results   Component Value Date    HEPBSAB Negative 03/10/2016     Lab Results   Component Value Date    HEPAIGG Positive (A) 07/30/2021     No results found for: VARICELLAZOS, VARICELLAINT  Immunization History   Administered Date(s) Administered    COVID-19, MRNA, LN-S, PF (Pfizer) (Purple Cap) 04/02/2021, 04/23/2021, 12/26/2021    Hepatitis B, Adult 08/11/2021    Influenza - Quadrivalent - PF *Preferred* (6 months and older) 10/08/2018, 10/07/2020, 10/27/2021    Influenza - Trivalent (ADULT) 10/21/2013    Influenza Split 10/26/2012    PPD Test 11/03/2015    Pneumococcal Conjugate - 13 Valent 10/20/2015    Pneumococcal Polysaccharide - 23 Valent 11/06/2012    Tdap 07/27/2021         Review of Systems  Review of Systems   Constitutional: Negative for chills, fever and weight loss.   HENT: Negative for sore throat.    Eyes: Negative for pain, discharge and redness.   Respiratory: Positive for cough and shortness of breath. Negative for wheezing.    Cardiovascular: Negative for chest pain, orthopnea and leg swelling.   Gastrointestinal: Positive for abdominal pain, heartburn and nausea. Negative for blood in stool, constipation, diarrhea, melena and vomiting.   Genitourinary: Negative for dysuria, frequency and urgency.   Musculoskeletal:  Positive for back pain. Negative for joint pain and myalgias.   Skin: Negative for itching and rash.   Neurological: Negative for focal weakness and seizures.   Endo/Heme/Allergies: Does not bruise/bleed easily.   Psychiatric/Behavioral: Negative for depression. The patient is not nervous/anxious.        Medical History:   Past Medical History:   Diagnosis Date    Anemia     Asthma     B12 deficiency     Crohn's disease     History of shingles     Lupus     Migraine headache     Raynaud disease     Reactive hypoglycemia     Seizures 2004    no medication     Sleep apnea     Non compliant with CPAP       Surgical History:  Past Surgical History:   Procedure Laterality Date    ABDOMINAL SURGERY      COLON SURGERY      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    COLONOSCOPY      COLOSTOMY      HYSTERECTOMY      due to endometriosis    ILEOSCOPY N/A 4/10/2019    Procedure: ILEOSCOPY through stoma;  Surgeon: Eve Currie MD;  Location: [facility] ([address] [place] [address]);  Service: Endoscopy;  Laterality: N/A;  Schedule as 30 minute case, schedule in April or May 2019    ILEOSTOMY      LAPAROSCOPY W/ MINI-LAPAROTOMY      large intestine removed      just a portion    pelvic mass removal      REVISION COLOSTOMY  2010    SMALL INTESTINE SURGERY      J pouch excision with end ileostomy    STOMACH SURGERY      TONSILLECTOMY, ADENOIDECTOMY         Family History:   Family History   Problem Relation Age of Onset    Colon cancer Maternal Grandmother 72    Cancer Mother 54        Anal cancer     Diabetes Mellitus Mother     Hypertension Mother     Colon cancer Mother     Hypertension Sister     Cancer Maternal Aunt     Cancer Paternal Grandmother     Heart attack Father     Cervical cancer Sister     Seizures Sister     Hypertension Sister     Liver disease Sister         Fatty Liver     Celiac disease Neg Hx     Cirrhosis Neg Hx     Colon polyps Neg Hx     Crohn's disease Neg Hx     Cystic  fibrosis Neg Hx     Hemochromatosis Neg Hx     Esophageal cancer Neg Hx     Inflammatory bowel disease Neg Hx     Irritable bowel syndrome Neg Hx     Liver cancer Neg Hx     Rectal cancer Neg Hx     Stomach cancer Neg Hx     Ulcerative colitis Neg Hx     Hugo's disease Neg Hx        Social History:   Social History     Tobacco Use    Smoking status: Never Smoker    Smokeless tobacco: Never Used   Substance Use Topics    Alcohol use: No     Comment: Rare social use.    Drug use: No       Allergies: Reviewed    Home Medications:   Medication List with Changes/Refills   New Medications    PANTOPRAZOLE (PROTONIX) 40 MG TABLET    Take 1 tablet (40 mg total) by mouth once daily.   Current Medications    AJOVY 225 MG/1.5 ML INJECTION        ALBUTEROL (VENTOLIN HFA) 90 MCG/ACTUATION INHALER    Inhale 2 puffs into the lungs every 6 (six) hours as needed (cough). Rescue    AZELASTINE (ASTELIN) 137 MCG (0.1 %) NASAL SPRAY    1 spray (137 mcg total) by Nasal route 2 (two) times daily.    BLOOD SUGAR DIAGNOSTIC STRP    To check BG 1 times daily, to use with insurance preferred meter    BLOOD-GLUCOSE METER KIT    To check BG 1 times daily, to use with insurance preferred meter    BLOOD-GLUCOSE METER,CONTINUOUS (DEXCOM G6 ) MISC    1 Units by Misc.(Non-Drug; Combo Route) route once daily.    BLOOD-GLUCOSE SENSOR (DEXCOM G6 SENSOR) ROBERTO    1 Device by Misc.(Non-Drug; Combo Route) route once daily.    BLOOD-GLUCOSE TRANSMITTER (DEXCOM G6 TRANSMITTER) ROBERTO    1 Device by Misc.(Non-Drug; Combo Route) route once daily.    CYANOCOBALAMIN 1,000 MCG/ML INJECTION    INJECT 1 ML INTO THE MUSCLE EVERY 28 DAYS.    CYCLOBENZAPRINE (FLEXERIL) 10 MG TABLET    Take 10 mg by mouth 2 (two) times daily as needed.    ESTRADIOL (VIVELLE-DOT) 0.1 MG/24 HR PTSW    Place 1 patch onto the skin twice a week.    FLASH GLUCOSE SCANNING READER ([employer] PEDRO 14 DAY READER) MISC    1 Units by Misc.(Non-Drug; Combo Route) route daily as  "needed.    FLASH GLUCOSE SENSOR (FREESTYLE PEDRO 14 DAY SENSOR) KIT    1 Units by Misc.(Non-Drug; Combo Route) route daily as needed.    HYDROCODONE-HOMATROPINE 5-1.5 MG/5 ML (HYCODAN) 5-1.5 MG/5 ML SYRP    Take 5 mLs by mouth nightly as needed (caution sedatin.).    LANCETS MISC    1 Units by Misc.(Non-Drug; Combo Route) route once daily. To check BG 1 times daily, to use with insurance preferred meter    ONDANSETRON (ZOFRAN-ODT) 4 MG TBDL    DISSOLVE 1 TABLET BY MOUTH EVERY 8 HOURS AS NEEDED    OXYCODONE (ROXICODONE) 10 MG TAB IMMEDIATE RELEASE TABLET    1 tablet as needed    OXYCODONE-ACETAMINOPHEN (PERCOCET)  MG PER TABLET    Take 1 tablet by mouth every 6 (six) hours as needed for Pain (Greater than 7 day supply medically necessary).    PHENTERMINE (ADIPEX-P) 37.5 MG TABLET    1/2 tab po qam    PROMETHAZINE (PHENERGAN) 25 MG TABLET    TAKE 1 TABLET (25 MG TOTAL) BY MOUTH EVERY 6 (SIX) HOURS AS NEEDED FOR NAUSEA.    PROMETHAZINE-DEXTROMETHORPHAN (PROMETHAZINE-DM) 6.25-15 MG/5 ML SYRP    Take 5 mLs by mouth every 4 to 6 hours as needed (cough).    STELARA 90 MG/ML SYRG SYRINGE    Every 2 months    SUMATRIPTAN (IMITREX STATDOSE) 6 MG/0.5 ML KIT    INJECT 0.5 ML UNDER THE SKIN AS NEEDED ONE TIME FOR MIGRAINE    SYRINGE WITH NEEDLE, SAFETY (BD [employer]RA SYRINGE) 3 ML 25 GAUGE X 1" SYRG    1 Syringe by Misc.(Non-Drug; Combo Route) route every 30 days.    ZOLPIDEM (AMBIEN) 10 MG TAB    Take 1 tablet (10 mg total) by mouth nightly as needed.       Physical Exam:  Vital Signs:  /82   Pulse 72   Temp 98.5 °F (36.9 °C)   Wt 88 kg (194 lb 0.1 oz)   SpO2 98%   BMI 34.37 kg/m²   Body mass index is 34.37 kg/m².    Physical Exam  Vitals and nursing note reviewed.   Constitutional:       General: She is not in acute distress.     Appearance: Normal appearance. She is well-developed. She is not ill-appearing or toxic-appearing.   Eyes:      Conjunctiva/sclera: Conjunctivae normal.      Pupils: Pupils are equal, " round, and reactive to light.   Neck:      Thyroid: No thyromegaly.   Cardiovascular:      Rate and Rhythm: Normal rate and regular rhythm.      Heart sounds: Normal heart sounds. No murmur heard.  Pulmonary:      Effort: Pulmonary effort is normal.      Breath sounds: Normal breath sounds. No wheezing or rales.   Abdominal:      General: Bowel sounds are normal. There is no distension.      Palpations: Abdomen is soft. There is no mass.      Tenderness: There is no abdominal tenderness.      Comments: Ileostomy in the right lower quadrant with peristomal herniation   Musculoskeletal:         General: No tenderness. Normal range of motion.   Lymphadenopathy:      Cervical: No cervical adenopathy.   Skin:     Findings: No erythema or rash.   Neurological:      General: No focal deficit present.      Mental Status: She is alert and oriented to person, place, and time.   Psychiatric:         Mood and Affect: Mood normal.         Behavior: Behavior normal.         Thought Content: Thought content normal.         Judgment: Judgment normal.         Labs: reviewed and pertinent noted above    Assessment/Plan:  Emi Johnson is a 46 y.o. female with a history of inflammatory bowel disease that affected the colon and is status post total proctocolectomy with J pouch placement initially but subsequent J pouch excision due to pouch dysfunction in currently with an end ileostomy. The following issues were addresssed:    1. Crohn's disease of both small and large intestine without complication    2. IBD (inflammatory bowel disease)    3. Ileostomy in place    4. Para-ileostomy hernia    5. Abdominal pain, epigastric    6. Rectal pain    7. Epigastric pain      1. IBD:  Although her most recent ileoscopy did show some erosions and her capsule endoscopy did as well the biopsies did not show any active inflammation some still not entirely certain whether she actually has any ongoing issues with IBD.  She is currently on Stelara  and has not had any side effects from this.  We will need to evaluate for any active disease in the near future.  We will likely need to proceed with a capsule endoscopy as well as probably an ileoscopy as well.  In the meantime continue Stelara.    2. Peristomal hernia:  She has significant discomfort and pain around her stoma.  This is related to the hernia.  We reviewed her CT scan in detail today and I showed her the amount of small bowel that has herniated around the stoma.  She has significant concerns for wound healing after surgery and would prefer not to have the hernia repaired.  I explained to her that other than hernia repair there is very little that we can do to help with her pain and discomfort around the stomal site.    3. Epigastric pain:  This is a relatively new symptom per report.  We will plan to evaluate this in the near future with a CT scan.  If the CT is unrevealing we will plan for an upper endoscopy in the near future.    4. Rectal pain/perineal pain:  Evaluate with CT scan.  This cannot be evaluated endoscopically as she has no anal canal.  There is no intestine left in this area.  Will rule out large lesions such as a mass or possible fluid collection with CT.  We cannot use contrast currently because of her allergy as well as due to a contrast shortage.            Follow up: Follow up in about 3 months (around 8/11/2022).    Thank you again for sending Emi Johnson to see Dr. Dale Novak today at the [facility]. Please don't hesitate to contact Dr. Novak if there are any questions regarding this evaluation, or if you have any other patients with inflammatory bowel disease for whom you would like a consultation. You can reach Dr. Novak at [phone] or by email at [email or web address]    Tin Novak MD  Department of Gastroenterology  Inflammatory Bowel Disease

## 2022-05-11 NOTE — H&P (VIEW-ONLY)
[facility]          Inflammatory Bowel Disease New Patient Consultation Note         TODAY'S VISIT DATE:  5/11/2022    Reason for Consult:    Chief Complaint   Patient presents with    Crohn's Disease       PCP: Bianca Rutledge      Referring MD:   Aaareferral Self    History of Present Illness:  Emi Johnson who is a 46 y.o. female is being seen today at the [facility] on 05/11/2022 for inflammatory bowel disease- inflammatory bowel disease-unspecified.  She is here today to establish care for her multiple gastrointestinal issues.  She has intermittent epigastric pain this started about a week ago.  This pain is not exacerbated by bowel movements or eating.  She has used Tums which helps with the pain sometimes but has not tried anything else.  Sometimes this pain is associated with a sensation burning coming up into her chest.  She also reports a chronic pain around her ostomy bag.  She also has bulging around the ileostomy from her hernia and sometimes this becomes very firm and hard.  It will spontaneously returned back to baseline.  For last few weeks she has been having pain in the perineal area or which she describes as the rectum.  She saw her gynecologist who did not feel like this was related to any gynecological issue.  The pain is intermittent and she again has not noticed any exacerbating or alleviating factors.  Her stool output has been stable to the ileostomy.  She has thick stool output most of the time.  She enters her back frequently but not because it is overly full.  She has been on Stelara for about 2 years.  She has not had any issues with the injections.    IBD History:  Ms Johnson is a 41 y.o. with inflammatory bowel disease with pouch dysfunction who has since undergone pouch excision.  She had initial symptoms in 2005 and diagnosed in 2006 with colonoscopy c/w CD initially and then though to be UC.  She was  failed asacol, lialda, sulfasalazine, rowasa enemas, canasa suppositories, humira (allergic reaction- injection site reaction, rash), prednisone, cipro/flagyl.  Due to medically refractory disease she had a 2 step restorative proctocolectomy in 2011 with pathology most consistent with UC.  Since surgery though she has had unrelenting ongoing diarrhea up to 15-20 BMs/day despite 10 immodium daily. Pouchoscopy 4/2012, 2/2014 were normal and she has taken several courses of cipro/flagyl without relief and also failed entocort in early 2014. Pt saw Dr. Ray at [facility] who recommended temporary loop ileostomy and to consider biofeedback due to anorectal manometry with concerns for pelvic floor dysfunction.  He also recommended Valium rectal supp if any issues of muscle spasms during biofeedback.  Patient had additional testing in April/May 2014 which showed normal gastrograffin enema, MRI abd/pelvis normal and pouchoscopy on 6/2014 showed normal appearing mucosa, dislodged staples at the anastomosis, and pt felt pain when staples touched. Therefore, the staples were removed by biopsy forcep. Anterior anastomosis staple removal area was put one endoclip to prevent sinus formation. MRI abd/pelvis normal 11/2015. She did not return for appts with Dr. Ray due to cost. She had hysterectomy in 2009 due to endometriosis though 2/2015 bilateral oophorectomy with lysis of adhesions- post op SBO from adhesions. She is on chronic narcotics (RLQ and subumbilical pain) and chronic antiemetics (due to nausea/vomiting).  Her last pouchoscopy 10/8/15 showed some ulcerations in the J pouch along suture line but normal neoterminal ileum and overall this was mild in activity. At her previous visits I felt that her symptoms were out of proportion to the findings on endoscopy.  She saw Dr. Mcdaniel who recommended starting remicade which she has been on since 12/7/15. Stool studies were negative for infection 1/2016. She had a  "pouchoscopy 3/18/15 which was normal with no ulcerations though overall friable mucosa. She had ongoing symptoms of abdominal pain, diarrhea and missed f/u appts due to work issues and lost lomotil prescriptions given.  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 We had recommended biofeedback therapy with Dr. Emmanuel but she did not wish to proceed with these recommendations and reported to us by phone on 6/21/16 that "I don't like enemas or suppositories and I am in too much pain." She had several ER visits and had been prescribed prednisone which we recommended that she should taper off of.  We recommended pouch excision with end ileostomy but she was not willing to proceed with this at the time but ultimately had a pouch excision by Dr. Gaspar at .  She ultimately underwent a pouch excision with intersphincteric pouchectomy and permanent end ileostomy on 11/11/16. Surgical pathology showed normal SB with fibrosis and chronic inflammation with foreign body giant cell reaction and edema. She restablished care with us on 3/31/16 at which time we proceeded with scheduling ileoscopy to assess for recurrence of possible Crohn's disease and see if treatment was warranted.   Follow-up ileoscopy was performed in 2017, 2018, and 2019.  These did not show any evidence of active disease.  In spite of that she continued to have multiple gastrointestinal complaints.  She began seeing Dr. Alvarez at [facility] and underwent an upper endoscopy and ileoscopy in June of 2020.  I do not have the report from the upper endoscopy but biopsies of the stomach and duodenum revealed chronic gastritis and duodenitis.  The ileoscopy report notes that there were a few small erosions identified.  Biopsies of these did not show any active inflammation.  A subsequent capsule endoscopy was performed in July of 2020 and mentions multiple small ulcers throughout the small intestine consistent with mildly active " Crohn's disease.  She was started on Stelara and has continued it since that time.  She has not noticed any significant change in her symptoms with the use of Stelara.  She also underwent parastomal hernia repair 2 or 3 years ago but has had a recurrence.    IBD Details:  Dx Date:  2005  Disease type/distribution:  Nonspecific colitis/pancolitis  Current Treatment:  Stelara 90 mg every 8 weeks  Start Date:  2020 Response:  Unknown  Optimized:  No  Adverse reactions:  None  Prior surgeries:  Prior total proctocolectomy with two-stage IPAA placement, pouch excision with end ileostomy  CRP Elevation:  Unknown   Disease Complications:  None  Extraintestinal manifestations:  None  Prior treatments:   Steroids:  Unclear response  5ASA:  Inadequate response  IMM:  None  TNF Inh:  Humira-good response, developed a rash at the injection site    Infliximab-unclear response-developed drug-induced lupus   Anti-Integrin:  None   IL 12/23:  Currently on Stelara  MARIE Inh:  None    Previous Clinical Trials:  None    Last Colonoscopy:  Last ileoscopy in June 2020 with superficial erosions-biopsies normal    Other Endoscopies:  EGD done in 2020-report not available-biopsies with chronic gastritis and duodenitis    Imaging:   MRE:  None   CT:  CT from November 2021 reviewed-significant peristomal hernia, other findings noted-no evidence of active inflammatory bowel disease   Other:  Other studies reviewed    Pertinent Labs:  Lab Results   Component Value Date    SEDRATE 2 05/27/2019    CRP 2.3 11/05/2021     Lab Results   Component Value Date    TTGIGA 4 03/10/2016     03/10/2016     Lab Results   Component Value Date    TSH 3.518 12/28/2020    FREET4 0.88 03/10/2016     Lab Results   Component Value Date    PNHECBRY01ZF 33 06/20/2018    PGCYWFCF60 592 12/28/2020     Lab Results   Component Value Date    HEPBSAG Negative 03/10/2016    HEPBCAB Negative 03/10/2016    HEPCAB Negative 07/27/2021     Lab Results   Component Value  Date    GMS45OMPV Negative 07/27/2021     No results found for: NIL, TBAG, TBAGNIL, MITOGENNIL, TBGOLD, TSPOTSCREN  No results found for: TPTMINTERP, TPMTRESULT  Lab Results   Component Value Date    STOOLCULTURE  05/12/2020     No Salmonella,Shigella,Vibrio,Campylobacter,Yersinia isolated.    STOOLCULTURE PLESIOMONAS SHIGELLOIDES (A) 05/12/2020    SHPBEDGZGK8U Negative 05/12/2020    DDQXZDPVDH4R Negative 05/12/2020    CDIFFICILEAN Negative 05/12/2020    CDIFFTOX Negative 05/12/2020     No results found for: CALPROTECTIN    Therapeutic Drug Monitoring Labs:  No results found for: PROMETH  No results found for: ANSADAINIT, INFLIXIMAB, INFLIXINTERP    Vaccinations:  Lab Results   Component Value Date    HEPBSAB Negative 03/10/2016     Lab Results   Component Value Date    HEPAIGG Positive (A) 07/30/2021     No results found for: VARICELLAZOS, VARICELLAINT  Immunization History   Administered Date(s) Administered    COVID-19, MRNA, LN-S, PF (Pfizer) (Purple Cap) 04/02/2021, 04/23/2021, 12/26/2021    Hepatitis B, Adult 08/11/2021    Influenza - Quadrivalent - PF *Preferred* (6 months and older) 10/08/2018, 10/07/2020, 10/27/2021    Influenza - Trivalent (ADULT) 10/21/2013    Influenza Split 10/26/2012    PPD Test 11/03/2015    Pneumococcal Conjugate - 13 Valent 10/20/2015    Pneumococcal Polysaccharide - 23 Valent 11/06/2012    Tdap 07/27/2021         Review of Systems  Review of Systems   Constitutional: Negative for chills, fever and weight loss.   HENT: Negative for sore throat.    Eyes: Negative for pain, discharge and redness.   Respiratory: Positive for cough and shortness of breath. Negative for wheezing.    Cardiovascular: Negative for chest pain, orthopnea and leg swelling.   Gastrointestinal: Positive for abdominal pain, heartburn and nausea. Negative for blood in stool, constipation, diarrhea, melena and vomiting.   Genitourinary: Negative for dysuria, frequency and urgency.   Musculoskeletal:  Positive for back pain. Negative for joint pain and myalgias.   Skin: Negative for itching and rash.   Neurological: Negative for focal weakness and seizures.   Endo/Heme/Allergies: Does not bruise/bleed easily.   Psychiatric/Behavioral: Negative for depression. The patient is not nervous/anxious.        Medical History:   Past Medical History:   Diagnosis Date    Anemia     Asthma     B12 deficiency     Crohn's disease     History of shingles     Lupus     Migraine headache     Raynaud disease     Reactive hypoglycemia     Seizures 2004    no medication     Sleep apnea     Non compliant with CPAP       Surgical History:  Past Surgical History:   Procedure Laterality Date    ABDOMINAL SURGERY      COLON SURGERY      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    COLONOSCOPY      COLOSTOMY      HYSTERECTOMY      due to endometriosis    ILEOSCOPY N/A 4/10/2019    Procedure: ILEOSCOPY through stoma;  Surgeon: Eve Currie MD;  Location: [facility] ([address] [place] [address]);  Service: Endoscopy;  Laterality: N/A;  Schedule as 30 minute case, schedule in April or May 2019    ILEOSTOMY      LAPAROSCOPY W/ MINI-LAPAROTOMY      large intestine removed      just a portion    pelvic mass removal      REVISION COLOSTOMY  2010    SMALL INTESTINE SURGERY      J pouch excision with end ileostomy    STOMACH SURGERY      TONSILLECTOMY, ADENOIDECTOMY         Family History:   Family History   Problem Relation Age of Onset    Colon cancer Maternal Grandmother 72    Cancer Mother 54        Anal cancer     Diabetes Mellitus Mother     Hypertension Mother     Colon cancer Mother     Hypertension Sister     Cancer Maternal Aunt     Cancer Paternal Grandmother     Heart attack Father     Cervical cancer Sister     Seizures Sister     Hypertension Sister     Liver disease Sister         Fatty Liver     Celiac disease Neg Hx     Cirrhosis Neg Hx     Colon polyps Neg Hx     Crohn's disease Neg Hx     Cystic  fibrosis Neg Hx     Hemochromatosis Neg Hx     Esophageal cancer Neg Hx     Inflammatory bowel disease Neg Hx     Irritable bowel syndrome Neg Hx     Liver cancer Neg Hx     Rectal cancer Neg Hx     Stomach cancer Neg Hx     Ulcerative colitis Neg Hx     Hugo's disease Neg Hx        Social History:   Social History     Tobacco Use    Smoking status: Never Smoker    Smokeless tobacco: Never Used   Substance Use Topics    Alcohol use: No     Comment: Rare social use.    Drug use: No       Allergies: Reviewed    Home Medications:   Medication List with Changes/Refills   New Medications    PANTOPRAZOLE (PROTONIX) 40 MG TABLET    Take 1 tablet (40 mg total) by mouth once daily.   Current Medications    AJOVY 225 MG/1.5 ML INJECTION        ALBUTEROL (VENTOLIN HFA) 90 MCG/ACTUATION INHALER    Inhale 2 puffs into the lungs every 6 (six) hours as needed (cough). Rescue    AZELASTINE (ASTELIN) 137 MCG (0.1 %) NASAL SPRAY    1 spray (137 mcg total) by Nasal route 2 (two) times daily.    BLOOD SUGAR DIAGNOSTIC STRP    To check BG 1 times daily, to use with insurance preferred meter    BLOOD-GLUCOSE METER KIT    To check BG 1 times daily, to use with insurance preferred meter    BLOOD-GLUCOSE METER,CONTINUOUS (DEXCOM G6 ) MISC    1 Units by Misc.(Non-Drug; Combo Route) route once daily.    BLOOD-GLUCOSE SENSOR (DEXCOM G6 SENSOR) ROBERTO    1 Device by Misc.(Non-Drug; Combo Route) route once daily.    BLOOD-GLUCOSE TRANSMITTER (DEXCOM G6 TRANSMITTER) ROBERTO    1 Device by Misc.(Non-Drug; Combo Route) route once daily.    CYANOCOBALAMIN 1,000 MCG/ML INJECTION    INJECT 1 ML INTO THE MUSCLE EVERY 28 DAYS.    CYCLOBENZAPRINE (FLEXERIL) 10 MG TABLET    Take 10 mg by mouth 2 (two) times daily as needed.    ESTRADIOL (VIVELLE-DOT) 0.1 MG/24 HR PTSW    Place 1 patch onto the skin twice a week.    FLASH GLUCOSE SCANNING READER (iCetanaSTE Ink PEDRO 14 DAY READER) MISC    1 Units by Misc.(Non-Drug; Combo Route) route daily as  "needed.    FLASH GLUCOSE SENSOR (FREESTYLE PEDRO 14 DAY SENSOR) KIT    1 Units by Misc.(Non-Drug; Combo Route) route daily as needed.    HYDROCODONE-HOMATROPINE 5-1.5 MG/5 ML (HYCODAN) 5-1.5 MG/5 ML SYRP    Take 5 mLs by mouth nightly as needed (caution sedatin.).    LANCETS MISC    1 Units by Misc.(Non-Drug; Combo Route) route once daily. To check BG 1 times daily, to use with insurance preferred meter    ONDANSETRON (ZOFRAN-ODT) 4 MG TBDL    DISSOLVE 1 TABLET BY MOUTH EVERY 8 HOURS AS NEEDED    OXYCODONE (ROXICODONE) 10 MG TAB IMMEDIATE RELEASE TABLET    1 tablet as needed    OXYCODONE-ACETAMINOPHEN (PERCOCET)  MG PER TABLET    Take 1 tablet by mouth every 6 (six) hours as needed for Pain (Greater than 7 day supply medically necessary).    PHENTERMINE (ADIPEX-P) 37.5 MG TABLET    1/2 tab po qam    PROMETHAZINE (PHENERGAN) 25 MG TABLET    TAKE 1 TABLET (25 MG TOTAL) BY MOUTH EVERY 6 (SIX) HOURS AS NEEDED FOR NAUSEA.    PROMETHAZINE-DEXTROMETHORPHAN (PROMETHAZINE-DM) 6.25-15 MG/5 ML SYRP    Take 5 mLs by mouth every 4 to 6 hours as needed (cough).    STELARA 90 MG/ML SYRG SYRINGE    Every 2 months    SUMATRIPTAN (IMITREX STATDOSE) 6 MG/0.5 ML KIT    INJECT 0.5 ML UNDER THE SKIN AS NEEDED ONE TIME FOR MIGRAINE    SYRINGE WITH NEEDLE, SAFETY (BD INTEGRA SYRINGE) 3 ML 25 GAUGE X 1" SYRG    1 Syringe by Misc.(Non-Drug; Combo Route) route every 30 days.    ZOLPIDEM (AMBIEN) 10 MG TAB    Take 1 tablet (10 mg total) by mouth nightly as needed.       Physical Exam:  Vital Signs:  /82   Pulse 72   Temp 98.5 °F (36.9 °C)   Wt 88 kg (194 lb 0.1 oz)   SpO2 98%   BMI 34.37 kg/m²   Body mass index is 34.37 kg/m².    Physical Exam  Vitals and nursing note reviewed.   Constitutional:       General: She is not in acute distress.     Appearance: Normal appearance. She is well-developed. She is not ill-appearing or toxic-appearing.   Eyes:      Conjunctiva/sclera: Conjunctivae normal.      Pupils: Pupils are equal, " round, and reactive to light.   Neck:      Thyroid: No thyromegaly.   Cardiovascular:      Rate and Rhythm: Normal rate and regular rhythm.      Heart sounds: Normal heart sounds. No murmur heard.  Pulmonary:      Effort: Pulmonary effort is normal.      Breath sounds: Normal breath sounds. No wheezing or rales.   Abdominal:      General: Bowel sounds are normal. There is no distension.      Palpations: Abdomen is soft. There is no mass.      Tenderness: There is no abdominal tenderness.      Comments: Ileostomy in the right lower quadrant with peristomal herniation   Musculoskeletal:         General: No tenderness. Normal range of motion.   Lymphadenopathy:      Cervical: No cervical adenopathy.   Skin:     Findings: No erythema or rash.   Neurological:      General: No focal deficit present.      Mental Status: She is alert and oriented to person, place, and time.   Psychiatric:         Mood and Affect: Mood normal.         Behavior: Behavior normal.         Thought Content: Thought content normal.         Judgment: Judgment normal.         Labs: reviewed and pertinent noted above    Assessment/Plan:  Emi Johnson is a 46 y.o. female with a history of inflammatory bowel disease that affected the colon and is status post total proctocolectomy with J pouch placement initially but subsequent J pouch excision due to pouch dysfunction in currently with an end ileostomy. The following issues were addresssed:    1. Crohn's disease of both small and large intestine without complication    2. IBD (inflammatory bowel disease)    3. Ileostomy in place    4. Para-ileostomy hernia    5. Abdominal pain, epigastric    6. Rectal pain    7. Epigastric pain      1. IBD:  Although her most recent ileoscopy did show some erosions and her capsule endoscopy did as well the biopsies did not show any active inflammation some still not entirely certain whether she actually has any ongoing issues with IBD.  She is currently on Stelara  and has not had any side effects from this.  We will need to evaluate for any active disease in the near future.  We will likely need to proceed with a capsule endoscopy as well as probably an ileoscopy as well.  In the meantime continue Stelara.    2. Peristomal hernia:  She has significant discomfort and pain around her stoma.  This is related to the hernia.  We reviewed her CT scan in detail today and I showed her the amount of small bowel that has herniated around the stoma.  She has significant concerns for wound healing after surgery and would prefer not to have the hernia repaired.  I explained to her that other than hernia repair there is very little that we can do to help with her pain and discomfort around the stomal site.    3. Epigastric pain:  This is a relatively new symptom per report.  We will plan to evaluate this in the near future with a CT scan.  If the CT is unrevealing we will plan for an upper endoscopy in the near future.    4. Rectal pain/perineal pain:  Evaluate with CT scan.  This cannot be evaluated endoscopically as she has no anal canal.  There is no intestine left in this area.  Will rule out large lesions such as a mass or possible fluid collection with CT.  We cannot use contrast currently because of her allergy as well as due to a contrast shortage.            Follow up: Follow up in about 3 months (around 8/11/2022).    Thank you again for sending Emi Johnson to see Dr. Dale Novak today at the [facility]. Please don't hesitate to contact Dr. Novak if there are any questions regarding this evaluation, or if you have any other patients with inflammatory bowel disease for whom you would like a consultation. You can reach Dr. Novak at [phone] or by email at [email or web address]    Tin Novak MD  Department of Gastroenterology  Inflammatory Bowel Disease

## 2022-05-11 NOTE — PATIENT INSTRUCTIONS
Get labs  Continue Stelara  Start pantoprazole   Schedule CT scan  If no clear issue on CT will arrange for EGD and ileoscopy  May need to do capsule test again  Follow up in 3 months

## 2022-05-12 LAB
GAMMA INTERFERON BACKGROUND BLD IA-ACNC: 0.08 IU/ML
M TB IFN-G CD4+ BCKGRND COR BLD-ACNC: 0.04 IU/ML
MITOGEN IGNF BCKGRD COR BLD-ACNC: 9.92 IU/ML
TB GOLD PLUS: NEGATIVE
TB2 - NIL: 0.07 IU/ML

## 2022-05-14 LAB
HBV SURFACE AB SER QL IA: NEGATIVE
HBV SURFACE AB SERPL IA-ACNC: 4 MIU/ML

## 2022-05-15 ENCOUNTER — HOSPITAL ENCOUNTER (EMERGENCY)
Facility: HOSPITAL | Age: 47
Discharge: HOME | End: 2022-05-15
Attending: EMERGENCY MEDICINE
Payer: COMMERCIAL

## 2022-05-15 VITALS
SYSTOLIC BLOOD PRESSURE: 118 MMHG | DIASTOLIC BLOOD PRESSURE: 69 MMHG | TEMPERATURE: 99 F | BODY MASS INDEX: 33.66 KG/M2 | OXYGEN SATURATION: 95 % | RESPIRATION RATE: 18 BRPM | HEART RATE: 63 BPM | WEIGHT: 190 LBS

## 2022-05-15 DIAGNOSIS — R10.13 EPIGASTRIC PAIN: ICD-10-CM

## 2022-05-15 DIAGNOSIS — N30.01 ACUTE CYSTITIS WITH HEMATURIA: Primary | ICD-10-CM

## 2022-05-15 LAB
ALBUMIN SERPL BCP-MCNC: 4.1 G/DL (ref 3.5–5.2)
ALP SERPL-CCNC: 95 U/L (ref 55–135)
ALT SERPL W/O P-5'-P-CCNC: 36 U/L (ref 10–44)
ANION GAP SERPL CALC-SCNC: 12 MMOL/L (ref 8–16)
AST SERPL-CCNC: 29 U/L (ref 10–40)
BACTERIA #/AREA URNS HPF: ABNORMAL /HPF
BASOPHILS # BLD AUTO: 0.03 K/UL (ref 0–0.2)
BASOPHILS NFR BLD: 0.5 % (ref 0–1.9)
BILIRUB SERPL-MCNC: 1.2 MG/DL (ref 0.1–1)
BILIRUB UR QL STRIP: NEGATIVE
BUN SERPL-MCNC: 19 MG/DL (ref 6–20)
CALCIUM SERPL-MCNC: 9.3 MG/DL (ref 8.7–10.5)
CHLORIDE SERPL-SCNC: 109 MMOL/L (ref 95–110)
CLARITY UR: ABNORMAL
CO2 SERPL-SCNC: 19 MMOL/L (ref 23–29)
COLOR UR: YELLOW
CREAT SERPL-MCNC: 1.1 MG/DL (ref 0.5–1.4)
DIFFERENTIAL METHOD: ABNORMAL
EOSINOPHIL # BLD AUTO: 0.3 K/UL (ref 0–0.5)
EOSINOPHIL NFR BLD: 5.1 % (ref 0–8)
ERYTHROCYTE [DISTWIDTH] IN BLOOD BY AUTOMATED COUNT: 12.4 % (ref 11.5–14.5)
EST. GFR  (AFRICAN AMERICAN): >60 ML/MIN/1.73 M^2
EST. GFR  (NON AFRICAN AMERICAN): >60 ML/MIN/1.73 M^2
GLUCOSE SERPL-MCNC: 96 MG/DL (ref 70–110)
GLUCOSE UR QL STRIP: NEGATIVE
HCT VFR BLD AUTO: 49.2 % (ref 37–48.5)
HGB BLD-MCNC: 16.4 G/DL (ref 12–16)
HGB UR QL STRIP: ABNORMAL
HYALINE CASTS #/AREA URNS LPF: 1 /LPF
IMM GRANULOCYTES # BLD AUTO: 0.02 K/UL (ref 0–0.04)
IMM GRANULOCYTES NFR BLD AUTO: 0.3 % (ref 0–0.5)
KETONES UR QL STRIP: NEGATIVE
LEUKOCYTE ESTERASE UR QL STRIP: ABNORMAL
LIPASE SERPL-CCNC: 26 U/L (ref 4–60)
LYMPHOCYTES # BLD AUTO: 1.6 K/UL (ref 1–4.8)
LYMPHOCYTES NFR BLD: 26.6 % (ref 18–48)
MCH RBC QN AUTO: 29.7 PG (ref 27–31)
MCHC RBC AUTO-ENTMCNC: 33.3 G/DL (ref 32–36)
MCV RBC AUTO: 89 FL (ref 82–98)
MICROSCOPIC COMMENT: ABNORMAL
MONOCYTES # BLD AUTO: 0.4 K/UL (ref 0.3–1)
MONOCYTES NFR BLD: 6.4 % (ref 4–15)
NEUTROPHILS # BLD AUTO: 3.7 K/UL (ref 1.8–7.7)
NEUTROPHILS NFR BLD: 61.1 % (ref 38–73)
NITRITE UR QL STRIP: NEGATIVE
NRBC BLD-RTO: 0 /100 WBC
PH UR STRIP: 5 [PH] (ref 5–8)
PLATELET # BLD AUTO: 171 K/UL (ref 150–450)
PMV BLD AUTO: 10.8 FL (ref 9.2–12.9)
POTASSIUM SERPL-SCNC: 5.1 MMOL/L (ref 3.5–5.1)
PROT SERPL-MCNC: 7.5 G/DL (ref 6–8.4)
PROT UR QL STRIP: ABNORMAL
RBC # BLD AUTO: 5.52 M/UL (ref 4–5.4)
RBC #/AREA URNS HPF: 5 /HPF (ref 0–4)
SODIUM SERPL-SCNC: 140 MMOL/L (ref 136–145)
SP GR UR STRIP: 1.02 (ref 1–1.03)
SQUAMOUS #/AREA URNS HPF: 8 /HPF
TROPONIN I SERPL DL<=0.01 NG/ML-MCNC: <0.006 NG/ML (ref 0–0.03)
URN SPEC COLLECT METH UR: ABNORMAL
UROBILINOGEN UR STRIP-ACNC: 1 EU/DL
WBC # BLD AUTO: 6.08 K/UL (ref 3.9–12.7)
WBC #/AREA URNS HPF: 63 /HPF (ref 0–5)
WBC CLUMPS URNS QL MICRO: ABNORMAL

## 2022-05-15 PROCEDURE — 99285 EMERGENCY DEPT VISIT HI MDM: CPT | Mod: 25

## 2022-05-15 PROCEDURE — 25000003 PHARM REV CODE 250: Performed by: PHYSICIAN ASSISTANT

## 2022-05-15 PROCEDURE — 93005 ELECTROCARDIOGRAM TRACING: CPT

## 2022-05-15 PROCEDURE — 85025 COMPLETE CBC W/AUTO DIFF WBC: CPT | Performed by: PHYSICIAN ASSISTANT

## 2022-05-15 PROCEDURE — 81000 URINALYSIS NONAUTO W/SCOPE: CPT | Performed by: PHYSICIAN ASSISTANT

## 2022-05-15 PROCEDURE — 63600175 PHARM REV CODE 636 W HCPCS: Performed by: PHYSICIAN ASSISTANT

## 2022-05-15 PROCEDURE — 93010 EKG 12-LEAD: ICD-10-PCS | Mod: ,,, | Performed by: INTERNAL MEDICINE

## 2022-05-15 PROCEDURE — 80053 COMPREHEN METABOLIC PANEL: CPT | Performed by: PHYSICIAN ASSISTANT

## 2022-05-15 PROCEDURE — 83690 ASSAY OF LIPASE: CPT | Performed by: PHYSICIAN ASSISTANT

## 2022-05-15 PROCEDURE — 84484 ASSAY OF TROPONIN QUANT: CPT | Performed by: PHYSICIAN ASSISTANT

## 2022-05-15 PROCEDURE — 93010 ELECTROCARDIOGRAM REPORT: CPT | Mod: ,,, | Performed by: INTERNAL MEDICINE

## 2022-05-15 PROCEDURE — 87086 URINE CULTURE/COLONY COUNT: CPT | Performed by: PHYSICIAN ASSISTANT

## 2022-05-15 PROCEDURE — 96374 THER/PROPH/DIAG INJ IV PUSH: CPT

## 2022-05-15 RX ORDER — LIDOCAINE HYDROCHLORIDE 20 MG/ML
10 SOLUTION OROPHARYNGEAL ONCE
Status: COMPLETED | OUTPATIENT
Start: 2022-05-15 | End: 2022-05-15

## 2022-05-15 RX ORDER — MORPHINE SULFATE 4 MG/ML
4 INJECTION, SOLUTION INTRAMUSCULAR; INTRAVENOUS
Status: COMPLETED | OUTPATIENT
Start: 2022-05-15 | End: 2022-05-15

## 2022-05-15 RX ORDER — CEPHALEXIN 500 MG/1
500 CAPSULE ORAL EVERY 8 HOURS
Qty: 21 CAPSULE | Refills: 0 | Status: SHIPPED | OUTPATIENT
Start: 2022-05-15 | End: 2022-05-22

## 2022-05-15 RX ORDER — MAG HYDROX/ALUMINUM HYD/SIMETH 200-200-20
30 SUSPENSION, ORAL (FINAL DOSE FORM) ORAL ONCE
Status: COMPLETED | OUTPATIENT
Start: 2022-05-15 | End: 2022-05-15

## 2022-05-15 RX ORDER — ACETAMINOPHEN 500 MG
500 TABLET ORAL EVERY 6 HOURS PRN
COMMUNITY

## 2022-05-15 RX ADMIN — ALUMINUM HYDROXIDE, MAGNESIUM HYDROXIDE, AND SIMETHICONE 30 ML: 200; 200; 20 SUSPENSION ORAL at 10:05

## 2022-05-15 RX ADMIN — MORPHINE SULFATE 4 MG: 4 INJECTION INTRAVENOUS at 11:05

## 2022-05-15 RX ADMIN — LIDOCAINE HYDROCHLORIDE 10 ML: 20 SOLUTION ORAL at 10:05

## 2022-05-15 NOTE — DISCHARGE INSTRUCTIONS

## 2022-05-15 NOTE — PHARMACY MED REC
"  Admission Medication History     The home medication history was taken by Shira Abreu CPhT.    Medication history obtained from, Patient Verified    You may go to "Admission" then "Reconcile Home Medications" tabs to review and/or act upon these items.      The home medication list has been updated by the Pharmacy department.    Please read ALL comments highlighted in yellow.    Please address this information as you see fit.     Feel free to contact us if you have any questions or require assistance.      The medications listed below were removed from the home medication list.  Please reorder if appropriate:  Patient reports no longer taking the following medication(s):   Astelin 137 mcg   Protonix 40 mg        Shiar Abreu CPhT.  Ext [phone]              .          "

## 2022-05-15 NOTE — ED PROVIDER NOTES
Encounter Date: 5/15/2022       History     Chief Complaint   Patient presents with    Abdominal Pain     Pt with history of Crohn's and ostomy presents with mid/upper abd pain that radiates to ostomy site. Pt has nausea but no vomiting. Stool is usual consistency and color. Stoma is pink in color. Pt also reports history of a hernia.      46-year-old female, PMH including but not limited to IBS, Crohn's, chronic abdominal pain, asthma, presents to ED with concern of abdominal pain.  Patient has significant history of multiple surgeries to abdomen including ileostomy bag.  She reports she has had pain around her ileostomy for a long time due to hernia.  She reports the pain in her right lower quadrant near ostomy has progressively worsened over past 3-4 days.  She also reports pain to epigastric region that has been constant for last 7 days, described as sharp, nonradiating, unchanged after meals, severity 7/10.  She was recently seen by GI who ordered CT abdomen pelvis but she states she was unable to have CT done due to work schedule.  She has tried Tums for epigastric pain with no improvement.  Denies changes in bowel appearance.  No dysuria, changes in urine color or frequency, chest pain, cough, shortness of breath, fevers, chills, nausea or vomiting.  No other acute complaints at this time.    The history is provided by the patient.     Review of patient's allergies indicates:   Allergen Reactions    Aspirin      Other reaction(s): vomiting, contraindicated for bleeding from crohns  Other reaction(s): bleeding    Mold Hives     Black mold     Sulfa (sulfonamide antibiotics) Hives and Rash    Iodinated contrast media Other (See Comments)    Adhesive     Aspirin     Iodine     Remicade [infliximab] Other (See Comments)     Medical induced lupus    Latex Rash     Other reaction(s): Hives     Past Medical History:   Diagnosis Date    Anemia     Asthma     B12 deficiency     Crohn's disease      History of shingles     Lupus     Migraine headache     Raynaud disease     Reactive hypoglycemia     Seizures 2004    no medication     Sleep apnea     Non compliant with CPAP     Past Surgical History:   Procedure Laterality Date    ABDOMINAL SURGERY      COLON SURGERY      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    COLONOSCOPY      COLOSTOMY      HYSTERECTOMY      due to endometriosis    ILEOSCOPY N/A 4/10/2019    Procedure: ILEOSCOPY through stoma;  Surgeon: Eve Currie MD;  Location: [address];  Service: Endoscopy;  Laterality: N/A;  Schedule as 30 minute case, schedule in April or May 2019    ILEOSTOMY      LAPAROSCOPY W/ MINI-LAPAROTOMY      large intestine removed      just a portion    pelvic mass removal      REVISION COLOSTOMY  2010    SMALL INTESTINE SURGERY      J pouch excision with end ileostomy    STOMACH SURGERY      TONSILLECTOMY, ADENOIDECTOMY       Family History   Problem Relation Age of Onset    Colon cancer Maternal Grandmother 72    Cancer Mother 54        Anal cancer     Diabetes Mellitus Mother     Hypertension Mother     Colon cancer Mother     Hypertension Sister     Cancer Maternal Aunt     Cancer Paternal Grandmother     Heart attack Father     Cervical cancer Sister     Seizures Sister     Hypertension Sister     Liver disease Sister         Fatty Liver     Celiac disease Neg Hx     Cirrhosis Neg Hx     Colon polyps Neg Hx     Crohn's disease Neg Hx     Cystic fibrosis Neg Hx     Hemochromatosis Neg Hx     Esophageal cancer Neg Hx     Inflammatory bowel disease Neg Hx     Irritable bowel syndrome Neg Hx     Liver cancer Neg Hx     Rectal cancer Neg Hx     Stomach cancer Neg Hx     Ulcerative colitis Neg Hx     Hugo's disease Neg Hx      Social History     Tobacco Use    Smoking status: Never Smoker    Smokeless tobacco: Never Used   Substance Use Topics    Alcohol use: No     Comment: Rare social use.    Drug use: No      Review of Systems   Constitutional: Negative for appetite change, chills and fever.   Respiratory: Negative for cough and shortness of breath.    Cardiovascular: Negative for chest pain.   Gastrointestinal: Positive for abdominal pain. Negative for blood in stool, nausea and vomiting.   Genitourinary: Negative for dysuria and frequency.   Musculoskeletal: Negative for back pain, neck pain and neck stiffness.       Physical Exam     Initial Vitals [05/15/22 0853]   BP Pulse Resp Temp SpO2   135/82 92 20 98.6 °F (37 °C) 97 %      MAP       --         Physical Exam    Nursing note and vitals reviewed.  Constitutional: Vital signs are normal. She appears well-developed and well-nourished. She is cooperative. She does not have a sickly appearance. She does not appear ill. No distress.   Resting on bed, no apparent distress   HENT:   Head: Normocephalic and atraumatic.   Eyes: EOM are normal.   Neck: Neck supple.   Normal range of motion.  Cardiovascular: Normal rate, regular rhythm and normal heart sounds.   Pulmonary/Chest: Effort normal and breath sounds normal.   Abdominal: Abdomen is soft. There is abdominal tenderness in the right lower quadrant and epigastric area.   Epigastric tenderness noted with no guarding or obvious distention.  Tenderness also surrounding ostomy bag on right lower quadrant with palpable or hernia.  Previous abdominal system sites appear to be well healed with no other overlying skin changes or erythema.  No CVA tenderness.   Musculoskeletal:         General: No tenderness.      Cervical back: Normal range of motion and neck supple.     Neurological: She is alert. She is not disoriented. GCS eye subscore is 4. GCS verbal subscore is 5. GCS motor subscore is 6.   Skin: Skin is warm and dry.   Psychiatric: She has a normal mood and affect. Her speech is normal and behavior is normal.         ED Course   Procedures  Labs Reviewed   CBC W/ AUTO DIFFERENTIAL - Abnormal; Notable for the  following components:       Result Value    RBC 5.52 (*)     Hemoglobin 16.4 (*)     Hematocrit 49.2 (*)     All other components within normal limits   COMPREHENSIVE METABOLIC PANEL - Abnormal; Notable for the following components:    CO2 19 (*)     Total Bilirubin 1.2 (*)     All other components within normal limits   URINALYSIS, REFLEX TO URINE CULTURE - Abnormal; Notable for the following components:    Appearance, UA Hazy (*)     Protein, UA Trace (*)     Occult Blood UA Trace (*)     Leukocytes, UA 3+ (*)     All other components within normal limits    Narrative:     Specimen Source->Urine   URINALYSIS MICROSCOPIC - Abnormal; Notable for the following components:    RBC, UA 5 (*)     WBC, UA 63 (*)     WBC Clumps, UA Few (*)     All other components within normal limits    Narrative:     Specimen Source->Urine   CULTURE, URINE   TROPONIN I   LIPASE     EKG Readings: (Independently Interpreted)   Initial: 0943. Rhythm: Normal Sinus Rhythm. Heart Rate: 72. Ectopy: No Ectopy. Conduction: Normal. ST Segments: Normal ST Segments. T Waves: Normal. Clinical Impression: Normal Sinus Rhythm     ECG Results          EKG 12-lead (In process)  Result time 05/16/22 10:01:43    In process by Interface, Lab In [facility] (05/16/22 10:01:43)                 Narrative:    Test Reason : R10.13,    Vent. Rate : 072 BPM     Atrial Rate : 072 BPM     P-R Int : 140 ms          QRS Dur : 082 ms      QT Int : 404 ms       P-R-T Axes : 038 079 067 degrees     QTc Int : 442 ms    Normal sinus rhythm  Normal ECG  When compared with ECG of 08-MAR-2021 10:44,  No significant change was found    Referred By: [email or web address]   SELF           Confirmed By:                             Imaging Results          US Abdomen Complete (Final result)  Result time 05/15/22 12:58:16    Final result by Sharan Larry MD (05/15/22 12:58:16)                 Impression:      Hepatomegaly noting steatosis, correlation with LFTs recommended.    Renal cortical  thinning, correlation with renal function testing as warranted.      Electronically signed by: Sharan Larry MD  Date:    05/15/2022  Time:    12:58             Narrative:    EXAMINATION:  US ABDOMEN COMPLETE    CLINICAL HISTORY:  Epigastric pain    TECHNIQUE:  Complete abdominal ultrasound (including pancreas, aorta, liver, gallbladder, common bile duct, IVC, kidneys, and spleen) was performed.    COMPARISON:  CT 05/15/2022    FINDINGS:  The visualized portions of the pancreas are unremarkable.  The aorta and IVC are unremarkable.  The gallbladder is nondistended.  No gallbladder wall thickening or pericholecystic fluid.  No gallbladder wall hyperemia.  Sonographic Simpson sign is negative.  No biliary dilation.  The liver is enlarged measuring 2 cm with diffusely echogenic echotexture suggesting underlying steatosis.  The spleen is unremarkable.  No ascites.  There is bilateral renal cortical thinning without hydronephrosis or nephrolithiasis.                               CT Abdomen Pelvis  Without Contrast (Final result)  Result time 05/15/22 11:39:36    Final result by Elijah Lemus MD (05/15/22 11:39:36)                 Impression:      Prior total colectomy with right lower quadrant ileostomy.    Large parastomal hernia containing loops of small bowel, similar to the 11/03/2021 exam.  No evidence of obstruction.    Prominent hepatic steatosis.      Electronically signed by: Elijah Lemus MD  Date:    05/15/2022  Time:    11:39             Narrative:    EXAMINATION:  CT ABDOMEN PELVIS WITHOUT CONTRAST    CLINICAL HISTORY:  Epigastric pain;    TECHNIQUE:  Low dose axial images, sagittal and coronal reformations were obtained from the lung bases to the pubic symphysis.    COMPARISON:  11/03/2021    FINDINGS:  Abdomen: Low-attenuation in the liver suggesting fatty change.  Probable small area of focal fatty sparing in the right hepatic lobe, unchanged.  Gallbladder is unremarkable.  No biliary or pancreatic  ductal dilatation.  Moderate pancreatic atrophy noted.  Splenic size within normal limits.  Adrenal glands are unremarkable.  No renal stones or hydronephrosis.  Abdominal aorta tapers normally.  No periaortic or periportal lymphadenopathy.    Pelvis: The bladder is grossly unremarkable.  Prior hysterectomy noted.  The adnexa grossly unremarkable.  Prior total colectomy noted.  Right lower quadrant ileostomy noted.    Bowel: Large right parastomal hernia containing loops of small bowel.  The hernia sac measures 11 cm (series 2, image 125).  No evidence of obstruction.  No fluid collection.  No mesenteric lymphadenopathy.    Bones: No marrow replacement process.    Lung bases: Trace amount interstitial thickening/atelectasis.                                 Medications   aluminum-magnesium hydroxide-simethicone 200-200-20 mg/5 mL suspension 30 mL (30 mLs Oral Given 5/15/22 1048)     And   LIDOcaine HCl 2% oral solution 10 mL (10 mLs Oral Given 5/15/22 1048)   morphine injection 4 mg (4 mg Intravenous Given 5/15/22 1148)     Medical Decision Making:   Initial Assessment:   Patient presents presents with concern of abdominal pain to both epigastric and right lower quadrant regions.  Patient has  significant abdominal surgery history including ileostomy.  No fevers, chills, nausea, vomiting.  Afebrile on arrival with vitals grossly unremarkable.  Patient resting comfortably on bed, no apparent distress.  Reproducible tenderness to both epigastric and right lower quadrant regions.  No guarding.  Differential Diagnosis:   GERD, intestinal spasm, gastroenteritis, gastritis, ulcer, cholecystitis, cholelithiasis, gallstones, pancreatitis, ileus, small bowel obstruction, appendicitis, diverticulitis, colitis, constipation, intestinal gas pain, UTI, pyelonephritis, nephrolithiasis, urolithiasis, chronic abdominal pain, IBS, Crohn's    ED Management:  CBC, CMP, lipase, UA, CT abdomen pelvis, abdominal ultrasound    CBC grossly  unremarkable with no elevated WBC.  CMP unremarkable near baseline.  Lipase WNL.  UA is concerning for UTI noting 3+ leukocytes with 63 WBC.  Prescription for    Per chart review, patient's GI doctor has ordered CT of abdomen pelvis but unfortunately patient was and able to have CT performed due to work schedule.  Will plan on proceeding with CT today    CT showing no significant acute intra-abdominal findings, noting large parastomal hernia containing loops of small bowel, similar to the 11/03/2021 exam.  No evidence of obstruction.  Abdominal ultrasound also grossly unremarkable    Patient's symptoms well controlled in ED.  She is otherwise well-appearing with stable vitals and cleared for discharge.  Encouraged oral hydration, monitor symptoms very closely, take antibiotic as instructed, close PCP/GI follow-up.  ED return precautions discussed at length.  Patient states her understanding and agrees with plan.  Other:   I have discussed this case with another health care provider.       <> Summary of the Discussion: Patient was discussed with and seen by attending, Dr. Abdul, who agrees with ED course and dispo.            Attending Attestation:     Physician Attestation Statement for NP/PA:   I have conducted a face to face encounter with this patient in addition to the NP/PA, due to Medical Complexity    Other NP/PA Attestation Additions:      Medical Decision Making: The patient is a 46-year-old female with abdominal pain.  She does a significant history for having had Crohn's disease and a colectomy with a colostomy bag.  She came to the emergency department with abdominal pain.  Her workup included labs, CT scan and ultrasound.  She has no cholecystitis or cholelithiasis, her abdominal CT scan is unremarkable.  The patient has urinary tract infection and will be started on antibiotics.  She also has epigastric pain and the etiology of this is unclear at this time.  She will follow up with her doctor.                       Clinical Impression:   Final diagnoses:  [R10.13] Epigastric pain  [N30.01] Acute cystitis with hematuria (Primary)          ED Disposition Condition    Discharge Stable        ED Prescriptions     Medication Sig Dispense Start Date End Date Auth. Provider    cephALEXin (KEFLEX) 500 MG capsule Take 1 capsule (500 mg total) by mouth every 8 (eight) hours. for 7 days 21 capsule 5/15/2022 5/22/2022 Cecil Pascual PA-C        Follow-up Information     Follow up With Specialties Details Why Contact Info    Bianca Rutledge MD Internal Medicine   [address] [facility] [address]  [phone]             Cecil Pascual PA-C  05/15/22 3174       Kasey Abdul MD  05/16/22 1226

## 2022-05-16 LAB
BACTERIA UR CULT: NORMAL
BACTERIA UR CULT: NORMAL

## 2022-05-17 ENCOUNTER — PATIENT MESSAGE (OUTPATIENT)
Dept: GASTROENTEROLOGY | Facility: CLINIC | Age: 47
End: 2022-05-17
Payer: COMMERCIAL

## 2022-05-17 ENCOUNTER — PATIENT MESSAGE (OUTPATIENT)
Dept: ENDOSCOPY | Facility: HOSPITAL | Age: 47
End: 2022-05-17
Payer: COMMERCIAL

## 2022-05-17 DIAGNOSIS — K52.9 IBD (INFLAMMATORY BOWEL DISEASE): ICD-10-CM

## 2022-05-17 DIAGNOSIS — K50.80 CROHN'S DISEASE OF BOTH SMALL AND LARGE INTESTINE WITHOUT COMPLICATION: Primary | ICD-10-CM

## 2022-05-17 DIAGNOSIS — R10.13 ABDOMINAL PAIN, EPIGASTRIC: ICD-10-CM

## 2022-05-18 LAB
HBV CORE AB SERPL QL IA: NEGATIVE
HBV SURFACE AG SERPL QL IA: NEGATIVE

## 2022-05-25 ENCOUNTER — PATIENT MESSAGE (OUTPATIENT)
Dept: INTERNAL MEDICINE | Facility: CLINIC | Age: 47
End: 2022-05-25
Payer: COMMERCIAL

## 2022-05-25 DIAGNOSIS — K50.00 CROHN'S DISEASE OF SMALL INTESTINE WITHOUT COMPLICATION: ICD-10-CM

## 2022-05-25 DIAGNOSIS — M79.7 FIBROMYALGIA: ICD-10-CM

## 2022-05-25 NOTE — TELEPHONE ENCOUNTER
Pt requesting refill:  oxyCODONE-acetaminophen (PERCOCET)  mg per tablet (Discontinued) 120 tablet 0 4/1/2022 4/29/2022 No   Sig - Route: Take 1 tablet by mouth every 6 (six) hours as needed for Pain (Greater than 7 day supply medically necessary). - Oral   Sent to pharmacy as: oxyCODONE-acetaminophen (PERCOCET)  mg per tablet   Class: Normal   Earliest Fill Date: 4/1/2022   Notes to Pharmacy: Quantity prescribed more than 7 day supply medically necessary   Reason for Discontinue: Reorder   Order: [id number]   Date/Time Signed: 4/1/2022 17:58       E-Prescribing Status: Receipt confirmed by pharmacy (4/1/2022  5:58 PM CDT)

## 2022-05-26 ENCOUNTER — ANESTHESIA EVENT (OUTPATIENT)
Dept: ENDOSCOPY | Facility: HOSPITAL | Age: 47
End: 2022-05-26
Payer: COMMERCIAL

## 2022-05-26 ENCOUNTER — ANESTHESIA (OUTPATIENT)
Dept: ENDOSCOPY | Facility: HOSPITAL | Age: 47
End: 2022-05-26
Payer: COMMERCIAL

## 2022-05-26 ENCOUNTER — HOSPITAL ENCOUNTER (OUTPATIENT)
Facility: HOSPITAL | Age: 47
Discharge: HOME | End: 2022-05-26
Attending: INTERNAL MEDICINE | Admitting: INTERNAL MEDICINE
Payer: COMMERCIAL

## 2022-05-26 VITALS
BODY MASS INDEX: 32.96 KG/M2 | SYSTOLIC BLOOD PRESSURE: 114 MMHG | RESPIRATION RATE: 20 BRPM | HEIGHT: 63 IN | TEMPERATURE: 98 F | HEART RATE: 91 BPM | WEIGHT: 186 LBS | OXYGEN SATURATION: 97 % | DIASTOLIC BLOOD PRESSURE: 60 MMHG

## 2022-05-26 DIAGNOSIS — R10.13 EPIGASTRIC ABDOMINAL PAIN: ICD-10-CM

## 2022-05-26 PROCEDURE — 44382 SMALL BOWEL ENDOSCOPY: CPT | Mod: 51,,, | Performed by: INTERNAL MEDICINE

## 2022-05-26 PROCEDURE — 25000003 PHARM REV CODE 250: Performed by: REGISTERED NURSE

## 2022-05-26 PROCEDURE — 37000009 HC ANESTHESIA EA ADD 15 MINS: Performed by: INTERNAL MEDICINE

## 2022-05-26 PROCEDURE — 44382 SMALL BOWEL ENDOSCOPY: CPT | Performed by: INTERNAL MEDICINE

## 2022-05-26 PROCEDURE — 25000003 PHARM REV CODE 250: Performed by: INTERNAL MEDICINE

## 2022-05-26 PROCEDURE — 43239 PR EGD, FLEX, W/BIOPSY, SGL/MULTI: ICD-10-PCS | Mod: ,,, | Performed by: INTERNAL MEDICINE

## 2022-05-26 PROCEDURE — 43239 EGD BIOPSY SINGLE/MULTIPLE: CPT | Mod: ,,, | Performed by: INTERNAL MEDICINE

## 2022-05-26 PROCEDURE — D9220A PRA ANESTHESIA: Mod: CRNA,,, | Performed by: REGISTERED NURSE

## 2022-05-26 PROCEDURE — 88305 TISSUE EXAM BY PATHOLOGIST: CPT | Performed by: PATHOLOGY

## 2022-05-26 PROCEDURE — 27201012 HC FORCEPS, HOT/COLD, DISP: Performed by: INTERNAL MEDICINE

## 2022-05-26 PROCEDURE — D9220A PRA ANESTHESIA: ICD-10-PCS | Mod: CRNA,,, | Performed by: REGISTERED NURSE

## 2022-05-26 PROCEDURE — 43239 EGD BIOPSY SINGLE/MULTIPLE: CPT | Performed by: INTERNAL MEDICINE

## 2022-05-26 PROCEDURE — 63600175 PHARM REV CODE 636 W HCPCS: Performed by: REGISTERED NURSE

## 2022-05-26 PROCEDURE — 44382 PR ILEOSCOPY THRU STOMA,BIOPSY: ICD-10-PCS | Mod: 51,,, | Performed by: INTERNAL MEDICINE

## 2022-05-26 PROCEDURE — 88305 TISSUE EXAM BY PATHOLOGIST: ICD-10-PCS | Mod: 26,,, | Performed by: PATHOLOGY

## 2022-05-26 PROCEDURE — D9220A PRA ANESTHESIA: ICD-10-PCS | Mod: ANES,,, | Performed by: ANESTHESIOLOGY

## 2022-05-26 PROCEDURE — 88305 TISSUE EXAM BY PATHOLOGIST: CPT | Mod: 26,,, | Performed by: PATHOLOGY

## 2022-05-26 PROCEDURE — D9220A PRA ANESTHESIA: Mod: ANES,,, | Performed by: ANESTHESIOLOGY

## 2022-05-26 PROCEDURE — 37000008 HC ANESTHESIA 1ST 15 MINUTES: Performed by: INTERNAL MEDICINE

## 2022-05-26 RX ORDER — OMEPRAZOLE 40 MG/1
40 CAPSULE, DELAYED RELEASE ORAL
Qty: 60 CAPSULE | Refills: 11 | Status: SHIPPED | OUTPATIENT
Start: 2022-05-26 | End: 2023-10-02

## 2022-05-26 RX ORDER — SODIUM CHLORIDE 9 MG/ML
INJECTION, SOLUTION INTRAVENOUS CONTINUOUS
Status: DISCONTINUED | OUTPATIENT
Start: 2022-05-26 | End: 2022-05-26 | Stop reason: HOSPADM

## 2022-05-26 RX ORDER — LIDOCAINE HCL/PF 100 MG/5ML
SYRINGE (ML) INTRAVENOUS
Status: DISCONTINUED | OUTPATIENT
Start: 2022-05-26 | End: 2022-05-26

## 2022-05-26 RX ORDER — PROPOFOL 10 MG/ML
VIAL (ML) INTRAVENOUS CONTINUOUS PRN
Status: DISCONTINUED | OUTPATIENT
Start: 2022-05-26 | End: 2022-05-26

## 2022-05-26 RX ORDER — LORAZEPAM 2 MG/ML
0.25 INJECTION INTRAMUSCULAR ONCE AS NEEDED
Status: DISCONTINUED | OUTPATIENT
Start: 2022-05-26 | End: 2022-05-26 | Stop reason: HOSPADM

## 2022-05-26 RX ORDER — SODIUM CHLORIDE 0.9 % (FLUSH) 0.9 %
3 SYRINGE (ML) INJECTION
Status: DISCONTINUED | OUTPATIENT
Start: 2022-05-26 | End: 2022-05-26 | Stop reason: HOSPADM

## 2022-05-26 RX ORDER — PROPOFOL 10 MG/ML
INJECTION, EMULSION INTRAVENOUS
Status: DISCONTINUED | OUTPATIENT
Start: 2022-05-26 | End: 2022-05-26

## 2022-05-26 RX ORDER — HYDROMORPHONE HYDROCHLORIDE 1 MG/ML
0.2 INJECTION, SOLUTION INTRAMUSCULAR; INTRAVENOUS; SUBCUTANEOUS EVERY 5 MIN PRN
Status: DISCONTINUED | OUTPATIENT
Start: 2022-05-26 | End: 2022-05-26 | Stop reason: HOSPADM

## 2022-05-26 RX ORDER — PANTOPRAZOLE SODIUM 40 MG/1
40 TABLET, DELAYED RELEASE ORAL 2 TIMES DAILY
Qty: 60 TABLET | Refills: 11 | Status: SHIPPED | OUTPATIENT
Start: 2022-05-26 | End: 2023-10-02

## 2022-05-26 RX ADMIN — GLYCOPYRROLATE 0.2 MG: 0.2 INJECTION, SOLUTION INTRAMUSCULAR; INTRAVITREAL at 08:05

## 2022-05-26 RX ADMIN — PROPOFOL 30 MG: 10 INJECTION, EMULSION INTRAVENOUS at 08:05

## 2022-05-26 RX ADMIN — SODIUM CHLORIDE: 0.9 INJECTION, SOLUTION INTRAVENOUS at 08:05

## 2022-05-26 RX ADMIN — PROPOFOL 50 MG: 10 INJECTION, EMULSION INTRAVENOUS at 08:05

## 2022-05-26 RX ADMIN — Medication 150 MG: at 08:05

## 2022-05-26 RX ADMIN — PROPOFOL 200 MCG/KG/MIN: 10 INJECTION, EMULSION INTRAVENOUS at 08:05

## 2022-05-26 NOTE — TRANSFER OF CARE
"Anesthesia Transfer of Care Note    Patient: Emi Johnson    Procedure(s) Performed: Procedure(s) (LRB):  ESOPHAGOGASTRODUODENOSCOPY (EGD) (N/A)  ILEOSCOPY (N/A)    Patient location: [facility]    Anesthesia Type: MAC    Transport from OR: Transported from OR on room air with adequate spontaneous ventilation    Post pain: adequate analgesia    Post assessment: no apparent anesthetic complications and tolerated procedure well    Post vital signs: stable    Level of consciousness: awake, alert and oriented    Nausea/Vomiting: no nausea/vomiting    Complications: none    Transfer of care protocol was followed      Last vitals:   Visit Vitals  /69 (Patient Position: Lying)   Pulse (!) 116   Temp 36.6 °C (97.9 °F) (Temporal)   Resp 18   Ht 5' 3" (1.6 m)   Wt 84.4 kg (186 lb)   SpO2 97%   Breastfeeding No   BMI 32.95 kg/m²     "

## 2022-05-26 NOTE — PROVATION PATIENT INSTRUCTIONS
Discharge Summary/Instructions after an Endoscopic Procedure  Patient Name: Emi Johnson  Patient MRN: [id number]  Patient YOB: 1975  Thursday, May 26, 2022  Tin Novak MD  Dear patient,  As a result of recent federal legislation (The Federal Cures Act), you may   receive lab or pathology results from your procedure in your [facility]   account before your physician is able to contact you. Your physician or   their representative will relay the results to you with their   recommendations at their soonest availability.  Thank you,  RESTRICTIONS:  During your procedure today, you received medications for sedation.  These   medications may affect your judgment, balance and coordination.  Therefore,   for 24 hours, you have the following restrictions:   - DO NOT drive a car, operate machinery, make legal/financial decisions,   sign important papers or drink alcohol.    ACTIVITY:  Today: no heavy lifting, straining or running due to procedural   sedation/anesthesia.  The following day: return to full activity including work.  DIET:  Eat and drink normally unless instructed otherwise.     TREATMENT FOR COMMON SIDE EFFECTS:  - Mild abdominal pain, nausea, belching, bloating or excessive gas:  rest,   eat lightly and use a heating pad.  - Sore Throat: treat with throat lozenges and/or gargle with warm salt   water.  - Because air was used during the procedure, expelling large amounts of air   from your rectum or belching is normal.  - If a bowel prep was taken, you may not have a bowel movement for 1-3 days.    This is normal.  SYMPTOMS TO WATCH FOR AND REPORT TO YOUR PHYSICIAN:  1. Abdominal pain or bloating, other than gas cramps.  2. Chest pain.  3. Back pain.  4. Signs of infection such as: chills or fever occurring within 24 hours   after the procedure.  5. Rectal bleeding, which would show as bright red, maroon, or black stools.   (A tablespoon of blood from the rectum is not serious, especially  if   hemorrhoids are present.)  6. Vomiting.  7. Weakness or dizziness.  GO DIRECTLY TO THE NEAREST EMERGENCY ROOM IF YOU HAVE ANY OF THE FOLLOWING:      Difficulty breathing              Chills and/or fever over 101 F   Persistent vomiting and/or vomiting blood   Severe abdominal pain   Severe chest pain   Black, tarry stools   Bleeding- more than one tablespoon   Any other symptom or condition that you feel may need urgent attention  Your doctor recommends these additional instructions:  If any biopsies were taken, your doctors clinic will contact you in 1 to 2   weeks with any results.  - Discharge patient to home.   - Resume previous diet today.   - Continue present medications.   - Await pathology results.   - Return to my office as previously scheduled.   - Will await biopsy results to determine cause of inflammatory changes in   the ileum. This could be Crohn's disease but it could be related to the   large parastomal hernia.  For questions, problems or results please call your physician - Tin Novak MD at Work:  [phone].  [facility], EMERGENCY ROOM PHONE NUMBER: [phone]  IF A COMPLICATION OR EMERGENCY SITUATION ARISES AND YOU ARE UNABLE TO REACH   YOUR PHYSICIAN - GO DIRECTLY TO THE EMERGENCY ROOM.  Tin Novak MD  5/26/2022 9:18:23 AM  This report has been verified and signed electronically.  Dear patient,  As a result of recent federal legislation (The Federal Cures Act), you may   receive lab or pathology results from your procedure in your [facility]   account before your physician is able to contact you. Your physician or   their representative will relay the results to you with their   recommendations at their soonest availability.  Thank you,  PROVATION

## 2022-05-26 NOTE — ANESTHESIA PREPROCEDURE EVALUATION
05/26/2022  Emi Johnson is a 46 y.o., female.      Pre-op Assessment    I have reviewed the Patient Summary Reports.     I have reviewed the Nursing Notes. I have reviewed the NPO Status.   I have reviewed the Medications.     Review of Systems  Anesthesia Hx:  No problems with previous Anesthesia  History of prior surgery of interest to airway management or planning: Previous anesthesia: General  Denies Personal Hx of Anesthesia complications.   Hematology/Oncology:         -- Anemia:   Cardiovascular:  Cardiovascular Normal     Pulmonary:   Asthma Sleep Apnea    Renal/:  Renal/ Normal     Hepatic/GI:   Liver Disease, crohn's   Neurological:   Neuromuscular Disease, Headaches Seizures    Endocrine:  Endocrine Normal  Metabolic Disorders, Obesity / BMI > 30    Patient Active Problem List   Diagnosis    Migraine syndrome    Raynaud's disease /phenomenon    Fibromyalgia    QUEENIE (obstructive sleep apnea)    Other convulsions    Indeterminate colitis    AMOS (iron deficiency anemia)    Asthma, chronic    Rectal pain    Crohn disease    Weakness of both legs    Visceral hyperalgesia    Abdominal pain    IBD (inflammatory bowel disease)    Ileostomy in place    Crohn's disease    Recurrent oral ulcers    Meralgia paresthetica of right side    Dehydration    High output ileostomy    Hepatic steatosis    Obesity (BMI 30.0-34.9)    Intractable vomiting    Urinary tract infection without hematuria    Para-ileostomy hernia     Past Medical History:   Diagnosis Date    Anemia     Asthma     B12 deficiency     Crohn's disease     History of shingles     Lupus     Migraine headache     Raynaud disease     Reactive hypoglycemia     Seizures 2004    no medication     Sleep apnea     Non compliant with CPAP     Past Surgical History:   Procedure Laterality Date    ABDOMINAL SURGERY       COLON SURGERY      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    COLONOSCOPY      COLOSTOMY      HYSTERECTOMY      due to endometriosis    ILEOSCOPY N/A 4/10/2019    Procedure: ILEOSCOPY through stoma;  Surgeon: Eve Currie MD;  Location: [address];  Service: Endoscopy;  Laterality: N/A;  Schedule as 30 minute case, schedule in April or May 2019    ILEOSTOMY      LAPAROSCOPY W/ MINI-LAPAROTOMY      large intestine removed      just a portion    pelvic mass removal      REVISION COLOSTOMY  2010    SMALL INTESTINE SURGERY      J pouch excision with end ileostomy    STOMACH SURGERY      TONSILLECTOMY, ADENOIDECTOMY           Physical Exam  General: Well nourished, Cooperative and Alert    Airway:  Mallampati: II   Mouth Opening: Normal  TM Distance: Normal  Tongue: Normal  Neck ROM: Normal ROM    Dental:  Intact    Chest/Lungs:  Clear to auscultation, Normal Respiratory Rate    Heart:  Rate: Normal  Rhythm: Regular Rhythm  Sounds: Normal        Anesthesia Plan  Type of Anesthesia, risks & benefits discussed:    Anesthesia Type: Gen Natural Airway, Gen ETT, MAC  Intra-op Monitoring Plan: Standard ASA Monitors  Post Op Pain Control Plan: multimodal analgesia  Induction:  IV  Airway Plan: Direct, Post-Induction  Informed Consent: Informed consent signed with the Patient and all parties understand the risks and agree with anesthesia plan.  All questions answered.   ASA Score: 3  Day of Surgery Review of History & Physical: H&P Update referred to the surgeon/provider.    Ready For Surgery From Anesthesia Perspective.     .

## 2022-05-26 NOTE — PROVATION PATIENT INSTRUCTIONS
Discharge Summary/Instructions after an Endoscopic Procedure  Patient Name: Emi Johnson  Patient MRN: [id number]  Patient YOB: 1975  Thursday, May 26, 2022  Tin Novak MD  Dear patient,  As a result of recent federal legislation (The Federal Cures Act), you may   receive lab or pathology results from your procedure in your [facility]   account before your physician is able to contact you. Your physician or   their representative will relay the results to you with their   recommendations at their soonest availability.  Thank you,  RESTRICTIONS:  During your procedure today, you received medications for sedation.  These   medications may affect your judgment, balance and coordination.  Therefore,   for 24 hours, you have the following restrictions:   - DO NOT drive a car, operate machinery, make legal/financial decisions,   sign important papers or drink alcohol.    ACTIVITY:  Today: no heavy lifting, straining or running due to procedural   sedation/anesthesia.  The following day: return to full activity including work.  DIET:  Eat and drink normally unless instructed otherwise.     TREATMENT FOR COMMON SIDE EFFECTS:  - Mild abdominal pain, nausea, belching, bloating or excessive gas:  rest,   eat lightly and use a heating pad.  - Sore Throat: treat with throat lozenges and/or gargle with warm salt   water.  - Because air was used during the procedure, expelling large amounts of air   from your rectum or belching is normal.  - If a bowel prep was taken, you may not have a bowel movement for 1-3 days.    This is normal.  SYMPTOMS TO WATCH FOR AND REPORT TO YOUR PHYSICIAN:  1. Abdominal pain or bloating, other than gas cramps.  2. Chest pain.  3. Back pain.  4. Signs of infection such as: chills or fever occurring within 24 hours   after the procedure.  5. Rectal bleeding, which would show as bright red, maroon, or black stools.   (A tablespoon of blood from the rectum is not serious, especially  if   hemorrhoids are present.)  6. Vomiting.  7. Weakness or dizziness.  GO DIRECTLY TO THE NEAREST EMERGENCY ROOM IF YOU HAVE ANY OF THE FOLLOWING:      Difficulty breathing              Chills and/or fever over 101 F   Persistent vomiting and/or vomiting blood   Severe abdominal pain   Severe chest pain   Black, tarry stools   Bleeding- more than one tablespoon   Any other symptom or condition that you feel may need urgent attention  Your doctor recommends these additional instructions:  If any biopsies were taken, your doctors clinic will contact you in 1 to 2   weeks with any results.  - Discharge patient to home.   - Perform ileoscopy today.  - No ibuprofen, naproxen, or other non-steroidal anti-inflammatory drugs.   - Use Protonix (pantoprazole) 40 mg PO BID.   - Await pathology results.  For questions, problems or results please call your physician - Tin Novak MD at Work:  [phone].  [facility] [place] [facility], EMERGENCY ROOM PHONE NUMBER: [phone]  IF A COMPLICATION OR EMERGENCY SITUATION ARISES AND YOU ARE UNABLE TO REACH   YOUR PHYSICIAN - GO DIRECTLY TO THE EMERGENCY ROOM.  Tin Novak MD  5/26/2022 9:02:58 AM  This report has been verified and signed electronically.  Dear patient,  As a result of recent federal legislation (The Federal Cures Act), you may   receive lab or pathology results from your procedure in your [facility]   account before your physician is able to contact you. Your physician or   their representative will relay the results to you with their   recommendations at their soonest availability.  Thank you,  PROVATION

## 2022-05-26 NOTE — ANESTHESIA POSTPROCEDURE EVALUATION
Anesthesia Post Evaluation    Patient: Emi Johnson    Procedure(s) Performed: Procedure(s) (LRB):  ESOPHAGOGASTRODUODENOSCOPY (EGD) (N/A)  ILEOSCOPY (N/A)    Final Anesthesia Type: MAC      Patient location during evaluation: [facility]  Patient participation: Yes- Able to Participate  Level of consciousness: awake and alert and oriented  Post-procedure vital signs: reviewed and stable  Pain management: adequate  Airway patency: patent    PONV status at discharge: No PONV  Anesthetic complications: no      Cardiovascular status: hemodynamically stable  Respiratory status: unassisted, spontaneous ventilation and room air  Hydration status: euvolemic  Follow-up not needed.          Vitals Value Taken Time   /60 05/26/22 1000   Temp 36.6 °C (97.9 °F) 05/26/22 0922   Pulse 99 05/26/22 1000   Resp 18 05/26/22 1000   SpO2 96 % 05/26/22 1000         No case tracking events are documented in the log.      Pain/Elliott Score: Elliott Score: 10 (5/26/2022  9:35 AM)

## 2022-05-26 NOTE — PLAN OF CARE
Patient discharged. Pain assessed and acceptable at this time. No complaints of nausea. Discharge instructions reviewed.

## 2022-05-27 ENCOUNTER — PATIENT MESSAGE (OUTPATIENT)
Dept: GASTROENTEROLOGY | Facility: CLINIC | Age: 47
End: 2022-05-27
Payer: COMMERCIAL

## 2022-05-27 RX ORDER — OXYCODONE AND ACETAMINOPHEN 10; 325 MG/1; MG/1
1 TABLET ORAL EVERY 6 HOURS PRN
Qty: 120 TABLET | Refills: 0 | Status: SHIPPED | OUTPATIENT
Start: 2022-05-27 | End: 2022-06-21 | Stop reason: SDUPTHER

## 2022-05-30 ENCOUNTER — PATIENT MESSAGE (OUTPATIENT)
Dept: ADMINISTRATIVE | Facility: HOSPITAL | Age: 47
End: 2022-05-30
Payer: COMMERCIAL

## 2022-06-01 LAB
FINAL PATHOLOGIC DIAGNOSIS: NORMAL
GROSS: NORMAL
Lab: NORMAL

## 2022-06-02 ENCOUNTER — PATIENT MESSAGE (OUTPATIENT)
Dept: GASTROENTEROLOGY | Facility: CLINIC | Age: 47
End: 2022-06-02
Payer: COMMERCIAL

## 2022-06-02 DIAGNOSIS — K50.80 CROHN'S DISEASE OF BOTH SMALL AND LARGE INTESTINE WITHOUT COMPLICATION: Primary | ICD-10-CM

## 2022-06-07 ENCOUNTER — PATIENT MESSAGE (OUTPATIENT)
Dept: INTERNAL MEDICINE | Facility: CLINIC | Age: 47
End: 2022-06-07
Payer: COMMERCIAL

## 2022-06-08 NOTE — TELEPHONE ENCOUNTER
Spoke with patient and scheduled VCE for Friday June 17, 2022 at 7:30 am  Reviewed Miralax Prep and appointment instructions instructions thoroughly.  Pt verbalized understanding to all and has no further questions at this time.   Emailed instructions and notice of appointment to patient at [email or web address]

## 2022-06-09 ENCOUNTER — PATIENT MESSAGE (OUTPATIENT)
Dept: INTERNAL MEDICINE | Facility: CLINIC | Age: 47
End: 2022-06-09
Payer: COMMERCIAL

## 2022-06-09 DIAGNOSIS — J06.9 URI (UPPER RESPIRATORY INFECTION): ICD-10-CM

## 2022-06-09 RX ORDER — TRIAMCINOLONE ACETONIDE 55 UG/1
2 SPRAY, METERED NASAL DAILY
Qty: 16.9 ML | Refills: 0 | Status: SHIPPED | OUTPATIENT
Start: 2022-06-09

## 2022-06-09 RX ORDER — FLUTICASONE PROPIONATE 50 MCG
2 SPRAY, SUSPENSION (ML) NASAL DAILY
Qty: 16 G | Refills: 11 | Status: SHIPPED | OUTPATIENT
Start: 2022-06-09 | End: 2022-06-09

## 2022-06-15 ENCOUNTER — TELEPHONE (OUTPATIENT)
Dept: GASTROENTEROLOGY | Facility: CLINIC | Age: 47
End: 2022-06-15
Payer: COMMERCIAL

## 2022-06-15 NOTE — TELEPHONE ENCOUNTER
Spke with patient and advised that her VCE was not authorized.  She was going to contact her insurance company at lunch today and let me know.  I explained that if was not authorized it would have to be cancelled, but that I would give her a chance to speak to her insurance company.  RN will monitor today for any information.

## 2022-06-15 NOTE — TELEPHONE ENCOUNTER
Received message from call center that patient said it would take 2-4 weeks for insiurance approval so her VCE was rescheduled until 7/22/2022 at 7:30 am.     Left pt voicemail to call me to discuss.

## 2022-06-17 ENCOUNTER — TELEPHONE (OUTPATIENT)
Dept: GASTROENTEROLOGY | Facility: CLINIC | Age: 47
End: 2022-06-17
Payer: COMMERCIAL

## 2022-06-21 ENCOUNTER — PATIENT MESSAGE (OUTPATIENT)
Dept: INTERNAL MEDICINE | Facility: CLINIC | Age: 47
End: 2022-06-21
Payer: COMMERCIAL

## 2022-06-21 DIAGNOSIS — K50.00 CROHN'S DISEASE OF SMALL INTESTINE WITHOUT COMPLICATION: ICD-10-CM

## 2022-06-21 DIAGNOSIS — M79.7 FIBROMYALGIA: ICD-10-CM

## 2022-06-21 RX ORDER — OXYCODONE AND ACETAMINOPHEN 10; 325 MG/1; MG/1
1 TABLET ORAL EVERY 6 HOURS PRN
Qty: 120 TABLET | Refills: 0 | Status: SHIPPED | OUTPATIENT
Start: 2022-06-25 | End: 2022-07-22 | Stop reason: SDUPTHER

## 2022-07-01 ENCOUNTER — PATIENT MESSAGE (OUTPATIENT)
Dept: INTERNAL MEDICINE | Facility: CLINIC | Age: 47
End: 2022-07-01
Payer: COMMERCIAL

## 2022-07-05 NOTE — TELEPHONE ENCOUNTER
Pt c/o popped blood vessel in right eye. Also hives on right side of body that only comes at night and is gone in the morning. pic attached.   Please advise.

## 2022-07-06 ENCOUNTER — TELEPHONE (OUTPATIENT)
Dept: GASTROENTEROLOGY | Facility: CLINIC | Age: 47
End: 2022-07-06
Payer: COMMERCIAL

## 2022-07-06 VITALS — SYSTOLIC BLOOD PRESSURE: 153 MMHG | DIASTOLIC BLOOD PRESSURE: 117 MMHG

## 2022-07-06 RX ORDER — ZOLPIDEM TARTRATE 10 MG/1
TABLET ORAL
Qty: 90 TABLET | Refills: 0 | Status: SHIPPED | OUTPATIENT
Start: 2022-07-06 | End: 2022-07-20

## 2022-07-06 NOTE — TELEPHONE ENCOUNTER
Please inform patient that it is possible this is the cause of blood vessel popping and high.  Please schedule appointment soon.

## 2022-07-06 NOTE — TELEPHONE ENCOUNTER
----- Message from Char Cortes sent at 7/6/2022  8:32 AM CDT -----  Regarding: RE: trace Johnson  Good morning!  Pt is approved! Thanks      ----- Message -----  From: Marjorie Powell RN  Sent: 7/6/2022   8:06 AM CDT  To: Char Cortes  Subject: RE: trace Johnson                          Good morning, Char,    Just following up on this patient's VCE.  Has it been approved yet?  Please let me know.    Thanks,  Marjorie    ----- Message -----  From: Char Cortes  Sent: 6/22/2022   9:28 AM CDT  To: Marjorie Powell RN  Subject: RE: trace Pradonier                          Good Morning!    Yes,  Still being reviewed by the rn. Not approved yet.      ----- Message -----  From: Marjorie Powell RN  Sent: 6/22/2022   8:38 AM CDT  To: Char Cortes, Kishore BRIAN Staff  Subject: RE: trace Johnson                          Char,    Can you please give me an update on if the VCE has been approved or what the status is?    Thanks so much,  Marjorie Powell RN     ----- Message -----  From: Char Cortes  Sent: 6/16/2022   1:38 PM CDT  To: Marjorie Powell RN  Subject: RE: trace chavarriamarcos Johnson                          Yes, great thanks    ----- Message -----  From: Marjorie Powell RN  Sent: 6/16/2022   1:02 PM CDT  To: Char Cortes  Subject: RE: trace Pardo,    She had a proctocolectomy so they now will do ileoscopy or pouchoscopy only.  Those are in the chart, as well as a previous VCE.  I hope this helps.    Marjorie Bailon  ----- Message -----  From: Char Cortes  Sent: 6/16/2022  11:12 AM CDT  To: Marjorie Powell RN  Subject: trace Fonseca Morning marjorie,        I was checking to see if you know if the pt has had a recent colonoscopy?  I did not see one in the     Pt's chart. The insurance companies like a copy of that.     Thanks    Char

## 2022-07-06 NOTE — TELEPHONE ENCOUNTER
LVM for patient to call me back at [phone] re: CARLIE on 7/22 has been approved.  Will send new instructions to patient via e-mail as before.

## 2022-07-11 ENCOUNTER — DOCUMENTATION ONLY (OUTPATIENT)
Dept: GASTROENTEROLOGY | Facility: CLINIC | Age: 47
End: 2022-07-11
Payer: COMMERCIAL

## 2022-07-12 ENCOUNTER — PATIENT MESSAGE (OUTPATIENT)
Dept: ADMINISTRATIVE | Facility: HOSPITAL | Age: 47
End: 2022-07-12
Payer: COMMERCIAL

## 2022-07-22 ENCOUNTER — OFFICE VISIT (OUTPATIENT)
Dept: INTERNAL MEDICINE | Facility: CLINIC | Age: 47
End: 2022-07-22
Payer: COMMERCIAL

## 2022-07-22 ENCOUNTER — TELEPHONE (OUTPATIENT)
Dept: GASTROENTEROLOGY | Facility: CLINIC | Age: 47
End: 2022-07-22
Payer: COMMERCIAL

## 2022-07-22 ENCOUNTER — CLINICAL SUPPORT (OUTPATIENT)
Dept: GASTROENTEROLOGY | Facility: CLINIC | Age: 47
End: 2022-07-22
Payer: COMMERCIAL

## 2022-07-22 VITALS
HEART RATE: 101 BPM | WEIGHT: 194.13 LBS | HEIGHT: 63 IN | OXYGEN SATURATION: 98 % | TEMPERATURE: 98 F | DIASTOLIC BLOOD PRESSURE: 78 MMHG | BODY MASS INDEX: 34.4 KG/M2 | SYSTOLIC BLOOD PRESSURE: 120 MMHG

## 2022-07-22 DIAGNOSIS — K50.00 CROHN'S DISEASE OF SMALL INTESTINE WITHOUT COMPLICATION: ICD-10-CM

## 2022-07-22 DIAGNOSIS — K50.80 CROHN'S DISEASE OF BOTH SMALL AND LARGE INTESTINE WITHOUT COMPLICATION: ICD-10-CM

## 2022-07-22 DIAGNOSIS — M79.7 FIBROMYALGIA: ICD-10-CM

## 2022-07-22 PROCEDURE — 3078F PR MOST RECENT DIASTOLIC BLOOD PRESSURE < 80 MM HG: ICD-10-PCS | Mod: CPTII,S$GLB,, | Performed by: INTERNAL MEDICINE

## 2022-07-22 PROCEDURE — 3008F PR BODY MASS INDEX (BMI) DOCUMENTED: ICD-10-PCS | Mod: CPTII,S$GLB,, | Performed by: INTERNAL MEDICINE

## 2022-07-22 PROCEDURE — 99999 PR PBB SHADOW E&M-EST. PATIENT-LVL V: ICD-10-PCS | Mod: PBBFAC,,, | Performed by: INTERNAL MEDICINE

## 2022-07-22 PROCEDURE — 1159F PR MEDICATION LIST DOCUMENTED IN MEDICAL RECORD: ICD-10-PCS | Mod: CPTII,S$GLB,, | Performed by: INTERNAL MEDICINE

## 2022-07-22 PROCEDURE — 99213 OFFICE O/P EST LOW 20 MIN: CPT | Mod: S$GLB,,, | Performed by: INTERNAL MEDICINE

## 2022-07-22 PROCEDURE — 99999 PR PBB SHADOW E&M-EST. PATIENT-LVL V: CPT | Mod: PBBFAC,,, | Performed by: INTERNAL MEDICINE

## 2022-07-22 PROCEDURE — 91110 GI TRC IMG INTRAL ESOPH-ILE: CPT | Mod: TC | Performed by: INTERNAL MEDICINE

## 2022-07-22 PROCEDURE — 91110 GI TRC IMG INTRAL ESOPH-ILE: CPT | Mod: S$GLB,,, | Performed by: INTERNAL MEDICINE

## 2022-07-22 PROCEDURE — 3008F BODY MASS INDEX DOCD: CPT | Mod: CPTII,S$GLB,, | Performed by: INTERNAL MEDICINE

## 2022-07-22 PROCEDURE — 99999 PR PBB SHADOW E&M-EST. PATIENT-LVL II: CPT | Mod: PBBFAC,,,

## 2022-07-22 PROCEDURE — 3078F DIAST BP <80 MM HG: CPT | Mod: CPTII,S$GLB,, | Performed by: INTERNAL MEDICINE

## 2022-07-22 PROCEDURE — 91110 PR GI TRACT CAPSULE ENDOSCOPY: ICD-10-PCS | Mod: S$GLB,,, | Performed by: INTERNAL MEDICINE

## 2022-07-22 PROCEDURE — 1160F RVW MEDS BY RX/DR IN RCRD: CPT | Mod: CPTII,S$GLB,, | Performed by: INTERNAL MEDICINE

## 2022-07-22 PROCEDURE — 3074F SYST BP LT 130 MM HG: CPT | Mod: CPTII,S$GLB,, | Performed by: INTERNAL MEDICINE

## 2022-07-22 PROCEDURE — 3074F PR MOST RECENT SYSTOLIC BLOOD PRESSURE < 130 MM HG: ICD-10-PCS | Mod: CPTII,S$GLB,, | Performed by: INTERNAL MEDICINE

## 2022-07-22 PROCEDURE — 99999 PR PBB SHADOW E&M-EST. PATIENT-LVL II: ICD-10-PCS | Mod: PBBFAC,,,

## 2022-07-22 PROCEDURE — 99213 PR OFFICE/OUTPT VISIT, EST, LEVL III, 20-29 MIN: ICD-10-PCS | Mod: S$GLB,,, | Performed by: INTERNAL MEDICINE

## 2022-07-22 PROCEDURE — 1160F PR REVIEW ALL MEDS BY PRESCRIBER/CLIN PHARMACIST DOCUMENTED: ICD-10-PCS | Mod: CPTII,S$GLB,, | Performed by: INTERNAL MEDICINE

## 2022-07-22 PROCEDURE — 1159F MED LIST DOCD IN RCRD: CPT | Mod: CPTII,S$GLB,, | Performed by: INTERNAL MEDICINE

## 2022-07-22 RX ORDER — AMOXICILLIN AND CLAVULANATE POTASSIUM 400; 57 MG/5ML; MG/5ML
800 POWDER, FOR SUSPENSION ORAL EVERY 12 HOURS
Qty: 140 ML | Refills: 0 | Status: SHIPPED | OUTPATIENT
Start: 2022-07-22 | End: 2022-07-29

## 2022-07-22 RX ORDER — OXYCODONE AND ACETAMINOPHEN 10; 325 MG/1; MG/1
1 TABLET ORAL EVERY 6 HOURS PRN
Qty: 120 TABLET | Refills: 0 | Status: SHIPPED | OUTPATIENT
Start: 2022-07-22 | End: 2022-08-17 | Stop reason: SDUPTHER

## 2022-07-22 NOTE — TELEPHONE ENCOUNTER
Pt swallow capsule @0787 w/o incident. Pt instruction review for today. Pt verbalizes understanding. Pt due to return back @430p.

## 2022-07-22 NOTE — PROGRESS NOTES
CC: followup of hypertension  HPI:  The patient is a 46 y.o. year old female who presents to the office for followup of hypertension.  The patient denies any chest pain, shortness of breath, blurred vision, nausea or vomiting, but reports occasional headache.  She also reports fatigue.  She had COVID about a month ago.  She then developed a diffuse rash.  Rash was pruritic and resolved with benadryl.  The patient reports she then popped a blood vessel in both eyes.  She then started monitoring her blood pressures with elevated readings.  She also reports postnasal drip and cough that started about 3 days ago.  The patient reports one day history of pelvic pressure.    PAST MEDICAL HISTORY:  Past Medical History:   Diagnosis Date    Anemia     Asthma     B12 deficiency     Crohn's disease     History of shingles     Lupus     Migraine headache     Raynaud disease     Reactive hypoglycemia     Seizures 2004    no medication     Sleep apnea     Non compliant with CPAP       SURGICAL HISTORY:  Past Surgical History:   Procedure Laterality Date    ABDOMINAL SURGERY      COLON SURGERY      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    COLONOSCOPY      COLOSTOMY      ESOPHAGOGASTRODUODENOSCOPY N/A 5/26/2022    Procedure: ESOPHAGOGASTRODUODENOSCOPY (EGD);  Surgeon: Tin Novak MD;  Location: [facility] (2ND FLR);  Service: Endoscopy;  Laterality: N/A;  fully vaccinated    HYSTERECTOMY      due to endometriosis    ILEOSCOPY N/A 4/10/2019    Procedure: ILEOSCOPY through stoma;  Surgeon: Eve Currie MD;  Location: [facility] (4TH FLR);  Service: Endoscopy;  Laterality: N/A;  Schedule as 30 minute case, schedule in April or May 2019    ILEOSCOPY N/A 5/26/2022    Procedure: ILEOSCOPY;  Surgeon: Tin Novak MD;  Location: [facility] (2ND [facility]);  Service: Endoscopy;  Laterality: N/A;    ILEOSTOMY      LAPAROSCOPY W/ MINI-LAPAROTOMY      large intestine removed      just a portion    pelvic  mass removal      REVISION COLOSTOMY  2010    SMALL INTESTINE SURGERY      J pouch excision with end ileostomy    STOMACH SURGERY      TONSILLECTOMY, ADENOIDECTOMY         MEDS:  Medcard reviewed and updated    ALLERGIES: Allergy Card reviewed and updated    SOCIAL HISTORY:   The patient is a nonsmoker.    PE:   APPEARANCE: Well nourished, well developed, in no acute distress.    CHEST: Lungs clear to auscultation with unlabored respirations.  CARDIOVASCULAR: Normal S1, S2. No murmurs. No carotid bruits. No pedal edema.  ABDOMEN: Bowel sounds normal. Not distended. Soft. No tenderness or masses.  Positive colostomy.  PSYCHIATRIC: The patient is oriented to person, place, and time and has a pleasant affect.        ASSESSMENT/PLAN:  Emi was seen today for follow-up and annual exam.    Diagnoses and all orders for this visit:    Fibromyalgia  -     oxyCODONE-acetaminophen (PERCOCET)  mg per tablet; Take 1 tablet by mouth every 6 (six) hours as needed for Pain (Greater than 7 day supply medically necessary).    Crohn's disease of small intestine without complication  -     oxyCODONE-acetaminophen (PERCOCET)  mg per tablet; Take 1 tablet by mouth every 6 (six) hours as needed for Pain (Greater than 7 day supply medically necessary).    Other orders  -     amoxicillin-clavulanate (AUGMENTIN) 400-57 mg/5 mL SusR; Take 10 mLs (800 mg total) by mouth every 12 (twelve) hours. for 7 days

## 2022-08-02 ENCOUNTER — PATIENT MESSAGE (OUTPATIENT)
Dept: GASTROENTEROLOGY | Facility: CLINIC | Age: 47
End: 2022-08-02
Payer: COMMERCIAL

## 2022-08-02 DIAGNOSIS — K50.80 CROHN'S DISEASE OF BOTH SMALL AND LARGE INTESTINE WITHOUT COMPLICATION: Primary | ICD-10-CM

## 2022-08-03 ENCOUNTER — PATIENT MESSAGE (OUTPATIENT)
Dept: BARIATRICS | Facility: CLINIC | Age: 47
End: 2022-08-03
Payer: COMMERCIAL

## 2022-08-03 NOTE — TELEPHONE ENCOUNTER
2nd attempt  LVM for patient to call me back at [phone] re: did capsule pass and need Stelara trough level

## 2022-08-03 NOTE — PROVATION PATIENT INSTRUCTIONS
Discharge Summary/Instructions for after Video Capsule Endoscopy  Patient Name: Emi Johnson  Patient MRN: [id number]  Patient YOB: 1975 Friday, July 22, 2022  Tin Novak MD  This is a 46 year old female.  Refer to note in patient chart for   documentation of history and physical.  1.  Do Not eat or drink anything for 1 hour.  Try sips of water first.  If   tolerated, resume your regular diet or one recommended by your physician.  2.  Do not drive, operate machinery, make critical decisions, or do   activities that require coordination or balance for 24 hours.  3.   You may experience a sore throat for 24 to 48 hours.  You may use   throat lozenges or gargle with warm salt water to relieve the discomfort.  4.  Because air was put into your stomach during the procedure, you may   experience some belching.  5.  Do not use any medication containing aspirin for 10 days.  6.  Go directly to the emergency room if you notice any of the following:   Chills and/or fever over 101 F   Persistent vomiting or vomiting with blood/nasal regurgitation   Severe abdominal pain, other than gas cramps   Severe chest pain   Black, tarry stools     Your doctor recommends these additional instructions:  You are being discharged to home.   Resume your previous diet today.   Return to your referring physician at appointment to be scheduled.   - Will check a Stelara level prior to her next dose to evaluate if dose   optimization may be worthwhile.  I would doubt that the findings would   explain her significant abdominal pain issues.  If you have any questions or problems, please call your physician.  EMERGENCY PHONE NUMBER: [phone]  LAB RESULTS: [phone]  Tin Novak MD  8/3/2022 12:14:02 PM  This report has been verified and signed electronically.  Dear patient,  As a result of recent federal legislation (The Federal Cures Act), you may   receive lab or pathology results from your procedure in  your [facility]   account before your physician is able to contact you. Your physician or   their representative will relay the results to you with their   recommendations at their soonest availability.  Thank you,  PROVATION

## 2022-08-08 ENCOUNTER — PATIENT MESSAGE (OUTPATIENT)
Dept: GASTROENTEROLOGY | Facility: CLINIC | Age: 47
End: 2022-08-08
Payer: COMMERCIAL

## 2022-08-08 NOTE — TELEPHONE ENCOUNTER
3rd attempt to contact  [facility] for patient to call me back at [phone] re:  Did capsule pass  Needs Stelara trough

## 2022-08-17 ENCOUNTER — PATIENT MESSAGE (OUTPATIENT)
Dept: INTERNAL MEDICINE | Facility: CLINIC | Age: 47
End: 2022-08-17
Payer: COMMERCIAL

## 2022-08-17 DIAGNOSIS — M79.7 FIBROMYALGIA: ICD-10-CM

## 2022-08-17 DIAGNOSIS — K50.00 CROHN'S DISEASE OF SMALL INTESTINE WITHOUT COMPLICATION: ICD-10-CM

## 2022-08-17 RX ORDER — OXYCODONE AND ACETAMINOPHEN 10; 325 MG/1; MG/1
1 TABLET ORAL EVERY 6 HOURS PRN
Qty: 120 TABLET | Refills: 0 | Status: SHIPPED | OUTPATIENT
Start: 2022-08-19 | End: 2022-09-14 | Stop reason: SDUPTHER

## 2022-08-23 ENCOUNTER — TELEPHONE (OUTPATIENT)
Dept: GASTROENTEROLOGY | Facility: CLINIC | Age: 47
End: 2022-08-23
Payer: COMMERCIAL

## 2022-09-01 ENCOUNTER — PATIENT MESSAGE (OUTPATIENT)
Dept: INTERNAL MEDICINE | Facility: CLINIC | Age: 47
End: 2022-09-01
Payer: COMMERCIAL

## 2022-09-01 DIAGNOSIS — R30.0 DYSURIA: Primary | ICD-10-CM

## 2022-09-01 RX ORDER — CIPROFLOXACIN 500 MG/1
500 TABLET ORAL EVERY 12 HOURS
Qty: 14 TABLET | Refills: 0 | Status: SHIPPED | OUTPATIENT
Start: 2022-09-01 | End: 2023-10-02 | Stop reason: ALTCHOICE

## 2022-09-01 NOTE — TELEPHONE ENCOUNTER
Urinalysis and urine culture have been ordered.  Please schedule.  Also, prescription for Cipro has been sent to the pharmacy electronically.

## 2022-09-10 ENCOUNTER — OFFICE VISIT (OUTPATIENT)
Dept: URGENT CARE | Facility: CLINIC | Age: 47
End: 2022-09-10
Payer: COMMERCIAL

## 2022-09-10 ENCOUNTER — PATIENT MESSAGE (OUTPATIENT)
Dept: INTERNAL MEDICINE | Facility: CLINIC | Age: 47
End: 2022-09-10
Payer: COMMERCIAL

## 2022-09-10 VITALS
HEART RATE: 86 BPM | HEIGHT: 63 IN | DIASTOLIC BLOOD PRESSURE: 80 MMHG | OXYGEN SATURATION: 98 % | BODY MASS INDEX: 34.38 KG/M2 | TEMPERATURE: 99 F | RESPIRATION RATE: 20 BRPM | WEIGHT: 194 LBS | SYSTOLIC BLOOD PRESSURE: 131 MMHG

## 2022-09-10 DIAGNOSIS — J06.9 VIRAL URI WITH COUGH: Primary | ICD-10-CM

## 2022-09-10 DIAGNOSIS — R05.8 COUGH WITH CONGESTION OF PARANASAL SINUS: ICD-10-CM

## 2022-09-10 DIAGNOSIS — H65.192 ACUTE MEE (MIDDLE EAR EFFUSION), LEFT: ICD-10-CM

## 2022-09-10 DIAGNOSIS — H92.02 LEFT EAR PAIN: ICD-10-CM

## 2022-09-10 DIAGNOSIS — Z20.822 ENCOUNTER FOR LABORATORY TESTING FOR COVID-19 VIRUS: ICD-10-CM

## 2022-09-10 DIAGNOSIS — R09.81 COUGH WITH CONGESTION OF PARANASAL SINUS: ICD-10-CM

## 2022-09-10 LAB
CTP QC/QA: YES
SARS-COV-2 RDRP RESP QL NAA+PROBE: NEGATIVE

## 2022-09-10 PROCEDURE — 3075F PR MOST RECENT SYSTOLIC BLOOD PRESS GE 130-139MM HG: ICD-10-PCS | Mod: CPTII,S$GLB,, | Performed by: PHYSICIAN ASSISTANT

## 2022-09-10 PROCEDURE — 3075F SYST BP GE 130 - 139MM HG: CPT | Mod: CPTII,S$GLB,, | Performed by: PHYSICIAN ASSISTANT

## 2022-09-10 PROCEDURE — 3008F BODY MASS INDEX DOCD: CPT | Mod: CPTII,S$GLB,, | Performed by: PHYSICIAN ASSISTANT

## 2022-09-10 PROCEDURE — 1159F MED LIST DOCD IN RCRD: CPT | Mod: CPTII,S$GLB,, | Performed by: PHYSICIAN ASSISTANT

## 2022-09-10 PROCEDURE — U0002: ICD-10-PCS | Mod: QW,S$GLB,, | Performed by: PHYSICIAN ASSISTANT

## 2022-09-10 PROCEDURE — 99214 OFFICE O/P EST MOD 30 MIN: CPT | Mod: S$GLB,,, | Performed by: PHYSICIAN ASSISTANT

## 2022-09-10 PROCEDURE — 3008F PR BODY MASS INDEX (BMI) DOCUMENTED: ICD-10-PCS | Mod: CPTII,S$GLB,, | Performed by: PHYSICIAN ASSISTANT

## 2022-09-10 PROCEDURE — 3079F DIAST BP 80-89 MM HG: CPT | Mod: CPTII,S$GLB,, | Performed by: PHYSICIAN ASSISTANT

## 2022-09-10 PROCEDURE — 99214 PR OFFICE/OUTPT VISIT, EST, LEVL IV, 30-39 MIN: ICD-10-PCS | Mod: S$GLB,,, | Performed by: PHYSICIAN ASSISTANT

## 2022-09-10 PROCEDURE — 1159F PR MEDICATION LIST DOCUMENTED IN MEDICAL RECORD: ICD-10-PCS | Mod: CPTII,S$GLB,, | Performed by: PHYSICIAN ASSISTANT

## 2022-09-10 PROCEDURE — 3079F PR MOST RECENT DIASTOLIC BLOOD PRESSURE 80-89 MM HG: ICD-10-PCS | Mod: CPTII,S$GLB,, | Performed by: PHYSICIAN ASSISTANT

## 2022-09-10 PROCEDURE — U0002 COVID-19 LAB TEST NON-CDC: HCPCS | Mod: QW,S$GLB,, | Performed by: PHYSICIAN ASSISTANT

## 2022-09-10 NOTE — PATIENT INSTRUCTIONS
PLEASE READ YOUR DISCHARGE INSTRUCTIONS ENTIRELY AS IT CONTAINS IMPORTANT INFORMATION.    Patient had covid testing done today.      If Negative and no direct exposure: symptom free without fever reducing meds in 24 hours - can go back to work in 24 hours with surgical mask for 14 days.  If you are directly exposed to someone who tested positive, you need to return for repeat testing in the next 5-7 days.  You may return sooner if you have any new worsening symptoms.    Discussed corona virus precautions and reviewed CDC FAC; printed a copy for patient.  I discussed to continue to monitor their symptoms. Discussed that if their symptoms persist or worsen to seek re-evaluation. Clinic vs. ER precautions were given.  Patient verbalized understanding and agreed with the above plan of care.    - Rest.  Drink plenty of fluids.    - Tylenol as directed as needed for fever/pain.  For Tylenol, do not exceed 3000 mg/ day. If no contraindication.  -OK to supplement with OTC DayQuil, NyQuil or TheraFlu every 6 hours as needed for cough and congestion.  Use caution of total amount of Tylenol/acetaminophen per day.  - Reviewed radiographs and all diagnostic testing with patient/family.      -Below are suggestions for symptomatic relief:              -Salt water gargles to soothe throat pain.              -Chloroseptic spray also helps to numb throat pain.              -Nasal saline spray reduces inflammation and dryness.              -Warm face compresses to help with facial sinus pain/pressure.              -Vicks vapor rub at night.              -Flonase OTC or Nasacort OTC  once a day for nasal/sinus congestion. DON'T USE IF YOU HAVE GLAUCOMA. CHECK WITH YOUR PHARMACIST/PHYSICIAN.              -Simple foods like chicken noodle soup.              -Mucinex DM (ANY COUGH EXPECTORANT--guaifenesin) for cough or chest congestion with mucus during the day time. Delsym or robitussin (ANY COUGH SUPPRESSANT--dextromethorphan) helps  with coughing at night. Mucinex-DM if you have chest congestion or sputum (caution if history of high blood pressure or palpitations).              -Zyrtec/Claritin/xyzal during the day time  & Benadryl at night (only if severe runny nose) may help with allergies and runny nose. Add decongestant if you have nasal/sinus congestion/sinus pressure/ear fullness sensation. (see below)    Caution with use of Decongestant meds:  -If you DO NOT have Hypertension or any history of palpitations, it is ok to take over the counter Sudafed or Mucinex D or Allegra-D or Claritin-D or Zyrtec-D.  -If you do take one of the above, it is ok to combine that with plain over the counter Mucinex or Allegra or Claritin or Zyrtec. If, for example, you are taking Zyrtec -D, you can combine that with Mucinex, but not Mucinex-D.  If you are taking Mucinex-D, you can combine that with plain Allegra or Claritin or Zyrtec.     -Do not combine pseudophed or phenylephrine with any other brand allergy-D for DECONGESTANT.   -Or vice versa, you can you take plain allergy medications (allegra/claritin/zyrtec with NO Decongestant) and ADD OTC pseudophed or phenelyphrine 2-3 times a day. Avoid taking decongestant late at night or with caffeine as it can keep you up or cause jittery feeling.    -If you DO have Hypertension , anxiety, or palpitations, it is safe to take Coricidin HBP for relief of sinus symptoms.      For your GI symptoms:  -Use gatorade/pedialyte or rehydration packets to help stay hydrated. Vitamin water and plain water do not contain rehydrating electrolytes.  -Increase clear liquids (water, gatorade, pedialyte, broths, jello, etc) Hold off on solids for 12-18 hours. Then advance to BRAT diet (banana, rice, applesauce, tea, toast/crackers), then advance further as tolerated. Avoid spicy or fatty foods.   -May take Imitrol OTC as needed for nausea.   -Use Peptobismol or Immodium to help alleviate your diarrhea symptoms.   -Take mylanta  or simethicone for bloating or gas pain.   -Take pepcid or omeprazole if you have heartburn or reflux sensation.  -Avoid imodium unless you have more than 6 loose stools in 24 hours. Take 1 dose and monitor to see if you can repeat AS IT WILL CAUSE CONSTIPATION.  -Wash hands frequently while sick. Avoid ibuprofen or other NSAIDS until you are well.   -Please go to the ER if you experience worsening abdominal pain, blood in your vomit or stool, high fever, dizziness, fainting, swelling of your abdomen, inability to pass gas or stool, or inability to urinate.         -You must understand that you've received an Urgent Care treatment only and that you may be released before all your medical problems are known or treated. You, the patient, will arrange for follow up care as instructed. Please arrange follow up with your primary medical clinic within 2-5 days if your signs and symptoms have not resolved or worsen.     - Follow up with your PCP or specialty clinic as directed.  You can call [phone] or [phone] to schedule an appointment with the appropriate provider.    - If your condition worsens or fails to improve we recommend that you receive another evaluation at the emergency room immediately or contact your primary medical clinic to discuss your concerns.              Prevention steps for patients with confirmed or suspected COVID-19  Stay home and stay away from family members and friends. The CDC says, you can leave home after these three things have happened: 1) You have had no fever for at least 24 hours (that is one full day of no fever without the use of medicine that reduces fevers) 2) AND other symptoms have improved (for example, when your cough or shortness of breath have improved) 3) AND at least 10 days have passed since your symptoms first appeared OR after 10 days passed from first positive test.  Separate yourself from other people and animals in your home.  Call ahead before visiting your  doctor.  Wear a facemask.  Cover your coughs and sneezes.  Wash your hands often with soap and water; hand  can be used, too.  Avoid sharing personal household items.  Wipe down surfaces used daily.  Monitor your symptoms. Seek prompt medical attention if your illness is worsening (e.g., difficulty breathing).   Before seeking care, call your healthcare provider.  If you have a medical emergency and need to call 911, notify the dispatch personnel that you have, or are being evaluated for COVID-19. If possible, put on a facemask before emergency medical services arrive.        Recommended precautions for household members, intimate partners, and caregivers in a home setting of a patient with symptomatic laboratory-confirmed COVID-19 or a patient under investigation.  Household members, intimate partners, and caregivers in the home setting awaiting tests results have close contact with a person with symptomatic, laboratory-confirmed COVID-19 or a person under investigation. Close contacts should monitor their health; they should call their provider right away if they develop symptoms suggestive of COVID-19 (e.g., fever, cough, shortness of breath).    Close contacts should also follow these recommendations:  Make sure that you understand and can help the patient follow their provider's instructions for medication(s) and care. You should help the patient with basic needs in the home and provide support for getting groceries, prescriptions, and other personal needs.  Monitor the patient's symptoms. If the patient is getting sicker, call his or her healthcare provider and tell them that the patient has laboratory-confirmed COVID-19. If the patient has a medical emergency and you need to call 911, notify the dispatch personnel that the patient has, or is being evaluated for COVID-19.  Household members should stay in another room or be  from the patient. Household members should use a separate bedroom  and bathroom, if available.  Prohibit visitors.  Household members should care for any pets in the home.  Make sure that shared spaces in the home have good air flow, such as by an air conditioner or an opened window, weather permitting.  Perform hand hygiene frequently. Wash your hands often with soap and water for at least 20 seconds or use an alcohol-based hand  (that contains > 60% alcohol) covering all surfaces of your hands and rubbing them together until they feel dry. Soap and water should be used preferentially.  Avoid touching your eyes, nose, and mouth.  The patient should wear a facemask. If the patient is not able to wear a facemask (for example, because it causes trouble breathing), caregivers should wear a mask when they are in the same room as the patient.  Wear a disposable facemask and gloves when you touch or have contact with the patient's blood, stool, or body fluids, such as saliva, sputum, nasal mucus, vomit, urine.  Throw out disposable facemasks and gloves after using them. Do not reuse.  When removing personal protective equipment, first remove and dispose of gloves. Then, immediately clean your hands with soap and water or alcohol-based hand . Next, remove and dispose of facemask, and immediately clean your hands again with soap and water or alcohol-based hand .  You should not share dishes, drinking glasses, cups, eating utensils, towels, bedding, or other items with the patient. After the patient uses these items, you should wash them thoroughly (see below Wash laundry thoroughly).  Clean all high-touch surfaces, such as counters, tabletops, doorknobs, bathroom fixtures, toilets, phones, keyboards, tablets, and bedside tables, every day. Also, clean any surfaces that may have blood, stool, or body fluids on them.  Use a household cleaning spray or wipe, according to the label instructions. Labels contain instructions for safe and effective use of the  cleaning product including precautions you should take when applying the product, such as wearing gloves and making sure you have good ventilation during use of the product.  Wash laundry thoroughly.  Immediately remove and wash clothes or bedding that have blood, stool, or body fluids on them.  Wear disposable gloves while handling soiled items and keep soiled items away from your body. Clean your hands (with soap and water or an alcohol-based hand ) immediately after removing your gloves.  Read and follow directions on labels of laundry or clothing items and detergent. In general, using a normal laundry detergent according to washing machine instructions and dry thoroughly using the warmest temperatures recommended on the clothing label.  Place all used disposable gloves, facemasks, and other contaminated items in a lined container before disposing of them with other household waste. Clean your hands (with soap and water or an alcohol-based hand ) immediately after handling these items. Soap and water should be used preferentially if hands are visibly dirty.  Discuss any additional questions with your state or local health department or healthcare provider. Check available hours when contacting your local health department.    For more information see CDC link below.      https://www.cdc.gov/coronavirus/2019-ncov/hcp/guidance-prevent-spread.html#precautions        Sources:  [facility], [facility]          Instructions for Home Care of Patients and Caretakers with Coronavirus Disease 2019  Limit visitors to the home.  Older persons and those that have chronic medical conditions such as diabetes, lung and heart disease are at increased risk for illness.   If possible, patients should use a separate bedroom while recovering. Caregivers and household members should avoid prolonged contact with the patient which means to stay 6 feet away and avoid contact with cough  droplets.  When close contact is necessary, wash your hands before and immediately after contact.   Perform hand hygiene frequently. Wash your hands often with soap and water for at least 20 seconds or use an alcohol-based hand , covering all surfaces of your hands and rubbing them together until they feel dry.   Avoid touching your eyes, nose, and mouth with unwashed hands.  Avoid sharing household items with the patient. You should not share dishes, drinking glasses, cups, eating utensils, towels, bedding, or other items. After the patient uses these items, you should wash them thoroughly.  Wash laundry thoroughly.   Immediately remove and wash clothes or bedding that have blood, stool, or body fluids on them.  Clean all high-touch surfaces, such as counters, tabletops, doorknobs, bathroom fixtures, toilets, phones, keyboards, tablets, and bedside tables, every day.   Use a household cleaning spray or wipe, according to the label instructions. Labels contain instructions for safe and effective use of the cleaning product including precautions you should take when applying the product, such as wearing gloves and making sure you have good ventilation during use of the product.    For more information see CDC link below.      https://www.cdc.gov/coronavirus/2019-ncov/hcp/guidance-prevent-spread.html#precautions               If your symptoms worsen or if you have any other concerns, please contact [facility] at [phone].

## 2022-09-10 NOTE — PROGRESS NOTES
"Subjective:       Patient ID: Emi Johnson is a 46 y.o. female.    Vitals:  height is 5' 3" (1.6 m) and weight is 88 kg (194 lb). Her temperature is 98.5 °F (36.9 °C). Her blood pressure is 131/80 and her pulse is 86. Her respiration is 20 and oxygen saturation is 98%.     Chief Complaint: Sore Throat    46-year-old female with a history of Crohn's disease status post colectomy placement who presents to urgent care clinic for evaluation.  Complaining of left ear pain, lymphadenopathy of anterior neck, sore throat, and mild dry cough for the last 3 days.  Left ear pain worsens when she swallows or drinks any fluid.  Took Tylenol with no significant relief.  Unable to take anti-inflammatory due to her history of Crohn's disease.    Sore Throat   This is a new problem. The current episode started in the past 7 days. The problem has been gradually worsening. The pain is worse on the left side. There has been no fever. The pain is at a severity of 5/10. Associated symptoms include coughing, ear pain, headaches and swollen glands. Pertinent negatives include no abdominal pain, congestion, diarrhea, neck pain, shortness of breath, stridor, trouble swallowing or vomiting. She has had no exposure to strep or mono. She has tried acetaminophen for the symptoms. The treatment provided mild relief.     Constitution: Negative for activity change, appetite change, chills, sweating, fatigue, fever and generalized weakness.   HENT:  Positive for ear pain and sore throat. Negative for hearing loss, facial swelling, congestion, postnasal drip, sinus pain, sinus pressure, trouble swallowing and voice change.    Neck: Negative for neck pain, neck stiffness and painful lymph nodes.   Cardiovascular:  Negative for chest pain, leg swelling, palpitations, sob on exertion and passing out.   Eyes:  Negative for eye discharge, eye pain, photophobia, vision loss, double vision and blurred vision.   Respiratory:  Positive for cough. Negative " for chest tightness, sputum production, bloody sputum, COPD, shortness of breath, stridor, wheezing and asthma.    Gastrointestinal:  Negative for abdominal pain, nausea, vomiting, constipation, diarrhea, bright red blood in stool, rectal bleeding, heartburn and bowel incontinence.   Genitourinary:  Negative for dysuria, frequency, urgency, urine decreased, flank pain, bladder incontinence and hematuria.   Musculoskeletal:  Negative for trauma, joint pain, joint swelling, abnormal ROM of joint, muscle cramps and muscle ache.   Skin:  Negative for color change, pale, rash and wound.   Allergic/Immunologic: Negative for seasonal allergies, asthma and immunocompromised state.   Neurological:  Positive for headaches. Negative for dizziness, history of vertigo, light-headedness, passing out, facial drooping, speech difficulty, coordination disturbances, loss of balance, disorientation, altered mental status, loss of consciousness, numbness, tingling and seizures.   Hematologic/Lymphatic: Negative for swollen lymph nodes, easy bruising/bleeding and trouble clotting. Does not bruise/bleed easily.   Psychiatric/Behavioral:  Negative for altered mental status and disorientation.        Past Medical History:   Diagnosis Date    Anemia     Asthma     B12 deficiency     Crohn's disease     History of shingles     Lupus     Migraine headache     Raynaud disease     Reactive hypoglycemia     Seizures 2004    no medication     Sleep apnea     Non compliant with CPAP     Past Surgical History:   Procedure Laterality Date    ABDOMINAL SURGERY      COLON SURGERY      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    COLONOSCOPY      COLOSTOMY      ESOPHAGOGASTRODUODENOSCOPY N/A 5/26/2022    Procedure: ESOPHAGOGASTRODUODENOSCOPY (EGD);  Surgeon: Tin Novak MD;  Location: [address];  Service: Endoscopy;  Laterality: N/A;  fully vaccinated    HYSTERECTOMY      due to endometriosis    ILEOSCOPY N/A 4/10/2019    Procedure:  ILEOSCOPY through stoma;  Surgeon: Eve Currie MD;  Location: [facility] (4TH FLR);  Service: Endoscopy;  Laterality: N/A;  Schedule as 30 minute case, schedule in April or May 2019    ILEOSCOPY N/A 5/26/2022    Procedure: ILEOSCOPY;  Surgeon: Tin Novak MD;  Location: [facility] (2ND FLR);  Service: Endoscopy;  Laterality: N/A;    ILEOSTOMY      LAPAROSCOPY W/ MINI-LAPAROTOMY      large intestine removed      just a portion    pelvic mass removal      REVISION COLOSTOMY  2010    SMALL INTESTINE SURGERY      J pouch excision with end ileostomy    STOMACH SURGERY      TONSILLECTOMY, ADENOIDECTOMY         Objective:      Physical Exam   Constitutional: She is oriented to person, place, and time. She appears well-developed. She is cooperative.  Non-toxic appearance. She does not appear ill. No distress. obesity  HENT:   Head: Normocephalic and atraumatic.   Ears:   Right Ear: Hearing, external ear and ear canal normal. No drainage, swelling or tenderness.   Left Ear: Hearing, external ear and ear canal normal. No drainage, swelling or tenderness.      Comments: Mild left middle ear effusion with no erythema or bulging  Nose: Nose normal. No rhinorrhea, purulent discharge or congestion. Right sinus exhibits no maxillary sinus tenderness and no frontal sinus tenderness. Left sinus exhibits no maxillary sinus tenderness and no frontal sinus tenderness.   Mouth/Throat: Uvula is midline, oropharynx is clear and moist and mucous membranes are normal. Mucous membranes are moist. No oral lesions. No trismus in the jaw. No uvula swelling. No oropharyngeal exudate, posterior oropharyngeal edema or posterior oropharyngeal erythema. No tonsillar exudate. Oropharynx is clear.   Eyes: Conjunctivae, EOM and lids are normal. Pupils are equal, round, and reactive to light. No visual field deficit is present. Right eye exhibits no discharge. Left eye exhibits no discharge. Right conjunctiva is not injected. Right conjunctiva  has no hemorrhage. Left conjunctiva is not injected. Left conjunctiva has no hemorrhage. Extraocular movement intact vision grossly intact gaze aligned appropriately   Neck: Neck supple. No neck rigidity present.   Cardiovascular: Normal rate, regular rhythm, normal heart sounds and normal pulses.   No murmur heard.  Pulmonary/Chest: Effort normal and breath sounds normal. No accessory muscle usage or stridor. No respiratory distress. She has no wheezes. She exhibits no tenderness.   Abdominal: Normal appearance. She exhibits no distension and no mass. Soft. There is no abdominal tenderness. There is no rebound and no guarding.   Musculoskeletal: Normal range of motion.         General: Normal range of motion.      Right lower leg: No edema.      Left lower leg: No edema.      Comments: Moves all extremities with normal tone, strength, and ROM.  Gait normal.   Lymphadenopathy:     She has no cervical adenopathy.   Neurological: no focal deficit. She is alert, oriented to person, place, and time and at baseline. She has normal motor skills and normal sensation. She displays no weakness, facial symmetry, normal reflexes and no dysarthria. No cranial nerve deficit or sensory deficit. She exhibits normal muscle tone. She has a normal Finger-Nose-Finger Test. Coordination: Heel to shin test normal. She shows no pronator drift. She displays no seizure activity. Gait and coordination normal. Coordination normal. GCS eye subscore is 4. GCS verbal subscore is 5. GCS motor subscore is 6.   Skin: Skin is warm, dry, not diaphoretic and no rash. Capillary refill takes less than 2 seconds.   Psychiatric: Her speech is normal and behavior is normal. Mood and thought content normal.   Nursing note and vitals reviewed.      Results for orders placed or performed in visit on 09/10/22   POCT COVID-19 Rapid Screening   Result Value Ref Range    POC Rapid COVID Negative Negative     Acceptable Yes      *Note: Due to a  large number of results and/or encounters for the requested time period, some results have not been displayed. A complete set of results can be found in Results Review.       Assessment:       1. Viral URI with cough    2. Acute BEN (middle ear effusion), left    3. Left ear pain    4. Cough with congestion of paranasal sinus    5. Encounter for laboratory testing for COVID-19 virus        Nontoxic appearing. Vitals are stable. Patient presents for COVID nasal swab testing. Patient is concerned for possible exposure. Rapid covid negative.   All diagnostic testing personally reviewed and interpreted.   Patient has symptoms at this time which is consistent with above diagnosis.        Patient was recommended OTC treatments for their symptoms.      Patient was counseled, explained with the test results meaning, expected course, and answered all of questions. They can also receive results via my chart.  Printed and verbal COVID guidelines were given.   Recommend follow-up PCP in the next 2-3 days if new or worsening symptoms.    Patient understands that they received an Urgent Care treatment only and that they may be released before all your medical problems are known or treated. Strict ED versus clinic precautions given.  Patient verbalized understanding and agreed with plan of care.    Note dictated with voice recognition software, please excuse any grammatical errors.    Plan:         Viral URI with cough    Acute BEN (middle ear effusion), left    Left ear pain    Cough with congestion of paranasal sinus    Encounter for laboratory testing for COVID-19 virus  -     POCT COVID-19 Rapid Screening            Additional MDM:     Heart Failure Score:   COPD = No    Patient Instructions     PLEASE READ YOUR DISCHARGE INSTRUCTIONS ENTIRELY AS IT CONTAINS IMPORTANT INFORMATION.    Patient had covid testing done today.      If Negative and no direct exposure: symptom free without fever reducing meds in 24 hours - can go back  to work in 24 hours with surgical mask for 14 days.  If you are directly exposed to someone who tested positive, you need to return for repeat testing in the next 5-7 days.  You may return sooner if you have any new worsening symptoms.    Discussed corona virus precautions and reviewed CDC FAC; printed a copy for patient.  I discussed to continue to monitor their symptoms. Discussed that if their symptoms persist or worsen to seek re-evaluation. Clinic vs. ER precautions were given.  Patient verbalized understanding and agreed with the above plan of care.    - Rest.  Drink plenty of fluids.    - Tylenol as directed as needed for fever/pain.  For Tylenol, do not exceed 3000 mg/ day. If no contraindication.  -OK to supplement with OTC DayQuil, NyQuil or TheraFlu every 6 hours as needed for cough and congestion.  Use caution of total amount of Tylenol/acetaminophen per day.  - Reviewed radiographs and all diagnostic testing with patient/family.      -Below are suggestions for symptomatic relief:              -Salt water gargles to soothe throat pain.              -Chloroseptic spray also helps to numb throat pain.              -Nasal saline spray reduces inflammation and dryness.              -Warm face compresses to help with facial sinus pain/pressure.              -Vicks vapor rub at night.              -Flonase OTC or Nasacort OTC  once a day for nasal/sinus congestion. DON'T USE IF YOU HAVE GLAUCOMA. CHECK WITH YOUR PHARMACIST/PHYSICIAN.              -Simple foods like chicken noodle soup.              -Mucinex DM (ANY COUGH EXPECTORANT--guaifenesin) for cough or chest congestion with mucus during the day time. Delsym or robitussin (ANY COUGH SUPPRESSANT--dextromethorphan) helps with coughing at night. Mucinex-DM if you have chest congestion or sputum (caution if history of high blood pressure or palpitations).              -Zyrtec/Claritin/xyzal during the day time  & Benadryl at night (only if severe runny nose)  may help with allergies and runny nose. Add decongestant if you have nasal/sinus congestion/sinus pressure/ear fullness sensation. (see below)    Caution with use of Decongestant meds:  -If you DO NOT have Hypertension or any history of palpitations, it is ok to take over the counter Sudafed or Mucinex D or Allegra-D or Claritin-D or Zyrtec-D.  -If you do take one of the above, it is ok to combine that with plain over the counter Mucinex or Allegra or Claritin or Zyrtec. If, for example, you are taking Zyrtec -D, you can combine that with Mucinex, but not Mucinex-D.  If you are taking Mucinex-D, you can combine that with plain Allegra or Claritin or Zyrtec.     -Do not combine pseudophed or phenylephrine with any other brand allergy-D for DECONGESTANT.   -Or vice versa, you can you take plain allergy medications (allegra/claritin/zyrtec with NO Decongestant) and ADD OTC pseudophed or phenelyphrine 2-3 times a day. Avoid taking decongestant late at night or with caffeine as it can keep you up or cause jittery feeling.    -If you DO have Hypertension , anxiety, or palpitations, it is safe to take Coricidin HBP for relief of sinus symptoms.      For your GI symptoms:  -Use gatorade/pedialyte or rehydration packets to help stay hydrated. Vitamin water and plain water do not contain rehydrating electrolytes.  -Increase clear liquids (water, gatorade, pedialyte, broths, jello, etc) Hold off on solids for 12-18 hours. Then advance to BRAT diet (banana, rice, applesauce, tea, toast/crackers), then advance further as tolerated. Avoid spicy or fatty foods.   -May take Imitrol OTC as needed for nausea.   -Use Peptobismol or Immodium to help alleviate your diarrhea symptoms.   -Take mylanta or simethicone for bloating or gas pain.   -Take pepcid or omeprazole if you have heartburn or reflux sensation.  -Avoid imodium unless you have more than 6 loose stools in 24 hours. Take 1 dose and monitor to see if you can repeat AS IT  WILL CAUSE CONSTIPATION.  -Wash hands frequently while sick. Avoid ibuprofen or other NSAIDS until you are well.   -Please go to the ER if you experience worsening abdominal pain, blood in your vomit or stool, high fever, dizziness, fainting, swelling of your abdomen, inability to pass gas or stool, or inability to urinate.         -You must understand that you've received an Urgent Care treatment only and that you may be released before all your medical problems are known or treated. You, the patient, will arrange for follow up care as instructed. Please arrange follow up with your primary medical clinic within 2-5 days if your signs and symptoms have not resolved or worsen.     - Follow up with your PCP or specialty clinic as directed.  You can call [phone] or [phone] to schedule an appointment with the appropriate provider.    - If your condition worsens or fails to improve we recommend that you receive another evaluation at the emergency room immediately or contact your primary medical clinic to discuss your concerns.              Prevention steps for patients with confirmed or suspected COVID-19  Stay home and stay away from family members and friends. The CDC says, you can leave home after these three things have happened: 1) You have had no fever for at least 24 hours (that is one full day of no fever without the use of medicine that reduces fevers) 2) AND other symptoms have improved (for example, when your cough or shortness of breath have improved) 3) AND at least 10 days have passed since your symptoms first appeared OR after 10 days passed from first positive test.  Separate yourself from other people and animals in your home.  Call ahead before visiting your doctor.  Wear a facemask.  Cover your coughs and sneezes.  Wash your hands often with soap and water; hand  can be used, too.  Avoid sharing personal household items.  Wipe down surfaces used daily.  Monitor your symptoms. Seek  prompt medical attention if your illness is worsening (e.g., difficulty breathing).   Before seeking care, call your healthcare provider.  If you have a medical emergency and need to call 911, notify the dispatch personnel that you have, or are being evaluated for COVID-19. If possible, put on a facemask before emergency medical services arrive.        Recommended precautions for household members, intimate partners, and caregivers in a home setting of a patient with symptomatic laboratory-confirmed COVID-19 or a patient under investigation.  Household members, intimate partners, and caregivers in the home setting awaiting tests results have close contact with a person with symptomatic, laboratory-confirmed COVID-19 or a person under investigation. Close contacts should monitor their health; they should call their provider right away if they develop symptoms suggestive of COVID-19 (e.g., fever, cough, shortness of breath).    Close contacts should also follow these recommendations:  Make sure that you understand and can help the patient follow their provider's instructions for medication(s) and care. You should help the patient with basic needs in the home and provide support for getting groceries, prescriptions, and other personal needs.  Monitor the patient's symptoms. If the patient is getting sicker, call his or her healthcare provider and tell them that the patient has laboratory-confirmed COVID-19. If the patient has a medical emergency and you need to call 911, notify the dispatch personnel that the patient has, or is being evaluated for COVID-19.  Household members should stay in another room or be  from the patient. Household members should use a separate bedroom and bathroom, if available.  Prohibit visitors.  Household members should care for any pets in the home.  Make sure that shared spaces in the home have good air flow, such as by an air conditioner or an opened window, weather  permitting.  Perform hand hygiene frequently. Wash your hands often with soap and water for at least 20 seconds or use an alcohol-based hand  (that contains > 60% alcohol) covering all surfaces of your hands and rubbing them together until they feel dry. Soap and water should be used preferentially.  Avoid touching your eyes, nose, and mouth.  The patient should wear a facemask. If the patient is not able to wear a facemask (for example, because it causes trouble breathing), caregivers should wear a mask when they are in the same room as the patient.  Wear a disposable facemask and gloves when you touch or have contact with the patient's blood, stool, or body fluids, such as saliva, sputum, nasal mucus, vomit, urine.  Throw out disposable facemasks and gloves after using them. Do not reuse.  When removing personal protective equipment, first remove and dispose of gloves. Then, immediately clean your hands with soap and water or alcohol-based hand . Next, remove and dispose of facemask, and immediately clean your hands again with soap and water or alcohol-based hand .  You should not share dishes, drinking glasses, cups, eating utensils, towels, bedding, or other items with the patient. After the patient uses these items, you should wash them thoroughly (see below Wash laundry thoroughly).  Clean all high-touch surfaces, such as counters, tabletops, doorknobs, bathroom fixtures, toilets, phones, keyboards, tablets, and bedside tables, every day. Also, clean any surfaces that may have blood, stool, or body fluids on them.  Use a household cleaning spray or wipe, according to the label instructions. Labels contain instructions for safe and effective use of the cleaning product including precautions you should take when applying the product, such as wearing gloves and making sure you have good ventilation during use of the product.  Wash laundry thoroughly.  Immediately remove and wash  clothes or bedding that have blood, stool, or body fluids on them.  Wear disposable gloves while handling soiled items and keep soiled items away from your body. Clean your hands (with soap and water or an alcohol-based hand ) immediately after removing your gloves.  Read and follow directions on labels of laundry or clothing items and detergent. In general, using a normal laundry detergent according to washing machine instructions and dry thoroughly using the warmest temperatures recommended on the clothing label.  Place all used disposable gloves, facemasks, and other contaminated items in a lined container before disposing of them with other household waste. Clean your hands (with soap and water or an alcohol-based hand ) immediately after handling these items. Soap and water should be used preferentially if hands are visibly dirty.  Discuss any additional questions with your state or local health department or healthcare provider. Check available hours when contacting your local health department.    For more information see CDC link below.      https://www.cdc.gov/coronavirus/2019-ncov/hcp/guidance-prevent-spread.html#precautions        Sources:  [facility], [facility]          Instructions for Home Care of Patients and Caretakers with Coronavirus Disease 2019  Limit visitors to the home.  Older persons and those that have chronic medical conditions such as diabetes, lung and heart disease are at increased risk for illness.   If possible, patients should use a separate bedroom while recovering. Caregivers and household members should avoid prolonged contact with the patient which means to stay 6 feet away and avoid contact with cough droplets.  When close contact is necessary, wash your hands before and immediately after contact.   Perform hand hygiene frequently. Wash your hands often with soap and water for at least 20 seconds or use an alcohol-based hand  , covering all surfaces of your hands and rubbing them together until they feel dry.   Avoid touching your eyes, nose, and mouth with unwashed hands.  Avoid sharing household items with the patient. You should not share dishes, drinking glasses, cups, eating utensils, towels, bedding, or other items. After the patient uses these items, you should wash them thoroughly.  Wash laundry thoroughly.   Immediately remove and wash clothes or bedding that have blood, stool, or body fluids on them.  Clean all high-touch surfaces, such as counters, tabletops, doorknobs, bathroom fixtures, toilets, phones, keyboards, tablets, and bedside tables, every day.   Use a household cleaning spray or wipe, according to the label instructions. Labels contain instructions for safe and effective use of the cleaning product including precautions you should take when applying the product, such as wearing gloves and making sure you have good ventilation during use of the product.    For more information see CDC link below.      https://www.cdc.gov/coronavirus/2019-ncov/hcp/guidance-prevent-spread.html#precautions               If your symptoms worsen or if you have any other concerns, please contact [facility] at [phone].

## 2022-09-14 ENCOUNTER — PATIENT MESSAGE (OUTPATIENT)
Dept: INTERNAL MEDICINE | Facility: CLINIC | Age: 47
End: 2022-09-14
Payer: COMMERCIAL

## 2022-09-14 ENCOUNTER — TELEPHONE (OUTPATIENT)
Dept: INTERNAL MEDICINE | Facility: CLINIC | Age: 47
End: 2022-09-14
Payer: COMMERCIAL

## 2022-09-14 DIAGNOSIS — K50.00 CROHN'S DISEASE OF SMALL INTESTINE WITHOUT COMPLICATION: ICD-10-CM

## 2022-09-14 DIAGNOSIS — M79.7 FIBROMYALGIA: ICD-10-CM

## 2022-09-14 RX ORDER — FLUNISOLIDE 0.25 MG/ML
SOLUTION NASAL
Refills: 0 | OUTPATIENT
Start: 2022-09-14

## 2022-09-14 RX ORDER — OXYCODONE AND ACETAMINOPHEN 10; 325 MG/1; MG/1
1 TABLET ORAL EVERY 6 HOURS PRN
Qty: 120 TABLET | Refills: 0 | Status: SHIPPED | OUTPATIENT
Start: 2022-09-14 | End: 2022-09-14 | Stop reason: SDUPTHER

## 2022-09-14 RX ORDER — OXYCODONE AND ACETAMINOPHEN 10; 325 MG/1; MG/1
1 TABLET ORAL EVERY 6 HOURS PRN
Qty: 120 TABLET | Refills: 0 | Status: SHIPPED | OUTPATIENT
Start: 2022-09-14 | End: 2022-10-10 | Stop reason: SDUPTHER

## 2022-09-19 ENCOUNTER — PATIENT OUTREACH (OUTPATIENT)
Dept: ADMINISTRATIVE | Facility: HOSPITAL | Age: 47
End: 2022-09-19
Payer: COMMERCIAL

## 2022-09-19 NOTE — LETTER
AUTHORIZATION FOR RELEASE OF   CONFIDENTIAL INFORMATION    Dear Medical Records,    We are seeing Emi Johnson, date of birth 1975, in the clinic at [facility]. Bianca Rutledge MD is the patient's PCP. Emi Johnson has an outstanding lab/procedure at the time we reviewed her chart. In order to help keep her health information updated, she has authorized us to request the following medical record(s):        (  )  MAMMOGRAM                                      ( x )  COLONOSCOPY      (  )  PAP SMEAR                                          (  )  OUTSIDE LAB RESULTS     (  )  DEXA SCAN                                          (  )  EYE EXAM            (  )  FOOT EXAM                                          (  )  ENTIRE RECORD     (  )  OUTSIDE IMMUNIZATIONS                 (  )  _______________         Please fax records to [facility], Janine M Ferrier, MD, [phone]     If you have any questions, please contact MEI Villatoro at [phone].           Patient Name: Emi Johnson  : 1975  Patient Phone #: [phone]

## 2022-09-26 ENCOUNTER — PATIENT OUTREACH (OUTPATIENT)
Dept: ADMINISTRATIVE | Facility: HOSPITAL | Age: 47
End: 2022-09-26
Payer: COMMERCIAL

## 2022-09-26 NOTE — LETTER
AUTHORIZATION FOR RELEASE OF   CONFIDENTIAL INFORMATION    Dear Medical Records,    We are seeing Emi Johnson, date of birth 1975, in the clinic at [facility]. Bianca Rutledge MD is the patient's PCP. Emi Johnson has an outstanding lab/procedure at the time we reviewed her chart. In order to help keep her health information updated, she has authorized us to request the following medical record(s):        (  )  MAMMOGRAM                                      ( x )  COLONOSCOPY      (  )  PAP SMEAR                                          (  )  OUTSIDE LAB RESULTS     (  )  DEXA SCAN                                          (  )  EYE EXAM            (  )  FOOT EXAM                                          (  )  ENTIRE RECORD     (  )  OUTSIDE IMMUNIZATIONS                 ( x )  SIGMOIDOSCOPY         Please fax records to [facility], Janine M Ferrier, MD, [phone]     If you have any questions, please contact MEI Villatoro [phone].           Patient Name: Emi Johnson  : 1975  Patient Phone #: [phone]

## 2022-09-29 ENCOUNTER — OFFICE VISIT (OUTPATIENT)
Dept: BARIATRICS | Facility: CLINIC | Age: 47
End: 2022-09-29
Payer: COMMERCIAL

## 2022-09-29 VITALS
HEART RATE: 88 BPM | BODY MASS INDEX: 33.53 KG/M2 | OXYGEN SATURATION: 95 % | SYSTOLIC BLOOD PRESSURE: 132 MMHG | DIASTOLIC BLOOD PRESSURE: 78 MMHG | WEIGHT: 189.31 LBS

## 2022-09-29 DIAGNOSIS — K52.9 IBD (INFLAMMATORY BOWEL DISEASE): ICD-10-CM

## 2022-09-29 DIAGNOSIS — E66.9 OBESITY, CLASS I, BMI 30.0-34.9 (SEE ACTUAL BMI): Primary | ICD-10-CM

## 2022-09-29 DIAGNOSIS — K76.0 FATTY LIVER: ICD-10-CM

## 2022-09-29 PROCEDURE — 3075F PR MOST RECENT SYSTOLIC BLOOD PRESS GE 130-139MM HG: ICD-10-PCS | Mod: CPTII,S$GLB,, | Performed by: INTERNAL MEDICINE

## 2022-09-29 PROCEDURE — 1159F MED LIST DOCD IN RCRD: CPT | Mod: CPTII,S$GLB,, | Performed by: INTERNAL MEDICINE

## 2022-09-29 PROCEDURE — 3008F PR BODY MASS INDEX (BMI) DOCUMENTED: ICD-10-PCS | Mod: CPTII,S$GLB,, | Performed by: INTERNAL MEDICINE

## 2022-09-29 PROCEDURE — 99999 PR PBB SHADOW E&M-EST. PATIENT-LVL III: ICD-10-PCS | Mod: PBBFAC,,, | Performed by: INTERNAL MEDICINE

## 2022-09-29 PROCEDURE — 3078F DIAST BP <80 MM HG: CPT | Mod: CPTII,S$GLB,, | Performed by: INTERNAL MEDICINE

## 2022-09-29 PROCEDURE — 1159F PR MEDICATION LIST DOCUMENTED IN MEDICAL RECORD: ICD-10-PCS | Mod: CPTII,S$GLB,, | Performed by: INTERNAL MEDICINE

## 2022-09-29 PROCEDURE — 99214 PR OFFICE/OUTPT VISIT, EST, LEVL IV, 30-39 MIN: ICD-10-PCS | Mod: S$GLB,,, | Performed by: INTERNAL MEDICINE

## 2022-09-29 PROCEDURE — 99999 PR PBB SHADOW E&M-EST. PATIENT-LVL III: CPT | Mod: PBBFAC,,, | Performed by: INTERNAL MEDICINE

## 2022-09-29 PROCEDURE — 3075F SYST BP GE 130 - 139MM HG: CPT | Mod: CPTII,S$GLB,, | Performed by: INTERNAL MEDICINE

## 2022-09-29 PROCEDURE — 1160F PR REVIEW ALL MEDS BY PRESCRIBER/CLIN PHARMACIST DOCUMENTED: ICD-10-PCS | Mod: CPTII,S$GLB,, | Performed by: INTERNAL MEDICINE

## 2022-09-29 PROCEDURE — 1160F RVW MEDS BY RX/DR IN RCRD: CPT | Mod: CPTII,S$GLB,, | Performed by: INTERNAL MEDICINE

## 2022-09-29 PROCEDURE — 3008F BODY MASS INDEX DOCD: CPT | Mod: CPTII,S$GLB,, | Performed by: INTERNAL MEDICINE

## 2022-09-29 PROCEDURE — 3078F PR MOST RECENT DIASTOLIC BLOOD PRESSURE < 80 MM HG: ICD-10-PCS | Mod: CPTII,S$GLB,, | Performed by: INTERNAL MEDICINE

## 2022-09-29 PROCEDURE — 99214 OFFICE O/P EST MOD 30 MIN: CPT | Mod: S$GLB,,, | Performed by: INTERNAL MEDICINE

## 2022-09-29 RX ORDER — PHENTERMINE HYDROCHLORIDE 37.5 MG/1
TABLET ORAL
Qty: 30 TABLET | Refills: 2 | Status: SHIPPED | OUTPATIENT
Start: 2022-09-29

## 2022-09-29 NOTE — PATIENT INSTRUCTIONS
Patient warned of common side effects of phentermine including anxiety, insomnia, palpitations and increased blood pressure. It was also explained that it is for short-term usage along with diet and exercise, and that stopping the medication without making lifestyle changes will result in regain of weight. Patient states understanding.     Weight loss medications are controlled substances.  They require routine follow up. Prescription or pills that are lost or destroyed will not be replaced.       Start phentermine with 1/2 pill a day for at least 2 weeks to see if that will control your appetite.  Go up to a full pill when needed.       Bariatric Soft Diet           Start Soft Diet 4 weeks after gastric bypass and sleeve    As your stomach heals, your doctor will progress your diet to soft foods.  This diet usually lasts for 1-2 months, but can last longer depending on each individual. Soft foods are those which can be easily mashed with a fork.    Remember these principles:  No liquids with meals. Do no drink 30 minutes before meals and wait 30 minutes to 1 hour after meals to start drinking.  Sip on water, sugar-free beverages or non-fat milk throughout the day.  You will need to continue drinking at least 1 protein drink daily to meet protein needs.  100% fruit juice (no sugar added) is allowed, but limit to 4oz a day because it is high in calories and does not contain any protein.  Chew foods slowly; one meal should take 20-30 minutes.  Eat 3-5 meals per day, without any additional snacking.  Stop eating as soon as you feel full.  Avoid using table sugar and foods made with refined sugar, which can trigger dumping syndrome.  Marinating meats with a low sugar marinade, adding low-fat salad dressing, or adding low calorie gravy (made from powder and water) can help meats to digest easier.     Adding Vegetables and Fruits:    As long as you are consuming >80g total protein daily from combination of foods and  protein drinks, you may start adding small bites of fruits and vegetables to your meals. Cooked, tender vegetables and ripe fruits without the peel are tolerated best.    Avoid fruit canned in syrup, sugary fruit juices, and vegetables cooked with oil, butter or jovel.  Bariatric SOFT Diet    EAT THESE FOODS AVOID THESE FOODS   High in Protein: High in Fat/Sugar:   Canned tuna or chicken (packed in water)  Lean ground turkey breast or ground round  Turkey or chicken (no skin); cooked tender and cut in small pieces  Lean pork or beef (cook in crock pot until very tender; cut in small pieces  Scrambled, poached, or boiled eggs  Baked, broiled, grilled or boiled fish and seafood (not fried!)  Silken tofu, Edamame (soybeans)  Beans, hummus and lentils  Lean deli meats (turkey and chicken breast, ham, roast beef)  1% or Skim Milk, Lactaid, or Soymilk  Low-fat or fat-free cottage cheese, soft cheese, mozzarella string cheese, or ricotta  Light yogurt, Greek yogurt, SF pudding High fat milk (whole, 2%)  Butter, margarine, oil, mayonnaise  Sour cream, cream cheese, salad dressing  Ice Cream  Cakes, cookies, pies, desserts  Candy  Luncheon meats (bologna, salami, chopped ham)  Sausage, Jovel  Gravy  Fried Foods  ___________________________________  Tough/Crunchy--------------------------------  Tough or dry meats  Corn   Granola/cereal with nuts  Shredded Coconut    May add after 2 months:  Raw veggies  Lettuce  Plain, Unsalted Nuts and Seeds  Protein bars with 0-4 grams of sugar   As long as you are getting >80g PRO: Starchy Carbohydrates. At goal weight, some may include whole grains in small amounts.   Cooked tender vegetables without peel  Ripe fruits without peel  Frozen fruits with no added sugar  Fruit canned in its own juice or in water  Fat free, sugar free, frozen yogurt White and wheat Bread, Rice, Pasta   Cereals (including grits, oatmeal)   Crackers, Pretzels, Chips, Granola  Corn, Popcorn, Peas  White Potatoes,  Sweet potatoes  Flour and corn tortillas     Fluids: Always Avoid:   Skim/1% milk, Lactaid, Soymilk  Water and Sugar-free beverages  (decaf and non-carbonated)  Decaf coffee & decaf tea  Sugary drinks  Carbonated drinks  Alcohol  Drinking through straws           Sample Menu for Bariatric Soft Diet  For Gastric Bypass and Sleeve            3 meals + 2 protein drinks  Remember: No drinking with meals.    Time of Day Day 1 Day 2   7a:    1 egg (or ¼ cup Egg Beaters) ¼ cup low-fat cottage cheese, 1 tbsp berries   7:30a-9:30a: 1 cup water/SF beverage     10a:  Protein drink  Protein drink   10:30a-11:30a: 1 cup water/SF beverage     12p:    1-2 oz grilled shrimp, ¼ cup green beans   1-2oz canned chicken, shredded cheese, 1 tbsp salsa   12:30p-2:30p: 1 cup water/SF beverage     3p:  Protein drink   Protein drink   3:30p-5:30p: 1 cup water/SF beverage     6p:  ½ cup low fat chili, 1oz low-fat cheese, ¼ cup broccoli 2 oz grilled fish,  ¼ cup lima beans   6:30p-9p: 1 cup water/SF beverage       This sample menu provides approx. 80g protein total, including about 40g protein from foods and at least 40g protein from protein drinks.  Drinking protein drinks daily helps decrease muscle loss, increase weight loss, and prevent hair loss.    Sip fluids continuously in between meals.    For fluids: 1 cup = 8 oz   For food: ¼ cup = 4 tablespoons = 1oz  No drinking from 30 minutes before meals to 30 minutes after meals.  3oz meat is approx. the size of a deck of cards.    A food scale will help you determine portion size (Can be purchased at [employer])

## 2022-09-29 NOTE — PROGRESS NOTES
Subjective:       Patient ID: Emi Johnson is a 46 y.o. female.    Chief Complaint: Follow-up    CC:    Pt here today for follow-up. Seen once for consult Nov 2021. Has lost -1.6 lbs (-1.1 lbs muscle. -0.4 lbs fat). Was to start 1000 anastacia pb meal planner, exercise and started phentermine 1/2 tab. On chart review that she did not lose weight in the time after that appt. States she took the medication for 2 weeks that when she didn't see much change, she stopped it. She implemented a limited amount of the changes we discussed. She is currently having some dental issues and has not been able to eat some things.   Medication options limited 2/2 h/o seizure, significant IBD hx, and current use opioids.   Recent GI notes and studies reviewed.   New BMR: 1358    New PBF: 46.7%       Initial  BMR:1368    PBF:  46.6%    Review of Systems      Objective:     /78 (BP Location: Left arm, Patient Position: Sitting)   Pulse 88   Wt 85.9 kg (189 lb 4.8 oz)   SpO2 95%   BMI 33.53 kg/m²     Physical Exam  Vitals reviewed.   Constitutional:       General: She is not in acute distress.     Appearance: She is well-developed. She is obese.   HENT:      Head: Normocephalic and atraumatic.   Eyes:      General: No scleral icterus.     Pupils: Pupils are equal, round, and reactive to light.   Neck:      Thyroid: No thyromegaly.   Cardiovascular:      Rate and Rhythm: Normal rate.   Pulmonary:      Effort: Pulmonary effort is normal.   Musculoskeletal:         General: Normal range of motion.      Cervical back: Normal range of motion and neck supple.   Skin:     General: Skin is warm and dry.      Findings: No erythema.   Neurological:      Mental Status: She is alert and oriented to person, place, and time.      Cranial Nerves: No cranial nerve deficit.   Psychiatric:         Behavior: Behavior normal.         Judgment: Judgment normal.       Assessment:       Problem List Items Addressed This Visit       IBD (inflammatory  bowel disease)     Other Visit Diagnoses       Obesity, Class I, BMI 30.0-34.9 (see actual BMI)    -  Primary    Relevant Medications    phentermine (ADIPEX-P) 37.5 mg tablet    Fatty liver                Plan:           1. Obesity, Class I, BMI 30.0-34.9 (see actual BMI)    - phentermine (ADIPEX-P) 37.5 mg tablet; 1/2 tab- 1 tab po daily  Dispense: 30 tablet; Refill: 2    2. Fatty liver      3. IBD (inflammatory bowel disease)      Patient warned of common side effects of phentermine including anxiety, insomnia, palpitations and increased blood pressure. It was also explained that it is for short-term usage along with diet and exercise, and that stopping the medication without making lifestyle changes will result in regain of weight. Patient states understanding.     Weight loss medications are controlled substances.  They require routine follow up. Prescription or pills that are lost or destroyed will not be replaced.       Start phentermine with 1/2 pill a day for at least 2 weeks to see if that will control your appetite.  Go up to a full pill when needed.     Soft diet instructions given while gets her dental work done.

## 2022-10-06 ENCOUNTER — PATIENT MESSAGE (OUTPATIENT)
Dept: BARIATRICS | Facility: CLINIC | Age: 47
End: 2022-10-06
Payer: COMMERCIAL

## 2022-10-10 ENCOUNTER — PATIENT MESSAGE (OUTPATIENT)
Dept: INTERNAL MEDICINE | Facility: CLINIC | Age: 47
End: 2022-10-10
Payer: COMMERCIAL

## 2022-10-10 ENCOUNTER — PATIENT MESSAGE (OUTPATIENT)
Dept: ADMINISTRATIVE | Facility: HOSPITAL | Age: 47
End: 2022-10-10
Payer: COMMERCIAL

## 2022-10-10 DIAGNOSIS — M79.7 FIBROMYALGIA: ICD-10-CM

## 2022-10-10 DIAGNOSIS — K50.00 CROHN'S DISEASE OF SMALL INTESTINE WITHOUT COMPLICATION: ICD-10-CM

## 2022-10-10 RX ORDER — OXYCODONE AND ACETAMINOPHEN 10; 325 MG/1; MG/1
1 TABLET ORAL EVERY 6 HOURS PRN
Qty: 120 TABLET | Refills: 0 | Status: SHIPPED | OUTPATIENT
Start: 2022-10-10 | End: 2022-11-07 | Stop reason: SDUPTHER

## 2022-10-11 ENCOUNTER — PATIENT MESSAGE (OUTPATIENT)
Dept: INTERNAL MEDICINE | Facility: CLINIC | Age: 47
End: 2022-10-11
Payer: COMMERCIAL

## 2022-10-26 ENCOUNTER — PATIENT MESSAGE (OUTPATIENT)
Dept: INTERNAL MEDICINE | Facility: CLINIC | Age: 47
End: 2022-10-26
Payer: COMMERCIAL

## 2022-10-26 DIAGNOSIS — Z00.00 ROUTINE MEDICAL EXAM: Primary | ICD-10-CM

## 2022-11-07 ENCOUNTER — PATIENT MESSAGE (OUTPATIENT)
Dept: INTERNAL MEDICINE | Facility: CLINIC | Age: 47
End: 2022-11-07
Payer: COMMERCIAL

## 2022-11-07 DIAGNOSIS — K50.00 CROHN'S DISEASE OF SMALL INTESTINE WITHOUT COMPLICATION: ICD-10-CM

## 2022-11-07 DIAGNOSIS — M79.7 FIBROMYALGIA: ICD-10-CM

## 2022-11-08 RX ORDER — OXYCODONE AND ACETAMINOPHEN 10; 325 MG/1; MG/1
1 TABLET ORAL EVERY 6 HOURS PRN
Qty: 120 TABLET | Refills: 0 | Status: SHIPPED | OUTPATIENT
Start: 2022-11-08 | End: 2022-12-05 | Stop reason: SDUPTHER

## 2022-11-24 ENCOUNTER — PATIENT MESSAGE (OUTPATIENT)
Dept: INTERNAL MEDICINE | Facility: CLINIC | Age: 47
End: 2022-11-24
Payer: COMMERCIAL

## 2022-12-05 ENCOUNTER — PATIENT MESSAGE (OUTPATIENT)
Dept: INTERNAL MEDICINE | Facility: CLINIC | Age: 47
End: 2022-12-05
Payer: COMMERCIAL

## 2022-12-05 DIAGNOSIS — K50.00 CROHN'S DISEASE OF SMALL INTESTINE WITHOUT COMPLICATION: ICD-10-CM

## 2022-12-05 DIAGNOSIS — M79.7 FIBROMYALGIA: ICD-10-CM

## 2022-12-05 RX ORDER — OXYCODONE AND ACETAMINOPHEN 10; 325 MG/1; MG/1
1 TABLET ORAL EVERY 6 HOURS PRN
Qty: 120 TABLET | Refills: 0 | Status: SHIPPED | OUTPATIENT
Start: 2022-12-05 | End: 2022-12-30 | Stop reason: SDUPTHER

## 2022-12-21 ENCOUNTER — OFFICE VISIT (OUTPATIENT)
Dept: URGENT CARE | Facility: CLINIC | Age: 47
End: 2022-12-21
Payer: COMMERCIAL

## 2022-12-21 VITALS
TEMPERATURE: 98 F | DIASTOLIC BLOOD PRESSURE: 85 MMHG | BODY MASS INDEX: 33.49 KG/M2 | HEART RATE: 89 BPM | SYSTOLIC BLOOD PRESSURE: 129 MMHG | WEIGHT: 189 LBS | OXYGEN SATURATION: 99 % | HEIGHT: 63 IN | RESPIRATION RATE: 20 BRPM

## 2022-12-21 DIAGNOSIS — M79.672 LEFT FOOT PAIN: Primary | ICD-10-CM

## 2022-12-21 PROCEDURE — 3008F BODY MASS INDEX DOCD: CPT | Mod: CPTII,S$GLB,,

## 2022-12-21 PROCEDURE — 3079F DIAST BP 80-89 MM HG: CPT | Mod: CPTII,S$GLB,,

## 2022-12-21 PROCEDURE — 3074F PR MOST RECENT SYSTOLIC BLOOD PRESSURE < 130 MM HG: ICD-10-PCS | Mod: CPTII,S$GLB,,

## 2022-12-21 PROCEDURE — 1160F PR REVIEW ALL MEDS BY PRESCRIBER/CLIN PHARMACIST DOCUMENTED: ICD-10-PCS | Mod: CPTII,S$GLB,,

## 2022-12-21 PROCEDURE — 3008F PR BODY MASS INDEX (BMI) DOCUMENTED: ICD-10-PCS | Mod: CPTII,S$GLB,,

## 2022-12-21 PROCEDURE — 1159F PR MEDICATION LIST DOCUMENTED IN MEDICAL RECORD: ICD-10-PCS | Mod: CPTII,S$GLB,,

## 2022-12-21 PROCEDURE — 3079F PR MOST RECENT DIASTOLIC BLOOD PRESSURE 80-89 MM HG: ICD-10-PCS | Mod: CPTII,S$GLB,,

## 2022-12-21 PROCEDURE — 73630 X-RAY EXAM OF FOOT: CPT | Mod: FY,LT,S$GLB, | Performed by: RADIOLOGY

## 2022-12-21 PROCEDURE — 99214 OFFICE O/P EST MOD 30 MIN: CPT | Mod: S$GLB,,,

## 2022-12-21 PROCEDURE — 1159F MED LIST DOCD IN RCRD: CPT | Mod: CPTII,S$GLB,,

## 2022-12-21 PROCEDURE — 1160F RVW MEDS BY RX/DR IN RCRD: CPT | Mod: CPTII,S$GLB,,

## 2022-12-21 PROCEDURE — 73630 XR FOOT COMPLETE 3 VIEW LEFT: ICD-10-PCS | Mod: FY,LT,S$GLB, | Performed by: RADIOLOGY

## 2022-12-21 PROCEDURE — 99214 PR OFFICE/OUTPT VISIT, EST, LEVL IV, 30-39 MIN: ICD-10-PCS | Mod: S$GLB,,,

## 2022-12-21 PROCEDURE — 3074F SYST BP LT 130 MM HG: CPT | Mod: CPTII,S$GLB,,

## 2022-12-21 NOTE — PATIENT INSTRUCTIONS
Orthopedic Follow up Discharge Instructions    If your condition worsens or fails to improve we recommend that you receive another evaluation at the ER immediately or contact your PCP to discuss your concerns or return here. You must understand that you've received an urgent care treatment only and that you may be released before all your medical problems are known or treated. You the patient will arrange for follouwp care as instructed.   If you were prescribed a narcotic or muscle relaxant do not drive or operate heavy machinery while taking these medications   Tylenol or ibuprofen can also be used as directed for pain unless you have an allergy to them or medical condition such as stomach ulcers, kidney or liver disease or blood thinners etc for which you should not be taking these type of medications.     If you have severe pain go to the ER immediately.   If you were given a splint wear it at all times.   If you were given crutches use them as we instructed. Do not rest your armpits on the foam pad or you risk compressing the nerves and the vessels there   Follow up with the orthopedist in 1 week if you continue with pain.   Sometimes it can take up to 1 week for stress fractures to show up on an X-ray, and may need reimaging or a CT or MRI of the affected area.

## 2022-12-21 NOTE — PROGRESS NOTES
"Subjective:       Patient ID: Emi Johnson is a 46 y.o. female.    Vitals:  height is 5' 3" (1.6 m) and weight is 85.7 kg (189 lb). Her temperature is 98.3 °F (36.8 °C). Her blood pressure is 129/85 and her pulse is 89. Her respiration is 20 and oxygen saturation is 99%.     Chief Complaint: Injury (Hurt my pinky toe and  my foot and my foot  has not gotten better - Entered by patient) and Foot Injury    46 year old female presents today with a left foot injury. Injury occurred about a week ago. Ecchymosis  and redness. States pain radiates from left 5th digit toe to top of foot. States she hit her coffee table on her foot. Pain scale 06/10. Currently taking Pain medication. Normal ROM, but hurts to bend toes. Mass is present on 5th digit. No prior injury to that location before.     Injury  This is a new problem. The current episode started in the past 7 days. The problem occurs constantly. The problem has been unchanged. Pertinent negatives include no coughing or rash. The symptoms are aggravated by bending. She has tried nothing for the symptoms.     Constitution: Negative.   HENT: Negative.  Negative for ear pain.    Respiratory: Negative.  Negative for cough and shortness of breath.    Musculoskeletal:  Positive for pain.        L foot pain after hitting foot on coffee table 1.5 weeks ago.    Skin:  Positive for bruising. Negative for rash and erythema.        L foot bruising    Neurological:  Negative for altered mental status.   Hematologic/Lymphatic: Negative.    Psychiatric/Behavioral: Negative.  Negative for altered mental status.      Objective:      Physical Exam   Constitutional: She is oriented to person, place, and time. She appears well-developed.   HENT:   Head: Normocephalic and atraumatic. Head is without abrasion, without contusion and without laceration.   Ears:   Right Ear: External ear normal.   Left Ear: External ear normal.   Nose: Nose normal.   Mouth/Throat: Oropharynx is clear and " moist and mucous membranes are normal.   Eyes: Conjunctivae, EOM and lids are normal. Pupils are equal, round, and reactive to light.   Neck: Trachea normal and phonation normal. Neck supple.   Cardiovascular: Normal rate, regular rhythm and normal heart sounds.   Pulmonary/Chest: Effort normal and breath sounds normal. No stridor. No respiratory distress.   Musculoskeletal: Normal range of motion.         General: Normal range of motion.      Right foot: Normal range of motion and normal capillary refill. No tenderness, bony tenderness, swelling, crepitus, deformity or foot drop.      Left foot: Normal range of motion and normal capillary refill. Tenderness and swelling present. No bony tenderness or crepitus.      Comments: Left pedal pulse present there is slight red swelling noted to left 5th pinky  toe, 5th toe is tender, redness is not consistent with cellulitis but with bruising.  Cap refill less than 2 seconds patient can move 5th toe with ease.   Neurological: She is alert and oriented to person, place, and time.   Skin: Skin is warm, dry, intact and no rash. Capillary refill takes less than 2 seconds. No abrasion, No burn, No bruising, No erythema and No ecchymosis   Psychiatric: Her speech is normal and behavior is normal. Judgment and thought content normal.   Nursing note and vitals reviewed.    X-Ray Foot Complete 3 view Left    Result Date: 12/21/2022  EXAMINATION: XR FOOT COMPLETE 3 VIEW LEFT CLINICAL HISTORY: .  Pain in left foot TECHNIQUE: AP, lateral and oblique views of the left foot were performed. COMPARISON: None FINDINGS: Three views left foot. No acute displaced fracture or dislocation of the foot noting overlapping digits limit evaluation on the oblique view.  There is edema about the dorsal aspect of the foot.  No radiopaque foreign body.     1. No convincing acute displaced fracture or dislocation of the foot noting nonspecific distal dorsal edema.  Please note, there is limitation in  evaluation of the digits and metatarsals on the oblique view secondary to positioning. Electronically signed by: Sharan Larry MD Date:    12/21/2022 Time:    17:13     Assessment:       1. Left foot pain          Plan:         Left foot pain  -     X-Ray Foot Complete 3 view Left; Future; Expected date: 12/21/2022  -     Burton tape (specify site)  -     Ambulatory referral/consult to Orthopedics           Medical Decision Making:   Initial Assessment:   PT in room AAOX4, skin W/D, resp E/U, non toxic in appearance, NAD.    Urgent Care Management:  Discussed with patient will burton tape toe.  Discussed with patient to continue to take pain medicine as prescribed.  Discussed with patient follow-up primary care provider orthopedics is not improved.  Discussed rice with patient, discussed patient patient have Tylenol or Motrin as directed by medication label.  Discussed patient I will place an orthopedic referral for patient with patient verbalized will follow up with primary care provider or orthopedic doctor.  Discussed with patient to go to emergency room this severely worse.

## 2022-12-29 ENCOUNTER — PATIENT MESSAGE (OUTPATIENT)
Dept: INTERNAL MEDICINE | Facility: CLINIC | Age: 47
End: 2022-12-29
Payer: COMMERCIAL

## 2022-12-29 DIAGNOSIS — K50.00 CROHN'S DISEASE OF SMALL INTESTINE WITHOUT COMPLICATION: ICD-10-CM

## 2022-12-29 DIAGNOSIS — M79.7 FIBROMYALGIA: ICD-10-CM

## 2022-12-30 ENCOUNTER — TELEPHONE (OUTPATIENT)
Dept: INTERNAL MEDICINE | Facility: CLINIC | Age: 47
End: 2022-12-30

## 2022-12-30 ENCOUNTER — PATIENT MESSAGE (OUTPATIENT)
Dept: INTERNAL MEDICINE | Facility: CLINIC | Age: 47
End: 2022-12-30
Payer: COMMERCIAL

## 2022-12-30 DIAGNOSIS — M79.7 FIBROMYALGIA: ICD-10-CM

## 2022-12-30 DIAGNOSIS — K50.00 CROHN'S DISEASE OF SMALL INTESTINE WITHOUT COMPLICATION: ICD-10-CM

## 2022-12-30 RX ORDER — OXYCODONE AND ACETAMINOPHEN 10; 325 MG/1; MG/1
1 TABLET ORAL EVERY 6 HOURS PRN
Qty: 120 TABLET | Refills: 0 | Status: SHIPPED | OUTPATIENT
Start: 2022-12-30 | End: 2023-01-30 | Stop reason: SDUPTHER

## 2022-12-30 RX ORDER — OXYCODONE AND ACETAMINOPHEN 10; 325 MG/1; MG/1
1 TABLET ORAL EVERY 6 HOURS PRN
Qty: 120 TABLET | Refills: 0 | Status: SHIPPED | OUTPATIENT
Start: 2022-12-30 | End: 2022-12-30 | Stop reason: SDUPTHER

## 2023-01-09 ENCOUNTER — LAB VISIT (OUTPATIENT)
Dept: LAB | Facility: HOSPITAL | Age: 48
End: 2023-01-09
Attending: INTERNAL MEDICINE
Payer: COMMERCIAL

## 2023-01-09 DIAGNOSIS — Z00.00 ROUTINE MEDICAL EXAM: ICD-10-CM

## 2023-01-09 LAB
ALBUMIN SERPL BCP-MCNC: 3.8 G/DL (ref 3.5–5.2)
ALP SERPL-CCNC: 101 U/L (ref 55–135)
ALT SERPL W/O P-5'-P-CCNC: 40 U/L (ref 10–44)
ANION GAP SERPL CALC-SCNC: 9 MMOL/L (ref 8–16)
AST SERPL-CCNC: 21 U/L (ref 10–40)
BASOPHILS # BLD AUTO: 0.04 K/UL (ref 0–0.2)
BASOPHILS NFR BLD: 0.7 % (ref 0–1.9)
BILIRUB SERPL-MCNC: 0.8 MG/DL (ref 0.1–1)
BUN SERPL-MCNC: 20 MG/DL (ref 6–20)
CALCIUM SERPL-MCNC: 9.3 MG/DL (ref 8.7–10.5)
CHLORIDE SERPL-SCNC: 110 MMOL/L (ref 95–110)
CHOLEST SERPL-MCNC: 146 MG/DL (ref 120–199)
CHOLEST/HDLC SERPL: 3.5 {RATIO} (ref 2–5)
CO2 SERPL-SCNC: 24 MMOL/L (ref 23–29)
CREAT SERPL-MCNC: 1.2 MG/DL (ref 0.5–1.4)
DIFFERENTIAL METHOD: ABNORMAL
EOSINOPHIL # BLD AUTO: 0.3 K/UL (ref 0–0.5)
EOSINOPHIL NFR BLD: 4.5 % (ref 0–8)
ERYTHROCYTE [DISTWIDTH] IN BLOOD BY AUTOMATED COUNT: 12.9 % (ref 11.5–14.5)
EST. GFR  (NO RACE VARIABLE): 56.2 ML/MIN/1.73 M^2
ESTIMATED AVG GLUCOSE: 94 MG/DL (ref 68–131)
GLUCOSE SERPL-MCNC: 116 MG/DL (ref 70–110)
HBA1C MFR BLD: 4.9 % (ref 4–5.6)
HCT VFR BLD AUTO: 48.4 % (ref 37–48.5)
HDLC SERPL-MCNC: 42 MG/DL (ref 40–75)
HDLC SERPL: 28.8 % (ref 20–50)
HGB BLD-MCNC: 16 G/DL (ref 12–16)
IMM GRANULOCYTES # BLD AUTO: 0.02 K/UL (ref 0–0.04)
IMM GRANULOCYTES NFR BLD AUTO: 0.3 % (ref 0–0.5)
LDLC SERPL CALC-MCNC: 87 MG/DL (ref 63–159)
LYMPHOCYTES # BLD AUTO: 1.7 K/UL (ref 1–4.8)
LYMPHOCYTES NFR BLD: 29.3 % (ref 18–48)
MCH RBC QN AUTO: 29.5 PG (ref 27–31)
MCHC RBC AUTO-ENTMCNC: 33.1 G/DL (ref 32–36)
MCV RBC AUTO: 89 FL (ref 82–98)
MONOCYTES # BLD AUTO: 0.4 K/UL (ref 0.3–1)
MONOCYTES NFR BLD: 6.6 % (ref 4–15)
NEUTROPHILS # BLD AUTO: 3.4 K/UL (ref 1.8–7.7)
NEUTROPHILS NFR BLD: 58.6 % (ref 38–73)
NONHDLC SERPL-MCNC: 104 MG/DL
NRBC BLD-RTO: 0 /100 WBC
PLATELET # BLD AUTO: 193 K/UL (ref 150–450)
PMV BLD AUTO: 11.4 FL (ref 9.2–12.9)
POTASSIUM SERPL-SCNC: 3.9 MMOL/L (ref 3.5–5.1)
PROT SERPL-MCNC: 6.5 G/DL (ref 6–8.4)
RBC # BLD AUTO: 5.42 M/UL (ref 4–5.4)
SODIUM SERPL-SCNC: 143 MMOL/L (ref 136–145)
TRIGL SERPL-MCNC: 85 MG/DL (ref 30–150)
TSH SERPL DL<=0.005 MIU/L-ACNC: 2.38 UIU/ML (ref 0.4–4)
WBC # BLD AUTO: 5.76 K/UL (ref 3.9–12.7)

## 2023-01-09 PROCEDURE — 80053 COMPREHEN METABOLIC PANEL: CPT | Performed by: INTERNAL MEDICINE

## 2023-01-09 PROCEDURE — 80061 LIPID PANEL: CPT | Performed by: INTERNAL MEDICINE

## 2023-01-09 PROCEDURE — 84443 ASSAY THYROID STIM HORMONE: CPT | Performed by: INTERNAL MEDICINE

## 2023-01-09 PROCEDURE — 36415 COLL VENOUS BLD VENIPUNCTURE: CPT | Mod: PO | Performed by: INTERNAL MEDICINE

## 2023-01-09 PROCEDURE — 83036 HEMOGLOBIN GLYCOSYLATED A1C: CPT | Performed by: INTERNAL MEDICINE

## 2023-01-09 PROCEDURE — 85025 COMPLETE CBC W/AUTO DIFF WBC: CPT | Performed by: INTERNAL MEDICINE

## 2023-01-09 RX ORDER — ZOLPIDEM TARTRATE 10 MG/1
10 TABLET ORAL NIGHTLY PRN
Qty: 90 TABLET | Refills: 0 | Status: SHIPPED | OUTPATIENT
Start: 2023-01-09 | End: 2023-03-22 | Stop reason: SDUPTHER

## 2023-01-13 ENCOUNTER — PATIENT OUTREACH (OUTPATIENT)
Dept: ADMINISTRATIVE | Facility: HOSPITAL | Age: 48
End: 2023-01-13
Payer: COMMERCIAL

## 2023-01-13 NOTE — PROGRESS NOTES
Health Maintenance Due   Topic Date Due    Sign Pain Contract  Never done    Colorectal Cancer Screening  10/15/2016    Pneumococcal Vaccines (Age 0-64) (3 - PPSV23 if available, else PCV20) 11/06/2017    COVID-19 Vaccine (4 - Booster for Pfizer series) 02/20/2022    Influenza Vaccine (1) 09/01/2022     Chart reviewed.   Immunizations: Reconciled  Orders placed: N/A  Upcoming appts to satisfy topics: Mammogram 2/27/2023

## 2023-01-15 DIAGNOSIS — E16.1 REACTIVE HYPOGLYCEMIA: ICD-10-CM

## 2023-01-17 RX ORDER — LANCETS
1 EACH MISCELLANEOUS DAILY
Qty: 100 EACH | Refills: 3 | Status: SHIPPED | OUTPATIENT
Start: 2023-01-17 | End: 2023-10-02 | Stop reason: SDUPTHER

## 2023-01-17 RX ORDER — INSULIN PUMP SYRINGE, 3 ML
EACH MISCELLANEOUS
Qty: 1 EACH | Refills: 0 | Status: SHIPPED | OUTPATIENT
Start: 2023-01-17 | End: 2024-11-19

## 2023-01-30 ENCOUNTER — PATIENT MESSAGE (OUTPATIENT)
Dept: INTERNAL MEDICINE | Facility: CLINIC | Age: 48
End: 2023-01-30
Payer: COMMERCIAL

## 2023-01-30 DIAGNOSIS — K50.00 CROHN'S DISEASE OF SMALL INTESTINE WITHOUT COMPLICATION: ICD-10-CM

## 2023-01-30 DIAGNOSIS — M79.7 FIBROMYALGIA: ICD-10-CM

## 2023-01-30 RX ORDER — OXYCODONE AND ACETAMINOPHEN 10; 325 MG/1; MG/1
1 TABLET ORAL EVERY 6 HOURS PRN
Qty: 120 TABLET | Refills: 0 | Status: SHIPPED | OUTPATIENT
Start: 2023-01-30 | End: 2023-05-15 | Stop reason: SDUPTHER

## 2023-02-08 ENCOUNTER — PATIENT MESSAGE (OUTPATIENT)
Dept: INTERNAL MEDICINE | Facility: CLINIC | Age: 48
End: 2023-02-08
Payer: COMMERCIAL

## 2023-02-14 ENCOUNTER — TELEPHONE (OUTPATIENT)
Dept: INTERNAL MEDICINE | Facility: CLINIC | Age: 48
End: 2023-02-14
Payer: COMMERCIAL

## 2023-02-15 ENCOUNTER — OFFICE VISIT (OUTPATIENT)
Dept: INTERNAL MEDICINE | Facility: CLINIC | Age: 48
End: 2023-02-15
Payer: COMMERCIAL

## 2023-02-15 DIAGNOSIS — M79.7 FIBROMYALGIA: ICD-10-CM

## 2023-02-15 DIAGNOSIS — K50.00 CROHN'S DISEASE OF SMALL INTESTINE WITHOUT COMPLICATION: ICD-10-CM

## 2023-02-15 DIAGNOSIS — R05.9 COUGH, UNSPECIFIED TYPE: ICD-10-CM

## 2023-02-15 DIAGNOSIS — M79.672 LEFT FOOT PAIN: Primary | ICD-10-CM

## 2023-02-15 DIAGNOSIS — Z93.2 ILEOSTOMY IN PLACE: ICD-10-CM

## 2023-02-15 PROCEDURE — 1159F MED LIST DOCD IN RCRD: CPT | Mod: CPTII,95,, | Performed by: INTERNAL MEDICINE

## 2023-02-15 PROCEDURE — 1159F PR MEDICATION LIST DOCUMENTED IN MEDICAL RECORD: ICD-10-PCS | Mod: CPTII,95,, | Performed by: INTERNAL MEDICINE

## 2023-02-15 PROCEDURE — 99213 OFFICE O/P EST LOW 20 MIN: CPT | Mod: 95,,, | Performed by: INTERNAL MEDICINE

## 2023-02-15 PROCEDURE — 1160F RVW MEDS BY RX/DR IN RCRD: CPT | Mod: CPTII,95,, | Performed by: INTERNAL MEDICINE

## 2023-02-15 PROCEDURE — 3044F PR MOST RECENT HEMOGLOBIN A1C LEVEL <7.0%: ICD-10-PCS | Mod: CPTII,95,, | Performed by: INTERNAL MEDICINE

## 2023-02-15 PROCEDURE — 3044F HG A1C LEVEL LT 7.0%: CPT | Mod: CPTII,95,, | Performed by: INTERNAL MEDICINE

## 2023-02-15 PROCEDURE — 1160F PR REVIEW ALL MEDS BY PRESCRIBER/CLIN PHARMACIST DOCUMENTED: ICD-10-PCS | Mod: CPTII,95,, | Performed by: INTERNAL MEDICINE

## 2023-02-15 PROCEDURE — 99213 PR OFFICE/OUTPT VISIT, EST, LEVL III, 20-29 MIN: ICD-10-PCS | Mod: 95,,, | Performed by: INTERNAL MEDICINE

## 2023-02-15 RX ORDER — PROMETHAZINE HYDROCHLORIDE AND DEXTROMETHORPHAN HYDROBROMIDE 6.25; 15 MG/5ML; MG/5ML
5 SYRUP ORAL NIGHTLY PRN
Qty: 180 ML | Refills: 0 | Status: SHIPPED | OUTPATIENT
Start: 2023-02-15 | End: 2023-06-07 | Stop reason: ALTCHOICE

## 2023-02-15 NOTE — PROGRESS NOTES
The patient location is:  [place]  The chief complaint leading to consultation is:  Followup of fibromyalgia and cough    Visit type: audiovisual    Face to Face time with patient:  8   minutes of total time spent on the encounter, which includes face to face time and non-face to face time preparing to see the patient (eg, review of tests), Obtaining and/or reviewing separately obtained history, Documenting clinical information in the electronic or other health record, Independently interpreting results (not separately reported) and communicating results to the patient/family/caregiver, or Care coordination (not separately reported).         Each patient to whom he or she provides medical services by telemedicine is:  (1) informed of the relationship between the physician and patient and the respective role of any other health care provider with respect to management of the patient; and (2) notified that he or she may decline to receive medical services by telemedicine and may withdraw from such care at any time.    Notes:   CC: followup of fibromyalgia and cough  HPI:  The patient is a 47 y.o. year old female with Crohn's disease of small intestine and ileostomy in place who presents t for follow-up of fibromyalgia.  She reports increased stress due to 's illness.  He has been hospitalized.  She also complains of hand pain and left foot pain for couple of months after hitting her foot.  She reports her fibromyalgia symptoms are stable.  The patient does report productive cough x2 weeks and chills.      PAST MEDICAL HISTORY:  Past Medical History:   Diagnosis Date    Anemia     Asthma     B12 deficiency     Crohn's disease     History of shingles     Lupus     Migraine headache     Raynaud disease     Reactive hypoglycemia     Seizures 2004    no medication     Sleep apnea     Non compliant with CPAP       SURGICAL HISTORY:  Past Surgical History:   Procedure Laterality Date    ABDOMINAL SURGERY      COLON  SURGERY      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    COLONOSCOPY      COLOSTOMY      ESOPHAGOGASTRODUODENOSCOPY N/A 5/26/2022    Procedure: ESOPHAGOGASTRODUODENOSCOPY (EGD);  Surgeon: Tin Novak MD;  Location: [facility] (2ND FLR);  Service: Endoscopy;  Laterality: N/A;  fully vaccinated    HYSTERECTOMY      due to endometriosis    ILEOSCOPY N/A 4/10/2019    Procedure: ILEOSCOPY through stoma;  Surgeon: Eve Currie MD;  Location: [facility] (4TH FLR);  Service: Endoscopy;  Laterality: N/A;  Schedule as 30 minute case, schedule in April or May 2019    ILEOSCOPY N/A 5/26/2022    Procedure: ILEOSCOPY;  Surgeon: Tin Novak MD;  Location: [facility] (2ND FLR);  Service: Endoscopy;  Laterality: N/A;    ILEOSTOMY      LAPAROSCOPY W/ MINI-LAPAROTOMY      large intestine removed      just a portion    pelvic mass removal      REVISION COLOSTOMY  2010    SMALL INTESTINE SURGERY      J pouch excision with end ileostomy    STOMACH SURGERY      TONSILLECTOMY, ADENOIDECTOMY         MEDS:  Medcard reviewed and updated    ALLERGIES: Allergy Card reviewed and updated    SOCIAL HISTORY:   The patient is a nonsmoker.    PE:   APPEARANCE: Well nourished, well developed, in no acute distress.    Video visit  PSYCHIATRIC: The patient is oriented to person, place, and time and has a pleasant affect.        ASSESSMENT/PLAN:  Diagnoses and all orders for this visit:    Left foot pain  -     X-Ray Foot Complete 3 view Left; Future    Crohn's disease of small intestine without complication  -     stable, followed by GI    Ileostomy in place  -     stable     Fibromyalgia  -     continue pain control     Cough, unspecified type  -     prescribed promethazine DM     Other orders  -     promethazine-dextromethorphan (PROMETHAZINE-DM) 6.25-15 mg/5 mL Syrp; Take 5 mLs by mouth nightly as needed (cough).

## 2023-02-22 ENCOUNTER — PATIENT MESSAGE (OUTPATIENT)
Dept: BARIATRICS | Facility: CLINIC | Age: 48
End: 2023-02-22
Payer: COMMERCIAL

## 2023-02-24 ENCOUNTER — PATIENT MESSAGE (OUTPATIENT)
Dept: INTERNAL MEDICINE | Facility: CLINIC | Age: 48
End: 2023-02-24
Payer: COMMERCIAL

## 2023-02-24 DIAGNOSIS — M79.7 FIBROMYALGIA: Primary | ICD-10-CM

## 2023-02-24 RX ORDER — OXYCODONE AND ACETAMINOPHEN 10; 325 MG/1; MG/1
1 TABLET ORAL EVERY 6 HOURS PRN
Qty: 120 TABLET | Refills: 0 | Status: SHIPPED | OUTPATIENT
Start: 2023-03-02 | End: 2023-03-22 | Stop reason: SDUPTHER

## 2023-02-28 ENCOUNTER — PATIENT MESSAGE (OUTPATIENT)
Dept: INTERNAL MEDICINE | Facility: CLINIC | Age: 48
End: 2023-02-28
Payer: COMMERCIAL

## 2023-03-06 ENCOUNTER — PATIENT MESSAGE (OUTPATIENT)
Dept: INTERNAL MEDICINE | Facility: CLINIC | Age: 48
End: 2023-03-06
Payer: COMMERCIAL

## 2023-03-06 DIAGNOSIS — E16.1 REACTIVE HYPOGLYCEMIA: ICD-10-CM

## 2023-03-07 ENCOUNTER — PATIENT MESSAGE (OUTPATIENT)
Dept: INTERNAL MEDICINE | Facility: CLINIC | Age: 48
End: 2023-03-07
Payer: COMMERCIAL

## 2023-03-07 RX ORDER — SERTRALINE HYDROCHLORIDE 50 MG/1
50 TABLET, FILM COATED ORAL DAILY
Qty: 30 TABLET | Refills: 11 | Status: SHIPPED | OUTPATIENT
Start: 2023-03-07 | End: 2024-03-10

## 2023-03-17 ENCOUNTER — PATIENT MESSAGE (OUTPATIENT)
Dept: INTERNAL MEDICINE | Facility: CLINIC | Age: 48
End: 2023-03-17
Payer: COMMERCIAL

## 2023-03-20 NOTE — TELEPHONE ENCOUNTER
Pt was given symbicort in urgent care and states that it helps with her asthma , she says it works but is almost out , she is requesting a refill . Attachment send of which one she was prescribed .

## 2023-03-22 ENCOUNTER — PATIENT MESSAGE (OUTPATIENT)
Dept: INTERNAL MEDICINE | Facility: CLINIC | Age: 48
End: 2023-03-22
Payer: COMMERCIAL

## 2023-03-22 DIAGNOSIS — M79.7 FIBROMYALGIA: ICD-10-CM

## 2023-03-22 RX ORDER — ZOLPIDEM TARTRATE 10 MG/1
10 TABLET ORAL NIGHTLY PRN
Qty: 90 TABLET | Refills: 0 | Status: SHIPPED | OUTPATIENT
Start: 2023-03-22 | End: 2023-05-15

## 2023-03-22 RX ORDER — OXYCODONE AND ACETAMINOPHEN 10; 325 MG/1; MG/1
1 TABLET ORAL EVERY 6 HOURS PRN
Qty: 120 TABLET | Refills: 0 | Status: SHIPPED | OUTPATIENT
Start: 2023-03-30 | End: 2023-04-21 | Stop reason: SDUPTHER

## 2023-03-22 NOTE — TELEPHONE ENCOUNTER
Pt was given inhaler at  and is requesting a refill, Symbicort 160/4.5.  60 inhalations. Please send to  [facility] on [address]

## 2023-03-28 ENCOUNTER — PATIENT MESSAGE (OUTPATIENT)
Dept: INTERNAL MEDICINE | Facility: CLINIC | Age: 48
End: 2023-03-28
Payer: COMMERCIAL

## 2023-03-28 RX ORDER — BUDESONIDE AND FORMOTEROL FUMARATE DIHYDRATE 160; 4.5 UG/1; UG/1
2 AEROSOL RESPIRATORY (INHALATION) EVERY 12 HOURS
Qty: 10.2 G | Refills: 3 | Status: SHIPPED | OUTPATIENT
Start: 2023-03-28 | End: 2024-04-10

## 2023-04-13 ENCOUNTER — PATIENT MESSAGE (OUTPATIENT)
Dept: INTERNAL MEDICINE | Facility: CLINIC | Age: 48
End: 2023-04-13
Payer: COMMERCIAL

## 2023-04-18 ENCOUNTER — PATIENT MESSAGE (OUTPATIENT)
Dept: WOUND CARE | Facility: CLINIC | Age: 48
End: 2023-04-18
Payer: COMMERCIAL

## 2023-04-19 ENCOUNTER — PATIENT MESSAGE (OUTPATIENT)
Dept: INTERNAL MEDICINE | Facility: CLINIC | Age: 48
End: 2023-04-19
Payer: COMMERCIAL

## 2023-04-19 NOTE — TELEPHONE ENCOUNTER
Pt accidentally touched a latex balloon and is still having itching, and slight SOB. She took benadryl. Pt is asking if there is something stronger.

## 2023-04-21 ENCOUNTER — PATIENT MESSAGE (OUTPATIENT)
Dept: INTERNAL MEDICINE | Facility: CLINIC | Age: 48
End: 2023-04-21
Payer: COMMERCIAL

## 2023-04-21 ENCOUNTER — OFFICE VISIT (OUTPATIENT)
Dept: WOUND CARE | Facility: CLINIC | Age: 48
End: 2023-04-21
Payer: COMMERCIAL

## 2023-04-21 ENCOUNTER — PATIENT MESSAGE (OUTPATIENT)
Dept: ADMINISTRATIVE | Facility: HOSPITAL | Age: 48
End: 2023-04-21
Payer: COMMERCIAL

## 2023-04-21 DIAGNOSIS — Z43.2 ATTENTION TO ILEOSTOMY: Primary | ICD-10-CM

## 2023-04-21 DIAGNOSIS — K43.9 HERNIA OF ABDOMINAL WALL: ICD-10-CM

## 2023-04-21 DIAGNOSIS — M79.7 FIBROMYALGIA: ICD-10-CM

## 2023-04-21 PROCEDURE — 99214 PR OFFICE/OUTPT VISIT, EST, LEVL IV, 30-39 MIN: ICD-10-PCS | Mod: S$GLB,,, | Performed by: CLINICAL NURSE SPECIALIST

## 2023-04-21 PROCEDURE — 1160F PR REVIEW ALL MEDS BY PRESCRIBER/CLIN PHARMACIST DOCUMENTED: ICD-10-PCS | Mod: CPTII,S$GLB,, | Performed by: CLINICAL NURSE SPECIALIST

## 2023-04-21 PROCEDURE — 99214 OFFICE O/P EST MOD 30 MIN: CPT | Mod: S$GLB,,, | Performed by: CLINICAL NURSE SPECIALIST

## 2023-04-21 PROCEDURE — 3044F PR MOST RECENT HEMOGLOBIN A1C LEVEL <7.0%: ICD-10-PCS | Mod: CPTII,S$GLB,, | Performed by: CLINICAL NURSE SPECIALIST

## 2023-04-21 PROCEDURE — 3044F HG A1C LEVEL LT 7.0%: CPT | Mod: CPTII,S$GLB,, | Performed by: CLINICAL NURSE SPECIALIST

## 2023-04-21 PROCEDURE — 99999 PR PBB SHADOW E&M-EST. PATIENT-LVL IV: CPT | Mod: PBBFAC,,, | Performed by: CLINICAL NURSE SPECIALIST

## 2023-04-21 PROCEDURE — 1159F MED LIST DOCD IN RCRD: CPT | Mod: CPTII,S$GLB,, | Performed by: CLINICAL NURSE SPECIALIST

## 2023-04-21 PROCEDURE — 1160F RVW MEDS BY RX/DR IN RCRD: CPT | Mod: CPTII,S$GLB,, | Performed by: CLINICAL NURSE SPECIALIST

## 2023-04-21 PROCEDURE — 1159F PR MEDICATION LIST DOCUMENTED IN MEDICAL RECORD: ICD-10-PCS | Mod: CPTII,S$GLB,, | Performed by: CLINICAL NURSE SPECIALIST

## 2023-04-21 PROCEDURE — 99999 PR PBB SHADOW E&M-EST. PATIENT-LVL IV: ICD-10-PCS | Mod: PBBFAC,,, | Performed by: CLINICAL NURSE SPECIALIST

## 2023-04-21 RX ORDER — OXYCODONE AND ACETAMINOPHEN 10; 325 MG/1; MG/1
1 TABLET ORAL EVERY 6 HOURS PRN
Qty: 120 TABLET | Refills: 0 | Status: SHIPPED | OUTPATIENT
Start: 2023-04-27 | End: 2023-10-13

## 2023-04-21 NOTE — PROGRESS NOTES
Subjective:       Patient ID: Emi Johnson is a 47 y.o. female.    Chief Complaint: Ileostomy and Hernia    This is another visit to enterostomal therapy clinic and she comes today for assistance with  Ileostomy  as she bumps at base and new hernia as well  Her last  revision was done 7/18/19 along with hernia repair by Dr Saldaña,. She is doing well, works full time, she did break her foot in Feb and is just now completed therapy and walking with elastic support only today.   PMH  She had total colectomy for crohn's  2018  by Dr Saldaña, she later developed large hernia that was subsequently repaired.     Wound Check    Review of Systems   Constitutional:  Negative for activity change, appetite change and fever.   HENT:  Negative for congestion and rhinorrhea.    Respiratory:  Negative for cough and shortness of breath.    Cardiovascular: Negative.    Gastrointestinal: Negative.         Ileostomy   Genitourinary: Negative.    Musculoskeletal: Negative.    Skin:  Negative for rash and wound.   Neurological: Negative.    Psychiatric/Behavioral: Negative.       Objective:      Physical Exam  Constitutional:       Appearance: She is well-developed.   Pulmonary:      Effort: Pulmonary effort is normal. No respiratory distress.   Abdominal:      General: Bowel sounds are normal.      Palpations: Abdomen is soft.      Tenderness: There is no abdominal tenderness.   Skin:     General: Skin is warm and dry.      Findings: No rash.   Neurological:      Mental Status: She is alert.         4/21  sjhowed pt how to touch the bumps and treat at home  she is using New image precut 25 mm but stoma is larger, she is advised to use rings since this is only supply she has  Discussed Hernia binder as she works at [employer]. She has had one hernia repair already and needs support while at work   I gave her a [employer] that I had and fit her . As she said she does not have the money to buy a binder.                 7/2/18 7/25/19 post hernia repair       5/13/20 5/19/21 5/19/21  Stoma is still 28 mm and she wants to stay with 13/4 flange new image so as last time I have reiterated to cut it a tad to fit perfectly, gave her a pair of scissors that make this easy to do and a angie   Had same discussion on miralax and hydration to encourage her to drink more daily, she drinks coke typically and very little water.    Assessment:       1. Attention to ileostomy    2. Hernia of abdominal wall        Plan:     Wear the Hernia binder as much as possible at work   Since she cannot really Enlarge wafer to 28 mm  use the rings she has to cover some of the flat stoma edge With her 13/4 wafer if possible to keep from moving up in size   [employer] is her DME    I have reviewed the plan of care with the patient and she express understanding. I spent over 50% of this 30 minute visit in face to face counseling.

## 2023-05-15 ENCOUNTER — HOSPITAL ENCOUNTER (OUTPATIENT)
Dept: RADIOLOGY | Facility: HOSPITAL | Age: 48
Discharge: HOME | End: 2023-05-15
Attending: INTERNAL MEDICINE
Payer: COMMERCIAL

## 2023-05-15 ENCOUNTER — OFFICE VISIT (OUTPATIENT)
Dept: INTERNAL MEDICINE | Facility: CLINIC | Age: 48
End: 2023-05-15
Payer: COMMERCIAL

## 2023-05-15 VITALS — WEIGHT: 196 LBS | BODY MASS INDEX: 34.73 KG/M2 | HEIGHT: 63 IN

## 2023-05-15 VITALS
WEIGHT: 196.19 LBS | DIASTOLIC BLOOD PRESSURE: 70 MMHG | BODY MASS INDEX: 34.76 KG/M2 | HEIGHT: 63 IN | TEMPERATURE: 97 F | SYSTOLIC BLOOD PRESSURE: 122 MMHG | OXYGEN SATURATION: 100 % | HEART RATE: 83 BPM

## 2023-05-15 DIAGNOSIS — M79.7 FIBROMYALGIA: ICD-10-CM

## 2023-05-15 DIAGNOSIS — G47.33 OSA (OBSTRUCTIVE SLEEP APNEA): Primary | ICD-10-CM

## 2023-05-15 DIAGNOSIS — Z12.31 OTHER SCREENING MAMMOGRAM: ICD-10-CM

## 2023-05-15 DIAGNOSIS — G47.00 INSOMNIA, UNSPECIFIED TYPE: ICD-10-CM

## 2023-05-15 DIAGNOSIS — M79.672 LEFT FOOT PAIN: ICD-10-CM

## 2023-05-15 DIAGNOSIS — K50.00 CROHN'S DISEASE OF SMALL INTESTINE WITHOUT COMPLICATION: ICD-10-CM

## 2023-05-15 PROCEDURE — 99213 OFFICE O/P EST LOW 20 MIN: CPT | Mod: S$GLB,,, | Performed by: INTERNAL MEDICINE

## 2023-05-15 PROCEDURE — 1159F PR MEDICATION LIST DOCUMENTED IN MEDICAL RECORD: ICD-10-PCS | Mod: CPTII,S$GLB,, | Performed by: INTERNAL MEDICINE

## 2023-05-15 PROCEDURE — 1160F RVW MEDS BY RX/DR IN RCRD: CPT | Mod: CPTII,S$GLB,, | Performed by: INTERNAL MEDICINE

## 2023-05-15 PROCEDURE — 3078F DIAST BP <80 MM HG: CPT | Mod: CPTII,S$GLB,, | Performed by: INTERNAL MEDICINE

## 2023-05-15 PROCEDURE — 77067 MAMMO DIGITAL SCREENING BILAT WITH TOMO: ICD-10-PCS | Mod: 26,,, | Performed by: RADIOLOGY

## 2023-05-15 PROCEDURE — 99999 PR PBB SHADOW E&M-EST. PATIENT-LVL V: CPT | Mod: PBBFAC,,, | Performed by: INTERNAL MEDICINE

## 2023-05-15 PROCEDURE — 77063 MAMMO DIGITAL SCREENING BILAT WITH TOMO: ICD-10-PCS | Mod: 26,,, | Performed by: RADIOLOGY

## 2023-05-15 PROCEDURE — 3044F PR MOST RECENT HEMOGLOBIN A1C LEVEL <7.0%: ICD-10-PCS | Mod: CPTII,S$GLB,, | Performed by: INTERNAL MEDICINE

## 2023-05-15 PROCEDURE — 73630 XR FOOT COMPLETE 3 VIEW LEFT: ICD-10-PCS | Mod: 26,LT,, | Performed by: RADIOLOGY

## 2023-05-15 PROCEDURE — 99213 PR OFFICE/OUTPT VISIT, EST, LEVL III, 20-29 MIN: ICD-10-PCS | Mod: S$GLB,,, | Performed by: INTERNAL MEDICINE

## 2023-05-15 PROCEDURE — 77063 BREAST TOMOSYNTHESIS BI: CPT | Mod: 26,,, | Performed by: RADIOLOGY

## 2023-05-15 PROCEDURE — 3078F PR MOST RECENT DIASTOLIC BLOOD PRESSURE < 80 MM HG: ICD-10-PCS | Mod: CPTII,S$GLB,, | Performed by: INTERNAL MEDICINE

## 2023-05-15 PROCEDURE — 73630 X-RAY EXAM OF FOOT: CPT | Mod: 26,LT,, | Performed by: RADIOLOGY

## 2023-05-15 PROCEDURE — 3044F HG A1C LEVEL LT 7.0%: CPT | Mod: CPTII,S$GLB,, | Performed by: INTERNAL MEDICINE

## 2023-05-15 PROCEDURE — 77067 SCR MAMMO BI INCL CAD: CPT | Mod: TC,PO

## 2023-05-15 PROCEDURE — 1159F MED LIST DOCD IN RCRD: CPT | Mod: CPTII,S$GLB,, | Performed by: INTERNAL MEDICINE

## 2023-05-15 PROCEDURE — 3074F SYST BP LT 130 MM HG: CPT | Mod: CPTII,S$GLB,, | Performed by: INTERNAL MEDICINE

## 2023-05-15 PROCEDURE — 99999 PR PBB SHADOW E&M-EST. PATIENT-LVL V: ICD-10-PCS | Mod: PBBFAC,,, | Performed by: INTERNAL MEDICINE

## 2023-05-15 PROCEDURE — 3008F BODY MASS INDEX DOCD: CPT | Mod: CPTII,S$GLB,, | Performed by: INTERNAL MEDICINE

## 2023-05-15 PROCEDURE — 73630 X-RAY EXAM OF FOOT: CPT | Mod: TC,PO,LT

## 2023-05-15 PROCEDURE — 1160F PR REVIEW ALL MEDS BY PRESCRIBER/CLIN PHARMACIST DOCUMENTED: ICD-10-PCS | Mod: CPTII,S$GLB,, | Performed by: INTERNAL MEDICINE

## 2023-05-15 PROCEDURE — 77067 SCR MAMMO BI INCL CAD: CPT | Mod: 26,,, | Performed by: RADIOLOGY

## 2023-05-15 PROCEDURE — 3008F PR BODY MASS INDEX (BMI) DOCUMENTED: ICD-10-PCS | Mod: CPTII,S$GLB,, | Performed by: INTERNAL MEDICINE

## 2023-05-15 PROCEDURE — 3074F PR MOST RECENT SYSTOLIC BLOOD PRESSURE < 130 MM HG: ICD-10-PCS | Mod: CPTII,S$GLB,, | Performed by: INTERNAL MEDICINE

## 2023-05-15 RX ORDER — ZOLPIDEM TARTRATE 12.5 MG/1
12.5 TABLET, FILM COATED, EXTENDED RELEASE ORAL NIGHTLY PRN
Qty: 90 TABLET | Refills: 0 | Status: SHIPPED | OUTPATIENT
Start: 2023-05-15 | End: 2023-08-07 | Stop reason: SDUPTHER

## 2023-05-15 RX ORDER — ZOLPIDEM TARTRATE 12.5 MG/1
12.5 TABLET, FILM COATED, EXTENDED RELEASE ORAL NIGHTLY PRN
Qty: 30 TABLET | Refills: 3 | Status: SHIPPED | OUTPATIENT
Start: 2023-05-15 | End: 2023-05-15 | Stop reason: SDUPTHER

## 2023-05-15 RX ORDER — LANCETS 33 GAUGE
1 EACH MISCELLANEOUS 2 TIMES DAILY
Qty: 200 EACH | Refills: 3 | Status: SHIPPED | OUTPATIENT
Start: 2023-05-15

## 2023-05-15 RX ORDER — OXYCODONE AND ACETAMINOPHEN 10; 325 MG/1; MG/1
1 TABLET ORAL EVERY 6 HOURS PRN
Qty: 120 TABLET | Refills: 0 | Status: SHIPPED | OUTPATIENT
Start: 2023-05-25 | End: 2023-06-12 | Stop reason: SDUPTHER

## 2023-05-15 NOTE — PROGRESS NOTES
CC: followup of fibromyalgia  HPI:  The patient is a 47 y.o. year old female who presents to the office for followup of fibromyalgia.  She reports worsening pain and difficulty sleeping at night.    The patient denies any chest pain, shortness of breath, headache, blurred vision, excessive fatigue, nausea or vomiting.      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    COLONOSCOPY      COLOSTOMY      ESOPHAGOGASTRODUODENOSCOPY N/A 5/26/2022    Procedure: ESOPHAGOGASTRODUODENOSCOPY (EGD);  Surgeon: Tin Novak MD;  Location: [facility] (2ND FLR);  Service: Endoscopy;  Laterality: N/A;  fully vaccinated    HYSTERECTOMY      due to endometriosis    ILEOSCOPY N/A 4/10/2019    Procedure: ILEOSCOPY through stoma;  Surgeon: Eve Currie MD;  Location: [facility] (4TH FLR);  Service: Endoscopy;  Laterality: N/A;  Schedule as 30 minute case, schedule in April or May 2019    ILEOSCOPY N/A 5/26/2022    Procedure: ILEOSCOPY;  Surgeon: Tin Novak MD;  Location: [facility] (2ND FLR);  Service: Endoscopy;  Laterality: N/A;    ILEOSTOMY      LAPAROSCOPY W/ MINI-LAPAROTOMY      large intestine removed      just a portion    pelvic mass removal      REVISION COLOSTOMY  2010    SMALL INTESTINE SURGERY      J pouch excision with end ileostomy    STOMACH SURGERY      TONSILLECTOMY, ADENOIDECTOMY         MEDS:  Medcard reviewed and updated    ALLERGIES: Allergy Card reviewed and updated    SOCIAL HISTORY:   The patient is a nonsmoker.    PE:   APPEARANCE: Well nourished, well developed, in no acute  distress.    CHEST: Lungs clear to auscultation with unlabored respirations.  CARDIOVASCULAR: Normal S1, S2. No murmurs. No carotid bruits. No pedal edema.  ABDOMEN: Bowel sounds normal. Not distended. Soft. No tenderness or masses.  Positive colostomy.  PSYCHIATRIC: The patient is oriented to person, place, and time and has a pleasant affect.        ASSESSMENT/PLAN:  Emi was seen today for follow-up, foot injury and abdominal pain.    Diagnoses and all orders for this visit:    QUEENIE (obstructive sleep apnea)  -     Ambulatory referral/consult to Sleep Disorders; Future    Fibromyalgia  -     oxyCODONE-acetaminophen (PERCOCET)  mg per tablet; Take 1 tablet by mouth every 6 (six) hours as needed for Pain (Greater than 7 day supply medically necessary).    Crohn's disease of small intestine without complication  -     oxyCODONE-acetaminophen (PERCOCET)  mg per tablet; Take 1 tablet by mouth every 6 (six) hours as needed for Pain (Greater than 7 day supply medically necessary).    Insomnia, unspecified type  -     refer to sleep Medicine     Other orders  -     blood sugar diagnostic (CONTOUR NEXT TEST STRIPS) Strp; 1 strip by Misc.(Non-Drug; Combo Route) route 2 (two) times a day.  -     lancets (BD ULTRA FINE LANCETS) 33 gauge Misc; 1 lancet by Misc.(Non-Drug; Combo Route) route 2 (two) times a day.  -     Discontinue: zolpidem (AMBIEN CR) 12.5 MG CR tablet; Take 1 tablet (12.5 mg total) by mouth nightly as needed for Insomnia.  -     zolpidem (AMBIEN CR) 12.5 MG CR tablet; Take 1 tablet (12.5 mg total) by mouth nightly as needed for Insomnia.

## 2023-05-23 ENCOUNTER — PATIENT MESSAGE (OUTPATIENT)
Dept: INTERNAL MEDICINE | Facility: CLINIC | Age: 48
End: 2023-05-23
Payer: COMMERCIAL

## 2023-05-23 NOTE — TELEPHONE ENCOUNTER
----- Message from Sharon Moore sent at 5/23/2023  4:11 PM CDT -----  Contact: [phone] Patient  2TESTRESULTS    Type: Test Results    What test was performed? Mammogram    Who ordered the test? Dr Rutledge    When and where were the test performed? [facility] 05/15/2023    Would you like response via [employer]: Call Back Please    Comments:

## 2023-05-25 RX ORDER — ONDANSETRON 4 MG/1
TABLET, ORALLY DISINTEGRATING ORAL
Qty: 30 TABLET | Refills: 3 | Status: SHIPPED | OUTPATIENT
Start: 2023-05-25 | End: 2023-06-07 | Stop reason: ALTCHOICE

## 2023-05-28 ENCOUNTER — PATIENT MESSAGE (OUTPATIENT)
Dept: INTERNAL MEDICINE | Facility: CLINIC | Age: 48
End: 2023-05-28
Payer: COMMERCIAL

## 2023-05-29 ENCOUNTER — HOSPITAL ENCOUNTER (OUTPATIENT)
Dept: RADIOLOGY | Facility: HOSPITAL | Age: 48
Discharge: HOME | End: 2023-05-29
Attending: INTERNAL MEDICINE
Payer: COMMERCIAL

## 2023-05-29 ENCOUNTER — TELEPHONE (OUTPATIENT)
Dept: RADIOLOGY | Facility: HOSPITAL | Age: 48
End: 2023-05-29
Payer: COMMERCIAL

## 2023-05-29 DIAGNOSIS — R92.8 ABNORMAL FINDING ON BREAST IMAGING: ICD-10-CM

## 2023-05-29 PROCEDURE — 76642 US BREAST LEFT LIMITED: ICD-10-PCS | Mod: 26,LT,, | Performed by: RADIOLOGY

## 2023-05-29 PROCEDURE — 76642 ULTRASOUND BREAST LIMITED: CPT | Mod: 26,LT,, | Performed by: RADIOLOGY

## 2023-05-29 PROCEDURE — 76642 ULTRASOUND BREAST LIMITED: CPT | Mod: TC,LT

## 2023-05-29 NOTE — TELEPHONE ENCOUNTER
Spoke with patient and explained mammogram findings.Patient expressed understanding of results. Patient scheduled abnormal mammogram follow up appointment at [facility] on 5/29/2023.

## 2023-05-29 NOTE — TELEPHONE ENCOUNTER
Pt states she received mammo results and results state that there is a mass in left breast , and that an ultrasound of her left breast is needed . Please order to [facility] .

## 2023-05-30 ENCOUNTER — TELEPHONE (OUTPATIENT)
Dept: RADIOLOGY | Facility: HOSPITAL | Age: 48
End: 2023-05-30
Payer: COMMERCIAL

## 2023-05-30 NOTE — TELEPHONE ENCOUNTER
Spoke with patient. Reviewed breast biopsy procedure and reviewed instructions for breast biopsy. Patient expressed understanding and all questions were answered. Provided patient with my phone number to call for any further concerns or questions.   Patient scheduled breast biopsy at the [facility] on 6/6/2023.

## 2023-06-02 ENCOUNTER — HOSPITAL ENCOUNTER (OUTPATIENT)
Dept: RADIOLOGY | Facility: HOSPITAL | Age: 48
Discharge: HOME | End: 2023-06-02
Attending: INTERNAL MEDICINE
Payer: COMMERCIAL

## 2023-06-02 DIAGNOSIS — R92.8 ABNORMAL FINDING ON BREAST IMAGING: Primary | ICD-10-CM

## 2023-06-02 PROCEDURE — 25000003 PHARM REV CODE 250: Performed by: INTERNAL MEDICINE

## 2023-06-02 PROCEDURE — 88305 TISSUE EXAM BY PATHOLOGIST: CPT | Mod: 26,,, | Performed by: PATHOLOGY

## 2023-06-02 PROCEDURE — 77065 MAMMO DIGITAL DIAGNOSTIC LEFT: ICD-10-PCS | Mod: 26,LT,, | Performed by: RADIOLOGY

## 2023-06-02 PROCEDURE — 27200939 MAMMO DIGITAL DIAGNOSTIC LEFT

## 2023-06-02 PROCEDURE — A4648 IMPLANTABLE TISSUE MARKER: HCPCS

## 2023-06-02 PROCEDURE — 19083 BX BREAST 1ST LESION US IMAG: CPT | Mod: LT,,, | Performed by: RADIOLOGY

## 2023-06-02 PROCEDURE — 19083 BX BREAST 1ST LESION US IMAG: CPT | Mod: LT

## 2023-06-02 PROCEDURE — 88305 TISSUE EXAM BY PATHOLOGIST: CPT | Performed by: PATHOLOGY

## 2023-06-02 PROCEDURE — 19083 US BREAST BIOPSY WITH IMAGING 1ST SITE LEFT: ICD-10-PCS | Mod: LT,,, | Performed by: RADIOLOGY

## 2023-06-02 PROCEDURE — 88305 TISSUE EXAM BY PATHOLOGIST: ICD-10-PCS | Mod: 26,,, | Performed by: PATHOLOGY

## 2023-06-02 PROCEDURE — 77065 DX MAMMO INCL CAD UNI: CPT | Mod: 26,LT,, | Performed by: RADIOLOGY

## 2023-06-02 RX ORDER — LIDOCAINE HYDROCHLORIDE 10 MG/ML
3 INJECTION INFILTRATION; PERINEURAL ONCE
Status: COMPLETED | OUTPATIENT
Start: 2023-06-02 | End: 2023-06-02

## 2023-06-02 RX ORDER — BUPIVACAINE HCL/EPINEPHRINE 0.25-.0005
10 VIAL (ML) INJECTION ONCE
Status: COMPLETED | OUTPATIENT
Start: 2023-06-02 | End: 2023-06-02

## 2023-06-02 RX ADMIN — LIDOCAINE HYDROCHLORIDE 3 ML: 10 INJECTION, SOLUTION INFILTRATION; PERINEURAL at 09:06

## 2023-06-02 RX ADMIN — BUPIVACAINE HYDROCHLORIDE AND EPINEPHRINE BITARTRATE 10 ML: 2.5; .005 INJECTION, SOLUTION INFILTRATION; PERINEURAL at 09:06

## 2023-06-05 LAB
FINAL PATHOLOGIC DIAGNOSIS: NORMAL
GROSS: NORMAL
Lab: NORMAL

## 2023-06-06 ENCOUNTER — TELEPHONE (OUTPATIENT)
Dept: SURGERY | Facility: CLINIC | Age: 48
End: 2023-06-06
Payer: COMMERCIAL

## 2023-06-06 NOTE — TELEPHONE ENCOUNTER
Patient called with results of breast biopsy from 6/2/2023,   no atypia/benign, all questions answered, pt advised to follow up in 6 months with repeat imaging per core biopsy protocol.  reviewed biopsy results and results are benign and concordant. Patient verbalized understanding.     ----- Message from Walker Grant MD sent at 6/5/2023  5:10 PM CDT -----  Benign and concordant.    Thank you,    Walker Grant M.D.  
Substance abuse/dependence

## 2023-06-07 ENCOUNTER — HOSPITAL ENCOUNTER (EMERGENCY)
Facility: HOSPITAL | Age: 48
Discharge: HOME | End: 2023-06-07
Attending: EMERGENCY MEDICINE
Payer: COMMERCIAL

## 2023-06-07 VITALS
RESPIRATION RATE: 18 BRPM | HEART RATE: 87 BPM | SYSTOLIC BLOOD PRESSURE: 132 MMHG | WEIGHT: 195 LBS | HEIGHT: 63 IN | TEMPERATURE: 98 F | OXYGEN SATURATION: 97 % | DIASTOLIC BLOOD PRESSURE: 79 MMHG | BODY MASS INDEX: 34.55 KG/M2

## 2023-06-07 DIAGNOSIS — K45.8 RECURRENT ABDOMINAL HERNIA WITHOUT OBSTRUCTION OR GANGRENE, UNSPECIFIED HERNIA TYPE: ICD-10-CM

## 2023-06-07 DIAGNOSIS — K76.0 HEPATIC STEATOSIS: ICD-10-CM

## 2023-06-07 DIAGNOSIS — R10.31 RIGHT LOWER QUADRANT ABDOMINAL PAIN: Primary | ICD-10-CM

## 2023-06-07 LAB
ALBUMIN SERPL BCP-MCNC: 4.3 G/DL (ref 3.5–5.2)
ALP SERPL-CCNC: 101 U/L (ref 55–135)
ALT SERPL W/O P-5'-P-CCNC: 37 U/L (ref 10–44)
ANION GAP SERPL CALC-SCNC: 11 MMOL/L (ref 8–16)
AST SERPL-CCNC: 22 U/L (ref 10–40)
BASOPHILS # BLD AUTO: 0.03 K/UL (ref 0–0.2)
BASOPHILS NFR BLD: 0.4 % (ref 0–1.9)
BILIRUB SERPL-MCNC: 1 MG/DL (ref 0.1–1)
BILIRUB UR QL STRIP: NEGATIVE
BUN SERPL-MCNC: 19 MG/DL (ref 6–20)
CALCIUM SERPL-MCNC: 9.8 MG/DL (ref 8.7–10.5)
CHLORIDE SERPL-SCNC: 106 MMOL/L (ref 95–110)
CLARITY UR: CLEAR
CO2 SERPL-SCNC: 24 MMOL/L (ref 23–29)
COLOR UR: YELLOW
CREAT SERPL-MCNC: 1.1 MG/DL (ref 0.5–1.4)
DIFFERENTIAL METHOD: ABNORMAL
EOSINOPHIL # BLD AUTO: 0.2 K/UL (ref 0–0.5)
EOSINOPHIL NFR BLD: 3.1 % (ref 0–8)
ERYTHROCYTE [DISTWIDTH] IN BLOOD BY AUTOMATED COUNT: 12.4 % (ref 11.5–14.5)
EST. GFR  (NO RACE VARIABLE): >60 ML/MIN/1.73 M^2
GLUCOSE SERPL-MCNC: 86 MG/DL (ref 70–110)
GLUCOSE UR QL STRIP: NEGATIVE
HCT VFR BLD AUTO: 50.2 % (ref 37–48.5)
HGB BLD-MCNC: 17.1 G/DL (ref 12–16)
HGB UR QL STRIP: NEGATIVE
IMM GRANULOCYTES # BLD AUTO: 0.02 K/UL (ref 0–0.04)
IMM GRANULOCYTES NFR BLD AUTO: 0.3 % (ref 0–0.5)
KETONES UR QL STRIP: NEGATIVE
LEUKOCYTE ESTERASE UR QL STRIP: ABNORMAL
LIPASE SERPL-CCNC: 21 U/L (ref 4–60)
LYMPHOCYTES # BLD AUTO: 2.1 K/UL (ref 1–4.8)
LYMPHOCYTES NFR BLD: 30.3 % (ref 18–48)
MCH RBC QN AUTO: 29.4 PG (ref 27–31)
MCHC RBC AUTO-ENTMCNC: 34.1 G/DL (ref 32–36)
MCV RBC AUTO: 86 FL (ref 82–98)
MICROSCOPIC COMMENT: NORMAL
MONOCYTES # BLD AUTO: 0.5 K/UL (ref 0.3–1)
MONOCYTES NFR BLD: 7.3 % (ref 4–15)
NEUTROPHILS # BLD AUTO: 4.1 K/UL (ref 1.8–7.7)
NEUTROPHILS NFR BLD: 58.6 % (ref 38–73)
NITRITE UR QL STRIP: NEGATIVE
NRBC BLD-RTO: 0 /100 WBC
PH UR STRIP: 6 [PH] (ref 5–8)
PLATELET # BLD AUTO: 205 K/UL (ref 150–450)
PMV BLD AUTO: 10.6 FL (ref 9.2–12.9)
POTASSIUM SERPL-SCNC: 4 MMOL/L (ref 3.5–5.1)
PROT SERPL-MCNC: 7.5 G/DL (ref 6–8.4)
PROT UR QL STRIP: NEGATIVE
RBC # BLD AUTO: 5.82 M/UL (ref 4–5.4)
SODIUM SERPL-SCNC: 141 MMOL/L (ref 136–145)
SP GR UR STRIP: 1.02 (ref 1–1.03)
URN SPEC COLLECT METH UR: ABNORMAL
UROBILINOGEN UR STRIP-ACNC: NEGATIVE EU/DL
WBC # BLD AUTO: 7 K/UL (ref 3.9–12.7)
WBC #/AREA URNS HPF: 4 /HPF (ref 0–5)

## 2023-06-07 PROCEDURE — 96376 TX/PRO/DX INJ SAME DRUG ADON: CPT

## 2023-06-07 PROCEDURE — 83690 ASSAY OF LIPASE: CPT | Performed by: NURSE PRACTITIONER

## 2023-06-07 PROCEDURE — 81000 URINALYSIS NONAUTO W/SCOPE: CPT | Performed by: NURSE PRACTITIONER

## 2023-06-07 PROCEDURE — 63600175 PHARM REV CODE 636 W HCPCS: Performed by: NURSE PRACTITIONER

## 2023-06-07 PROCEDURE — 99285 EMERGENCY DEPT VISIT HI MDM: CPT | Mod: 25

## 2023-06-07 PROCEDURE — 96374 THER/PROPH/DIAG INJ IV PUSH: CPT

## 2023-06-07 PROCEDURE — 80053 COMPREHEN METABOLIC PANEL: CPT | Performed by: NURSE PRACTITIONER

## 2023-06-07 PROCEDURE — 96375 TX/PRO/DX INJ NEW DRUG ADDON: CPT

## 2023-06-07 PROCEDURE — 85025 COMPLETE CBC W/AUTO DIFF WBC: CPT | Performed by: NURSE PRACTITIONER

## 2023-06-07 RX ORDER — MORPHINE SULFATE 4 MG/ML
4 INJECTION, SOLUTION INTRAMUSCULAR; INTRAVENOUS
Status: COMPLETED | OUTPATIENT
Start: 2023-06-07 | End: 2023-06-07

## 2023-06-07 RX ORDER — HYDROMORPHONE HYDROCHLORIDE 1 MG/ML
1 INJECTION, SOLUTION INTRAMUSCULAR; INTRAVENOUS; SUBCUTANEOUS
Status: COMPLETED | OUTPATIENT
Start: 2023-06-07 | End: 2023-06-07

## 2023-06-07 RX ORDER — ONDANSETRON 2 MG/ML
4 INJECTION INTRAMUSCULAR; INTRAVENOUS
Status: COMPLETED | OUTPATIENT
Start: 2023-06-07 | End: 2023-06-07

## 2023-06-07 RX ORDER — PROMETHAZINE HYDROCHLORIDE 25 MG/1
25 TABLET ORAL EVERY 6 HOURS PRN
Qty: 15 TABLET | Refills: 0 | Status: SHIPPED | OUTPATIENT
Start: 2023-06-07

## 2023-06-07 RX ADMIN — HYDROMORPHONE HYDROCHLORIDE 1 MG: 1 INJECTION, SOLUTION INTRAMUSCULAR; INTRAVENOUS; SUBCUTANEOUS at 12:06

## 2023-06-07 RX ADMIN — ONDANSETRON 4 MG: 2 INJECTION INTRAMUSCULAR; INTRAVENOUS at 09:06

## 2023-06-07 RX ADMIN — MORPHINE SULFATE 4 MG: 4 INJECTION INTRAVENOUS at 10:06

## 2023-06-07 RX ADMIN — ONDANSETRON 4 MG: 2 INJECTION INTRAMUSCULAR; INTRAVENOUS at 01:06

## 2023-06-07 NOTE — Clinical Note
"Emi Kirklandhy" Elizabeth was seen and treated in our emergency department on 6/7/2023.  She may return to work on 06/10/2023.       If you have any questions or concerns, please don't hesitate to call.      Bailey Jamil NP"

## 2023-06-07 NOTE — ED PROVIDER NOTES
Encounter Date: 6/7/2023       History     Chief Complaint   Patient presents with    Abdominal Pain     Lower left and right quadrant pain that started around midnight. Pain is constant and described as a sharp pain. Pain is not exacerbated or made better by anything she has done. +nausea - vomiting.      47-year-old female presents emergency room with reports of right lower quadrant and upper quadrant pain that started last night around midnight.  Patient has a history of Crohn's with a prior colectomy in which he has an ostomy to the right lower quadrant.  She denies any bloody output.  She denies vomiting however does report nausea.  She denies any fever or urinary complaints.  Patient has a past medical history of Crohn's, anemia, asthma, shingles, lupus, Raynaud's, seizures, hypoglycemia, sleep apnea.  See physical examination    The history is provided by the patient. No  was used.   Review of patient's allergies indicates:   Allergen Reactions    Aspirin      Other reaction(s): vomiting, contraindicated for bleeding from crohns  Other reaction(s): bleeding    Mold Hives     Black mold     Sulfa (sulfonamide antibiotics) Hives and Rash    Iodinated contrast media Other (See Comments)    Adhesive     Aspirin     Iodine     Remicade [infliximab] Other (See Comments)     Medical induced lupus    Latex Rash     Other reaction(s): Hives     Past Medical History:   Diagnosis Date    Anemia     Asthma     B12 deficiency     Crohn's disease     History of shingles     Lupus     Migraine headache     Raynaud disease     Reactive hypoglycemia     Seizures 2004    no medication     Sleep apnea     Non compliant with CPAP     Past Surgical History:   Procedure Laterality Date    ABDOMINAL SURGERY      COLON SURGERY      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    COLONOSCOPY      COLOSTOMY      ESOPHAGOGASTRODUODENOSCOPY N/A 5/26/2022    Procedure: ESOPHAGOGASTRODUODENOSCOPY (EGD);  Surgeon: Tin  TOD Novak MD;  Location: [facility] (2ND FLR);  Service: Endoscopy;  Laterality: N/A;  fully vaccinated    HYSTERECTOMY      due to endometriosis    ILEOSCOPY N/A 4/10/2019    Procedure: ILEOSCOPY through stoma;  Surgeon: Eve Currie MD;  Location: [facility] (4TH FLR);  Service: Endoscopy;  Laterality: N/A;  Schedule as 30 minute case, schedule in April or May 2019    ILEOSCOPY N/A 5/26/2022    Procedure: ILEOSCOPY;  Surgeon: Tin Novak MD;  Location: [facility] (2ND FLR);  Service: Endoscopy;  Laterality: N/A;    ILEOSTOMY      LAPAROSCOPY W/ MINI-LAPAROTOMY      large intestine removed      just a portion    pelvic mass removal      REVISION COLOSTOMY  2010    SMALL INTESTINE SURGERY      J pouch excision with end ileostomy    STOMACH SURGERY      TONSILLECTOMY, ADENOIDECTOMY       Family History   Problem Relation Age of Onset    Cancer Mother 54        Anal cancer     Diabetes Mellitus Mother     Hypertension Mother     Colon cancer Mother     Heart attack Father     Breast cancer Sister     Hypertension Sister     Cervical cancer Sister     Seizures Sister     Hypertension Sister     Liver disease Sister         Fatty Liver     Cancer Maternal Aunt     Breast cancer Paternal Aunt     Colon cancer Maternal Grandmother 72    Cancer Paternal Grandmother     Celiac disease Neg Hx     Cirrhosis Neg Hx     Colon polyps Neg Hx     Crohn's disease Neg Hx     Cystic fibrosis Neg Hx     Hemochromatosis Neg Hx     Esophageal cancer Neg Hx     Inflammatory bowel disease Neg Hx     Irritable bowel syndrome Neg Hx     Liver cancer Neg Hx     Rectal cancer Neg Hx     Stomach cancer Neg Hx     Ulcerative colitis Neg Hx     Hugo's disease Neg Hx      Social History     Tobacco Use    Smoking status: Never    Smokeless tobacco: Never   Substance Use Topics    Alcohol use: No     Comment: Rare social use.    Drug use: No     Review of Systems   Gastrointestinal:  Positive for abdominal pain and nausea.   All other  systems reviewed and are negative.    Physical Exam     Initial Vitals [06/07/23 0843]   BP Pulse Resp Temp SpO2   132/79 87 16 97.9 °F (36.6 °C) 97 %      MAP       --         Physical Exam    Constitutional: She appears well-developed and well-nourished.   HENT:   Head: Normocephalic.   Right Ear: Hearing and tympanic membrane normal.   Left Ear: Hearing and tympanic membrane normal.   Nose: Nose normal.   Mouth/Throat: Oropharynx is clear and moist.   Eyes: Lids are normal. Pupils are equal, round, and reactive to light.   Neck:   Normal range of motion.  Cardiovascular:  Normal rate.           Pulmonary/Chest: Breath sounds normal. No respiratory distress. She has no wheezes. She has no rhonchi.   Abdominal: Abdomen is soft. There is abdominal tenderness in the right upper quadrant and right lower quadrant.       Musculoskeletal:         General: Normal range of motion.      Cervical back: Normal range of motion. No rigidity.     Neurological: She is alert and oriented to person, place, and time.   Skin: Skin is warm and dry. No rash noted.   Psychiatric: She has a normal mood and affect. Her behavior is normal. Judgment and thought content normal.       ED Course   Procedures  Labs Reviewed   CBC W/ AUTO DIFFERENTIAL - Abnormal; Notable for the following components:       Result Value    RBC 5.82 (*)     Hemoglobin 17.1 (*)     Hematocrit 50.2 (*)     All other components within normal limits   URINALYSIS, REFLEX TO URINE CULTURE - Abnormal; Notable for the following components:    Leukocytes, UA Trace (*)     All other components within normal limits    Narrative:     Specimen Source->Urine   COMPREHENSIVE METABOLIC PANEL   LIPASE   URINALYSIS MICROSCOPIC    Narrative:     Specimen Source->Urine          Imaging Results              CT Abdomen Pelvis  Without Contrast (Final result)  Result time 06/07/23 12:59:42      Final result by Efraín Nash Jr., MD (06/07/23 12:59:42)                   Impression:       No acute intra-abdominal process in this patient status post proctocolectomy.    Hepatic steatosis      Electronically signed by: Efraín Chowdhury Haile Jr  Date:    06/07/2023  Time:    12:59               Narrative:    EXAMINATION:  CT ABDOMEN PELVIS WITHOUT CONTRAST    CLINICAL HISTORY:  RLQ abdominal pain (Age >= 14y);    TECHNIQUE:  Low dose axial images, sagittal and coronal reformations were obtained from the lung bases to the pubic symphysis.  Contrast was not administered.    COMPARISON:  05/15/2022    FINDINGS:  Lung Bases: Unremarkable.    Kidneys/ Ureters: Unremarkable.    Liver: Severe steatosis.    Gallbladder: No calcified gallstones.    Bile Ducts: No evidence of dilated ducts.    Pancreas: No mass or peripancreatic fat stranding.    Spleen: Unremarkable.    Adrenals: Unremarkable.    GI Tract/Mesentery: Status pose total proctocolectomy and right lower quadrant ileostomy.  Large peristomal hernia is present without evidence of obstruction, similar to the prior study in size.    Retroperitoneum: No significant adenopathy.    Vasculature: No significant atherosclerosis or aneurysm.    Pelvic organs: Unremarkable.    Bones: Unremarkable.                                       Medications   ondansetron injection 4 mg (4 mg Intravenous Given 6/7/23 0951)   morphine injection 4 mg (4 mg Intravenous Given 6/7/23 1015)   HYDROmorphone injection 1 mg (1 mg Intravenous Given 6/7/23 1232)   ondansetron injection 4 mg (4 mg Intravenous Given 6/7/23 1313)     Medical Decision Making:   Differential Diagnosis:   Differential Diagnosis includes, but is not limited to:  AAA, aortic dissection, mesenteric ischemia, perforated viscous, MI/ACS, SBO/volvulus, incarcerated/strangulated hernia, intussusception, ileus, appendicitis, cholecystitis, cholangitis, diverticulitis, esophagitis, hepatitis, nephrolithiasis, pancreatitis, gastroenteritis, colitis, IBD/IBS, biliary colic, GERD, PUD, constipation, UTI/pyelonephritis,   disorder.     Clinical Tests:   Lab Tests: Reviewed and Ordered  Radiological Study: Ordered and Reviewed           ED Course as of 06/07/23 1324   Wed Jun 07, 2023   1137 CBC, chemistry, lipase, urinary analysis have been reviewed.  The patient's CT is currently pending at this time.  She has had some improvement after a dose of 4 mg of morphine was given for abdominal pain.  Will continue to monitor. [DT]   1228 Pt is requesting additional dose of pain meds. Dilaudid orderd. CT pending.  [DT]   1244 Spoke with the patient concerning her frustration over shifting rooms throughout the [employer] process.  After an in-depth conversation, the patient seemed to understand the process however I did notified the patient that her care and her condition is most important throughout this process. [DT]   1307 FINDINGS:  Lung Bases: Unremarkable.     Kidneys/ Ureters: Unremarkable.     Liver: Severe steatosis.     Gallbladder: No calcified gallstones.     Bile Ducts: No evidence of dilated ducts.     Pancreas: No mass or peripancreatic fat stranding.     Spleen: Unremarkable.     Adrenals: Unremarkable.     GI Tract/Mesentery: Status pose total proctocolectomy and right lower quadrant ileostomy.  Large peristomal hernia is present without evidence of obstruction, similar to the prior study in size.     Retroperitoneum: No significant adenopathy.     Vasculature: No significant atherosclerosis or aneurysm.     Pelvic organs: Unremarkable.     Bones: Unremarkable.     Impression:     No acute intra-abdominal process in this patient status post proctocolectomy.     Hepatic steatosis        Electronically signed by: Efraín Blake Jr  Date:                                            06/07/2023 [DT]   1323 Patient notified the need for follow-up care with the primary care provider in addition to the medications prescribed.  Also recommend a repeat urinary analysis as there was only trace leukocytes in the patient is  asymptomatic at this time.  She is been given strict return precautions to return to the emergency room and pain has been better controlled with the dose of Dilaudid that was given here in the ER.  She will also be given a work note for the next few days per her request and is otherwise stable at this time for discharge.  This case has been reviewed with Dr. Anguiano [DT]      ED Course User Index  [DT] Bailey Jamil NP                 Clinical Impression:   Final diagnoses:  [R10.31] Right lower quadrant abdominal pain (Primary)  [K76.0] Hepatic steatosis  [K45.8] Recurrent abdominal hernia without obstruction or gangrene, unspecified hernia type        ED Disposition Condition    Discharge Stable          ED Prescriptions       Medication Sig Dispense Start Date End Date Auth. Provider    promethazine (PHENERGAN) 25 MG tablet Take 1 tablet (25 mg total) by mouth every 6 (six) hours as needed for Nausea. 15 tablet 6/7/2023 -- Bailey Jamil NP          Follow-up Information       Follow up With Specialties Details Why Contact Info    Bianca Rutledge MD Internal Medicine Schedule an appointment as soon as possible for a visit in 2 days  [address] [facility]  [place] [address]  [phone]      Emily Gaspar MD Colon and Rectal Surgery Schedule an appointment as soon as possible for a visit in 2 days  [address] [facility]  [address]  [place] [address]  [phone]               Bailey Jamil NP  06/07/23 9060

## 2023-06-07 NOTE — DISCHARGE INSTRUCTIONS

## 2023-06-12 ENCOUNTER — PATIENT MESSAGE (OUTPATIENT)
Dept: INTERNAL MEDICINE | Facility: CLINIC | Age: 48
End: 2023-06-12
Payer: COMMERCIAL

## 2023-06-12 DIAGNOSIS — M79.7 FIBROMYALGIA: ICD-10-CM

## 2023-06-12 DIAGNOSIS — K50.00 CROHN'S DISEASE OF SMALL INTESTINE WITHOUT COMPLICATION: ICD-10-CM

## 2023-06-12 RX ORDER — OXYCODONE AND ACETAMINOPHEN 10; 325 MG/1; MG/1
1 TABLET ORAL EVERY 6 HOURS PRN
Qty: 120 TABLET | Refills: 0 | Status: SHIPPED | OUTPATIENT
Start: 2023-06-22 | End: 2023-10-13

## 2023-06-15 ENCOUNTER — PATIENT MESSAGE (OUTPATIENT)
Dept: INTERNAL MEDICINE | Facility: CLINIC | Age: 48
End: 2023-06-15
Payer: COMMERCIAL

## 2023-06-21 ENCOUNTER — PATIENT MESSAGE (OUTPATIENT)
Dept: INTERNAL MEDICINE | Facility: CLINIC | Age: 48
End: 2023-06-21
Payer: COMMERCIAL

## 2023-06-27 ENCOUNTER — PATIENT MESSAGE (OUTPATIENT)
Dept: INTERNAL MEDICINE | Facility: CLINIC | Age: 48
End: 2023-06-27
Payer: COMMERCIAL

## 2023-06-27 ENCOUNTER — TELEPHONE (OUTPATIENT)
Dept: INTERNAL MEDICINE | Facility: CLINIC | Age: 48
End: 2023-06-27
Payer: COMMERCIAL

## 2023-06-27 DIAGNOSIS — M54.9 BACK PAIN, UNSPECIFIED BACK LOCATION, UNSPECIFIED BACK PAIN LATERALITY, UNSPECIFIED CHRONICITY: Primary | ICD-10-CM

## 2023-06-27 NOTE — TELEPHONE ENCOUNTER
Please inform patient that MRI reveals bulging disc with no evidence of spinal stenosis.  Recommend referral to back and spine clinic for further evaluation.

## 2023-06-28 ENCOUNTER — PATIENT MESSAGE (OUTPATIENT)
Dept: INTERNAL MEDICINE | Facility: CLINIC | Age: 48
End: 2023-06-28
Payer: COMMERCIAL

## 2023-07-03 ENCOUNTER — PATIENT MESSAGE (OUTPATIENT)
Dept: INTERNAL MEDICINE | Facility: CLINIC | Age: 48
End: 2023-07-03
Payer: COMMERCIAL

## 2023-07-03 NOTE — TELEPHONE ENCOUNTER
Pt c/o accidentally touching a latex balloon at work and had rash and trouble breathing pt request Epi pen to be order. Please advise if you agree.    If so please [facility] to [facility] on [address]

## 2023-07-11 ENCOUNTER — PATIENT MESSAGE (OUTPATIENT)
Dept: INTERNAL MEDICINE | Facility: CLINIC | Age: 48
End: 2023-07-11
Payer: COMMERCIAL

## 2023-07-11 DIAGNOSIS — M79.7 FIBROMYALGIA: ICD-10-CM

## 2023-07-11 DIAGNOSIS — K50.00 CROHN'S DISEASE OF SMALL INTESTINE WITHOUT COMPLICATION: ICD-10-CM

## 2023-07-11 RX ORDER — HYDROCODONE BITARTRATE AND ACETAMINOPHEN 10; 325 MG/1; MG/1
1 TABLET ORAL EVERY 6 HOURS PRN
Qty: 120 TABLET | Refills: 0 | Status: SHIPPED | OUTPATIENT
Start: 2023-07-11

## 2023-07-11 NOTE — TELEPHONE ENCOUNTER
I have printed a prescription for hydrocodone with acetaminophen if patient would like to try that for pain control.

## 2023-07-25 ENCOUNTER — PATIENT MESSAGE (OUTPATIENT)
Dept: INTERNAL MEDICINE | Facility: CLINIC | Age: 48
End: 2023-07-25
Payer: COMMERCIAL

## 2023-07-25 ENCOUNTER — TELEPHONE (OUTPATIENT)
Dept: HEPATOLOGY | Facility: CLINIC | Age: 48
End: 2023-07-25
Payer: COMMERCIAL

## 2023-07-25 NOTE — TELEPHONE ENCOUNTER
Patient called to reschedule appointment. Provider schedule change.      Rescheduled date; Friday, September 8, 2023 at 3PM  A copy of the appointment has been mailed out.

## 2023-07-26 ENCOUNTER — PATIENT MESSAGE (OUTPATIENT)
Dept: INTERNAL MEDICINE | Facility: CLINIC | Age: 48
End: 2023-07-26
Payer: COMMERCIAL

## 2023-07-26 DIAGNOSIS — M79.7 FIBROMYALGIA: Primary | ICD-10-CM

## 2023-07-28 RX ORDER — OXYCODONE AND ACETAMINOPHEN 7.5; 325 MG/1; MG/1
1 TABLET ORAL EVERY 6 HOURS PRN
Qty: 120 TABLET | Refills: 0 | Status: SHIPPED | OUTPATIENT
Start: 2023-07-28 | End: 2023-08-21 | Stop reason: SDUPTHER

## 2023-07-31 ENCOUNTER — PATIENT OUTREACH (OUTPATIENT)
Dept: ADMINISTRATIVE | Facility: HOSPITAL | Age: 48
End: 2023-07-31
Payer: COMMERCIAL

## 2023-07-31 NOTE — PROGRESS NOTES
Health Maintenance Due   Topic Date Due    Sign Pain Contract  Never done    Colorectal Cancer Screening  10/15/2016    Pneumococcal Vaccines (Age 0-64) (3 - PPSV23 or PCV20) 11/06/2017    COVID-19 Vaccine (4 - Pfizer series) 02/20/2022     Chart reviewed.   Immunizations: Reconciled  Orders placed: N/A  Upcoming appts to satisfy OC topics: N/A

## 2023-08-07 ENCOUNTER — PATIENT MESSAGE (OUTPATIENT)
Dept: INTERNAL MEDICINE | Facility: CLINIC | Age: 48
End: 2023-08-07
Payer: COMMERCIAL

## 2023-08-07 NOTE — TELEPHONE ENCOUNTER
No care due was identified.  [facility] Embedded Care Due Messages. Reference number: [id number].   8/07/2023 10:11:44 AM CDT

## 2023-08-08 ENCOUNTER — PATIENT MESSAGE (OUTPATIENT)
Dept: INTERNAL MEDICINE | Facility: CLINIC | Age: 48
End: 2023-08-08
Payer: COMMERCIAL

## 2023-08-08 RX ORDER — ZOLPIDEM TARTRATE 12.5 MG/1
12.5 TABLET, FILM COATED, EXTENDED RELEASE ORAL NIGHTLY PRN
Qty: 90 TABLET | Refills: 0 | Status: SHIPPED | OUTPATIENT
Start: 2023-08-08 | End: 2023-10-02 | Stop reason: SDUPTHER

## 2023-08-14 ENCOUNTER — OFFICE VISIT (OUTPATIENT)
Dept: INTERNAL MEDICINE | Facility: CLINIC | Age: 48
End: 2023-08-14
Payer: COMMERCIAL

## 2023-08-14 DIAGNOSIS — E16.1 REACTIVE HYPOGLYCEMIA: ICD-10-CM

## 2023-08-14 DIAGNOSIS — J30.9 ALLERGIC RHINITIS, UNSPECIFIED SEASONALITY, UNSPECIFIED TRIGGER: Primary | ICD-10-CM

## 2023-08-14 DIAGNOSIS — J45.909 CHRONIC ASTHMA WITHOUT COMPLICATION, UNSPECIFIED ASTHMA SEVERITY, UNSPECIFIED WHETHER PERSISTENT: Chronic | ICD-10-CM

## 2023-08-14 DIAGNOSIS — Z91.040 LATEX ALLERGY: ICD-10-CM

## 2023-08-14 PROCEDURE — 1159F MED LIST DOCD IN RCRD: CPT | Mod: CPTII,95,, | Performed by: INTERNAL MEDICINE

## 2023-08-14 PROCEDURE — 3044F PR MOST RECENT HEMOGLOBIN A1C LEVEL <7.0%: ICD-10-PCS | Mod: CPTII,95,, | Performed by: INTERNAL MEDICINE

## 2023-08-14 PROCEDURE — 99213 OFFICE O/P EST LOW 20 MIN: CPT | Mod: 95,,, | Performed by: INTERNAL MEDICINE

## 2023-08-14 PROCEDURE — 1160F PR REVIEW ALL MEDS BY PRESCRIBER/CLIN PHARMACIST DOCUMENTED: ICD-10-PCS | Mod: CPTII,95,, | Performed by: INTERNAL MEDICINE

## 2023-08-14 PROCEDURE — 99213 PR OFFICE/OUTPT VISIT, EST, LEVL III, 20-29 MIN: ICD-10-PCS | Mod: 95,,, | Performed by: INTERNAL MEDICINE

## 2023-08-14 PROCEDURE — 3044F HG A1C LEVEL LT 7.0%: CPT | Mod: CPTII,95,, | Performed by: INTERNAL MEDICINE

## 2023-08-14 PROCEDURE — 1159F PR MEDICATION LIST DOCUMENTED IN MEDICAL RECORD: ICD-10-PCS | Mod: CPTII,95,, | Performed by: INTERNAL MEDICINE

## 2023-08-14 PROCEDURE — 1160F RVW MEDS BY RX/DR IN RCRD: CPT | Mod: CPTII,95,, | Performed by: INTERNAL MEDICINE

## 2023-08-14 RX ORDER — EPINEPHRINE 0.3 MG/.3ML
1 INJECTION SUBCUTANEOUS ONCE
Qty: 0.3 ML | Refills: 0 | Status: SHIPPED | OUTPATIENT
Start: 2023-08-14 | End: 2023-08-14

## 2023-08-14 RX ORDER — MONTELUKAST SODIUM 10 MG/1
10 TABLET ORAL NIGHTLY
Qty: 30 TABLET | Refills: 0 | Status: SHIPPED | OUTPATIENT
Start: 2023-08-14 | End: 2023-10-02

## 2023-08-14 NOTE — PROGRESS NOTES
The patient location is:  [place]  The chief complaint leading to consultation is:  Sinus infection and asthma    Visit type: audiovisual    Face to Face time with patient:  9   minutes of total time spent on the encounter, which includes face to face time and non-face to face time preparing to see the patient (eg, review of tests), Obtaining and/or reviewing separately obtained history, Documenting clinical information in the electronic or other health record, Independently interpreting results (not separately reported) and communicating results to the patient/family/caregiver, or Care coordination (not separately reported).         Each patient to whom he or she provides medical services by telemedicine is:  (1) informed of the relationship between the physician and patient and the respective role of any other health care provider with respect to management of the patient; and (2) notified that he or she may decline to receive medical services by telemedicine and may withdraw from such care at any time.    Notes:   CC:  Sinus infection and asthma  HPI:  The patient is a 47 y.o. year old female who presents to the office for sinus infection and asthma.  She reports postnasal drip and headache.  She also reports nonproductive cough, but denies any fever, ear pain or sore throat.  She has been taking Claritin for her symptoms daily.  Her symptoms have been for 1 half months.  She also reports exacerbation of fibromyalgia symptoms.  In addition, the patient reports shortness of breath secondary to asthma, but denies any wheezing.  She also reports difficulty breathing and hives when exposed to latex at work.      PAST MEDICAL HISTORY:  Past Medical History:   Diagnosis Date    Anemia     Asthma     B12 deficiency     Crohn's disease     History of shingles     Lupus     Migraine headache     Raynaud disease     Reactive hypoglycemia     Seizures 2004    no medication     Sleep apnea     Non compliant with CPAP        SURGICAL HISTORY:  Past Surgical History:   Procedure Laterality Date    ABDOMINAL SURGERY      COLON SURGERY      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    COLONOSCOPY      COLOSTOMY      ESOPHAGOGASTRODUODENOSCOPY N/A 5/26/2022    Procedure: ESOPHAGOGASTRODUODENOSCOPY (EGD);  Surgeon: Tin Novak MD;  Location: [facility] (2ND FLR);  Service: Endoscopy;  Laterality: N/A;  fully vaccinated    HYSTERECTOMY      due to endometriosis    ILEOSCOPY N/A 4/10/2019    Procedure: ILEOSCOPY through stoma;  Surgeon: Eve Currie MD;  Location: [facility] (4TH FLR);  Service: Endoscopy;  Laterality: N/A;  Schedule as 30 minute case, schedule in April or May 2019    ILEOSCOPY N/A 5/26/2022    Procedure: ILEOSCOPY;  Surgeon: Tin Novak MD;  Location: [facility] (2ND FLR);  Service: Endoscopy;  Laterality: N/A;    ILEOSTOMY      LAPAROSCOPY W/ MINI-LAPAROTOMY      large intestine removed      just a portion    pelvic mass removal      REVISION COLOSTOMY  2010    SMALL INTESTINE SURGERY      J pouch excision with end ileostomy    STOMACH SURGERY      TONSILLECTOMY, ADENOIDECTOMY         MEDS:  Medcard reviewed and updated    ALLERGIES: Allergy Card reviewed and updated    SOCIAL HISTORY:   The patient is a nonsmoker.    PE:   APPEARANCE: Well nourished, well developed, in no acute distress.    Video visit  PSYCHIATRIC: The patient is oriented to person, place, and time and has a pleasant affect.        ASSESSMENT/PLAN:  Diagnoses and all orders for this visit:    Allergic rhinitis, unspecified seasonality, unspecified trigger  -     montelukast (SINGULAIR) 10 mg tablet; Take 1 tablet (10 mg total) by mouth every evening.    Reactive hypoglycemia  -     blood sugar diagnostic Strp; To check BG 1 times daily, to use with insurance preferred meterTo check BG 1 times daily, to use with insurance preferred meter    Chronic asthma without complication, unspecified asthma severity, unspecified whether  persistent  -     montelukast (SINGULAIR) 10 mg tablet; Take 1 tablet (10 mg total) by mouth every evening.    Latex allergy  -     Ambulatory referral/consult to Allergy; Future    Other orders  -     EPINEPHrine (EPIPEN) 0.3 mg/0.3 mL AtIn; Inject 0.3 mLs (0.3 mg total) into the muscle once. for 1 dose

## 2023-08-21 ENCOUNTER — PATIENT MESSAGE (OUTPATIENT)
Dept: INTERNAL MEDICINE | Facility: CLINIC | Age: 48
End: 2023-08-21
Payer: COMMERCIAL

## 2023-08-21 DIAGNOSIS — M79.7 FIBROMYALGIA: ICD-10-CM

## 2023-08-21 RX ORDER — OXYCODONE AND ACETAMINOPHEN 7.5; 325 MG/1; MG/1
1 TABLET ORAL EVERY 6 HOURS PRN
Qty: 120 TABLET | Refills: 0 | Status: SHIPPED | OUTPATIENT
Start: 2023-08-21 | End: 2023-09-18 | Stop reason: SDUPTHER

## 2023-08-21 NOTE — TELEPHONE ENCOUNTER
No care due was identified.  [facility] Embedded Care Due Messages. Reference number: [id number].   8/21/2023 7:47:04 AM CDT

## 2023-08-22 ENCOUNTER — HOSPITAL ENCOUNTER (EMERGENCY)
Facility: HOSPITAL | Age: 48
Discharge: HOME | End: 2023-08-22
Attending: STUDENT IN AN ORGANIZED HEALTH CARE EDUCATION/TRAINING PROGRAM
Payer: COMMERCIAL

## 2023-08-22 ENCOUNTER — PATIENT MESSAGE (OUTPATIENT)
Dept: INTERNAL MEDICINE | Facility: CLINIC | Age: 48
End: 2023-08-22
Payer: COMMERCIAL

## 2023-08-22 VITALS
TEMPERATURE: 99 F | HEART RATE: 104 BPM | SYSTOLIC BLOOD PRESSURE: 131 MMHG | RESPIRATION RATE: 16 BRPM | WEIGHT: 190 LBS | DIASTOLIC BLOOD PRESSURE: 84 MMHG | OXYGEN SATURATION: 97 % | BODY MASS INDEX: 33.66 KG/M2

## 2023-08-22 DIAGNOSIS — R05.9 COUGH: ICD-10-CM

## 2023-08-22 DIAGNOSIS — U07.1 COVID-19 VIRUS DETECTED: ICD-10-CM

## 2023-08-22 DIAGNOSIS — U07.1 COVID: Primary | ICD-10-CM

## 2023-08-22 DIAGNOSIS — R07.9 CHEST PAIN: ICD-10-CM

## 2023-08-22 LAB
ALBUMIN SERPL BCP-MCNC: 4.4 G/DL (ref 3.5–5.2)
ALP SERPL-CCNC: 94 U/L (ref 55–135)
ALT SERPL W/O P-5'-P-CCNC: 36 U/L (ref 10–44)
ANION GAP SERPL CALC-SCNC: 14 MMOL/L (ref 8–16)
AST SERPL-CCNC: 24 U/L (ref 10–40)
BASOPHILS # BLD AUTO: 0.04 K/UL (ref 0–0.2)
BASOPHILS NFR BLD: 0.6 % (ref 0–1.9)
BILIRUB SERPL-MCNC: 0.7 MG/DL (ref 0.1–1)
BUN SERPL-MCNC: 13 MG/DL (ref 6–20)
CALCIUM SERPL-MCNC: 9.6 MG/DL (ref 8.7–10.5)
CHLORIDE SERPL-SCNC: 112 MMOL/L (ref 95–110)
CO2 SERPL-SCNC: 19 MMOL/L (ref 23–29)
CREAT SERPL-MCNC: 1.1 MG/DL (ref 0.5–1.4)
CTP QC/QA: YES
DIFFERENTIAL METHOD: ABNORMAL
EOSINOPHIL # BLD AUTO: 0.2 K/UL (ref 0–0.5)
EOSINOPHIL NFR BLD: 2.1 % (ref 0–8)
ERYTHROCYTE [DISTWIDTH] IN BLOOD BY AUTOMATED COUNT: 12.7 % (ref 11.5–14.5)
EST. GFR  (NO RACE VARIABLE): >60 ML/MIN/1.73 M^2
GLUCOSE SERPL-MCNC: 97 MG/DL (ref 70–110)
HCT VFR BLD AUTO: 48.2 % (ref 37–48.5)
HGB BLD-MCNC: 16.4 G/DL (ref 12–16)
IMM GRANULOCYTES # BLD AUTO: 0.02 K/UL (ref 0–0.04)
IMM GRANULOCYTES NFR BLD AUTO: 0.3 % (ref 0–0.5)
LACTATE SERPL-SCNC: 1.1 MMOL/L (ref 0.5–2.2)
LYMPHOCYTES # BLD AUTO: 2.1 K/UL (ref 1–4.8)
LYMPHOCYTES NFR BLD: 29.8 % (ref 18–48)
MCH RBC QN AUTO: 28.9 PG (ref 27–31)
MCHC RBC AUTO-ENTMCNC: 34 G/DL (ref 32–36)
MCV RBC AUTO: 85 FL (ref 82–98)
MONOCYTES # BLD AUTO: 0.7 K/UL (ref 0.3–1)
MONOCYTES NFR BLD: 9.1 % (ref 4–15)
NEUTROPHILS # BLD AUTO: 4.1 K/UL (ref 1.8–7.7)
NEUTROPHILS NFR BLD: 58.1 % (ref 38–73)
NRBC BLD-RTO: 0 /100 WBC
PLATELET # BLD AUTO: 198 K/UL (ref 150–450)
PMV BLD AUTO: 11 FL (ref 9.2–12.9)
POTASSIUM SERPL-SCNC: 3.6 MMOL/L (ref 3.5–5.1)
PROT SERPL-MCNC: 7.2 G/DL (ref 6–8.4)
RBC # BLD AUTO: 5.67 M/UL (ref 4–5.4)
SARS-COV-2 RDRP RESP QL NAA+PROBE: POSITIVE
SODIUM SERPL-SCNC: 145 MMOL/L (ref 136–145)
TROPONIN I SERPL DL<=0.01 NG/ML-MCNC: <0.006 NG/ML (ref 0–0.03)
WBC # BLD AUTO: 7.12 K/UL (ref 3.9–12.7)

## 2023-08-22 PROCEDURE — 93010 ELECTROCARDIOGRAM REPORT: CPT | Mod: ,,, | Performed by: INTERNAL MEDICINE

## 2023-08-22 PROCEDURE — 84484 ASSAY OF TROPONIN QUANT: CPT | Performed by: NURSE PRACTITIONER

## 2023-08-22 PROCEDURE — 93005 ELECTROCARDIOGRAM TRACING: CPT

## 2023-08-22 PROCEDURE — 99285 EMERGENCY DEPT VISIT HI MDM: CPT | Mod: 25

## 2023-08-22 PROCEDURE — 87635 SARS-COV-2 COVID-19 AMP PRB: CPT | Performed by: NURSE PRACTITIONER

## 2023-08-22 PROCEDURE — 82962 GLUCOSE BLOOD TEST: CPT

## 2023-08-22 PROCEDURE — 93010 EKG 12-LEAD: ICD-10-PCS | Mod: ,,, | Performed by: INTERNAL MEDICINE

## 2023-08-22 PROCEDURE — 25000003 PHARM REV CODE 250: Performed by: STUDENT IN AN ORGANIZED HEALTH CARE EDUCATION/TRAINING PROGRAM

## 2023-08-22 PROCEDURE — 83605 ASSAY OF LACTIC ACID: CPT | Performed by: NURSE PRACTITIONER

## 2023-08-22 PROCEDURE — 80053 COMPREHEN METABOLIC PANEL: CPT | Performed by: NURSE PRACTITIONER

## 2023-08-22 PROCEDURE — 63600175 PHARM REV CODE 636 W HCPCS: Performed by: NURSE PRACTITIONER

## 2023-08-22 PROCEDURE — 25000003 PHARM REV CODE 250: Performed by: NURSE PRACTITIONER

## 2023-08-22 PROCEDURE — 96374 THER/PROPH/DIAG INJ IV PUSH: CPT

## 2023-08-22 PROCEDURE — 85025 COMPLETE CBC W/AUTO DIFF WBC: CPT | Performed by: NURSE PRACTITIONER

## 2023-08-22 PROCEDURE — 96361 HYDRATE IV INFUSION ADD-ON: CPT

## 2023-08-22 RX ORDER — GUAIFENESIN 100 MG/5ML
100-200 SOLUTION ORAL EVERY 4 HOURS PRN
Qty: 60 ML | Refills: 0 | Status: SHIPPED | OUTPATIENT
Start: 2023-08-22 | End: 2023-09-01

## 2023-08-22 RX ORDER — BENZONATATE 100 MG/1
100 CAPSULE ORAL 3 TIMES DAILY PRN
Status: DISCONTINUED | OUTPATIENT
Start: 2023-08-22 | End: 2023-08-22 | Stop reason: HOSPADM

## 2023-08-22 RX ORDER — ONDANSETRON 2 MG/ML
4 INJECTION INTRAMUSCULAR; INTRAVENOUS
Status: COMPLETED | OUTPATIENT
Start: 2023-08-22 | End: 2023-08-22

## 2023-08-22 RX ADMIN — BENZONATATE 100 MG: 100 CAPSULE ORAL at 08:08

## 2023-08-22 RX ADMIN — SODIUM CHLORIDE 1000 ML: 9 INJECTION, SOLUTION INTRAVENOUS at 07:08

## 2023-08-22 RX ADMIN — ONDANSETRON 4 MG: 2 INJECTION INTRAMUSCULAR; INTRAVENOUS at 07:08

## 2023-08-22 NOTE — Clinical Note
"Emi Johnson (Kathy) was seen and treated in our emergency department on 8/22/2023.     COVID-19 is present in our communities across the state. There is limited testing for COVID at this time, so not all patients can be tested. In this situation, your employee meets the following criteria:    Emi Johnson has met the criteria for COVID-19 testing and has a POSITIVE result. She can return to work once they are asymptomatic for 24 hours without the use of fever reducing medications AND at least five days from the first positive result. A mask is recommended for 5 days post quarantine.     If you have any questions or concerns, or if I can be of further assistance, please do not hesitate to contact me.    Sincerely,             Tiffanie Hernández, DO"

## 2023-08-23 ENCOUNTER — TELEPHONE (OUTPATIENT)
Dept: INTERNAL MEDICINE | Facility: CLINIC | Age: 48
End: 2023-08-23
Payer: COMMERCIAL

## 2023-08-23 ENCOUNTER — PATIENT MESSAGE (OUTPATIENT)
Dept: INTERNAL MEDICINE | Facility: CLINIC | Age: 48
End: 2023-08-23
Payer: COMMERCIAL

## 2023-08-23 ENCOUNTER — NURSE TRIAGE (OUTPATIENT)
Dept: ADMINISTRATIVE | Facility: CLINIC | Age: 48
End: 2023-08-23
Payer: COMMERCIAL

## 2023-08-23 LAB — POCT GLUCOSE: 78 MG/DL (ref 70–110)

## 2023-08-23 RX ORDER — PROMETHAZINE HYDROCHLORIDE AND DEXTROMETHORPHAN HYDROBROMIDE 6.25; 15 MG/5ML; MG/5ML
5 SYRUP ORAL EVERY 6 HOURS PRN
Qty: 180 ML | Refills: 1 | Status: SHIPPED | OUTPATIENT
Start: 2023-08-23 | End: 2023-09-02

## 2023-08-23 NOTE — TELEPHONE ENCOUNTER
Please inform patient that prescription for promethazine DM has been sent to her pharmacy electronically.  Advised her that she should not operate heavy machinery while taking this medication due to sedation.  Also, advised patient that she will need to quarantine for 5 days.  If anyone in the household does not have COVID-19, she will need to wear a high-quality mask, either an N95 or a kn95.  After 5 days, she can break quarantine.  However, she will need to continue to wear a high-quality mask for 5 additional days.

## 2023-08-23 NOTE — TELEPHONE ENCOUNTER
Pt is coughing really bad and would like a strong cough med.   She states she is coughing so much it is making her throw up  Please send to [facility] on [address]    She tested positive for covid on 8/22

## 2023-08-23 NOTE — ED NOTES
Patient presents to the ED with c/o cough, sore throat, congestion, fever, weakness, SOB, fatigue, n/v/d, abd pain, and body aches x 2 days. Denies contact with anyone known to be ill. States she self tested for covid and was negative x 2. Endorses hx of hernia. Denies medical hx. Updated on care plan. Wctm.

## 2023-08-23 NOTE — ED PROVIDER NOTES
NAME:  Emi Johnson  CSN:     [id number]  MRN:    [id number]  ADMIT DATE: 8/22/2023        eMERGENCY dEPARTMENT eNCOUnter    CHIEF COMPLAINT    Chief Complaint   Patient presents with    Multiple complaints     Fever, body aches, cough, HA, chill, vomiting, chest pain upon cough. Hx ileostomy. No sick contacts. CBG = 78       HPI    Emi Johnson is a 47 y.o. female with a past medical history of  has a past medical history of Anemia, Asthma, B12 deficiency, Crohn's disease, History of shingles, Lupus, Migraine headache, Raynaud disease, Reactive hypoglycemia, Seizures (2004), and Sleep apnea.     she presents to the ED due to Two days  of generalized body aches, cough, congestion.  States she just got back from [place] 2 days ago and started feeling sick immediately.  Has had fever at home.  Does have ostomy from Crohn's disease is not currently on any medications from a Crohn's standpoint.  Denies any increase in output from her ostomy bag.  No lightheadedness or weakness.  No urinary symptoms.  No known sick contacts.    HPI       PAST MEDICAL HISTORY  Past Medical History:   Diagnosis Date    Anemia     Asthma     B12 deficiency     Crohn's disease     History of shingles     Lupus     Migraine headache     Raynaud disease     Reactive hypoglycemia     Seizures 2004    no medication     Sleep apnea     Non compliant with CPAP       SURGICAL HISTORY    Past Surgical History:   Procedure Laterality Date    ABDOMINAL SURGERY      COLON SURGERY      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    COLONOSCOPY      COLOSTOMY      ESOPHAGOGASTRODUODENOSCOPY N/A 5/26/2022    Procedure: ESOPHAGOGASTRODUODENOSCOPY (EGD);  Surgeon: Tin Novak MD;  Location: [facility] (2ND FLR);  Service: Endoscopy;  Laterality: N/A;  fully vaccinated    HYSTERECTOMY      due to endometriosis    ILEOSCOPY N/A 4/10/2019    Procedure: ILEOSCOPY through stoma;  Surgeon: Eve Currie MD;  Location: [facility] (4TH FLR);  Service:  Endoscopy;  Laterality: N/A;  Schedule as 30 minute case, schedule in April or May 2019    ILEOSCOPY N/A 5/26/2022    Procedure: ILEOSCOPY;  Surgeon: Tin Novak MD;  Location: [facility] ([address] [place] [address]);  Service: Endoscopy;  Laterality: N/A;    ILEOSTOMY      LAPAROSCOPY W/ MINI-LAPAROTOMY      large intestine removed      just a portion    pelvic mass removal      REVISION COLOSTOMY  2010    SMALL INTESTINE SURGERY      J pouch excision with end ileostomy    STOMACH SURGERY      TONSILLECTOMY, ADENOIDECTOMY         FAMILY HISTORY    Family History   Problem Relation Age of Onset    Cancer Mother 54        Anal cancer     Diabetes Mellitus Mother     Hypertension Mother     Colon cancer Mother     Heart attack Father     Breast cancer Sister     Hypertension Sister     Cervical cancer Sister     Seizures Sister     Hypertension Sister     Liver disease Sister         Fatty Liver     Cancer Maternal Aunt     Breast cancer Paternal Aunt     Colon cancer Maternal Grandmother 72    Cancer Paternal Grandmother     Celiac disease Neg Hx     Cirrhosis Neg Hx     Colon polyps Neg Hx     Crohn's disease Neg Hx     Cystic fibrosis Neg Hx     Hemochromatosis Neg Hx     Esophageal cancer Neg Hx     Inflammatory bowel disease Neg Hx     Irritable bowel syndrome Neg Hx     Liver cancer Neg Hx     Rectal cancer Neg Hx     Stomach cancer Neg Hx     Ulcerative colitis Neg Hx     Hugo's disease Neg Hx        SOCIAL HISTORY    Social History     Socioeconomic History    Marital status:    Tobacco Use    Smoking status: Never    Smokeless tobacco: Never   Substance and Sexual Activity    Alcohol use: No     Comment: Rare social use.    Drug use: No    Sexual activity: Yes     Partners: Male   Social History Narrative    Sales at [employer]     Social Determinants of Health     Financial Resource Strain: Unknown (8/14/2023)    Overall Financial Resource Strain (CARDIA)     Difficulty of Paying Living Expenses: Patient refused    Food Insecurity: Unknown (8/14/2023)    Hunger Vital Sign     Worried About Running Out of Food in the Last Year: Patient refused     Ran Out of Food in the Last Year: Patient refused   Transportation Needs: Unknown (8/14/2023)    PRAPARE - Transportation     Lack of Transportation (Medical): Patient refused     Lack of Transportation (Non-Medical): Patient refused   Physical Activity: Unknown (8/14/2023)    Exercise Vital Sign     Days of Exercise per Week: Patient refused   Stress: Stress Concern Present (8/14/2023)    [employer] [place] [employer] - Occupational Stress Questionnaire     Feeling of Stress : Very much   Social Connections: Unknown (8/14/2023)    Social Connection and Isolation Panel [NHANES]     Frequency of Communication with Friends and Family: Three times a week     Frequency of Social Gatherings with Friends and Family: Never     Active Member of Clubs or Organizations: No     Attends Club or Organization Meetings: Never     Marital Status:    Housing Stability: Unknown (8/14/2023)    Housing Stability Vital Sign     Unable to Pay for Housing in the Last Year: Patient refused     Unstable Housing in the Last Year: Patient refused       MEDICATIONS  Current Outpatient Medications   Medication Instructions    acetaminophen (TYLENOL) 500 mg, Oral, Every 6 hours PRN    AJOVY SYRINGE 225 mg, Subcutaneous, Every 30 days, prn    albuterol (VENTOLIN HFA) 90 mcg/actuation inhaler 2 puffs, Inhalation, Every 6 hours PRN, Rescue    blood sugar diagnostic (CONTOUR NEXT TEST STRIPS) Strp 1 strip, Misc.(Non-Drug; Combo Route), 2 times daily    blood sugar diagnostic Strp To check BG 1 times daily, to use with insurance preferred meterTo check BG 1 times daily, to use with insurance preferred meter    blood-glucose meter kit To check BG 1 times daily, to use with insurance preferred meter    blood-glucose meter,continuous (DEXCOM G6 ) Misc 1 Units, Misc.(Non-Drug; Combo Route),  Daily    blood-glucose sensor (DEXCOM G6 SENSOR) Jane 1 Device, Misc.(Non-Drug; Combo Route), Daily    blood-glucose transmitter (DEXCOM G6 TRANSMITTER) Jane 1 Device, Misc.(Non-Drug; Combo Route), Daily    budesonide-formoterol 160-4.5 mcg (SYMBICORT) 160-4.5 mcg/actuation HFAA 2 puffs, Inhalation, Every 12 hours, Controller    ciprofloxacin HCl (CIPRO) 500 mg, Oral, Every 12 hours    cyanocobalamin 1,000 mcg/mL injection INJECT 1 ML INTO THE MUSCLE EVERY 28 DAYS.    cyclobenzaprine (FLEXERIL) 10 mg, Oral, 2 times daily PRN    EPINEPHrine (EPIPEN) 0.3 mg, Intramuscular, Once    flash glucose scanning reader (FREESTYLE PEDRO 14 DAY READER) Misc 1 Units, Misc.(Non-Drug; Combo Route), Daily PRN    flash glucose sensor (FREESTYLE PEDRO 14 DAY SENSOR) Kit 1 Units, Misc.(Non-Drug; Combo Route), Daily PRN    guaiFENesin 100 mg/5 ml (ROBITUSSIN) 100-200 mg, Oral, Every 4 hours PRN    HYDROcodone-acetaminophen (NORCO)  mg per tablet 1 tablet, Oral, Every 6 hours PRN    hydrocodone-homatropine 5-1.5 mg/5 ml (HYCODAN) 5-1.5 mg/5 mL Syrp 5 mLs, Oral, Nightly PRN    lancets (BD ULTRA FINE LANCETS) 33 gauge Misc 1 lancet , Misc.(Non-Drug; Combo Route), 2 times daily    lancets Misc 1 Units, Misc.(Non-Drug; Combo Route), Daily, To check BG 1 times daily, to use with insurance preferred meter    montelukast (SINGULAIR) 10 mg, Oral, Nightly    nirmatrelvir-ritonavir 300 mg (150 mg x 2)-100 mg copackaged tablets (EUA) Take 3 tablets by mouth 2 (two) times daily for 5 days. Each dose contains 2 nirmatrelvir (pink tablets) and 1 ritonavir (white tablet). Take all 3 tablets together    omeprazole (PRILOSEC) 40 mg, Oral, 2 times daily before meals    oxyCODONE-acetaminophen (PERCOCET)  mg per tablet Take 1 tablet by mouth every 6 (six) hours as needed for pain    oxyCODONE-acetaminophen (PERCOCET)  mg per tablet take one tablet by mouth daily every six hours as needed for pain    oxyCODONE-acetaminophen (PERCOCET)  " mg per tablet 1 tablet, Oral, Every 6 hours PRN    oxyCODONE-acetaminophen (PERCOCET) 7.5-325 mg per tablet 1 tablet, Oral, Every 6 hours PRN    pantoprazole (PROTONIX) 40 mg, Oral, 2 times daily    phentermine (ADIPEX-P) 37.5 mg tablet 1/2 tab- 1 tab po daily    promethazine (PHENERGAN) 25 mg, Oral, Every 6 hours PRN    sertraline (ZOLOFT) 50 mg, Oral, Daily    STELARA 90 mg, Subcutaneous, Every 2 months    sumatriptan (IMITREX STATDOSE) 6 mg/0.5 mL kit INJECT 0.5 ML UNDER THE SKIN AS NEEDED ONE TIME FOR MIGRAINE    syringe with needle, safety (BD INTEGRA SYRINGE) 3 mL 25 gauge x 1" Syrg 1 Syringe, Misc.(Non-Drug; Combo Route), Every 30 days    triamcinolone (NASACORT) 55 mcg nasal inhaler 2 sprays, Nasal, Daily    zolpidem (AMBIEN CR) 12.5 mg, Oral, Nightly PRN       ALLERGIES    Review of patient's allergies indicates:   Allergen Reactions    Aspirin      Other reaction(s): vomiting, contraindicated for bleeding from crohns  Other reaction(s): bleeding    Mold Hives     Black mold     Sulfa (sulfonamide antibiotics) Hives and Rash    Iodinated contrast media Other (See Comments)    Adhesive     Aspirin     Iodine     Remicade [infliximab] Other (See Comments)     Medical induced lupus    Latex Rash     Other reaction(s): Hives         REVIEW OF SYSTEMS   Review of Systems       PHYSICAL EXAM    Reviewed Triage Note    VITAL SIGNS:   ED Triage Vitals [08/22/23 1900]   Enc Vitals Group      BP (!) 141/81      Pulse 109      Resp 16      Temp 98.6 °F (37 °C)      Temp src       SpO2 96 %      Weight 190 lb      Height       Head Circumference       Peak Flow       Pain Score       Pain Loc       Pain Edu?       Excl. in GC?        Patient Vitals for the past 24 hrs:   BP Temp Pulse Resp SpO2 Weight   08/22/23 2021 131/84 -- 99 -- 98 % --   08/22/23 1950 119/70 -- 98 -- 96 % --   08/22/23 1942 -- -- 102 -- 98 % --   08/22/23 1900 (!) 141/81 98.6 °F (37 °C) 109 16 96 % 86.2 kg (190 lb)       Physical " Exam    Nursing note and vitals reviewed.  Constitutional: She appears well-developed and well-nourished.   HENT:   Head: Normocephalic and atraumatic.   Eyes: EOM are normal. Pupils are equal, round, and reactive to light.   Neck: Neck supple.   Normal range of motion.  Cardiovascular:  Normal rate and regular rhythm.           Pulmonary/Chest: Breath sounds normal. No respiratory distress.   Abdominal: Abdomen is soft. She exhibits no distension. There is no abdominal tenderness.   End ileostomy to the right mid abdomen.  No surrounding erythema or warmth. There is no rebound and no guarding.   Musculoskeletal:         General: Normal range of motion.      Cervical back: Normal range of motion and neck supple.     Neurological: She is alert and oriented to person, place, and time.   Skin: Skin is warm and dry.   Psychiatric: She has a normal mood and affect.                EKG     Interpreted by EM physician if performed:   Sinus tachycardia at a rate of 102.  No ST elevation or depression noted.  QTC of 440.             LABS  Pertinent labs reviewed. (See chart for details)   Labs Reviewed   CBC W/ AUTO DIFFERENTIAL - Abnormal; Notable for the following components:       Result Value    RBC 5.67 (*)     Hemoglobin 16.4 (*)     All other components within normal limits   COMPREHENSIVE METABOLIC PANEL - Abnormal; Notable for the following components:    Chloride 112 (*)     CO2 19 (*)     All other components within normal limits   SARS-COV-2 RDRP GENE - Abnormal; Notable for the following components:    POC Rapid COVID Positive (*)     All other components within normal limits   TROPONIN I   LACTIC ACID, PLASMA         RADIOLOGY          Imaging Results              X-Ray Chest PA And Lateral (Final result)  Result time 08/22/23 20:10:29      Final result by Abimael Abdul DO (08/22/23 20:10:29)                   Impression:      No acute abnormality.      Electronically signed by: Abimael  Chantell  Date:    08/22/2023  Time:    20:10               Narrative:    EXAMINATION:  XR CHEST PA AND LATERAL    CLINICAL HISTORY:  Cough, unspecified    TECHNIQUE:  PA and lateral views of the chest were performed.    COMPARISON:  None    FINDINGS:  The lungs are well expanded and clear. No focal opacities are seen. The pleural spaces are clear. The cardiac silhouette is unremarkable. The visualized osseous structures are unremarkable.  Small biopsy clip in the left breast.                                        PROCEDURES    Procedures      ED COURSE & MEDICAL DECISION MAKING    Pertinent Labs & Imaging studies reviewed. (See chart for details and specific orders.)        Summary of review of records:     MDM:  Emi Johnson is a 47 y.o. female who presents for 2 days of generalized illness.    Differential diagnosis includes but is not limited to:  viral illness, electrolyte derangement, CIRO, less likely ACS pneumonia.    CBC, CMP ordered as well as troponin, COVID, lactate, chest x-ray          Medications   benzonatate capsule 100 mg (100 mg Oral Given 8/22/23 2017)   sodium chloride 0.9% bolus 1,000 mL 1,000 mL (0 mLs Intravenous Stopped 8/22/23 2022)   ondansetron injection 4 mg (4 mg Intravenous Given 8/22/23 1934)       ED Course as of 08/22/23 2049   Tue Aug 22, 2023   1952 SARS-CoV-2 RNA, Amplification, Qual(!): Positive [HL]   1952 WBC: 7.12  No leukocytosis [HL]   2015 Troponin I: <0.006 [HL]   2015 Lactate, Jason: 1.1 [HL]   2015 Comprehensive metabolic panel(!)  No acute abnormalities  [HL]   2015 X-Ray Chest PA And Lateral  No acute abnormality. [HL]      ED Course User Index  [HL] Tiffanie Hernández, DO     All findings discussed with patient at length at bedside.  Discussed risks and benefits of Paxlovid and she would like to proceed with treatment.  Work note provided and symptomatic treatment.  Reasons to return discussed, all questions addressed.  Comfortable with plan of discharge home at this  time.    FINAL IMPRESSION    Final diagnoses:  [R07.9] Chest pain  [R05.9] Cough  [U07.1] COVID (Primary)       DISPOSITION  Patient discharged in stable condition        ED Prescriptions       Medication Sig Dispense Start Date End Date Auth. Provider    nirmatrelvir-ritonavir 300 mg (150 mg x 2)-100 mg copackaged tablets (EUA) Take 3 tablets by mouth 2 (two) times daily for 5 days. Each dose contains 2 nirmatrelvir (pink tablets) and 1 ritonavir (white tablet). Take all 3 tablets together 30 tablet 8/22/2023 8/27/2023 Tiffanie Hernández DO    guaiFENesin 100 mg/5 ml (ROBITUSSIN) 100 mg/5 mL syrup Take 5-10 mLs (100-200 mg total) by mouth every 4 (four) hours as needed for Cough. 60 mL 8/22/2023 9/1/2023 Tiffanie Hernández DO          Follow-up Information       Follow up With Specialties Details Why Contact Info    Bianca Rutledge MD Internal Medicine Schedule an appointment as soon as possible for a visit   [address] [facility] [address]  [phone]                DISCLAIMER: This note was prepared with [email or web address] voice recognition transcription software. Garbled syntax, mangled pronouns, and other bizarre constructions may be attributed to that software system.      DO Edgar Mckenzie Hanna K., DO  08/23/23 0157

## 2023-08-23 NOTE — ED NOTES
EDP made aware of Covid + results. Pt transported to [facility] via wheelchair.

## 2023-08-23 NOTE — TELEPHONE ENCOUNTER
----- Message from Marilee S Quintin sent at 8/23/2023  7:57 AM CDT -----  Contact: Mobile [phone]  Patient said that she have Covid and she would like for you to prescribe her some cough medication.           [id number] - [place] - [address]  [address] [place] [address]  Phone: [phone] Fax: [phone]    Patient would like for you to give her a call.

## 2023-08-23 NOTE — TELEPHONE ENCOUNTER
Pt responded to [facility] text message  Reason for Disposition   MODERATE difficulty breathing (e.g., speaks in phrases, SOB even at rest, pulse 100-120)    Additional Information   Negative: SEVERE difficulty breathing (e.g., struggling for each breath, speaks in single words)   Negative: Difficult to awaken or acting confused (e.g., disoriented, slurred speech)   Negative: Bluish (or gray) lips or face now   Negative: Shock suspected (e.g., cold/pale/clammy skin, too weak to stand, low BP, rapid pulse)   Negative: Sounds like a life-threatening emergency to the triager   Negative: SEVERE or constant chest pain or pressure  (Exception: Mild central chest pain, present only when coughing.)    Protocols used: Coronavirus (COVID-19) Diagnosed or [id number]  Spoke with pt who reports that she was diagnosed with COVID last night. Reports she has Hx of asthma, and reports having SOB. Pt requesting cough medication. Pt advised to be seen in ED now. Pt states she was see in ED yesterday. Advised will send message to PCP regarding request.

## 2023-09-16 ENCOUNTER — PATIENT MESSAGE (OUTPATIENT)
Dept: INTERNAL MEDICINE | Facility: CLINIC | Age: 48
End: 2023-09-16
Payer: COMMERCIAL

## 2023-09-16 DIAGNOSIS — R32 URINARY INCONTINENCE, UNSPECIFIED TYPE: Primary | ICD-10-CM

## 2023-09-18 ENCOUNTER — PATIENT MESSAGE (OUTPATIENT)
Dept: INTERNAL MEDICINE | Facility: CLINIC | Age: 48
End: 2023-09-18
Payer: COMMERCIAL

## 2023-09-18 DIAGNOSIS — M79.7 FIBROMYALGIA: ICD-10-CM

## 2023-09-18 RX ORDER — OXYCODONE AND ACETAMINOPHEN 7.5; 325 MG/1; MG/1
1 TABLET ORAL EVERY 6 HOURS PRN
Qty: 120 TABLET | Refills: 0 | Status: SHIPPED | OUTPATIENT
Start: 2023-09-22 | End: 2023-10-13

## 2023-09-18 NOTE — TELEPHONE ENCOUNTER
No care due was identified.  [facility] Embedded Care Due Messages. Reference number: [id number].   9/18/2023 9:04:53 AM CDT

## 2023-09-19 ENCOUNTER — PATIENT MESSAGE (OUTPATIENT)
Dept: INTERNAL MEDICINE | Facility: CLINIC | Age: 48
End: 2023-09-19
Payer: COMMERCIAL

## 2023-09-19 NOTE — LETTER
September 19, 2023    Emi Johnson  [address]  [address]  [place] [address]         [facility] Internal Medicine  [address].  [place] [address]  Phone: [phone]  Fax: [phone] September 19, 2023     Patient: Emi Johnson   YOB: 1975           To Whom It May Concern:    It is my medical opinion that Emi Johnson may return to work on 09/25/2023 with the following restrictions:  She must wear a mask through September 29, 2023       If you have any questions or concerns, please don't hesitate to call.    Sincerely,          Bianca Rutledge MD

## 2023-09-19 NOTE — TELEPHONE ENCOUNTER
Pt tested positive for covid today 09/19/2023. Her symptoms started today 09/19/23 as well: cough, headache, congestion, sinus pressure, diarrhea, and feeling bad.    Pt also stated she last tested positive for covid on 08/22/23 and her symptom then was only a cough.

## 2023-09-20 ENCOUNTER — PATIENT MESSAGE (OUTPATIENT)
Dept: UROLOGY | Facility: CLINIC | Age: 48
End: 2023-09-20
Payer: COMMERCIAL

## 2023-10-02 ENCOUNTER — TELEPHONE (OUTPATIENT)
Dept: INTERNAL MEDICINE | Facility: CLINIC | Age: 48
End: 2023-10-02
Payer: COMMERCIAL

## 2023-10-02 ENCOUNTER — OFFICE VISIT (OUTPATIENT)
Dept: INTERNAL MEDICINE | Facility: CLINIC | Age: 48
End: 2023-10-02
Payer: COMMERCIAL

## 2023-10-02 VITALS
OXYGEN SATURATION: 96 % | TEMPERATURE: 98 F | BODY MASS INDEX: 35.37 KG/M2 | HEIGHT: 63 IN | RESPIRATION RATE: 18 BRPM | HEART RATE: 80 BPM | SYSTOLIC BLOOD PRESSURE: 110 MMHG | DIASTOLIC BLOOD PRESSURE: 84 MMHG | WEIGHT: 199.63 LBS

## 2023-10-02 DIAGNOSIS — Z00.00 ROUTINE MEDICAL EXAM: Primary | ICD-10-CM

## 2023-10-02 DIAGNOSIS — J30.9 ALLERGIC RHINITIS, UNSPECIFIED SEASONALITY, UNSPECIFIED TRIGGER: ICD-10-CM

## 2023-10-02 DIAGNOSIS — K21.9 GASTROESOPHAGEAL REFLUX DISEASE, UNSPECIFIED WHETHER ESOPHAGITIS PRESENT: ICD-10-CM

## 2023-10-02 DIAGNOSIS — M79.7 FIBROMYALGIA: Primary | ICD-10-CM

## 2023-10-02 DIAGNOSIS — R05.9 COUGH, UNSPECIFIED TYPE: ICD-10-CM

## 2023-10-02 PROCEDURE — 1160F PR REVIEW ALL MEDS BY PRESCRIBER/CLIN PHARMACIST DOCUMENTED: ICD-10-PCS | Mod: CPTII,S$GLB,, | Performed by: INTERNAL MEDICINE

## 2023-10-02 PROCEDURE — 3079F DIAST BP 80-89 MM HG: CPT | Mod: CPTII,S$GLB,, | Performed by: INTERNAL MEDICINE

## 2023-10-02 PROCEDURE — 1160F RVW MEDS BY RX/DR IN RCRD: CPT | Mod: CPTII,S$GLB,, | Performed by: INTERNAL MEDICINE

## 2023-10-02 PROCEDURE — 99999 PR PBB SHADOW E&M-EST. PATIENT-LVL V: CPT | Mod: PBBFAC,,, | Performed by: INTERNAL MEDICINE

## 2023-10-02 PROCEDURE — 3008F PR BODY MASS INDEX (BMI) DOCUMENTED: ICD-10-PCS | Mod: CPTII,S$GLB,, | Performed by: INTERNAL MEDICINE

## 2023-10-02 PROCEDURE — 3044F PR MOST RECENT HEMOGLOBIN A1C LEVEL <7.0%: ICD-10-PCS | Mod: CPTII,S$GLB,, | Performed by: INTERNAL MEDICINE

## 2023-10-02 PROCEDURE — 3044F HG A1C LEVEL LT 7.0%: CPT | Mod: CPTII,S$GLB,, | Performed by: INTERNAL MEDICINE

## 2023-10-02 PROCEDURE — 1159F PR MEDICATION LIST DOCUMENTED IN MEDICAL RECORD: ICD-10-PCS | Mod: CPTII,S$GLB,, | Performed by: INTERNAL MEDICINE

## 2023-10-02 PROCEDURE — 3074F SYST BP LT 130 MM HG: CPT | Mod: CPTII,S$GLB,, | Performed by: INTERNAL MEDICINE

## 2023-10-02 PROCEDURE — 99214 OFFICE O/P EST MOD 30 MIN: CPT | Mod: S$GLB,,, | Performed by: INTERNAL MEDICINE

## 2023-10-02 PROCEDURE — 1159F MED LIST DOCD IN RCRD: CPT | Mod: CPTII,S$GLB,, | Performed by: INTERNAL MEDICINE

## 2023-10-02 PROCEDURE — 3074F PR MOST RECENT SYSTOLIC BLOOD PRESSURE < 130 MM HG: ICD-10-PCS | Mod: CPTII,S$GLB,, | Performed by: INTERNAL MEDICINE

## 2023-10-02 PROCEDURE — 3079F PR MOST RECENT DIASTOLIC BLOOD PRESSURE 80-89 MM HG: ICD-10-PCS | Mod: CPTII,S$GLB,, | Performed by: INTERNAL MEDICINE

## 2023-10-02 PROCEDURE — 99999 PR PBB SHADOW E&M-EST. PATIENT-LVL V: ICD-10-PCS | Mod: PBBFAC,,, | Performed by: INTERNAL MEDICINE

## 2023-10-02 PROCEDURE — 99214 PR OFFICE/OUTPT VISIT, EST, LEVL IV, 30-39 MIN: ICD-10-PCS | Mod: S$GLB,,, | Performed by: INTERNAL MEDICINE

## 2023-10-02 PROCEDURE — 3008F BODY MASS INDEX DOCD: CPT | Mod: CPTII,S$GLB,, | Performed by: INTERNAL MEDICINE

## 2023-10-02 RX ORDER — OMEPRAZOLE 20 MG/1
20 CAPSULE, DELAYED RELEASE ORAL DAILY
Qty: 90 CAPSULE | Refills: 1 | Status: SHIPPED | OUTPATIENT
Start: 2023-10-02 | End: 2024-10-01

## 2023-10-02 RX ORDER — ZOLPIDEM TARTRATE 12.5 MG/1
12.5 TABLET, FILM COATED, EXTENDED RELEASE ORAL NIGHTLY PRN
Qty: 90 TABLET | Refills: 0 | Status: SHIPPED | OUTPATIENT
Start: 2023-11-05 | End: 2024-02-01 | Stop reason: SDUPTHER

## 2023-10-02 NOTE — PROGRESS NOTES
CC: followup of fibromyalgia  HPI:  The patient is a 47 y.o. year old female who presents to the office for followup of fibromyalgia.  She reports about 8 month history of  chronic nonproductive cough.  Cough is present all day long.  She reports shortness of breath that she associated with asthma, but reports asthma has not been bad.  She denies any wheezing.  She also reports headaches that start from her neck, and lasts 2-3 days without medication.  She has taken excedrin migraine and tylenol with some relief. She reports heartburn and post nasal drip.  Recent CXR showed no acute abnormalities..  She has taken singulair without relief.    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    COLONOSCOPY      COLOSTOMY      ESOPHAGOGASTRODUODENOSCOPY N/A 5/26/2022    Procedure: ESOPHAGOGASTRODUODENOSCOPY (EGD);  Surgeon: Tin Novak MD;  [facility]: [facility] ([address] [place] [address]);  Service: Endoscopy;  Laterality: N/A;  fully vaccinated    HYSTERECTOMY      due to endometriosis    ILEOSCOPY N/A 4/10/2019    Procedure: ILEOSCOPY through stoma;  Surgeon: Eve Currie MD;  Location: [facility] (4TH FLR);  Service: Endoscopy;  Laterality: N/A;  Schedule as 30 minute case, schedule in April or May 2019    ILEOSCOPY N/A 5/26/2022    Procedure: ILEOSCOPY;  Surgeon: Tin Novak MD;  Location: [facility] ([facility]);  Service: Endoscopy;  Laterality: N/A;    ILEOSTOMY      LAPAROSCOPY W/ MINI-LAPAROTOMY      large intestine removed      just a portion    pelvic mass removal      REVISION COLOSTOMY  2010    SMALL  INTESTINE SURGERY      J pouch excision with end ileostomy    STOMACH SURGERY      TONSILLECTOMY, ADENOIDECTOMY         MEDS:  Medcard reviewed and updated    ALLERGIES: Allergy Card reviewed and updated    SOCIAL HISTORY:   The patient is a nonsmoker.    PE:   APPEARANCE: Well nourished, well developed, in no acute distress.    CHEST: Lungs clear to auscultation with unlabored respirations.  CARDIOVASCULAR: Normal S1, S2. No murmurs. No carotid bruits. No pedal edema.  ABDOMEN: Bowel sounds normal. Not distended. Soft. No tenderness or masses. .  PSYCHIATRIC: The patient is oriented to person, place, and time and has a pleasant affect.        ASSESSMENT/PLAN:  Emi was seen today for follow-up and cough.    Diagnoses and all orders for this visit:    Fibromyalgia  -     continue pain control    Cough, unspecified type  -     may be secondary to postnasal drip and reflux     Gastroesophageal reflux disease, unspecified whether esophagitis present  -     start omeprazole   -     symptoms currently treated with Tums     Allergic rhinitis, unspecified seasonality, unspecified trigger  -     Singulair was ineffective   -     change antihistamines from Claritin to Zyrtec or Xyzal     Other orders  -     omeprazole (PRILOSEC) 20 MG capsule; Take 1 capsule (20 mg total) by mouth once daily.  -     zolpidem (AMBIEN CR) 12.5 MG CR tablet; Take 1 tablet (12.5 mg total) by mouth nightly as needed for Insomnia.

## 2023-10-05 ENCOUNTER — OFFICE VISIT (OUTPATIENT)
Dept: UROLOGY | Facility: CLINIC | Age: 48
End: 2023-10-05
Payer: COMMERCIAL

## 2023-10-05 VITALS
DIASTOLIC BLOOD PRESSURE: 78 MMHG | WEIGHT: 199.5 LBS | HEIGHT: 64 IN | HEART RATE: 82 BPM | BODY MASS INDEX: 34.06 KG/M2 | SYSTOLIC BLOOD PRESSURE: 113 MMHG

## 2023-10-05 DIAGNOSIS — N39.3 STRESS INCONTINENCE: Primary | ICD-10-CM

## 2023-10-05 DIAGNOSIS — R32 URINARY INCONTINENCE, UNSPECIFIED TYPE: ICD-10-CM

## 2023-10-05 DIAGNOSIS — N95.2 VAGINAL ATROPHY: ICD-10-CM

## 2023-10-05 LAB
BILIRUB SERPL-MCNC: NORMAL MG/DL
BLOOD URINE, POC: NORMAL
CLARITY, POC UA: CLEAR
COLOR, POC UA: YELLOW
GLUCOSE UR QL STRIP: NORMAL
KETONES UR QL STRIP: NORMAL
LEUKOCYTE ESTERASE URINE, POC: NORMAL
MICROSCOPIC COMMENT: NORMAL
NITRITE, POC UA: NORMAL
PH, POC UA: 5
POC RESIDUAL URINE VOLUME: 0 ML (ref 0–100)
PROTEIN, POC: NORMAL
RBC #/AREA URNS AUTO: 0 /HPF (ref 0–4)
SPECIFIC GRAVITY, POC UA: 1025
UROBILINOGEN, POC UA: NORMAL
WBC #/AREA URNS AUTO: 0 /HPF (ref 0–5)

## 2023-10-05 PROCEDURE — 3008F BODY MASS INDEX DOCD: CPT | Mod: CPTII,S$GLB,, | Performed by: NURSE PRACTITIONER

## 2023-10-05 PROCEDURE — 99204 PR OFFICE/OUTPT VISIT, NEW, LEVL IV, 45-59 MIN: ICD-10-PCS | Mod: 25,S$GLB,, | Performed by: NURSE PRACTITIONER

## 2023-10-05 PROCEDURE — 3044F HG A1C LEVEL LT 7.0%: CPT | Mod: CPTII,S$GLB,, | Performed by: NURSE PRACTITIONER

## 2023-10-05 PROCEDURE — 3078F DIAST BP <80 MM HG: CPT | Mod: CPTII,S$GLB,, | Performed by: NURSE PRACTITIONER

## 2023-10-05 PROCEDURE — 1160F RVW MEDS BY RX/DR IN RCRD: CPT | Mod: CPTII,S$GLB,, | Performed by: NURSE PRACTITIONER

## 2023-10-05 PROCEDURE — 99999 PR PBB SHADOW E&M-EST. PATIENT-LVL V: CPT | Mod: PBBFAC,,, | Performed by: NURSE PRACTITIONER

## 2023-10-05 PROCEDURE — 81514 NFCT DS BV&VAGINITIS DNA ALG: CPT | Performed by: NURSE PRACTITIONER

## 2023-10-05 PROCEDURE — 99999 PR PBB SHADOW E&M-EST. PATIENT-LVL V: ICD-10-PCS | Mod: PBBFAC,,, | Performed by: NURSE PRACTITIONER

## 2023-10-05 PROCEDURE — 3078F PR MOST RECENT DIASTOLIC BLOOD PRESSURE < 80 MM HG: ICD-10-PCS | Mod: CPTII,S$GLB,, | Performed by: NURSE PRACTITIONER

## 2023-10-05 PROCEDURE — 51798 US URINE CAPACITY MEASURE: CPT | Mod: S$GLB,,, | Performed by: NURSE PRACTITIONER

## 2023-10-05 PROCEDURE — 3074F PR MOST RECENT SYSTOLIC BLOOD PRESSURE < 130 MM HG: ICD-10-PCS | Mod: CPTII,S$GLB,, | Performed by: NURSE PRACTITIONER

## 2023-10-05 PROCEDURE — 51701 INSERT BLADDER CATHETER: CPT | Mod: S$GLB,,, | Performed by: NURSE PRACTITIONER

## 2023-10-05 PROCEDURE — 1160F PR REVIEW ALL MEDS BY PRESCRIBER/CLIN PHARMACIST DOCUMENTED: ICD-10-PCS | Mod: CPTII,S$GLB,, | Performed by: NURSE PRACTITIONER

## 2023-10-05 PROCEDURE — 3044F PR MOST RECENT HEMOGLOBIN A1C LEVEL <7.0%: ICD-10-PCS | Mod: CPTII,S$GLB,, | Performed by: NURSE PRACTITIONER

## 2023-10-05 PROCEDURE — 81001 URINALYSIS AUTO W/SCOPE: CPT | Performed by: NURSE PRACTITIONER

## 2023-10-05 PROCEDURE — 1159F MED LIST DOCD IN RCRD: CPT | Mod: CPTII,S$GLB,, | Performed by: NURSE PRACTITIONER

## 2023-10-05 PROCEDURE — 3074F SYST BP LT 130 MM HG: CPT | Mod: CPTII,S$GLB,, | Performed by: NURSE PRACTITIONER

## 2023-10-05 PROCEDURE — 87086 URINE CULTURE/COLONY COUNT: CPT | Performed by: NURSE PRACTITIONER

## 2023-10-05 PROCEDURE — 81002 URINALYSIS NONAUTO W/O SCOPE: CPT | Mod: S$GLB,,, | Performed by: NURSE PRACTITIONER

## 2023-10-05 PROCEDURE — 3008F PR BODY MASS INDEX (BMI) DOCUMENTED: ICD-10-PCS | Mod: CPTII,S$GLB,, | Performed by: NURSE PRACTITIONER

## 2023-10-05 PROCEDURE — 1159F PR MEDICATION LIST DOCUMENTED IN MEDICAL RECORD: ICD-10-PCS | Mod: CPTII,S$GLB,, | Performed by: NURSE PRACTITIONER

## 2023-10-05 PROCEDURE — 51798 POCT BLADDER SCAN: ICD-10-PCS | Mod: S$GLB,,, | Performed by: NURSE PRACTITIONER

## 2023-10-05 PROCEDURE — 51701 PR INSERTION OF NON-INDWELLING BLADDER CATHETERIZATION FOR RESIDUAL UR: ICD-10-PCS | Mod: S$GLB,,, | Performed by: NURSE PRACTITIONER

## 2023-10-05 PROCEDURE — 99204 OFFICE O/P NEW MOD 45 MIN: CPT | Mod: 25,S$GLB,, | Performed by: NURSE PRACTITIONER

## 2023-10-05 PROCEDURE — 81002 POCT URINE DIPSTICK WITHOUT MICROSCOPE: ICD-10-PCS | Mod: S$GLB,,, | Performed by: NURSE PRACTITIONER

## 2023-10-05 RX ORDER — OXYBUTYNIN CHLORIDE 10 MG/1
10 TABLET, EXTENDED RELEASE ORAL DAILY
Qty: 30 TABLET | Refills: 11 | Status: SHIPPED | OUTPATIENT
Start: 2023-10-05 | End: 2024-10-04

## 2023-10-05 RX ORDER — ESTRADIOL 0.1 MG/G
CREAM VAGINAL
Qty: 42.5 G | Refills: 3 | Status: SHIPPED | OUTPATIENT
Start: 2023-10-05

## 2023-10-05 NOTE — PROGRESS NOTES
"CHIEF COMPLAINT:    Mrs. Johnson is a 47 y.o. female presenting for stress incontinence.    .  PRESENTING ILLNESS:    Emi Johnson is a 47 y.o. female with a PMH of asthma, dehydration, obesity, chron disease, ibd, fibromyalgia, ofelia who presents for stress incontinence.     New patient to urology department. Presents today for stress incontinence.  Reports having urinary leakage with sneezing or coughing.  This has been a problem for years and has not been addressed. Drinks coke, milk, and water throughout the day. Nocturia x 1 per night. Discussed medications and pelvic floor therapy to treat incontinence.  Patient defers pelvic floor therapy at this time, believes "it is baloney."  Will to trial a medication and practice Kegel exercises at home.  Also reports abstinence from intercourse due to pain and vaginal dryness.      REVIEW OF SYSTEMS:    Review of Systems   Constitutional:  Negative for chills and fever.   Respiratory:  Negative for shortness of breath.    Cardiovascular:  Negative for chest pain.   Gastrointestinal:  Negative for constipation and diarrhea.   Genitourinary:  Positive for urgency. Negative for dysuria, flank pain, frequency and hematuria.   Neurological:  Negative for dizziness and weakness.        PATIENT HISTORY:    Past Medical History:   Diagnosis Date    Anemia     Asthma     B12 deficiency     Crohn's disease     History of shingles     Lupus     Migraine headache     Raynaud disease     Reactive hypoglycemia     Seizures 2004    no medication     Sleep apnea     Non compliant with CPAP       Family History   Problem Relation Age of Onset    Cancer Mother 54        Anal cancer     Diabetes Mellitus Mother     Hypertension Mother     Colon cancer Mother     Heart attack Father     Breast cancer Sister     Hypertension Sister     Cervical cancer Sister     Seizures Sister     Hypertension Sister     Liver disease Sister         Fatty Liver     Cancer Maternal Aunt     Breast cancer " Paternal Aunt     Colon cancer Maternal Grandmother 72    Cancer Paternal Grandmother     Celiac disease Neg Hx     Cirrhosis Neg Hx     Colon polyps Neg Hx     Crohn's disease Neg Hx     Cystic fibrosis Neg Hx     Hemochromatosis Neg Hx     Esophageal cancer Neg Hx     Inflammatory bowel disease Neg Hx     Irritable bowel syndrome Neg Hx     Liver cancer Neg Hx     Rectal cancer Neg Hx     Stomach cancer Neg Hx     Ulcerative colitis Neg Hx     Hugo's disease Neg Hx        Allergies:  Aspirin, Mold, Sulfa (sulfonamide antibiotics), Iodinated contrast media, Adhesive, Aspirin, Iodine, Remicade [infliximab], and Latex    Medications:    Current Outpatient Medications:     acetaminophen (TYLENOL) 500 MG tablet, Take 500 mg by mouth every 6 (six) hours as needed for Pain., Disp: , Rfl:     AJOVY 225 mg/1.5 mL injection, Inject 225 mg into the skin every 30 days. prn, Disp: , Rfl:     albuterol (VENTOLIN HFA) 90 mcg/actuation inhaler, Inhale 2 puffs into the lungs every 6 (six) hours as needed (cough). Rescue, Disp: 18 g, Rfl: 0    blood sugar diagnostic (CONTOUR NEXT TEST STRIPS) Strp, 1 strip by Misc.(Non-Drug; Combo Route) route 2 (two) times a day., Disp: 200 strip, Rfl: 3    blood sugar diagnostic Strp, To check BG 1 times daily, to use with insurance preferred meterTo check BG 1 times daily, to use with insurance preferred meter, Disp: 100 strip, Rfl: 3    blood-glucose meter kit, To check BG 1 times daily, to use with insurance preferred meter, Disp: 1 each, Rfl: 0    budesonide-formoterol 160-4.5 mcg (SYMBICORT) 160-4.5 mcg/actuation HFAA, Inhale 2 puffs into the lungs every 12 (twelve) hours. Controller, Disp: 10.2 g, Rfl: 3    cyanocobalamin 1,000 mcg/mL injection, INJECT 1 ML INTO THE MUSCLE EVERY 28 DAYS., Disp: 1 mL, Rfl: 12    cyclobenzaprine (FLEXERIL) 10 MG tablet, Take 10 mg by mouth 2 (two) times daily as needed., Disp: , Rfl:     flash glucose scanning reader (KILTR PEDRO 14 DAY READER) [facility] [place]  Units by Misc.(Non-Drug; Combo Route) route daily as needed., Disp: 1 each, Rfl: 0    flash glucose sensor (FREESTYLE PEDRO 14 DAY SENSOR) Kit, 1 Units by Misc.(Non-Drug; Combo Route) route daily as needed., Disp: 2 kit, Rfl: 6    HYDROcodone-acetaminophen (NORCO)  mg per tablet, Take 1 tablet by mouth every 6 (six) hours as needed for Pain., Disp: 120 tablet, Rfl: 0    hydrocodone-homatropine 5-1.5 mg/5 ml (HYCODAN) 5-1.5 mg/5 mL Syrp, Take 5 mLs by mouth nightly as needed (caution sedatin.)., Disp: 120 mL, Rfl: 0    lancets (BD ULTRA FINE LANCETS) 33 gauge Misc, 1 lancet by Misc.(Non-Drug; Combo Route) route 2 (two) times a day., Disp: 200 each, Rfl: 3    omeprazole (PRILOSEC) 20 MG capsule, Take 1 capsule (20 mg total) by mouth once daily., Disp: 90 capsule, Rfl: 1    oxyCODONE-acetaminophen (PERCOCET)  mg per tablet, Take 1 tablet by mouth every 6 (six) hours as needed for pain, Disp: 120 tablet, Rfl: 0    oxyCODONE-acetaminophen (PERCOCET)  mg per tablet, take one tablet by mouth daily every six hours as needed for pain, Disp: 120 tablet, Rfl: 0    oxyCODONE-acetaminophen (PERCOCET)  mg per tablet, Take 1 tablet by mouth every 6 (six) hours as needed for Pain (Greater than 7 day supply medically necessary)., Disp: 120 tablet, Rfl: 0    oxyCODONE-acetaminophen (PERCOCET) 7.5-325 mg per tablet, Take 1 tablet by mouth every 6 (six) hours as needed for Pain., Disp: 120 tablet, Rfl: 0    phentermine (ADIPEX-P) 37.5 mg tablet, 1/2 tab- 1 tab po daily, Disp: 30 tablet, Rfl: 2    promethazine (PHENERGAN) 25 MG tablet, Take 1 tablet (25 mg total) by mouth every 6 (six) hours as needed for Nausea., Disp: 15 tablet, Rfl: 0    sertraline (ZOLOFT) 50 MG tablet, Take 1 tablet (50 mg total) by mouth once daily., Disp: 30 tablet, Rfl: 11    STELARA 90 mg/mL Syrg syringe, Inject 90 mg into the skin. Every 2 months, Disp: , Rfl:     sumatriptan (IMITREX STATDOSE) 6 mg/0.5 mL kit, INJECT 0.5 ML UNDER THE  "SKIN AS NEEDED ONE TIME FOR MIGRAINE, Disp: , Rfl: 1    syringe with needle, safety (BD INTEGRA SYRINGE) 3 mL 25 gauge x 1" Syrg, 1 Syringe by Misc.(Non-Drug; Combo Route) route every 30 days., Disp: 20 Syringe, Rfl: 1    triamcinolone (NASACORT) 55 mcg nasal inhaler, 2 sprays by Nasal route once daily., Disp: 16.9 mL, Rfl: 0    [START ON 11/5/2023] zolpidem (AMBIEN CR) 12.5 MG CR tablet, Take 1 tablet (12.5 mg total) by mouth nightly as needed for Insomnia., Disp: 90 tablet, Rfl: 0    blood-glucose meter,continuous (DEXCOM G6 ) Misc, 1 Units by Misc.(Non-Drug; Combo Route) route once daily., Disp: 1 each, Rfl: 3    EPINEPHrine (EPIPEN) 0.3 mg/0.3 mL AtIn, Inject 0.3 mLs (0.3 mg total) into the muscle once. for 1 dose, Disp: 0.3 mL, Rfl: 0    estradioL (ESTRACE) 0.01 % (0.1 mg/gram) vaginal cream, Estrace apply a pea sized amount to urethra and vaginal opening 3 x weekly at night, Disp: 42.5 g, Rfl: 3    oxybutynin (DITROPAN-XL) 10 MG 24 hr tablet, Take 1 tablet (10 mg total) by mouth once daily., Disp: 30 tablet, Rfl: 11    PHYSICAL EXAMINATION:    Physical Exam  Vitals and nursing note reviewed. Exam conducted with a chaperone present.   Constitutional:       Appearance: Normal appearance. She is well-developed.   HENT:      Head: Normocephalic and atraumatic.   Eyes:      Pupils: Pupils are equal, round, and reactive to light.   Pulmonary:      Effort: Pulmonary effort is normal.   Genitourinary:     General: Normal vulva.      Exam position: Supine.      Urethra: No prolapse, urethral pain, urethral swelling or urethral lesion.      Vagina: Normal.      Rectum: Normal.      Comments: Vaginal dryness  Musculoskeletal:         General: Normal range of motion.      Cervical back: Normal range of motion.   Skin:     General: Skin is warm and dry.   Neurological:      Mental Status: She is alert and oriented to person, place, and time.   Psychiatric:         Behavior: Behavior normal.       Consent verbally " obtained.  Betadine prep was applied to the urethral meatus. An in and out cath was performed after voiding.  The PVR was 60 ml.      LABS:    U/a dipstick performed in office today: yellow, ph 5, 1.025, otherwise unremarkable  Bladder scan performed by Nurse alex.  PVR 0 ml      IMPRESSION:    Encounter Diagnoses   Name Primary?    Stress incontinence Yes    Urinary incontinence, unspecified type     Vaginal atrophy        PLAN:    Problem List Items Addressed This Visit    None  Visit Diagnoses       Stress incontinence    -  Primary    Relevant Medications    oxybutynin (DITROPAN-XL) 10 MG 24 hr tablet    Other Relevant Orders    Urine culture    Urinalysis Microscopic    VAGINOSIS SCREEN BY DNA PROBE    Urinary incontinence, unspecified type        Relevant Orders    POCT URINE DIPSTICK WITHOUT MICROSCOPE (Completed)    POCT Bladder Scan (Completed)    Urine culture    Urinalysis Microscopic    VAGINOSIS SCREEN BY DNA PROBE    Vaginal atrophy        Relevant Medications    estradioL (ESTRACE) 0.01 % (0.1 mg/gram) vaginal cream            1. Stress incontinence   - Behavior modifications   -Empty bladder before bed  -timed voiding  -discussed bladder irritants and their avoidance.    -Instructed patient to sit and relax when urinating.  Patient was also encouraged to wait after her initial stream stops before getting up.     - consider pelvic floor if amenable   - start oxybutynin 10 mg daily   - educational material on Kegels given   - send urine for analysis, culture   - send vaginosis swab    2. Vaginal atrophy   - estrace cream to urethra and vaginal opening 3 times weekly at night    3. Rtc in 3 mths for f/u    Francesca Louis NP        I spent over 45 minutes with the patient. Over 50% of the visit was spent in counseling.

## 2023-10-05 NOTE — PATIENT INSTRUCTIONS
Follow up in 3 months with pelvic     Behavior modifications   -timed voiding  -discussed bladder irritants and their avoidance.    -Instructed patient to sit and relax when urinating.  Patient was also encouraged to wait after her initial stream stops before getting up.

## 2023-10-06 LAB
BACTERIAL VAGINOSIS DNA: POSITIVE
CANDIDA GLABRATA DNA: NEGATIVE
CANDIDA KRUSEI DNA: NEGATIVE
CANDIDA RRNA VAG QL PROBE: NEGATIVE
T VAGINALIS RRNA GENITAL QL PROBE: NEGATIVE

## 2023-10-07 LAB — BACTERIA UR CULT: NO GROWTH

## 2023-10-09 ENCOUNTER — PATIENT MESSAGE (OUTPATIENT)
Dept: UROLOGY | Facility: CLINIC | Age: 48
End: 2023-10-09
Payer: COMMERCIAL

## 2023-10-10 RX ORDER — METRONIDAZOLE 500 MG/1
500 TABLET ORAL EVERY 12 HOURS
Qty: 14 TABLET | Refills: 0 | Status: SHIPPED | OUTPATIENT
Start: 2023-10-10 | End: 2023-10-17

## 2023-10-13 ENCOUNTER — PATIENT MESSAGE (OUTPATIENT)
Dept: INTERNAL MEDICINE | Facility: CLINIC | Age: 48
End: 2023-10-13
Payer: COMMERCIAL

## 2023-10-13 DIAGNOSIS — M79.7 FIBROMYALGIA: ICD-10-CM

## 2023-10-13 RX ORDER — OXYCODONE AND ACETAMINOPHEN 7.5; 325 MG/1; MG/1
1 TABLET ORAL EVERY 6 HOURS PRN
Qty: 120 TABLET | Refills: 0 | Status: SHIPPED | OUTPATIENT
Start: 2023-10-20 | End: 2023-11-13 | Stop reason: SDUPTHER

## 2023-10-13 NOTE — TELEPHONE ENCOUNTER
No care due was identified.  Health [facility] Embedded Care Due Messages. Reference number: [id number].   10/13/2023 10:06:53 AM CDT

## 2023-11-13 ENCOUNTER — PATIENT MESSAGE (OUTPATIENT)
Dept: ADMINISTRATIVE | Facility: HOSPITAL | Age: 48
End: 2023-11-13
Payer: COMMERCIAL

## 2023-11-13 ENCOUNTER — PATIENT MESSAGE (OUTPATIENT)
Dept: INTERNAL MEDICINE | Facility: CLINIC | Age: 48
End: 2023-11-13
Payer: COMMERCIAL

## 2023-11-13 DIAGNOSIS — M79.7 FIBROMYALGIA: ICD-10-CM

## 2023-11-13 RX ORDER — OXYCODONE AND ACETAMINOPHEN 7.5; 325 MG/1; MG/1
1 TABLET ORAL EVERY 6 HOURS PRN
Qty: 120 TABLET | Refills: 0 | Status: SHIPPED | OUTPATIENT
Start: 2023-11-13 | End: 2023-12-11 | Stop reason: SDUPTHER

## 2023-11-13 NOTE — TELEPHONE ENCOUNTER
No care due was identified.  Health [facility] Embedded Care Due Messages. Reference number: [id number].   11/13/2023 9:14:42 AM CST

## 2023-11-15 ENCOUNTER — TELEPHONE (OUTPATIENT)
Dept: INTERNAL MEDICINE | Facility: CLINIC | Age: 48
End: 2023-11-15
Payer: COMMERCIAL

## 2023-11-15 NOTE — TELEPHONE ENCOUNTER
----- Message from Keke Ingram sent at 11/14/2023  3:11 PM CST -----  Contact: [phone]  Pt is requesting a callback from the office. Per pt, she does not want to leave a detailed message. Please call.      Per pt, please call as soon as possible.           Thank you

## 2023-12-11 ENCOUNTER — PATIENT MESSAGE (OUTPATIENT)
Dept: INTERNAL MEDICINE | Facility: CLINIC | Age: 48
End: 2023-12-11
Payer: COMMERCIAL

## 2023-12-11 DIAGNOSIS — M79.7 FIBROMYALGIA: ICD-10-CM

## 2023-12-11 RX ORDER — OXYCODONE AND ACETAMINOPHEN 7.5; 325 MG/1; MG/1
1 TABLET ORAL EVERY 6 HOURS PRN
Qty: 120 TABLET | Refills: 0 | Status: SHIPPED | OUTPATIENT
Start: 2023-12-11 | End: 2024-01-08 | Stop reason: SDUPTHER

## 2023-12-11 NOTE — TELEPHONE ENCOUNTER
No care due was identified.  Health [facility] Embedded Care Due Messages. Reference number: [id number].   12/11/2023 10:49:06 AM CST

## 2023-12-27 ENCOUNTER — PATIENT MESSAGE (OUTPATIENT)
Dept: INTERNAL MEDICINE | Facility: CLINIC | Age: 48
End: 2023-12-27
Payer: COMMERCIAL

## 2023-12-28 RX ORDER — SEMAGLUTIDE 0.25 MG/.5ML
0.25 INJECTION, SOLUTION SUBCUTANEOUS
Qty: 2 ML | Refills: 0 | Status: SHIPPED | OUTPATIENT
Start: 2023-12-28 | End: 2024-01-19

## 2023-12-28 NOTE — TELEPHONE ENCOUNTER
See email, reports no thyroid cancer history or multiple endocrine neoplasia type 2 syndrome.  Would like to try wegovy

## 2024-01-08 ENCOUNTER — PATIENT MESSAGE (OUTPATIENT)
Dept: INTERNAL MEDICINE | Facility: CLINIC | Age: 49
End: 2024-01-08
Payer: COMMERCIAL

## 2024-01-08 DIAGNOSIS — M79.7 FIBROMYALGIA: ICD-10-CM

## 2024-01-09 RX ORDER — OXYCODONE AND ACETAMINOPHEN 7.5; 325 MG/1; MG/1
1 TABLET ORAL EVERY 6 HOURS PRN
Qty: 120 TABLET | Refills: 0 | Status: SHIPPED | OUTPATIENT
Start: 2024-01-09 | End: 2024-02-05 | Stop reason: SDUPTHER

## 2024-01-09 NOTE — TELEPHONE ENCOUNTER
No care due was identified.  Health [facility] Embedded Care Due Messages. Reference number: [id number].   1/08/2024 8:53:46 PM CST

## 2024-01-18 ENCOUNTER — OFFICE VISIT (OUTPATIENT)
Dept: URGENT CARE | Facility: CLINIC | Age: 49
End: 2024-01-18
Payer: COMMERCIAL

## 2024-01-18 VITALS
BODY MASS INDEX: 35.35 KG/M2 | SYSTOLIC BLOOD PRESSURE: 108 MMHG | HEART RATE: 80 BPM | OXYGEN SATURATION: 95 % | DIASTOLIC BLOOD PRESSURE: 77 MMHG | TEMPERATURE: 98 F | HEIGHT: 63 IN | WEIGHT: 199.5 LBS | RESPIRATION RATE: 18 BRPM

## 2024-01-18 DIAGNOSIS — M54.50 ACUTE RIGHT-SIDED LOW BACK PAIN WITHOUT SCIATICA: Primary | ICD-10-CM

## 2024-01-18 PROCEDURE — 72100 X-RAY EXAM L-S SPINE 2/3 VWS: CPT | Mod: FY,S$GLB,, | Performed by: RADIOLOGY

## 2024-01-18 PROCEDURE — 99214 OFFICE O/P EST MOD 30 MIN: CPT | Mod: S$GLB,,, | Performed by: FAMILY MEDICINE

## 2024-01-18 RX ORDER — METHOCARBAMOL 750 MG/1
750 TABLET, FILM COATED ORAL 3 TIMES DAILY
Qty: 30 TABLET | Refills: 0 | Status: SHIPPED | OUTPATIENT
Start: 2024-01-18 | End: 2024-01-28

## 2024-01-18 NOTE — LETTER
January 18, 2024      [facility]  [address] [address]  Phone: [phone]  Fax: [phone]       Patient: Emi Johnson   YOB: 1975  Date of Visit: 01/18/2024    To Whom It May Concern:    Herson Johnson  was at [facility] on 01/18/2024. The patient may return to work/school on 1/19/24   with no restrictions. If you have any questions or concerns, or if I can be of further assistance, please do not hesitate to contact me.    Sincerely,    Tez Kearns MD

## 2024-01-18 NOTE — PROGRESS NOTES
"Subjective:      Patient ID: Emi Johnson is a 48 y.o. female.    Vitals:  height is 5' 3" (1.6 m) and weight is 90.5 kg (199 lb 8.3 oz). Her oral temperature is 98.2 °F (36.8 °C). Her blood pressure is 108/77 and her pulse is 80. Her respiration is 18 and oxygen saturation is 95%.     Chief Complaint: Back Pain    Pt arrives to clinic for lower back pain. Paint started 1 week ago. At [facility] tx included tylenol with no relief.  C/o right sided LBP x 10 days, does not recall injury or trauma  No paresthesias  Pt has hx of Crohns, and has ostomy,  Does have a hx of LBP and take Oxycodone on regular basis      Back Pain  This is a new problem. The current episode started 1 to 4 weeks ago. The problem occurs constantly. The problem has been gradually worsening since onset. The pain is present in the lumbar spine. The quality of the pain is described as shooting. The pain is at a severity of 9/10. The pain is severe. The pain is The same all the time. Exacerbated by: activity. Stiffness is present All day. Associated symptoms include headaches. Risk factors include menopause. Treatments tried: tylenol. The treatment provided no relief.       Musculoskeletal:  Positive for back pain.   Neurological:  Positive for headaches.      Objective:     Physical Exam   Constitutional: She is oriented to person, place, and time. She appears well-developed. She is cooperative.  Non-toxic appearance. She does not appear ill. No distress.   HENT:   Head: Normocephalic and atraumatic.   Ears:   Right Ear: Hearing, tympanic membrane, external ear and ear canal normal.   Left Ear: Hearing, tympanic membrane, external ear and ear canal normal.   Nose: Nose normal. No mucosal edema, rhinorrhea or nasal deformity. No epistaxis. Right sinus exhibits no maxillary sinus tenderness and no frontal sinus tenderness. Left sinus exhibits no maxillary sinus tenderness and no frontal sinus tenderness.   Mouth/Throat: Uvula is midline, oropharynx is " clear and moist and mucous membranes are normal. No trismus in the jaw. Normal dentition. No uvula swelling. No posterior oropharyngeal erythema.   Eyes: Conjunctivae and lids are normal. Right eye exhibits no discharge. Left eye exhibits no discharge. No scleral icterus.   Neck: Trachea normal and phonation normal. Neck supple.   Cardiovascular: Normal rate, regular rhythm, normal heart sounds and normal pulses.   Pulmonary/Chest: Effort normal and breath sounds normal. No respiratory distress.   Abdominal: Normal appearance and bowel sounds are normal. She exhibits no distension and no mass. Soft. There is no abdominal tenderness.   Musculoskeletal: Normal range of motion.         General: Tenderness present. No swelling. Normal range of motion.      Comments: Moderate tenderness on flexion at 30 plus degrees  Extension  with increase tenderness  SLRE negative       Neurological: She is alert and oriented to person, place, and time. She exhibits normal muscle tone. Coordination normal.   Skin: Skin is warm, dry, intact, not diaphoretic and not pale.   Psychiatric: Her speech is normal and behavior is normal. Judgment and thought content normal.   Nursing note and vitals reviewed.      Assessment:     1. Acute right-sided low back pain without sciatica        Plan:       Acute right-sided low back pain without sciatica  -     X-Ray Lumbar Spine 2 Or 3 Views; Future; Expected date: 01/18/2024    Other orders  -     methocarbamoL (ROBAXIN) 750 MG Tab; Take 1 tablet (750 mg total) by mouth 3 (three) times daily. for 10 days  Dispense: 30 tablet; Refill: 0       Advised to continue heating pad    Continue Oxycodone PRN  FU with PCP

## 2024-01-19 ENCOUNTER — LAB VISIT (OUTPATIENT)
Dept: LAB | Facility: HOSPITAL | Age: 49
End: 2024-01-19
Attending: INTERNAL MEDICINE
Payer: COMMERCIAL

## 2024-01-19 ENCOUNTER — OFFICE VISIT (OUTPATIENT)
Dept: INTERNAL MEDICINE | Facility: CLINIC | Age: 49
End: 2024-01-19
Payer: COMMERCIAL

## 2024-01-19 VITALS
HEIGHT: 63 IN | HEART RATE: 78 BPM | DIASTOLIC BLOOD PRESSURE: 82 MMHG | WEIGHT: 204.81 LBS | BODY MASS INDEX: 36.29 KG/M2 | SYSTOLIC BLOOD PRESSURE: 130 MMHG | OXYGEN SATURATION: 98 % | TEMPERATURE: 98 F

## 2024-01-19 DIAGNOSIS — K76.0 HEPATIC STEATOSIS: ICD-10-CM

## 2024-01-19 DIAGNOSIS — M54.9 RIGHT-SIDED BACK PAIN, UNSPECIFIED BACK LOCATION, UNSPECIFIED CHRONICITY: ICD-10-CM

## 2024-01-19 DIAGNOSIS — M79.7 FIBROMYALGIA: Primary | ICD-10-CM

## 2024-01-19 LAB
BACTERIA #/AREA URNS AUTO: NORMAL /HPF
BILIRUB UR QL STRIP: NEGATIVE
CLARITY UR REFRACT.AUTO: CLEAR
COLOR UR AUTO: YELLOW
GLUCOSE UR QL STRIP: NEGATIVE
HGB UR QL STRIP: NEGATIVE
KETONES UR QL STRIP: NEGATIVE
LEUKOCYTE ESTERASE UR QL STRIP: ABNORMAL
MICROSCOPIC COMMENT: NORMAL
NITRITE UR QL STRIP: NEGATIVE
PH UR STRIP: 5 [PH] (ref 5–8)
PROT UR QL STRIP: NEGATIVE
RBC #/AREA URNS AUTO: 1 /HPF (ref 0–4)
SP GR UR STRIP: >1.03 (ref 1–1.03)
SQUAMOUS #/AREA URNS AUTO: 1 /HPF
URN SPEC COLLECT METH UR: ABNORMAL
WBC #/AREA URNS AUTO: 3 /HPF (ref 0–5)

## 2024-01-19 PROCEDURE — 87086 URINE CULTURE/COLONY COUNT: CPT | Performed by: INTERNAL MEDICINE

## 2024-01-19 PROCEDURE — 3075F SYST BP GE 130 - 139MM HG: CPT | Mod: CPTII,S$GLB,, | Performed by: INTERNAL MEDICINE

## 2024-01-19 PROCEDURE — 99999 PR PBB SHADOW E&M-EST. PATIENT-LVL III: CPT | Mod: PBBFAC,,, | Performed by: INTERNAL MEDICINE

## 2024-01-19 PROCEDURE — 99213 OFFICE O/P EST LOW 20 MIN: CPT | Mod: S$GLB,,, | Performed by: INTERNAL MEDICINE

## 2024-01-19 PROCEDURE — 3079F DIAST BP 80-89 MM HG: CPT | Mod: CPTII,S$GLB,, | Performed by: INTERNAL MEDICINE

## 2024-01-19 PROCEDURE — 3008F BODY MASS INDEX DOCD: CPT | Mod: CPTII,S$GLB,, | Performed by: INTERNAL MEDICINE

## 2024-01-19 PROCEDURE — 81001 URINALYSIS AUTO W/SCOPE: CPT | Performed by: INTERNAL MEDICINE

## 2024-01-19 PROCEDURE — 3044F HG A1C LEVEL LT 7.0%: CPT | Mod: CPTII,S$GLB,, | Performed by: INTERNAL MEDICINE

## 2024-01-19 RX ORDER — SEMAGLUTIDE 0.68 MG/ML
INJECTION, SOLUTION SUBCUTANEOUS
Qty: 3 ML | Refills: 0 | Status: SHIPPED | OUTPATIENT
Start: 2024-01-19 | End: 2024-03-01

## 2024-01-19 RX ORDER — CYCLOBENZAPRINE HCL 10 MG
10 TABLET ORAL 2 TIMES DAILY PRN
Qty: 30 TABLET | Refills: 1 | Status: SHIPPED | OUTPATIENT
Start: 2024-01-19

## 2024-01-19 RX ORDER — CEFUROXIME AXETIL 250 MG/1
TABLET ORAL
Qty: 2 KIT | Refills: 2 | Status: SHIPPED | OUTPATIENT
Start: 2024-01-19

## 2024-01-19 NOTE — PROGRESS NOTES
CC: followup of fibromyalgia  HPI:  The patient is a 48 y.o. year old female who presents to the office for followup of fibromyalgia.  She reports her pain is relatively well controlled.  She reports nasal congestion that started l;ast night, as well as scratchy throat.  She denies any fever.  The patient also reports chronic cough, which she attributes to black mol;d in her bathroom.  She reports fatigue.   The patient reports back pain, for which she went to urgent care.  Pain started about 1-1/2 weeks ago.  The pain is an intermittent burning sensation that is exascerbated by certain movements.  She was prescribed robaxin without relief.  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    COLONOSCOPY      COLOSTOMY      ESOPHAGOGASTRODUODENOSCOPY N/A 5/26/2022    Procedure: ESOPHAGOGASTRODUODENOSCOPY (EGD);  Surgeon: Tin Novak MD;  Location: [facility] (2ND FLR);  Service: Endoscopy;  Laterality: N/A;  fully vaccinated    HYSTERECTOMY      due to endometriosis    ILEOSCOPY N/A 4/10/2019    Procedure: ILEOSCOPY through stoma;  Surgeon: Eve Currie MD;  Location: [facility] (4TH FLR);  Service: Endoscopy;  Laterality: N/A;  Schedule as 30 minute case, schedule in April or May 2019    ILEOSCOPY N/A 5/26/2022    Procedure: ILEOSCOPY;  Surgeon: Tin Novak MD;  Location: [facility] (2ND FLR);  Service: Endoscopy;  Laterality: N/A;    ILEOSTOMY      LAPAROSCOPY W/ MINI-LAPAROTOMY      large intestine removed      just a portion    pelvic  mass removal      REVISION COLOSTOMY  2010    SMALL INTESTINE SURGERY      J pouch excision with end ileostomy    STOMACH SURGERY      TONSILLECTOMY, ADENOIDECTOMY         MEDS:  Medcard reviewed and updated    ALLERGIES: Allergy Card reviewed and updated    SOCIAL HISTORY:   The patient is a nonsmoker.    PE:   APPEARANCE: Well nourished, well developed, in no acute distress.    CHEST: Lungs clear to auscultation with unlabored respirations.  CARDIOVASCULAR: Normal S1, S2. No murmurs. No carotid bruits. No pedal edema.  ABDOMEN: Bowel sounds normal. Not distended. Soft. No tenderness or masses.  Positive colostomy.    PSYCHIATRIC: The patient is oriented to person, place, and time and has a pleasant affect.        ASSESSMENT/PLAN:  Diagnoses and all orders for this visit:    Fibromyalgia  -     stable, continue pain control     Hepatic steatosis  -     semaglutide (OZEMPIC) 0.25 mg or 0.5 mg (2 mg/3 mL) pen injector; Inject 0.25 mg into the skin every 7 days for 28 days, THEN 0.5 mg every 7 days for 14 days.    Right-sided back pain, unspecified back location, unspecified chronicity  -     Urinalysis; Future  -     Urine culture; Future    Other orders  -     sumatriptan (IMITREX STATDOSE) 6 mg/0.5 mL kit; INJECT 0.5 ML UNDER THE SKIN AS NEEDED ONE TIME FOR MIGRAINE  -     cyclobenzaprine (FLEXERIL) 10 MG tablet; Take 1 tablet (10 mg total) by mouth 2 (two) times daily as needed.

## 2024-01-20 LAB
BACTERIA UR CULT: NORMAL
BACTERIA UR CULT: NORMAL

## 2024-01-23 ENCOUNTER — PATIENT MESSAGE (OUTPATIENT)
Dept: INTERNAL MEDICINE | Facility: CLINIC | Age: 49
End: 2024-01-23
Payer: COMMERCIAL

## 2024-01-23 DIAGNOSIS — R74.8 ELEVATED LIVER ENZYMES: Primary | ICD-10-CM

## 2024-01-23 DIAGNOSIS — R05.9 COUGH: ICD-10-CM

## 2024-01-24 RX ORDER — AMOXICILLIN AND CLAVULANATE POTASSIUM 875; 125 MG/1; MG/1
1 TABLET, FILM COATED ORAL EVERY 12 HOURS
Qty: 20 TABLET | Refills: 0 | Status: SHIPPED | OUTPATIENT
Start: 2024-01-24

## 2024-01-24 RX ORDER — HYDROCODONE BITARTRATE AND HOMATROPINE METHYLBROMIDE ORAL SOLUTION 5; 1.5 MG/5ML; MG/5ML
5 LIQUID ORAL NIGHTLY PRN
Qty: 120 ML | Refills: 0 | Status: SHIPPED | OUTPATIENT
Start: 2024-01-24 | End: 2024-02-08

## 2024-01-24 NOTE — ED PROVIDER NOTES
Encounter Date: 3/3/2017       History     Chief Complaint   Patient presents with    Urinary Tract Infection     pt believes she has an UTI since Dec 31st, also thinks she is dehydrated     Review of patient's allergies indicates:   Allergen Reactions    Aspirin      Other reaction(s): vomiting, contraindicated for bleeding from crohns  Other reaction(s): bleeding    Sulfa (sulfonamide antibiotics) Hives and Rash    Iodinated contrast media - iv dye Other (See Comments)    Adhesive     Aspirin     Remicade [infliximab] Other (See Comments)     Medical induced lupus    Latex Rash     Other reaction(s): Hives     HPI Comments: Emi Johnson, a 41 y.o. female that presents to the ED for UTI symptoms.  She's dealt with recurrent UTI's since her ileostomy bag placed in November.  Since that time she's been since in this ED, given IV ABX and prescribed oral ABX.  She continued to develop symptoms and was seen at  where she was given IV fluids and discharged with oral ABX.  She has continued with symptoms since that time.  Denies any fever.  She states that she has been nauseated, but has not vomited.      Patient is a 41 y.o. female presenting with the following complaint: dysuria. The history is provided by the patient.   Dysuria    The problem occurs every urination. The quality of the pain is described as burning and shooting. The pain is at a severity of 6/10. She is not sexually active. There is no history of pyelonephritis. Associated symptoms include nausea, frequency and urgency. Pertinent negatives include no vomiting and no possible pregnancy. Her past medical history is significant for recurrent UTIs. Her past medical history does not include kidney stones.     Past Medical History:   Diagnosis Date    Anemia     Asthma     B12 deficiency     Crohn's disease     High risk medication use     History of shingles     Immunosuppressed status     Lupus     Migraine headache     Raynaud disease      Reactive hypoglycemia     Seizures 2004    no medication     Sleep apnea     Non compliant with CPAP     Past Surgical History:   Procedure Laterality Date    ABDOMINAL SURGERY      COLON SURGERY      95% of colon removed 2010    COLOSTOMY      HYSTERECTOMY      due to endometriosis    ILEOSTOMY      LAPAROSCOPY W/ MINI-LAPAROTOMY      large intestine removed      just a portion    pelvic mass removal      REVISION COLOSTOMY  2010    STOMACH SURGERY      TONSILLECTOMY, ADENOIDECTOMY       Family History   Problem Relation Age of Onset    Colon cancer Maternal Grandmother     Cancer Mother      anal    Breast cancer Sister     Migraines Sister     Seizures Sister     Cancer Maternal Aunt     Cancer Paternal Grandmother     Celiac disease Neg Hx     Cirrhosis Neg Hx     Colon polyps Neg Hx     Crohn's disease Neg Hx     Cystic fibrosis Neg Hx     Hemochromatosis Neg Hx     Esophageal cancer Neg Hx     Inflammatory bowel disease Neg Hx     Irritable bowel syndrome Neg Hx     Liver cancer Neg Hx     Liver disease Neg Hx     Rectal cancer Neg Hx     Stomach cancer Neg Hx     Ulcerative colitis Neg Hx     Hugo's disease Neg Hx      Social History   Substance Use Topics    Smoking status: Never Smoker    Smokeless tobacco: Never Used    Alcohol use No     Review of Systems   Constitutional: Negative for fever.   Gastrointestinal: Positive for abdominal pain and nausea. Negative for vomiting.   Genitourinary: Positive for dysuria, frequency and urgency. Negative for vaginal bleeding, vaginal discharge and vaginal pain.   Skin: Negative for color change and rash.   Allergic/Immunologic: Negative for immunocompromised state.   Neurological: Negative for dizziness and light-headedness.   Hematological: Does not bruise/bleed easily.   Psychiatric/Behavioral: Negative for agitation and confusion.   All other systems reviewed and are negative.      Physical Exam   Initial Vitals   BP  Pulse Resp Temp SpO2   03/03/17 1228 03/03/17 1228 03/03/17 1228 03/03/17 1228 03/03/17 1228   137/74 78 20 98.1 °F (36.7 °C) 97 %     Physical Exam    Nursing note and vitals reviewed.  Constitutional: She appears well-developed and well-nourished. No distress.   HENT:   Head: Normocephalic and atraumatic.   Right Ear: External ear normal.   Left Ear: External ear normal.   Nose: Nose normal.   Mouth/Throat: Oropharynx is clear and moist.   Eyes: Conjunctivae and EOM are normal.   Neck: Normal range of motion. No tracheal deviation present.   Cardiovascular: Normal rate and regular rhythm.   Pulmonary/Chest: Breath sounds normal. No respiratory distress. She has no wheezes. She has no rhonchi. She has no rales.   Abdominal: Soft. Bowel sounds are normal. She exhibits no distension. There is tenderness. There is no rebound and no guarding.   Musculoskeletal: Normal range of motion. She exhibits no tenderness.   Neurological: She is alert and oriented to person, place, and time. She has normal strength.   Skin: Skin is warm and dry.   Psychiatric: She has a normal mood and affect. Thought content normal.         ED Course   Procedures  Labs Reviewed   URINALYSIS - Abnormal; Notable for the following:        Result Value    Appearance, UA Hazy (*)     Specific Gravity, UA >=1.030 (*)     Leukocytes, UA Trace (*)     All other components within normal limits   URINALYSIS MICROSCOPIC - Abnormal; Notable for the following:     WBC, UA 15 (*)     Bacteria, UA Few (*)     All other components within normal limits   URINALYSIS - Abnormal; Notable for the following:     Appearance, UA Hazy (*)     Specific Gravity, UA >=1.030 (*)     Occult Blood UA Trace (*)     Leukocytes, UA Trace (*)     All other components within normal limits   URINALYSIS MICROSCOPIC - Abnormal; Notable for the following:     WBC, UA 7 (*)     Bacteria, UA Few (*)     All other components within normal limits   CULTURE, URINE   CULTURE, URINE   CBC W/  AUTO DIFFERENTIAL   COMPREHENSIVE METABOLIC PANEL             Medical Decision Making:   Initial Assessment:   Dysuria, frequency   Differential Diagnosis:   UTI, nephrolithiasis, pyelonephritis, interstitial cystitis   ED Management:  Zofran given with improvement of nausea.  Blood work WNL.  UA showed evidence of trace WBC.  Sending urine for culture but will not treat for UTI at this time.  The patient was informed that we would place her on ABX should the culture grow any specific bacteria.  Patient states she has an appointment with a urologist at [facility] this month as well as her PCP.  Encouraged her to keep the appointment.  Strict return precautions given and patient verbalized understanding.    RX: Zofran                 Attending Attestation:     Physician Attestation Statement for NP/PA:   I have conducted a face to face encounter with this patient in addition to the NP/PA, due to NP/PA Request    Other NP/PA Attestation Additions:    History of Present Illness: Dysuria since Dec 31st.  Has taken several courses of abx.    Medical Decision Making: No signs of UTI in ER today.  Will send UCx.  Pt advised to follow upwt urology for evaluation of chronic symptoms.                 ED Course     Clinical Impression:   The primary encounter diagnosis was Urinary frequency. A diagnosis of Dysuria was also pertinent to this visit.          Patito Mason PA-C  03/03/17 1537       Giuliana Spangler MD  03/05/17 2013     No

## 2024-02-01 RX ORDER — ZOLPIDEM TARTRATE 12.5 MG/1
12.5 TABLET, FILM COATED, EXTENDED RELEASE ORAL NIGHTLY PRN
Qty: 90 TABLET | Refills: 0 | Status: SHIPPED | OUTPATIENT
Start: 2024-02-01 | End: 2024-04-29 | Stop reason: SDUPTHER

## 2024-02-01 NOTE — TELEPHONE ENCOUNTER
No care due was identified.  [facility] Embedded Care Due Messages. Reference number: [id number].   2/01/2024 2:06:45 PM CST

## 2024-02-05 DIAGNOSIS — M79.7 FIBROMYALGIA: ICD-10-CM

## 2024-02-05 RX ORDER — OXYCODONE AND ACETAMINOPHEN 7.5; 325 MG/1; MG/1
1 TABLET ORAL EVERY 6 HOURS PRN
Qty: 120 TABLET | Refills: 0 | Status: SHIPPED | OUTPATIENT
Start: 2024-02-05 | End: 2024-03-04 | Stop reason: SDUPTHER

## 2024-02-05 NOTE — TELEPHONE ENCOUNTER
No care due was identified.  [facility] Embedded Care Due Messages. Reference number: [id number].   2/05/2024 9:02:35 AM CST

## 2024-02-08 ENCOUNTER — HOSPITAL ENCOUNTER (EMERGENCY)
Facility: HOSPITAL | Age: 49
Discharge: HOME | End: 2024-02-08
Attending: EMERGENCY MEDICINE
Payer: COMMERCIAL

## 2024-02-08 ENCOUNTER — PATIENT MESSAGE (OUTPATIENT)
Dept: INTERNAL MEDICINE | Facility: CLINIC | Age: 49
End: 2024-02-08
Payer: COMMERCIAL

## 2024-02-08 VITALS
HEIGHT: 63 IN | BODY MASS INDEX: 33.66 KG/M2 | HEART RATE: 108 BPM | OXYGEN SATURATION: 96 % | WEIGHT: 190 LBS | DIASTOLIC BLOOD PRESSURE: 78 MMHG | SYSTOLIC BLOOD PRESSURE: 124 MMHG | TEMPERATURE: 98 F | RESPIRATION RATE: 20 BRPM

## 2024-02-08 DIAGNOSIS — R05.9 COUGH: ICD-10-CM

## 2024-02-08 DIAGNOSIS — R05.9 COUGH: Primary | ICD-10-CM

## 2024-02-08 DIAGNOSIS — R05.9 COUGH, UNSPECIFIED TYPE: Primary | ICD-10-CM

## 2024-02-08 LAB
INFLUENZA A, MOLECULAR: NEGATIVE
INFLUENZA B, MOLECULAR: NEGATIVE
SARS-COV-2 RDRP RESP QL NAA+PROBE: NEGATIVE
SPECIMEN SOURCE: NORMAL

## 2024-02-08 PROCEDURE — 87502 INFLUENZA DNA AMP PROBE: CPT

## 2024-02-08 PROCEDURE — 99283 EMERGENCY DEPT VISIT LOW MDM: CPT | Mod: 25

## 2024-02-08 PROCEDURE — 25000003 PHARM REV CODE 250

## 2024-02-08 PROCEDURE — U0002 COVID-19 LAB TEST NON-CDC: HCPCS

## 2024-02-08 RX ORDER — HYDROCODONE POLISTIREX AND CHLORPHENIRAMINE POLISTIREX 10; 8 MG/5ML; MG/5ML
5 SUSPENSION, EXTENDED RELEASE ORAL EVERY 12 HOURS PRN
Qty: 115 ML | Refills: 0 | Status: SHIPPED | OUTPATIENT
Start: 2024-02-08 | End: 2024-02-09 | Stop reason: SDUPTHER

## 2024-02-08 RX ORDER — HYDROCODONE POLISTIREX AND CHLORPHENIRAMINE POLISTIREX 10; 8 MG/5ML; MG/5ML
5 SUSPENSION, EXTENDED RELEASE ORAL EVERY 12 HOURS PRN
Qty: 115 ML | Refills: 0 | OUTPATIENT
Start: 2024-02-08 | End: 2024-02-08

## 2024-02-08 RX ORDER — CODEINE PHOSPHATE AND GUAIFENESIN 10; 100 MG/5ML; MG/5ML
5 SOLUTION ORAL
Status: COMPLETED | OUTPATIENT
Start: 2024-02-08 | End: 2024-02-08

## 2024-02-08 RX ORDER — HYDROCODONE POLISTIREX AND CHLORPHENIRAMINE POLISTIREX 10; 8 MG/5ML; MG/5ML
5 SUSPENSION, EXTENDED RELEASE ORAL
Status: DISCONTINUED | OUTPATIENT
Start: 2024-02-08 | End: 2024-02-08

## 2024-02-08 RX ADMIN — GUAIFENESIN AND CODEINE PHOSPHATE 5 ML: 100; 10 SOLUTION ORAL at 10:02

## 2024-02-09 RX ORDER — HYDROCODONE POLISTIREX AND CHLORPHENIRAMINE POLISTIREX 10; 8 MG/5ML; MG/5ML
5 SUSPENSION, EXTENDED RELEASE ORAL EVERY 12 HOURS PRN
Qty: 115 ML | Refills: 0 | Status: SHIPPED | OUTPATIENT
Start: 2024-02-09

## 2024-02-09 RX ORDER — HYDROCODONE POLISTIREX AND CHLORPHENIRAMINE POLISTIREX 10; 8 MG/5ML; MG/5ML
5 SUSPENSION, EXTENDED RELEASE ORAL EVERY 12 HOURS PRN
Qty: 115 ML | Refills: 0 | Status: SHIPPED | OUTPATIENT
Start: 2024-02-09 | End: 2024-02-09 | Stop reason: SDUPTHER

## 2024-02-09 NOTE — ED NOTES
RN first encounter with pt. Pt ambulatory to room. Pt aao x 4. Pt reports cough that is constant, sometimes productive and sometimes not. Pt states when cough is productive she cannot cough it all the way up. Pt reports being diagnosed with bronchitis two weeks ago and prescribed a cough medicine that she says isn't helping. Pt reports fever/chills- reports taking last dose of tylenol approx two hours ago. Pt reports the cough is now causing her epigastric pain and pain to her hernia. Pt reports nausea without emesis. Pt is tachycardic.

## 2024-02-09 NOTE — ED NOTES
Informed pt medication is waiting to be verified by pharmacy and when it arrives to unit RN will be in to administer. Pt verbalized understanding. Pt denies any further needs at this time.

## 2024-02-09 NOTE — DISCHARGE INSTRUCTIONS
Thank you for letting me care for you today! It was nice meeting you, and I hope you feel better soon.   If you would like access to your chart and what was done today please utilize the [facility].   Please don't hesitate to return if your symptoms worsen or you develop any other worrisome symptoms.    Our goal in the emergency department is to always give you outstanding care and exceptional service. You may receive a survey by mail or e-mail in the next week regarding your experience in our ED. We would greatly appreciate you completing and returning the survey. Your feedback provides us with a way to recognize our staff who give very good care and it helps us learn how to improve when your experience was below our aspiration of excellence.     Sincerely,    Sue Galindo PA-C  Emergency Department Physician Assistant  [facility], [place], and [place]

## 2024-02-09 NOTE — ED PROVIDER NOTES
Encounter Date: 2/8/2024       History     Chief Complaint   Patient presents with    Cough     Pt presents with c/o cough x 2 weeks. Pt states it is now causing pain in epigastric area and causing her to feel lightheaded. Pt reports seen at a clinic for the same symptoms 3 weeks ago and was told she had bronchitis.      48-year-old female with past medical history of asthma, lupus, migraines, anemia presents to the ED with worsening cough x2 weeks.  Reports epigastric discomfort and chest tightening from coughing.  Has been taking over-the-counter decongestants with no improvement of her symptoms.  Has tried promethazine with codeine with no improvement couple of weeks back.  States she saw her PCP recently who prescribed her Tussionex, has not been able to pick it up from pharmacy.  States they were out of the medications.  No fever, nausea, chills, chest pain, shortness breath, abdominal pain, diarrhea.  No other acute complaints today.    The history is provided by the patient.     Review of patient's allergies indicates:   Allergen Reactions    Aspirin      Other reaction(s): vomiting, contraindicated for bleeding from crohns  Other reaction(s): bleeding    Mold Hives     Black mold     Sulfa (sulfonamide antibiotics) Hives and Rash    Iodinated contrast media Other (See Comments)    Adhesive     Aspirin     Iodine     Remicade [infliximab] Other (See Comments)     Medical induced lupus    Latex Rash     Other reaction(s): Hives     Past Medical History:   Diagnosis Date    Anemia     Asthma     B12 deficiency     Crohn's disease     History of shingles     Lupus     Migraine headache     Raynaud disease     Reactive hypoglycemia     Seizures 2004    no medication     Sleep apnea     Non compliant with CPAP     Past Surgical History:   Procedure Laterality Date    ABDOMINAL SURGERY      COLON SURGERY      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    COLONOSCOPY      COLOSTOMY      ESOPHAGOGASTRODUODENOSCOPY N/A  5/26/2022    Procedure: ESOPHAGOGASTRODUODENOSCOPY (EGD);  Surgeon: Tin Novak MD;  Location: [facility] (2ND FLR);  Service: Endoscopy;  Laterality: N/A;  fully vaccinated    HYSTERECTOMY      due to endometriosis    ILEOSCOPY N/A 4/10/2019    Procedure: ILEOSCOPY through stoma;  Surgeon: Eve Currie MD;  Location: [facility] ENDO (4TH FLR);  Service: Endoscopy;  Laterality: N/A;  Schedule as 30 minute case, schedule in April or May 2019    ILEOSCOPY N/A 5/26/2022    Procedure: ILEOSCOPY;  Surgeon: Tin Novak MD;  Location: [facility] (2ND FLR);  Service: Endoscopy;  Laterality: N/A;    ILEOSTOMY      LAPAROSCOPY W/ MINI-LAPAROTOMY      large intestine removed      just a portion    pelvic mass removal      REVISION COLOSTOMY  2010    SMALL INTESTINE SURGERY      J pouch excision with end ileostomy    STOMACH SURGERY      TONSILLECTOMY, ADENOIDECTOMY       Family History   Problem Relation Age of Onset    Cancer Mother 54        Anal cancer     Diabetes Mellitus Mother     Hypertension Mother     Colon cancer Mother     Heart attack Father     Breast cancer Sister     Hypertension Sister     Cervical cancer Sister     Seizures Sister     Hypertension Sister     Liver disease Sister         Fatty Liver     Cancer Maternal Aunt     Breast cancer Paternal Aunt     Colon cancer Maternal Grandmother 72    Cancer Paternal Grandmother     Celiac disease Neg Hx     Cirrhosis Neg Hx     Colon polyps Neg Hx     Crohn's disease Neg Hx     Cystic fibrosis Neg Hx     Hemochromatosis Neg Hx     Esophageal cancer Neg Hx     Inflammatory bowel disease Neg Hx     Irritable bowel syndrome Neg Hx     Liver cancer Neg Hx     Rectal cancer Neg Hx     Stomach cancer Neg Hx     Ulcerative colitis Neg Hx     Hugo's disease Neg Hx      Social History     Tobacco Use    Smoking status: Never    Smokeless tobacco: Never   Substance Use Topics    Alcohol use: No     Comment: Rare social use.    Drug use: No     Review of  Systems   Constitutional:  Negative for chills and fever.   HENT:  Negative for congestion, rhinorrhea and sore throat.    Respiratory:  Positive for cough. Negative for chest tightness, shortness of breath and wheezing.    Cardiovascular:  Negative for chest pain, palpitations and leg swelling.   Gastrointestinal:  Negative for abdominal distention, abdominal pain, nausea and vomiting.   Genitourinary:  Negative for dysuria and frequency.   Musculoskeletal:  Negative for neck pain and neck stiffness.   Neurological:  Negative for headaches.       Physical Exam     Initial Vitals [02/08/24 2108]   BP Pulse Resp Temp SpO2   124/78 (!) 112 20 97.9 °F (36.6 °C) 96 %      MAP       --         Physical Exam    Vitals reviewed.  Constitutional: She appears well-developed and well-nourished. She is not diaphoretic. No distress.   HENT:   Head: Normocephalic and atraumatic.   Right Ear: External ear normal.   Left Ear: External ear normal.   Mouth/Throat: Oropharynx is clear and moist.   Posterior oropharyngeal erythema without tonsillar exudates.  Uvula midline.   Eyes: EOM are normal. Pupils are equal, round, and reactive to light.   Neck: Neck supple.   Normal range of motion.  Cardiovascular:  Normal rate and normal heart sounds.           Pulmonary/Chest: Breath sounds normal. No respiratory distress. She has no wheezes.   Abdominal: Abdomen is soft. Bowel sounds are normal. She exhibits no distension. There is no abdominal tenderness. There is no rebound and no guarding.   Musculoskeletal:         General: Normal range of motion.      Cervical back: Normal range of motion and neck supple.     Neurological: She is alert and oriented to person, place, and time. GCS score is 15. GCS eye subscore is 4. GCS verbal subscore is 5. GCS motor subscore is 6.   Skin: Skin is warm. Capillary refill takes less than 2 seconds.   Psychiatric: She has a normal mood and affect. Her behavior is normal. Judgment and thought content  normal.         ED Course   Procedures  Labs Reviewed   INFLUENZA A & B BY MOLECULAR   SARS-COV-2 RNA AMPLIFICATION, QUAL          Imaging Results              X-Ray Chest PA And Lateral (Final result)  Result time 02/08/24 21:34:52      Final result by Abimael Abdul DO (02/08/24 21:34:52)                   Impression:      No acute abnormality.      Electronically signed by: Abimael Abdul  Date:    02/08/2024  Time:    21:34               Narrative:    EXAMINATION:  XR CHEST PA AND LATERAL    CLINICAL HISTORY:  Cough, unspecified    TECHNIQUE:  PA and lateral views of the chest were performed.    COMPARISON:  08/22/2023.    FINDINGS:  The lungs are well expanded and clear. No focal opacities are seen. The pleural spaces are clear. The cardiac silhouette is unremarkable. The visualized osseous structures are unremarkable.  Biopsy clip overlies the left breast.                                       Medications   guaiFENesin-codeine 100-10 mg/5 ml syrup 5 mL (5 mLs Oral Given 2/8/24 2225)     Medical Decision Making  Differential Diagnosis includes, but is not limited to:  Viral URI, Strep pharyngitis, viral pharyngitis, foreign body aspiration/ingestion, bronchitis, asthma exacerbation, CHF exacerbation, COPD exacerbation, allergy/atopy, influenza, pertussis, PE, pneumonia, lung abscess, fungal infection, TB, epiglottitis.    ED management     48-year-old female with past medical history of asthma, lupus, migraines, anemia presents to the ED with worsening cough x2 weeks. Patient is not toxic appearing, hemodynamically stable and resting comfortably on bed. Patient is well-appearing.  Awake and alert.  Afebrile with vitals WNL. No distress on exam.  Physical exam as stated above. No concerning features on physical exam to suggest bacterial otitis media/externa, sinusitis, pharyngitis, or peritonsillar abscess. Vital signs do not suggest sepsis.  Lab and imaging results discussed on ED course. The patient's x-ray  does not show any sign of pneumonia, pulmonary edema or heart failure etiology.  Symptoms and history not consistent with cough from chronic GERD. Review of home meds shows that cough is unlikely to be medication induced. At this time it appears that the patient is suffering from a viral upper respiratory infection which will need to run its course.  There is no need for antibiotics at this time.  I will discharge home with prescription symptom control for cough. I have discussed over-the-counter products,such as Tylenol or ibuprofen for control of headache, body aches, fever.  The pt is feeling improved following treatment here in the emergency department.  Remains nontoxic in appearance.  Oxygenating appropriately on room air.  Will Rx Tussinex for cough.  Encouraged follow-up with their primary care physician.  Discussed warning signs for return and written instructions given.     I have discussed the specifics of the workup with the patient and the patient has verbalized understanding of the details of the workup, the diagnosis, the treatment plan, and the need for outpatient follow-up with PCP. ED precautions given. Discussed with pt about returning to the ED, if symptoms fail to improve or worsen.     RESULTS:  Documented in ED course.   Labs/ekg interpreted by myself      Amount and/or Complexity of Data Reviewed  Labs:  Decision-making details documented in ED Course.  Radiology: ordered. Decision-making details documented in ED Course.    Risk  OTC drugs.  Prescription drug management.               ED Course as of 02/08/24 2245   Thu Feb 08, 2024 2139 X-Ray Chest PA And Lateral  Independently reviewed by myself and radiologist    FINDINGS:  The lungs are well expanded and clear. No focal opacities are seen. The pleural spaces are clear. The cardiac silhouette is unremarkable. The visualized osseous structures are unremarkable.  Biopsy clip overlies the left breast.     Impression:     No acute  abnormality.   [NW]   2159 Influenza A & B by Molecular  Negative [NW]   2159 COVID-19 Rapid Screening  Negative [NW]      ED Course User Index  [NW] Sue Galindo PA-C                           Clinical Impression:  Final diagnoses:  [R05.9] Cough (Primary)          ED Disposition Condition    Discharge Stable          ED Prescriptions       Medication Sig Dispense Start Date End Date Auth. Provider    hydrocodone-chlorpheniramine (TUSSIONEX) 10-8 mg/5 mL suspension Take 5 mLs by mouth every 12 (twelve) hours as needed for Cough. 115 mL 2/8/2024 -- Sue Galindo PA-C          Follow-up Information       Follow up With Specialties Details Why Contact Info    Bianca Rutledge MD Internal Medicine Schedule an appointment as soon as possible for a visit in 2 days As needed, If symptoms worsen [address] [facility] [address]  [phone]               Sue Galindo PA-C  02/08/24 0334

## 2024-02-19 ENCOUNTER — PATIENT MESSAGE (OUTPATIENT)
Dept: INTERNAL MEDICINE | Facility: CLINIC | Age: 49
End: 2024-02-19
Payer: COMMERCIAL

## 2024-02-20 ENCOUNTER — PATIENT MESSAGE (OUTPATIENT)
Dept: ADMINISTRATIVE | Facility: HOSPITAL | Age: 49
End: 2024-02-20
Payer: COMMERCIAL

## 2024-02-25 ENCOUNTER — HOSPITAL ENCOUNTER (EMERGENCY)
Facility: HOSPITAL | Age: 49
Discharge: HOME | End: 2024-02-25
Attending: EMERGENCY MEDICINE
Payer: COMMERCIAL

## 2024-02-25 VITALS
WEIGHT: 190 LBS | OXYGEN SATURATION: 98 % | SYSTOLIC BLOOD PRESSURE: 127 MMHG | RESPIRATION RATE: 18 BRPM | TEMPERATURE: 98 F | DIASTOLIC BLOOD PRESSURE: 75 MMHG | HEART RATE: 93 BPM | BODY MASS INDEX: 33.66 KG/M2

## 2024-02-25 DIAGNOSIS — R05.9 COUGH: ICD-10-CM

## 2024-02-25 DIAGNOSIS — J40 BRONCHITIS: Primary | ICD-10-CM

## 2024-02-25 PROCEDURE — 94640 AIRWAY INHALATION TREATMENT: CPT | Mod: XB

## 2024-02-25 PROCEDURE — 87635 SARS-COV-2 COVID-19 AMP PRB: CPT | Performed by: EMERGENCY MEDICINE

## 2024-02-25 PROCEDURE — 25000242 PHARM REV CODE 250 ALT 637 W/ HCPCS: Performed by: EMERGENCY MEDICINE

## 2024-02-25 PROCEDURE — 94761 N-INVAS EAR/PLS OXIMETRY MLT: CPT

## 2024-02-25 PROCEDURE — 99285 EMERGENCY DEPT VISIT HI MDM: CPT | Mod: 25

## 2024-02-25 PROCEDURE — 87502 INFLUENZA DNA AMP PROBE: CPT

## 2024-02-25 RX ORDER — DOXYCYCLINE 100 MG/1
100 CAPSULE ORAL 2 TIMES DAILY
Qty: 20 CAPSULE | Refills: 0 | Status: SHIPPED | OUTPATIENT
Start: 2024-02-25 | End: 2024-03-06

## 2024-02-25 RX ORDER — IPRATROPIUM BROMIDE AND ALBUTEROL SULFATE 2.5; .5 MG/3ML; MG/3ML
3 SOLUTION RESPIRATORY (INHALATION)
Status: COMPLETED | OUTPATIENT
Start: 2024-02-25 | End: 2024-02-25

## 2024-02-25 RX ORDER — ALBUTEROL SULFATE 90 UG/1
1-2 AEROSOL, METERED RESPIRATORY (INHALATION) EVERY 6 HOURS PRN
Qty: 18 G | Refills: 6 | Status: SHIPPED | OUTPATIENT
Start: 2024-02-25 | End: 2025-02-24

## 2024-02-25 RX ORDER — DOXYCYCLINE 100 MG/1
100 CAPSULE ORAL 2 TIMES DAILY
Qty: 20 CAPSULE | Refills: 0 | Status: SHIPPED | OUTPATIENT
Start: 2024-02-25 | End: 2024-02-25

## 2024-02-25 RX ORDER — ALBUTEROL SULFATE 2.5 MG/.5ML
2.5 SOLUTION RESPIRATORY (INHALATION) EVERY 4 HOURS PRN
Qty: 25 EACH | Refills: 6 | Status: SHIPPED | OUTPATIENT
Start: 2024-02-25

## 2024-02-25 RX ORDER — BENZONATATE 100 MG/1
100 CAPSULE ORAL 3 TIMES DAILY PRN
Qty: 20 CAPSULE | Refills: 0 | Status: SHIPPED | OUTPATIENT
Start: 2024-02-25 | End: 2024-02-25 | Stop reason: ALTCHOICE

## 2024-02-25 RX ORDER — PROMETHAZINE HYDROCHLORIDE AND DEXTROMETHORPHAN HYDROBROMIDE 6.25; 15 MG/5ML; MG/5ML
5 SYRUP ORAL EVERY 4 HOURS PRN
Qty: 120 ML | Refills: 0 | Status: SHIPPED | OUTPATIENT
Start: 2024-02-25 | End: 2024-03-06

## 2024-02-25 RX ADMIN — IPRATROPIUM BROMIDE AND ALBUTEROL SULFATE 3 ML: .5; 3 SOLUTION RESPIRATORY (INHALATION) at 11:02

## 2024-02-25 NOTE — ED PROVIDER NOTES
Encounter Date: 2/25/2024       History     Chief Complaint   Patient presents with    Cough     Hx of bronchitis x 3 mths, pt states cough is making hernia hurt, +nausea also reported x 3 weeks, last ate- this am      Patient is a 48-year-old female with a history of asthma who complains of a cough productive for green sputum.  She said this began a couple of months ago and has had no improvement.  She has had subjective fevers.  She reports the cough is causing chest and abdominal pain as well as shortness of breath at times.      Review of patient's allergies indicates:   Allergen Reactions    Aspirin      Other reaction(s): vomiting, contraindicated for bleeding from crohns  Other reaction(s): bleeding    Mold Hives     Black mold     Sulfa (sulfonamide antibiotics) Hives and Rash    Iodinated contrast media Other (See Comments)    Adhesive     Aspirin     Iodine     Remicade [infliximab] Other (See Comments)     Medical induced lupus    Latex Rash     Other reaction(s): Hives     Past Medical History:   Diagnosis Date    Anemia     Asthma     B12 deficiency     Crohn's disease     History of shingles     Lupus     Migraine headache     Raynaud disease     Reactive hypoglycemia     Seizures 2004    no medication     Sleep apnea     Non compliant with CPAP     Past Surgical History:   Procedure Laterality Date    ABDOMINAL SURGERY      COLON SURGERY      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    COLONOSCOPY      COLOSTOMY      ESOPHAGOGASTRODUODENOSCOPY N/A 5/26/2022    Procedure: ESOPHAGOGASTRODUODENOSCOPY (EGD);  Surgeon: Tin Novak MD;  Location: [facility] (2ND FLR);  Service: Endoscopy;  Laterality: N/A;  fully vaccinated    HYSTERECTOMY      due to endometriosis    ILEOSCOPY N/A 4/10/2019    Procedure: ILEOSCOPY through stoma;  Surgeon: Eve Currie MD;  Location: [facility] (4TH FLR);  Service: Endoscopy;  Laterality: N/A;  Schedule as 30 minute case, schedule in April or May 2019     ILEOSCOPY N/A 5/26/2022    Procedure: ILEOSCOPY;  Surgeon: Tin Novak MD;  Location: [facility] ([address] [place] [address]);  Service: Endoscopy;  Laterality: N/A;    ILEOSTOMY      LAPAROSCOPY W/ MINI-LAPAROTOMY      large intestine removed      just a portion    pelvic mass removal      REVISION COLOSTOMY  2010    SMALL INTESTINE SURGERY      J pouch excision with end ileostomy    STOMACH SURGERY      TONSILLECTOMY, ADENOIDECTOMY       Family History   Problem Relation Age of Onset    Cancer Mother 54        Anal cancer     Diabetes Mellitus Mother     Hypertension Mother     Colon cancer Mother     Heart attack Father     Breast cancer Sister     Hypertension Sister     Cervical cancer Sister     Seizures Sister     Hypertension Sister     Liver disease Sister         Fatty Liver     Cancer Maternal Aunt     Breast cancer Paternal Aunt     Colon cancer Maternal Grandmother 72    Cancer Paternal Grandmother     Celiac disease Neg Hx     Cirrhosis Neg Hx     Colon polyps Neg Hx     Crohn's disease Neg Hx     Cystic fibrosis Neg Hx     Hemochromatosis Neg Hx     Esophageal cancer Neg Hx     Inflammatory bowel disease Neg Hx     Irritable bowel syndrome Neg Hx     Liver cancer Neg Hx     Rectal cancer Neg Hx     Stomach cancer Neg Hx     Ulcerative colitis Neg Hx     Hugo's disease Neg Hx      Social History     Tobacco Use    Smoking status: Never    Smokeless tobacco: Never   Substance Use Topics    Alcohol use: No     Comment: Rare social use.    Drug use: No     Review of Systems   Constitutional:         Subjective fever   HENT:  Positive for congestion.    Respiratory:  Positive for cough and shortness of breath.    Cardiovascular:  Positive for chest pain.   Gastrointestinal:  Negative for vomiting.   Neurological:  Negative for syncope.       Physical Exam     Initial Vitals [02/25/24 1049]   BP Pulse Resp Temp SpO2   127/75 92 18 97.5 °F (36.4 °C) 98 %      MAP       --         Physical Exam    Nursing note and  vitals reviewed.  Constitutional: No distress.   HENT:   Head: Atraumatic.   Mouth/Throat: Oropharynx is clear and moist.   Neck: Neck supple.   Cardiovascular:  Normal rate, regular rhythm and normal heart sounds.           Pulmonary/Chest:   Faint expiratory wheezing bilaterally.   Musculoskeletal:         General: No edema. Normal range of motion.      Cervical back: Neck supple.     Neurological: She is alert and oriented to person, place, and time.   Skin: Skin is warm and dry.   Psychiatric: Thought content normal.         ED Course   Procedures  Labs Reviewed   SARS-COV-2 RDRP GENE   POCT INFLUENZA A/B MOLECULAR          Imaging Results              X-Ray Chest PA And Lateral (Final result)  Result time 02/25/24 12:09:02      Final result by Andre Liang MD (02/25/24 12:09:02)                   Impression:      No acute intrathoracic process.      Electronically signed by: Andre Liang MD  Date:    02/25/2024  Time:    12:09               Narrative:    EXAMINATION:  XR CHEST PA AND LATERAL    CLINICAL HISTORY:  Cough, unspecified    TECHNIQUE:  PA and lateral views of the chest were performed.    COMPARISON:  Multiple prior exams, most recent from 02/08/2024    FINDINGS:  Cardiac silhouette is not enlarged.  Normal pulmonary vasculature.  Lungs are clear.  No pleural effusion.  No pneumothorax.  Stable biopsy clip overlying the left breast.  No evidence of acute fracture or dislocation.                                       Medications   albuterol-ipratropium 2.5 mg-0.5 mg/3 mL nebulizer solution 3 mL (3 mLs Nebulization Given 2/25/24 1142)     Medical Decision Making  DDx :  Including but not limited to :  Bronchitis, asthma exacerbation, pharyngitis, pneumonia, influenza, COVID-19 infection.    Emergent evaluation of a 48-year-old female who has had a productive cough over the past couple of months.  Oxygen saturations are 98 percent on room air.  Patient is not tachycardic.  She has no  pleuritic chest pain and I do not suspect a pulmonary embolism.  Chest x-ray without infiltrates.  Due to the fact that patient has lupus in the symptoms have been persisting for so long I will place her on antibiotics as well as an albuterol inhaler and Tessalon Perles for her cough.  I have urged close follow-up with her primary physician as soon as able.  She may return to the emergency department for any possible worsening.    Amount and/or Complexity of Data Reviewed  Labs: ordered.     Details: COVID-19 and influenza swabs are negative.  Radiology: ordered.     Details: Chest x-ray with no acute cardiopulmonary abnormality, specifically no infiltrates.    Risk  Prescription drug management.      Additional MDM:   PERC Rule:   Age is greater than or equal to 50 = 0.0  Heart Rate is greater than or equal to 100 = 0.0  SaO2 on room air < 95% = 0.0  Unilateral leg swelling = 0.0  Hemoptysis = 0.0  Recent surgery or trauma = 0.0  Prior PE or DVT =  0.0  Hormone use = 0.00  PERC Score = 0        Well's Criteria Score:  -Clinical symptoms of DVT (leg swelling, pain with palpation) = 0.0  -PE is #1 diagnosis OR equally likely =            0.0  -Heart Rate >100 =   0.0  -Immobilization (= or > than 3 days) or surgery in the previous 4 weeks = 0.0  -Previous DVT/PE = 0.0  -Hemoptysis =          0.0  -Malignancy =           0.0  Well's Probability Score =    0                                     Clinical Impression:  Final diagnoses:  [R05.9] Cough  [J40] Bronchitis (Primary)          ED Disposition Condition    Discharge Stable          ED Prescriptions       Medication Sig Dispense Start Date End Date Auth. Provider    doxycycline (VIBRAMYCIN) 100 MG Cap  (Status: Discontinued) Take 1 capsule (100 mg total) by mouth 2 (two) times daily. for 10 days 20 capsule 2/25/2024 2/25/2024 Otoniel Cabezas MD    benzonatate (TESSALON) 100 MG capsule  (Status: Discontinued) Take 1 capsule (100 mg total) by mouth 3 (three)  times daily as needed for Cough. 20 capsule 2/25/2024 2/25/2024 Otoniel Cabezas MD    albuterol (PROVENTIL/VENTOLIN HFA) 90 mcg/actuation inhaler Inhale 1-2 puffs into the lungs every 6 (six) hours as needed for Wheezing or Shortness of Breath. Rescue 18 g 2/25/2024 2/24/2025 Otoniel Cabezas MD    albuterol sulfate 2.5 mg/0.5 mL Nebu Take 2.5 mg by nebulization every 4 (four) hours as needed (Wheezing or shortness of breath). Rescue 25 each 2/25/2024 -- Otoniel Cabezas MD    doxycycline (VIBRAMYCIN) 100 MG Cap Take 1 capsule (100 mg total) by mouth 2 (two) times daily. for 10 days 20 capsule 2/25/2024 3/6/2024 Otoniel Cabezas MD    promethazine-dextromethorphan (PROMETHAZINE-DM) 6.25-15 mg/5 mL Syrp Take 5 mLs by mouth every 4 (four) hours as needed (cough). 120 mL 2/25/2024 3/6/2024 Otoniel Cabezas MD          Follow-up Information       Follow up With Specialties Details Why Contact Info    Bianca Rutledge MD Internal Medicine Schedule an appointment as soon as possible for a visit   [address] [facility]  [place] [address]  [phone]      [facility] Emergency Dept Emergency Medicine  If symptoms worsen [address] [facility] [facility], [place] [address] [phone]             Otoniel Cabezas MD  02/25/24 7455

## 2024-02-25 NOTE — ED NOTES
Upon d/c, pt states that she can't have tessalon pearls, she needs cough medication. Provider notified

## 2024-02-25 NOTE — ED TRIAGE NOTES
+non productive cough. Hx of bronchitis. Pt reports she works at [employer] and co workers have been sick. Pt reports n/v and fever

## 2024-02-26 ENCOUNTER — OFFICE VISIT (OUTPATIENT)
Dept: INTERNAL MEDICINE | Facility: CLINIC | Age: 49
End: 2024-02-26
Payer: COMMERCIAL

## 2024-02-26 VITALS
HEART RATE: 77 BPM | WEIGHT: 200.63 LBS | DIASTOLIC BLOOD PRESSURE: 64 MMHG | TEMPERATURE: 98 F | OXYGEN SATURATION: 97 % | HEIGHT: 63 IN | SYSTOLIC BLOOD PRESSURE: 126 MMHG | BODY MASS INDEX: 35.55 KG/M2

## 2024-02-26 DIAGNOSIS — R05.9 COUGH, UNSPECIFIED TYPE: ICD-10-CM

## 2024-02-26 DIAGNOSIS — Z00.00 ROUTINE MEDICAL EXAM: Primary | ICD-10-CM

## 2024-02-26 DIAGNOSIS — K50.00 CROHN'S DISEASE OF SMALL INTESTINE WITHOUT COMPLICATION: ICD-10-CM

## 2024-02-26 PROCEDURE — 3078F DIAST BP <80 MM HG: CPT | Mod: CPTII,S$GLB,, | Performed by: INTERNAL MEDICINE

## 2024-02-26 PROCEDURE — 3008F BODY MASS INDEX DOCD: CPT | Mod: CPTII,S$GLB,, | Performed by: INTERNAL MEDICINE

## 2024-02-26 PROCEDURE — 3044F HG A1C LEVEL LT 7.0%: CPT | Mod: CPTII,S$GLB,, | Performed by: INTERNAL MEDICINE

## 2024-02-26 PROCEDURE — 99999 PR PBB SHADOW E&M-EST. PATIENT-LVL IV: CPT | Mod: PBBFAC,,, | Performed by: INTERNAL MEDICINE

## 2024-02-26 PROCEDURE — 3074F SYST BP LT 130 MM HG: CPT | Mod: CPTII,S$GLB,, | Performed by: INTERNAL MEDICINE

## 2024-02-26 PROCEDURE — 99396 PREV VISIT EST AGE 40-64: CPT | Mod: S$GLB,,, | Performed by: INTERNAL MEDICINE

## 2024-02-26 PROCEDURE — 1159F MED LIST DOCD IN RCRD: CPT | Mod: CPTII,S$GLB,, | Performed by: INTERNAL MEDICINE

## 2024-02-26 PROCEDURE — 1160F RVW MEDS BY RX/DR IN RCRD: CPT | Mod: CPTII,S$GLB,, | Performed by: INTERNAL MEDICINE

## 2024-02-26 RX ORDER — FLUCONAZOLE 150 MG/1
150 TABLET ORAL DAILY
Qty: 1 TABLET | Refills: 0 | Status: SHIPPED | OUTPATIENT
Start: 2024-02-26 | End: 2024-02-27

## 2024-02-26 RX ORDER — ONDANSETRON 4 MG/1
TABLET, ORALLY DISINTEGRATING ORAL
Qty: 30 TABLET | Refills: 3 | Status: SHIPPED | OUTPATIENT
Start: 2024-02-26

## 2024-02-26 NOTE — LETTER
February 26, 2024      [facility] Internal Medicine  [address] [facility].  [place] [address]  Phone: [phone]  Fax: [phone]       Patient: Emi Johnson   YOB: 1975  Date of Visit: 02/26/2024    To Whom It May Concern:    Herson Johnson  was at [facility] on 02/26/2024. The patient may return to work/school on 02/27/2024 with no restrictions.  Please excuse her absence on 02/25/2024 due to illness. If you have any questions or concerns, or if I can be of further assistance, please do not hesitate to contact me.    Sincerely,      Bianca Rutledge MD

## 2024-02-26 NOTE — PROGRESS NOTES
The patient is a 48 y.o. old female with Crohn's disease of small intestine without complication who presents to the office for a physical.    PAST MEDICAL HISTORY  Past Medical History:   Diagnosis Date    Anemia     Asthma     B12 deficiency     Crohn's disease     History of shingles     Lupus     Migraine headache     Raynaud disease     Reactive hypoglycemia     Seizures 2004    no medication     Sleep apnea     Non compliant with CPAP       SURGICAL HISTORY:  Past Surgical History:   Procedure Laterality Date    ABDOMINAL SURGERY      COLON SURGERY      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    COLONOSCOPY      COLOSTOMY      ESOPHAGOGASTRODUODENOSCOPY N/A 5/26/2022    Procedure: ESOPHAGOGASTRODUODENOSCOPY (EGD);  Surgeon: Tin Novak MD;  Location: [facility] (2ND FLR);  Service: Endoscopy;  Laterality: N/A;  fully vaccinated    HYSTERECTOMY      due to endometriosis    ILEOSCOPY N/A 4/10/2019    Procedure: ILEOSCOPY through stoma;  Surgeon: Eve Currie MD;  Location: [facility] (4TH FLR);  Service: Endoscopy;  Laterality: N/A;  Schedule as 30 minute case, schedule in April or May 2019    ILEOSCOPY N/A 5/26/2022    Procedure: ILEOSCOPY;  Surgeon: Tin Novak MD;  Location: [facility] (2ND FLR);  Service: Endoscopy;  Laterality: N/A;    ILEOSTOMY      LAPAROSCOPY W/ MINI-LAPAROTOMY      large intestine removed      just a portion    pelvic mass removal      REVISION COLOSTOMY  2010    SMALL INTESTINE SURGERY      J pouch excision with end ileostomy    STOMACH SURGERY      TONSILLECTOMY, ADENOIDECTOMY           MEDS:  Medcard reviewed and updated    ALLERGIES: Allergy Card reviewed and updated    SOCIAL HISTORY:   The patient is a nonsmoker, denies alcohol or illicit drug use.    ROS:  GENERAL: Positive fever, chills snd fatigability, denies weight loss.  SKIN: Positive rashes.  HEAD: Positive headaches.  Denies recent head trauma.  EYES: No photophobia, ocular pain or diplopia.Decreased  vision.  EARS: Denies ear pain, discharge or vertigo.  NOSE: No epistaxis or postnasal drip.  MOUTH & THROAT: No hoarseness or change in voice.   NODES: Denies swollen glands.  CHEST: Positive shortness of breath, wheezing, cough and sputum production.  CARDIOVASCULAR: Positive chest pain due to cough, denies palpitations.  ABDOMEN: Appetite fine. Denies diarrhea, constipation or blood in stool.    Positive abdominal pain,  URINARY: No dysuria or hematuria.  MUSCULOSKELETAL: No joint stiffness or swelling. Positive back pain, due to sciatica and diffuse muscle aches due to fibromyalgia.   NEUROLOGIC: No history of seizures. Positive dizziness.  ENDOCRINE: Denies polyuria or polydipsia.  PSYCHIATRIC: Denies mood swings, depression, anxiety, homicidal or suicidal thoughts.    SCREENINGS:  Last cholesterol: .2024  Last colonoscopy/ video endoscopy: 2022  Last mammogram: 2023  Last Pap smear: about 4 years ago  Last tetanus: 2021  Last Pneumovax: 2012/2015  Last eye exam: 2023  Last bone density: none  Last menstrual period: hysterectomy    PE:   Vitals:  Vitals:    02/26/24 0801   BP: 126/64   Pulse: 77   Temp: 97.6 °F (36.4 °C)       APPEARANCE: Well nourished, well developed, in no acute distress.    EYES: Sclerae anicteric. PERRL. EOMI.      EARS: TM's intact. No retraction or perforation.    NOSE: Mucosa pink. Airway clear.  MOUTH & THROAT: No tonsillar enlargement. No pharyngeal erythema or exudate. No stridor.  NECK: Supple, no thyromegaly.  CHEST: Lungs clear to auscultation with unlabored respirations.  CARDIOVASCULAR: Normal S1, S2. No murmurs. No carotid bruits. No pedal edema.  ABDOMEN: Bowel sounds normal. Not distended. Soft. No tenderness or masses.  Positive colostomy.  MUSCULOSKELETAL:  Normal gait, no cyanosis or clubbing. Stength 5//5 in all 4 extremities.   SKIN: Normal skin turgor, warm and dry.  NEUROLOGIC: Cranial Nerves: Intact.  PSYCHIATRIC: The patient is oriented to person, place, and time and  has a pleasant affect.        ASSESSMENT/PLAN:  Emi was seen today for follow-up, abdominal pain, cough, sore throat, sinus problem and rash.    Diagnoses and all orders for this visit:    Routine medical exam  -     labs reviewed     Cough, unspecified type  -     Ambulatory referral/consult to Pulmonary Disease Management w/ Respiratory Therapist; Future    Crohn's disease of small intestine without complication  -     stable, followed by GI    Other orders  -     ondansetron (ZOFRAN-ODT) 4 MG TbDL; DISSOLVE 1 TABLET BY MOUTH EVERY 8 HOURS AS NEEDED  -     fluconazole (DIFLUCAN) 150 MG Tab; Take 1 tablet (150 mg total) by mouth once daily. for 1 day        Answers submitted by the patient for this visit:  Cough Questionnaire (Submitted on 2/26/2024)  Chief Complaint: Cough  Chronicity: chronic  Onset: more than 1 month ago  Progression since onset: rapidly worsening  Frequency: constantly  Cough characteristics: non-productive, productive of sputum  chest pain: Yes  chills: Yes  ear congestion: No  ear pain: No  fever: Yes  headaches: Yes  heartburn: Yes  hemoptysis: No  myalgias: Yes  nasal congestion: Yes  postnasal drip: Yes  rash: Yes  rhinorrhea: Yes  shortness of breath: Yes  sore throat: Yes  sweats: Yes  weight loss: No  wheezing: Yes  Aggravated by: nothing  asthma: Yes  bronchitis: Yes  Treatments tried: OTC cough suppressant, a beta-agonist inhaler, prescription cough suppressant

## 2024-03-04 DIAGNOSIS — M79.7 FIBROMYALGIA: ICD-10-CM

## 2024-03-04 RX ORDER — OXYCODONE AND ACETAMINOPHEN 7.5; 325 MG/1; MG/1
1 TABLET ORAL EVERY 6 HOURS PRN
Qty: 120 TABLET | Refills: 0 | Status: CANCELLED | OUTPATIENT
Start: 2024-03-04

## 2024-03-04 NOTE — TELEPHONE ENCOUNTER
No care due was identified.  [facility] Embedded Care Due Messages. Reference number: [id number].   3/04/2024 9:57:53 AM CST

## 2024-03-04 NOTE — TELEPHONE ENCOUNTER
No care due was identified.  [facility] Embedded Care Due Messages. Reference number: [id number].   3/04/2024 8:32:10 AM CST

## 2024-03-05 RX ORDER — OXYCODONE AND ACETAMINOPHEN 7.5; 325 MG/1; MG/1
1 TABLET ORAL EVERY 6 HOURS PRN
Qty: 120 TABLET | Refills: 0 | Status: SHIPPED | OUTPATIENT
Start: 2024-03-05 | End: 2024-03-26 | Stop reason: SDUPTHER

## 2024-03-07 ENCOUNTER — PATIENT MESSAGE (OUTPATIENT)
Dept: INTERNAL MEDICINE | Facility: CLINIC | Age: 49
End: 2024-03-07
Payer: COMMERCIAL

## 2024-03-07 ENCOUNTER — PATIENT OUTREACH (OUTPATIENT)
Dept: ADMINISTRATIVE | Facility: HOSPITAL | Age: 49
End: 2024-03-07
Payer: COMMERCIAL

## 2024-03-07 RX ORDER — LEVOCETIRIZINE DIHYDROCHLORIDE 5 MG/1
5 TABLET, FILM COATED ORAL NIGHTLY
Qty: 30 TABLET | Refills: 2 | Status: SHIPPED | OUTPATIENT
Start: 2024-03-07 | End: 2025-03-07

## 2024-03-07 NOTE — TELEPHONE ENCOUNTER
Recommend trial of antihistamine.  A prescription for Xyzal has been sent to the pharmacy electronically.

## 2024-03-07 NOTE — LETTER
AUTHORIZATION FOR RELEASE OF   CONFIDENTIAL INFORMATION    Dear Dr. Leal,    We are seeing Emi Johnson, date of birth 1975, in the clinic at [facility] INTERNAL MEDICINE. Bianca Rutledge MD is the patient's PCP. Emi Johnson has an outstanding lab/procedure at the time we reviewed her chart. In order to help keep her health information updated, she has authorized us to request the following medical record(s):        (  )  MAMMOGRAM                                      ( x )  COLONOSCOPY/Sigmoidoscopy       (  )  PAP SMEAR                                          (  )  OUTSIDE LAB RESULTS     (  )  DEXA SCAN                                          (  )  EYE EXAM            (  )  FOOT EXAM                                          (  )  ENTIRE RECORD     (  )  OUTSIDE IMMUNIZATIONS                 (  )  _______________         Please fax records to Bianca Rutledge MD, [phone]        Patient Name: Emi Johnson  : 1975  Patient Phone #: [phone]

## 2024-03-20 ENCOUNTER — PATIENT MESSAGE (OUTPATIENT)
Dept: INTERNAL MEDICINE | Facility: CLINIC | Age: 49
End: 2024-03-20
Payer: COMMERCIAL

## 2024-03-26 DIAGNOSIS — M79.7 FIBROMYALGIA: ICD-10-CM

## 2024-03-26 RX ORDER — OXYCODONE AND ACETAMINOPHEN 7.5; 325 MG/1; MG/1
1 TABLET ORAL EVERY 6 HOURS PRN
Qty: 120 TABLET | Refills: 0 | Status: CANCELLED | OUTPATIENT
Start: 2024-03-26

## 2024-03-26 NOTE — TELEPHONE ENCOUNTER
No care due was identified.  [facility] Embedded Care Due Messages. Reference number: [id number].   3/26/2024 5:43:27 PM CDT

## 2024-03-26 NOTE — TELEPHONE ENCOUNTER
No care due was identified.  [facility] Embedded Care Due Messages. Reference number: [id number].   3/26/2024 7:35:50 AM CDT

## 2024-03-27 RX ORDER — OXYCODONE AND ACETAMINOPHEN 7.5; 325 MG/1; MG/1
1 TABLET ORAL EVERY 6 HOURS PRN
Qty: 120 TABLET | Refills: 0 | Status: SHIPPED | OUTPATIENT
Start: 2024-04-05

## 2024-03-27 RX ORDER — OXYCODONE AND ACETAMINOPHEN 7.5; 325 MG/1; MG/1
1 TABLET ORAL EVERY 6 HOURS PRN
Qty: 120 TABLET | Refills: 0 | Status: SHIPPED | OUTPATIENT
Start: 2024-05-03 | End: 2024-05-27 | Stop reason: SDUPTHER

## 2024-03-27 NOTE — TELEPHONE ENCOUNTER
Pt request Percocet refills for month April and May to be printed and post dates for the upcoming months.    RTC:05/08/2024

## 2024-03-28 NOTE — TELEPHONE ENCOUNTER
Good afternoon. I just dropped off the paper work for Dr Rutledge to fill out for me. I have it to the lady at the desk. Please let me know when it is ready for . Thank you. I need it as soon as possible. Thanks you.

## 2024-04-02 ENCOUNTER — PATIENT MESSAGE (OUTPATIENT)
Dept: INTERNAL MEDICINE | Facility: CLINIC | Age: 49
End: 2024-04-02
Payer: COMMERCIAL

## 2024-04-02 NOTE — TELEPHONE ENCOUNTER
Called and spoke to pt and stated that we has her paper work and that sill waiting on paper work to be filled out. Pt stated she needs paper work as soon as possible. Pt appointment was schedule on 4/1/24 and now reschedule to 5/8/24 with .

## 2024-04-04 ENCOUNTER — PATIENT MESSAGE (OUTPATIENT)
Dept: INTERNAL MEDICINE | Facility: CLINIC | Age: 49
End: 2024-04-04
Payer: COMMERCIAL

## 2024-04-05 RX ORDER — AZITHROMYCIN 250 MG/1
TABLET, FILM COATED ORAL
Qty: 6 TABLET | Refills: 0 | Status: SHIPPED | OUTPATIENT
Start: 2024-04-05 | End: 2024-04-10

## 2024-04-29 ENCOUNTER — PATIENT MESSAGE (OUTPATIENT)
Dept: INTERNAL MEDICINE | Facility: CLINIC | Age: 49
End: 2024-04-29
Payer: COMMERCIAL

## 2024-04-29 DIAGNOSIS — M79.7 FIBROMYALGIA: ICD-10-CM

## 2024-04-29 DIAGNOSIS — J32.9 SINUSITIS, UNSPECIFIED CHRONICITY, UNSPECIFIED LOCATION: Primary | ICD-10-CM

## 2024-04-29 DIAGNOSIS — K50.00 CROHN'S DISEASE OF SMALL INTESTINE WITHOUT COMPLICATION: ICD-10-CM

## 2024-04-29 RX ORDER — HYDROCODONE BITARTRATE AND ACETAMINOPHEN 10; 325 MG/1; MG/1
1 TABLET ORAL EVERY 6 HOURS PRN
Qty: 120 TABLET | Refills: 0 | Status: SHIPPED | OUTPATIENT
Start: 2024-04-29 | End: 2024-04-29 | Stop reason: SDUPTHER

## 2024-04-29 RX ORDER — ZOLPIDEM TARTRATE 12.5 MG/1
12.5 TABLET, FILM COATED, EXTENDED RELEASE ORAL NIGHTLY PRN
Qty: 90 TABLET | Refills: 0 | Status: CANCELLED | OUTPATIENT
Start: 2024-04-29 | End: 2024-10-28

## 2024-04-29 RX ORDER — HYDROCODONE BITARTRATE AND ACETAMINOPHEN 10; 325 MG/1; MG/1
1 TABLET ORAL EVERY 6 HOURS PRN
Qty: 120 TABLET | Refills: 0 | Status: SHIPPED | OUTPATIENT
Start: 2024-04-29 | End: 2024-04-30 | Stop reason: SDUPTHER

## 2024-04-29 RX ORDER — BUDESONIDE AND FORMOTEROL FUMARATE DIHYDRATE 160; 4.5 UG/1; UG/1
2 AEROSOL RESPIRATORY (INHALATION) EVERY 12 HOURS
Qty: 10.2 G | Refills: 3 | Status: SHIPPED | OUTPATIENT
Start: 2024-04-29 | End: 2025-04-29

## 2024-04-29 RX ORDER — NALOXONE HYDROCHLORIDE 4 MG/.1ML
SPRAY NASAL
Qty: 1 EACH | Refills: 11 | Status: SHIPPED | OUTPATIENT
Start: 2024-04-29

## 2024-04-29 RX ORDER — ZOLPIDEM TARTRATE 12.5 MG/1
12.5 TABLET, FILM COATED, EXTENDED RELEASE ORAL NIGHTLY PRN
Qty: 90 TABLET | Refills: 0 | Status: SHIPPED | OUTPATIENT
Start: 2024-04-29 | End: 2024-10-28

## 2024-04-29 NOTE — TELEPHONE ENCOUNTER
No care due was identified.  [facility] Embedded Care Due Messages. Reference number: [id number].   4/29/2024 7:14:16 AM CDT

## 2024-04-29 NOTE — TELEPHONE ENCOUNTER
No care due was identified.  [facility] Embedded Care Due Messages. Reference number: [id number].   4/29/2024 7:09:26 AM CDT

## 2024-04-29 NOTE — TELEPHONE ENCOUNTER
Pt requesting 3 refills. Pt also c/o asthma, sinus drip, and cough is worse. OTC meds and the last abx prescribed is not working. Pt symptoms have worsened. Please advise.

## 2024-04-30 ENCOUNTER — PATIENT MESSAGE (OUTPATIENT)
Dept: INTERNAL MEDICINE | Facility: CLINIC | Age: 49
End: 2024-04-30
Payer: COMMERCIAL

## 2024-04-30 ENCOUNTER — TELEPHONE (OUTPATIENT)
Dept: PULMONOLOGY | Facility: CLINIC | Age: 49
End: 2024-04-30
Payer: COMMERCIAL

## 2024-04-30 DIAGNOSIS — R05.9 COUGH, UNSPECIFIED TYPE: Primary | ICD-10-CM

## 2024-04-30 RX ORDER — HYDROCODONE BITARTRATE AND ACETAMINOPHEN 10; 325 MG/1; MG/1
1 TABLET ORAL EVERY 6 HOURS PRN
Qty: 120 TABLET | Refills: 0 | Status: SHIPPED | OUTPATIENT
Start: 2024-04-30

## 2024-04-30 NOTE — TELEPHONE ENCOUNTER
----- Message from Mckenzie Winchester sent at 4/30/2024  1:13 PM CDT -----  Contact: [phone]  Patient is returning a phone call.  Who left a message for the patient: milvia  Does patient know what this is regarding:  HYDROcodone-acetaminophen (NORCO)  mg per tablet. Was called in but pt states she was changed to oxyCODONE-acetaminophen (PERCOCET)  mg per tablet. It was changed to 10 MG she states   Pharmacy has an issue pt would like you to call her to discuss. Thanks  Would you like a call back, or a response through your [facility] portal?:   yes  Comments:   [facility] [address] [address]  Phone: [phone] Fax: [phone]      Pt wants this to be sent as an emergency message to be completed. Thanks

## 2024-04-30 NOTE — TELEPHONE ENCOUNTER
Left message on pt voicemail, informing her that I'm contacting her in regards to scheduling her appointment with one of our providers. I also advised pt that if she has any questions or concerns, she may contact the office. Office number has been provided.

## 2024-04-30 NOTE — TELEPHONE ENCOUNTER
No care due was identified.  [facility] Embedded Care Due Messages. Reference number: [id number].   4/30/2024 8:26:30 AM CDT

## 2024-04-30 NOTE — TELEPHONE ENCOUNTER
Patient requested us to disregard her message.    Patient is requesting a new reference to pulmonary, as her old one was canceled

## 2024-04-30 NOTE — TELEPHONE ENCOUNTER
----- Message from Venecia Palacios sent at 4/30/2024  4:02 PM CDT -----  Dr Rutledge placed a referral for a Consult to Ambulatory referral/consult to Pulmonary Disease Management w/ Respiratory Therapist. Please assist with scheduling    Diagnosis:Cough, unspecified type [R05.9]      MRN:[id number].    Contact # [phone]      Thanks  Venecia DANIELS

## 2024-05-01 ENCOUNTER — PATIENT MESSAGE (OUTPATIENT)
Dept: INTERNAL MEDICINE | Facility: CLINIC | Age: 49
End: 2024-05-01
Payer: COMMERCIAL

## 2024-05-01 ENCOUNTER — HOSPITAL ENCOUNTER (EMERGENCY)
Facility: HOSPITAL | Age: 49
Discharge: HOME | End: 2024-05-01
Attending: EMERGENCY MEDICINE
Payer: COMMERCIAL

## 2024-05-01 ENCOUNTER — TELEPHONE (OUTPATIENT)
Dept: INTERNAL MEDICINE | Facility: CLINIC | Age: 49
End: 2024-05-01
Payer: COMMERCIAL

## 2024-05-01 VITALS
SYSTOLIC BLOOD PRESSURE: 118 MMHG | DIASTOLIC BLOOD PRESSURE: 79 MMHG | TEMPERATURE: 99 F | HEART RATE: 89 BPM | OXYGEN SATURATION: 97 % | BODY MASS INDEX: 32.77 KG/M2 | WEIGHT: 185 LBS | RESPIRATION RATE: 20 BRPM

## 2024-05-01 DIAGNOSIS — R05.3 CHRONIC COUGH: Primary | ICD-10-CM

## 2024-05-01 DIAGNOSIS — R05.9 COUGH: ICD-10-CM

## 2024-05-01 LAB
ALBUMIN SERPL BCP-MCNC: 4 G/DL (ref 3.5–5.2)
ALP SERPL-CCNC: 106 U/L (ref 55–135)
ALT SERPL W/O P-5'-P-CCNC: 66 U/L (ref 10–44)
ANION GAP SERPL CALC-SCNC: 14 MMOL/L (ref 8–16)
AST SERPL-CCNC: 32 U/L (ref 10–40)
BASOPHILS # BLD AUTO: 0.03 K/UL (ref 0–0.2)
BASOPHILS NFR BLD: 0.2 % (ref 0–1.9)
BILIRUB SERPL-MCNC: 0.5 MG/DL (ref 0.1–1)
BUN SERPL-MCNC: 21 MG/DL (ref 6–20)
CALCIUM SERPL-MCNC: 9.5 MG/DL (ref 8.7–10.5)
CHLORIDE SERPL-SCNC: 110 MMOL/L (ref 95–110)
CO2 SERPL-SCNC: 17 MMOL/L (ref 23–29)
CREAT SERPL-MCNC: 1 MG/DL (ref 0.5–1.4)
DIFFERENTIAL METHOD BLD: ABNORMAL
EOSINOPHIL # BLD AUTO: 0 K/UL (ref 0–0.5)
EOSINOPHIL NFR BLD: 0.2 % (ref 0–8)
ERYTHROCYTE [DISTWIDTH] IN BLOOD BY AUTOMATED COUNT: 12.3 % (ref 11.5–14.5)
EST. GFR  (NO RACE VARIABLE): >60 ML/MIN/1.73 M^2
GLUCOSE SERPL-MCNC: 116 MG/DL (ref 70–110)
HCT VFR BLD AUTO: 46.6 % (ref 37–48.5)
HGB BLD-MCNC: 16.2 G/DL (ref 12–16)
IMM GRANULOCYTES # BLD AUTO: 0.08 K/UL (ref 0–0.04)
IMM GRANULOCYTES NFR BLD AUTO: 0.6 % (ref 0–0.5)
LYMPHOCYTES # BLD AUTO: 1.5 K/UL (ref 1–4.8)
LYMPHOCYTES NFR BLD: 11.4 % (ref 18–48)
MCH RBC QN AUTO: 29.4 PG (ref 27–31)
MCHC RBC AUTO-ENTMCNC: 34.8 G/DL (ref 32–36)
MCV RBC AUTO: 85 FL (ref 82–98)
MONOCYTES # BLD AUTO: 0.8 K/UL (ref 0.3–1)
MONOCYTES NFR BLD: 6 % (ref 4–15)
NEUTROPHILS # BLD AUTO: 10.8 K/UL (ref 1.8–7.7)
NEUTROPHILS NFR BLD: 81.6 % (ref 38–73)
NRBC BLD-RTO: 0 /100 WBC
PLATELET # BLD AUTO: 210 K/UL (ref 150–450)
PMV BLD AUTO: 10.8 FL (ref 9.2–12.9)
POTASSIUM SERPL-SCNC: 4 MMOL/L (ref 3.5–5.1)
PROT SERPL-MCNC: 7.2 G/DL (ref 6–8.4)
RBC # BLD AUTO: 5.51 M/UL (ref 4–5.4)
SODIUM SERPL-SCNC: 141 MMOL/L (ref 136–145)
WBC # BLD AUTO: 13.19 K/UL (ref 3.9–12.7)

## 2024-05-01 PROCEDURE — 96375 TX/PRO/DX INJ NEW DRUG ADDON: CPT

## 2024-05-01 PROCEDURE — 96374 THER/PROPH/DIAG INJ IV PUSH: CPT

## 2024-05-01 PROCEDURE — 63600175 PHARM REV CODE 636 W HCPCS: Performed by: EMERGENCY MEDICINE

## 2024-05-01 PROCEDURE — 93005 ELECTROCARDIOGRAM TRACING: CPT

## 2024-05-01 PROCEDURE — 85025 COMPLETE CBC W/AUTO DIFF WBC: CPT | Performed by: EMERGENCY MEDICINE

## 2024-05-01 PROCEDURE — 93010 ELECTROCARDIOGRAM REPORT: CPT | Mod: ,,, | Performed by: INTERNAL MEDICINE

## 2024-05-01 PROCEDURE — 80053 COMPREHEN METABOLIC PANEL: CPT | Performed by: EMERGENCY MEDICINE

## 2024-05-01 PROCEDURE — 99285 EMERGENCY DEPT VISIT HI MDM: CPT | Mod: 25

## 2024-05-01 RX ORDER — DEXAMETHASONE SODIUM PHOSPHATE 4 MG/ML
8 INJECTION, SOLUTION INTRA-ARTICULAR; INTRALESIONAL; INTRAMUSCULAR; INTRAVENOUS; SOFT TISSUE
Status: COMPLETED | OUTPATIENT
Start: 2024-05-01 | End: 2024-05-01

## 2024-05-01 RX ORDER — CODEINE PHOSPHATE AND GUAIFENESIN 10; 100 MG/5ML; MG/5ML
5 SOLUTION ORAL EVERY 6 HOURS PRN
Qty: 100 ML | Refills: 0 | Status: SHIPPED | OUTPATIENT
Start: 2024-05-01 | End: 2024-05-06

## 2024-05-01 RX ORDER — ONDANSETRON HYDROCHLORIDE 2 MG/ML
4 INJECTION, SOLUTION INTRAVENOUS
Status: COMPLETED | OUTPATIENT
Start: 2024-05-01 | End: 2024-05-01

## 2024-05-01 RX ORDER — PREDNISONE 50 MG/1
50 TABLET ORAL DAILY
Qty: 4 TABLET | Refills: 0 | Status: SHIPPED | OUTPATIENT
Start: 2024-05-01 | End: 2024-05-05

## 2024-05-01 RX ADMIN — DEXAMETHASONE SODIUM PHOSPHATE 8 MG: 4 INJECTION, SOLUTION INTRA-ARTICULAR; INTRALESIONAL; INTRAMUSCULAR; INTRAVENOUS; SOFT TISSUE at 07:05

## 2024-05-01 RX ADMIN — ONDANSETRON 4 MG: 2 INJECTION INTRAMUSCULAR; INTRAVENOUS at 07:05

## 2024-05-01 NOTE — Clinical Note
"Emi Kirklandhy" Elizabeth was seen and treated in our emergency department on 5/1/2024.  She may return to work on 05/03/2024.       If you have any questions or concerns, please don't hesitate to call.      Jennifer Eddy MD"

## 2024-05-01 NOTE — ED PROVIDER NOTES
Emergency Department Encounter  Provider Note  Encounter Date: 5/1/2024    Patient Name: Emi Johnson  MRN: [id number]    History of Present Illness   HPI  History of Present Illness:    Chief Complaint:   Chief Complaint   Patient presents with    Cough     Patient reports a persistent cough for 6 months that is worsening recently. She reports coughing so hard that her chest hurts, she sometimes vomits and she almost passed out yesterday. Denies fever.      48-year-old female presenting with 6 months of cough, history of asthma.  However, this cough has been ongoing and chronic, possibly stemming from black mold, but patient is not entirely sure.  Has been using multiple medications both prescribed in over-the-counter without improvement in symptoms.  Has seen her PCP multiple times.  Has upcoming PFTs and pulmonary appointment.  However, would like something for cough and last night coughed so hard she almost passed out.    The following PMH/PSH/SocHx/FamHx has been reviewed by myself:  Past Medical History:   Diagnosis Date    Anemia     Asthma     B12 deficiency     Crohn's disease     History of shingles     Lupus     Migraine headache     Raynaud disease     Reactive hypoglycemia     Seizures 2004    no medication     Sleep apnea     Non compliant with CPAP     Past Surgical History:   Procedure Laterality Date    ABDOMINAL SURGERY      COLON SURGERY      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    COLONOSCOPY      COLOSTOMY      ESOPHAGOGASTRODUODENOSCOPY N/A 5/26/2022    Procedure: ESOPHAGOGASTRODUODENOSCOPY (EGD);  Surgeon: Tin Novak MD;  Location: [facility] ([facility]);  Service: Endoscopy;  Laterality: N/A;  fully vaccinated    HYSTERECTOMY      due to endometriosis    ILEOSCOPY N/A 4/10/2019    Procedure: ILEOSCOPY through stoma;  Surgeon: Eve Currie MD;  Location: [facility] (4TH FLR);  Service: Endoscopy;  Laterality: N/A;  Schedule as 30 minute case, schedule in April or May 2019     ILEOSCOPY N/A 5/26/2022    Procedure: ILEOSCOPY;  Surgeon: Tin Novak MD;  Location: [facility] ([address] [place] [address]);  Service: Endoscopy;  Laterality: N/A;    ILEOSTOMY      LAPAROSCOPY W/ MINI-LAPAROTOMY      large intestine removed      just a portion    pelvic mass removal      REVISION COLOSTOMY  2010    SMALL INTESTINE SURGERY      J pouch excision with end ileostomy    STOMACH SURGERY      TONSILLECTOMY, ADENOIDECTOMY       Social History     Tobacco Use    Smoking status: Never    Smokeless tobacco: Never   Substance Use Topics    Alcohol use: No     Comment: Rare social use.    Drug use: No     Family History   Problem Relation Name Age of Onset    Cancer Mother  54        Anal cancer     Diabetes Mellitus Mother      Hypertension Mother      Colon cancer Mother      Heart attack Father      Breast cancer Sister Edel     Hypertension Sister Edel     Cervical cancer Sister Crystal     Seizures Sister Crystal     Hypertension Sister [place]aa     Liver disease Sister [place]         Fatty Liver     Cancer Maternal Aunt      Breast cancer Paternal Aunt      Colon cancer Maternal Grandmother  72    Cancer Paternal Grandmother      Celiac disease Neg Hx      Cirrhosis Neg Hx      Colon polyps Neg Hx      Crohn's disease Neg Hx      Cystic fibrosis Neg Hx      Hemochromatosis Neg Hx      Esophageal cancer Neg Hx      Inflammatory bowel disease Neg Hx      Irritable bowel syndrome Neg Hx      Liver cancer Neg Hx      Rectal cancer Neg Hx      Stomach cancer Neg Hx      Ulcerative colitis Neg Hx      Hugo's disease Neg Hx       Allergies reviewed:  Review of patient's allergies indicates:   Allergen Reactions    Aspirin      Other reaction(s): vomiting, contraindicated for bleeding from crohns  Other reaction(s): bleeding    Mold Hives     Black mold     Sulfa (sulfonamide antibiotics) Hives and Rash    Iodinated contrast media Other (See Comments)    Adhesive     Aspirin     Iodine     Remicade [infliximab] Other  (See Comments)     Medical induced lupus    Latex Rash     Other reaction(s): Hives     Medications reviewed:  Discharge Medication List as of 5/1/2024  7:30 AM        START taking these medications    Details   guaiFENesin-codeine 100-10 mg/5 ml (TUSSI-ORGANIDIN NR)  mg/5 mL syrup Take 5 mLs by mouth every 6 (six) hours as needed for Cough., Starting Wed 5/1/2024, Until Mon 5/6/2024 at 2359, Print      predniSONE (DELTASONE) 50 MG Tab Take 1 tablet (50 mg total) by mouth once daily. for 4 days, Starting Wed 5/1/2024, Until Sun 5/5/2024, Normal           CONTINUE these medications which have NOT CHANGED    Details   acetaminophen (TYLENOL) 500 MG tablet Take 500 mg by mouth every 6 (six) hours as needed for Pain., Historical Med      AJOVY 225 mg/1.5 mL injection Inject 225 mg into the skin every 30 days. prn, Starting Tue 10/22/2019, Historical Med      albuterol (PROVENTIL/VENTOLIN HFA) 90 mcg/actuation inhaler Inhale 1-2 puffs into the lungs every 6 (six) hours as needed for Wheezing or Shortness of Breath. Rescue, Starting Sun 2/25/2024, Until Mon 2/24/2025 at 2359, Print      albuterol sulfate 2.5 mg/0.5 mL Nebu Take 2.5 mg by nebulization every 4 (four) hours as needed (Wheezing or shortness of breath). Rescue, Starting Sun 2/25/2024, Print      amoxicillin-clavulanate 875-125mg (AUGMENTIN) 875-125 mg per tablet Take 1 tablet by mouth every 12 (twelve) hours., Starting Wed 1/24/2024, Normal      !! blood sugar diagnostic (CONTOUR NEXT TEST STRIPS) Strp 1 strip by Misc.(Non-Drug; Combo Route) route 2 (two) times a day., Starting Mon 5/15/2023, Normal      !! blood sugar diagnostic Strp To check BG 1 times daily, Normal      !! blood sugar diagnostic Strp To check blood glucose one time daily, Starting Tue 3/7/2023, Normal      blood-glucose meter kit To check BG 1 times daily, to use with insurance preferred meter, Normal      blood-glucose meter,continuous (DEXCOM G6 ) Misc 1 Units by  Misc.(Non-Drug; Combo Route) route once daily., Starting Tue 12/1/2020, Until Wed 5/11/2022, Normal      budesonide-formoterol 160-4.5 mcg (SYMBICORT) 160-4.5 mcg/actuation HFAA Inhale 2 puffs into the lungs every 12 (twelve) hours. Controller, Starting Mon 4/29/2024, Until Tue 4/29/2025, Normal      cyanocobalamin 1,000 mcg/mL injection INJECT 1 ML INTO THE MUSCLE EVERY 28 DAYS., Normal      cyclobenzaprine (FLEXERIL) 10 MG tablet Take 1 tablet (10 mg total) by mouth 2 (two) times daily as needed., Starting Fri 1/19/2024, Normal      EPINEPHrine (EPIPEN) 0.3 mg/0.3 mL AtIn Inject 0.3 mLs (0.3 mg total) into the muscle once. for 1 dose, Starting Mon 8/14/2023, Normal      estradioL (ESTRACE) 0.01 % (0.1 mg/gram) vaginal cream Apply a pea sized amount to urethra and vaginal opening 3 x weekly at night, Normal      flash glucose scanning reader (FREESTYLE PEDRO 14 DAY READER) Misc 1 Units by Misc.(Non-Drug; Combo Route) route daily as needed., Starting Wed 10/27/2021, Print      flash glucose sensor (FREESTYLE PEDRO 14 DAY SENSOR) Kit 1 Units by Misc.(Non-Drug; Combo Route) route daily as needed., Starting Wed 10/27/2021, Print      HYDROcodone-acetaminophen (NORCO)  mg per tablet Take 1 tablet by mouth every 6 (six) hours as needed for Pain., Starting Tue 4/30/2024, Normal      hydrocodone-chlorpheniramine (TUSSIONEX) 10-8 mg/5 mL suspension Take 5 mLs by mouth every 12 (twelve) hours as needed for Cough., Starting Fri 2/9/2024, Normal      lancets (BD ULTRA FINE LANCETS) 33 gauge Misc 1 lancet by Misc.(Non-Drug; Combo Route) route 2 (two) times a day., Starting Mon 5/15/2023, Normal      levocetirizine (XYZAL) 5 MG tablet Take 1 tablet (5 mg total) by mouth every evening., Starting Thu 3/7/2024, Until Fri 3/7/2025, Normal      naloxone (NARCAN) 4 mg/actuation Spry 4mg by nasal route as needed for opioid overdose; may repeat every 2-3 minutes in alternating nostrils until medical help arrives. Call 911, Normal  "     omeprazole (PRILOSEC) 20 MG capsule Take 1 capsule (20 mg total) by mouth once daily., Starting Mon 10/2/2023, Until Tue 10/1/2024, Normal      ondansetron (ZOFRAN-ODT) 4 MG TbDL DISSOLVE 1 TABLET BY MOUTH EVERY 8 HOURS AS NEEDED, Normal      oxybutynin (DITROPAN-XL) 10 MG 24 hr tablet Take 1 tablet (10 mg total) by mouth once daily., Starting Thu 10/5/2023, Until Fri 10/4/2024, Normal      !! oxyCODONE-acetaminophen (PERCOCET) 7.5-325 mg per tablet Take 1 tablet by mouth every 6 (six) hours as needed for Pain., Starting Fri 5/3/2024, Print      !! oxyCODONE-acetaminophen (PERCOCET) 7.5-325 mg per tablet Take 1 tablet by mouth every 6 (six) hours as needed for Pain., Starting Fri 4/5/2024, Print      phentermine (ADIPEX-P) 37.5 mg tablet 1/2 tab- 1 tab po daily, Normal      promethazine (PHENERGAN) 25 MG tablet Take 1 tablet (25 mg total) by mouth every 6 (six) hours as needed for Nausea., Starting Wed 6/7/2023, Normal      sertraline (ZOLOFT) 50 MG tablet Take 1 tablet (50 mg total) by mouth once daily., Starting Tue 3/7/2023, Until Sun 3/10/2024, Normal      STELARA 90 mg/mL Syrg syringe Inject 90 mg into the skin. Every 2 months, Starting Wed 9/2/2020, Historical Med      sumatriptan (IMITREX STATDOSE) 6 mg/0.5 mL kit INJECT 0.5 ML UNDER THE SKIN AS NEEDED ONE TIME FOR MIGRAINE, Normal      syringe with needle, safety (BD INTEGRA SYRINGE) 3 mL 25 gauge x 1" Syrg 1 Syringe by Misc.(Non-Drug; Combo Route) route every 30 days., Starting Tue 1/19/2021, Normal      triamcinolone (NASACORT) 55 mcg nasal inhaler 2 sprays by Nasal route once daily., Starting Thu 6/9/2022, Normal      zolpidem (AMBIEN CR) 12.5 MG CR tablet Take 1 tablet (12.5 mg total) by mouth nightly as needed for Insomnia., Starting Mon 4/29/2024, Until Mon 10/28/2024 at 2359, Normal       !! - Potential duplicate medications found. Please discuss with provider.          Physical Exam     Initial Vitals [05/01/24 0546]   BP Pulse Resp Temp SpO2 "   (!) 139/90 104 18 97.8 °F (36.6 °C) 98 %      MAP       --           Triage vital signs reviewed.    Constitutional: Well-nourished, well-developed. Not in acute distress.  Intermittent bouts of coughing.  HENT: Normocephalic, atraumatic. Moist mucous membranes.  Eyes: No conjunctival injection.  Resp: Normal respiratory effort, breathing unlabored.  Clear lungs bilaterally.  Cardio: Regular rate and rhythm.  GI: Abdomen non-distended.   MSK: Extremities without any deformities noted. No lower extremity edema.  Skin: Warm and dry. No rashes or lesions noted.  Neuro: Awake and alert. Moves all extremities.    ED Course   Procedures    Medical Decision Making    History Acquisition     The history is provided by the patient.     Review of prior external/non ED notes:  Patient seen twice in the ED since February for bronchitis, cough.  Has received promethazine, hydrocodone syrup without improvement in symptoms.  Patient on chronic opiates, prescribed by her PCP.    Differential Diagnoses   Based on available information and initial assessment, the working Differential Diagnosis includes, but is not limited to:  Viral URI, Strep pharyngitis, viral pharyngitis, foreign body aspiration/ingestion, bronchitis, asthma exacerbation, CHF exacerbation, COPD exacerbation, allergy/atopy, influenza, pertussis, PE, pneumonia, lung abscess, fungal infection, TB, epiglottitis.  .    EKG   EKG ordered and independently reviewed by me:   Sinus tachycardic, rate 101, normal intervals, normal axis, no STEMI    Labs   Lab tests ordered and independently reviewed by me:    Labs Reviewed   CBC W/ AUTO DIFFERENTIAL - Abnormal; Notable for the following components:       Result Value    WBC 13.19 (*)     RBC 5.51 (*)     Hemoglobin 16.2 (*)     Immature Granulocytes 0.6 (*)     Gran # (ANC) 10.8 (*)     Immature Grans (Abs) 0.08 (*)     Gran % 81.6 (*)     Lymph % 11.4 (*)     All other components within normal limits   COMPREHENSIVE  METABOLIC PANEL - Abnormal; Notable for the following components:    CO2 17 (*)     Glucose 116 (*)     BUN 21 (*)     ALT 66 (*)     All other components within normal limits       Imaging   Imaging ordered and independently reviewed by me:   Imaging Results              X-Ray Chest PA And Lateral (Final result)  Result time 05/01/24 07:23:49      Final result by Andre Ford MD (05/01/24 07:23:49)                   Impression:      No convincing evidence of acute cardiopulmonary disease.      Electronically signed by: Andre Ford  Date:    05/01/2024  Time:    07:23               Narrative:    EXAMINATION:  XR CHEST PA AND LATERAL    CLINICAL HISTORY:  Cough, unspecified    TECHNIQUE:  PA and lateral views of the chest were performed.    COMPARISON:  Chest radiograph performed 02/25/2024, 11:58 hours.    FINDINGS:  Monitoring leads overlie the chest.  Cardiomediastinal contours appear grossly within normal limits.    Minimal linear platelike opacities at the left lateral lung base favored represent atelectasis.    No definite focal airspace consolidation.    No definite pneumothorax or large volume pleural effusion.    No acute findings in the visualized abdomen.    Osseous and soft tissue structures appear without definite acute change.                                         Additional Consideration   Emi Johnson  presents to the Emergency Department today with chronic cough.  Will check labs, x-ray.  Doubt she has had a viral illness for 6 months, possible this is post viral in nature versus allergy versus primary lung issue.  Patient does have a history of asthma.  Clear lungs bilaterally, no wheezing, no indication for DuoNeb.    Additional testing considered but not indicated during the course of this workup: further imaging and labwork, not indicated  Co-morbidities/chronic illness/exacerbation of chronic illness considered which impacted clinical decision making:  Fibromyalgia,  asthma  Procedures done in the ED or plan for the OR: No  Social determinants of care considered during development of treatment plan include: Decreased medical literacy  Discussion of management or test interpretation with external provider: No  DNR discussion: No    The patient's list of active medications and allergies as documented per RN staff has been reviewed.  Medications given in the ED and/or prescribed:   Medications   ondansetron injection 4 mg (4 mg Intravenous Given 5/1/24 0739)   dexAMETHasone injection 8 mg (8 mg Intravenous Given 5/1/24 0739)             ED Course as of 05/01/24 0752   Wed May 01, 2024   0722 CBC auto differential(!)  Leukocytosis [CS]   0723 Comprehensive metabolic panel(!)  Independently interpreted by me; unremarkable or consistent with the patient's baseline   [CS]   0730 X-Ray Chest PA And Lateral  No pna, no ptx [CS]   0735 After discussion with the patient, will try oral steroids, will give dose of IV here.  Will try codeine for cough.  Doubt ACS, heart failure.  Doubt PE given longevity of sxs, lack of risk fx.  Work note provided. Air filter recommended. Patient has follow-up with pulm in 9 days. [CS]      ED Course User Index  [CS] Jennifer Eddy MD       Explanation of disposition: Discharge    Clinical Impression:     1. Chronic cough    2. Cough         All results from the workup were reviewed with the patient/family in detail. I discussed with the patient and/or the family/caregiver that today's evaluation in the ED does not suggest any emergent or life-threatening medical conditions that would require hospitalization or immediate intervention beyond what was provided in the ED. I believe the patient is safe for discharge.  However, a reassuring evaluation in the ED does not preclude the presence or development of a serious or life-threatening condition. As such, strict return precautions were discussed with the patient expressing understanding of instructions, and  all questions answered. The patient/family communicates understanding of all this information and all remaining questions and concerns were addressed at this time.    The patient/family has been provided with verbal and printed direction regarding our final diagnosis(es) as well as instructions regarding use of OTC and/or Rx medications intended to manage the patient's aforementioned conditions including:  ED Prescriptions       Medication Sig Dispense Start Date End Date Auth. Provider    predniSONE (DELTASONE) 50 MG Tab Take 1 tablet (50 mg total) by mouth once daily. for 4 days 4 tablet 5/1/2024 5/5/2024 Jennifer Eddy MD    guaiFENesin-codeine 100-10 mg/5 ml (TUSSI-ORGANIDIN NR)  mg/5 mL syrup Take 5 mLs by mouth every 6 (six) hours as needed for Cough. 100 mL 5/1/2024 5/6/2024 Jennifer Eddy MD          The patient's condition does not warrant review of the  and prescription of controlled substances.      ED Disposition Condition    Discharge Stable               Jennifer Eddy MD  05/01/24 0754

## 2024-05-01 NOTE — TELEPHONE ENCOUNTER
Has persistent cough. Previous referral was placed and she has never gotten a call from them to schedule and the referral is closed.     Needs new referral to pulmonary

## 2024-05-01 NOTE — TELEPHONE ENCOUNTER
Patient had an x-ray at the ER today. She requests you to give an interpretation as she thinks the ER staff didn't want to inform her. Patient googled her findings and is now nervous about the results.

## 2024-05-01 NOTE — ED TRIAGE NOTES
Pt comes to the er complaining of a cough that started 6 months ago and is getting worse. Says that yesterday it got so bad she felt like she was going to pass out. Also complains of lightheadedness, chest pain, abdominal pain and confusion. Says something just feels off. Lungs clear. Radial pulses present. Pt coughing frequently and spitting in emesis bag. AAO x4. Ambulatory with even and steady gait. O2 sat 98% on room air. No pain upon abdominal palpation.

## 2024-05-01 NOTE — DISCHARGE INSTRUCTIONS
Your workup today does not reveal pneumonia. Your labwork shows inflammation but no electrolyte imbalances. We will treat you with steroids and cough syrup. Please follow up with your doctors as scheduled.     The name of the air purifier is WINIX and since it's a HEPA filter, they do need to be replaced every so often due to the tiny particles it can filter out.

## 2024-05-01 NOTE — TELEPHONE ENCOUNTER
----- Message from Maryse Reece sent at 4/30/2024  3:45 PM CDT -----  Contact: Pt [phone]  Patient would like to get a referral.  Referral to what specialty:  Pulmonary  Does the patient want the referral with a specific physician:  no  [facility] the [facility]  Reason (be specific):  persistent cough  Does the patient already have the specialty clinic appointment scheduled:  no  If yes, what date is the appointment scheduled:     Is the insurance listed in Epic correct? (this is important for a referral):  yes  Advised patient that once provider approves this either a nurse or  will return their call?:   Would the patient like a call back, or a response through their [facility] portal?:  Call back    Comments:    Previous referral was placed and she has never gotten a call from them to schedule and the referral is closed.    Please call and advise.    Thank You

## 2024-05-02 LAB
OHS QRS DURATION: 80 MS
OHS QTC CALCULATION: 448 MS

## 2024-05-08 ENCOUNTER — OFFICE VISIT (OUTPATIENT)
Dept: INTERNAL MEDICINE | Facility: CLINIC | Age: 49
End: 2024-05-08
Payer: COMMERCIAL

## 2024-05-08 ENCOUNTER — LAB VISIT (OUTPATIENT)
Dept: LAB | Facility: HOSPITAL | Age: 49
End: 2024-05-08
Attending: INTERNAL MEDICINE
Payer: COMMERCIAL

## 2024-05-08 VITALS
BODY MASS INDEX: 35.7 KG/M2 | HEIGHT: 63 IN | DIASTOLIC BLOOD PRESSURE: 62 MMHG | OXYGEN SATURATION: 99 % | SYSTOLIC BLOOD PRESSURE: 104 MMHG | WEIGHT: 201.5 LBS | HEART RATE: 99 BPM | TEMPERATURE: 97 F

## 2024-05-08 DIAGNOSIS — Z43.2 ATTENTION TO ILEOSTOMY: ICD-10-CM

## 2024-05-08 DIAGNOSIS — R35.89 POLYURIA: ICD-10-CM

## 2024-05-08 DIAGNOSIS — M79.7 FIBROMYALGIA: ICD-10-CM

## 2024-05-08 DIAGNOSIS — R05.9 COUGH, UNSPECIFIED TYPE: ICD-10-CM

## 2024-05-08 DIAGNOSIS — R06.02 SHORTNESS OF BREATH: Primary | ICD-10-CM

## 2024-05-08 DIAGNOSIS — Z87.898 HISTORY OF SEIZURES: ICD-10-CM

## 2024-05-08 LAB
BACTERIA #/AREA URNS AUTO: ABNORMAL /HPF
BILIRUB UR QL STRIP: NEGATIVE
CLARITY UR REFRACT.AUTO: CLEAR
COLOR UR AUTO: YELLOW
GLUCOSE UR QL STRIP: NEGATIVE
HGB UR QL STRIP: NEGATIVE
KETONES UR QL STRIP: NEGATIVE
LEUKOCYTE ESTERASE UR QL STRIP: ABNORMAL
MICROSCOPIC COMMENT: ABNORMAL
NITRITE UR QL STRIP: NEGATIVE
PH UR STRIP: 5 [PH] (ref 5–8)
PROT UR QL STRIP: NEGATIVE
RBC #/AREA URNS AUTO: 3 /HPF (ref 0–4)
SP GR UR STRIP: 1.02 (ref 1–1.03)
SQUAMOUS #/AREA URNS AUTO: 0 /HPF
URN SPEC COLLECT METH UR: ABNORMAL
WBC #/AREA URNS AUTO: 10 /HPF (ref 0–5)

## 2024-05-08 PROCEDURE — 87086 URINE CULTURE/COLONY COUNT: CPT | Performed by: INTERNAL MEDICINE

## 2024-05-08 PROCEDURE — 3008F BODY MASS INDEX DOCD: CPT | Mod: CPTII,S$GLB,, | Performed by: INTERNAL MEDICINE

## 2024-05-08 PROCEDURE — 3074F SYST BP LT 130 MM HG: CPT | Mod: CPTII,S$GLB,, | Performed by: INTERNAL MEDICINE

## 2024-05-08 PROCEDURE — 99999 PR PBB SHADOW E&M-EST. PATIENT-LVL IV: CPT | Mod: PBBFAC,,, | Performed by: INTERNAL MEDICINE

## 2024-05-08 PROCEDURE — 3078F DIAST BP <80 MM HG: CPT | Mod: CPTII,S$GLB,, | Performed by: INTERNAL MEDICINE

## 2024-05-08 PROCEDURE — 3044F HG A1C LEVEL LT 7.0%: CPT | Mod: CPTII,S$GLB,, | Performed by: INTERNAL MEDICINE

## 2024-05-08 PROCEDURE — 81001 URINALYSIS AUTO W/SCOPE: CPT | Performed by: INTERNAL MEDICINE

## 2024-05-08 PROCEDURE — 99214 OFFICE O/P EST MOD 30 MIN: CPT | Mod: S$GLB,,, | Performed by: INTERNAL MEDICINE

## 2024-05-08 RX ORDER — OMEPRAZOLE 20 MG/1
20 CAPSULE, DELAYED RELEASE ORAL DAILY
Qty: 90 CAPSULE | Refills: 1 | Status: SHIPPED | OUTPATIENT
Start: 2024-05-08 | End: 2025-05-08

## 2024-05-08 NOTE — PROGRESS NOTES
CC: followup of cough and chest shortness of breath  HPI:  The patient is a 48 y.o. year old female with history of seizures and attention to ileostomywho presents to the office for followup of cough and shortness of breath.  Her symptoms persist despite antibiotics and steroids.  She has been evaluated by ENT and pulmonology.  She has a chest CT scheduled Friday.  She reports her fibromyalgia symptoms are stable..The patient also reports feeling weak and lightheaded.  She also reports chest tightness.  Labs performed in ED reveal leukocytosis and elevated ALT.  CXR showed no acute abnormalities.  WEKG was significant for sinus tachycardia and nonspefcific ST-Twave changes.  She also reports about 1 week history of pelvic pressure and frequent urination.  The patient states she has never taken the omeprazole that was prescribed.    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    COLONOSCOPY      COLOSTOMY      ESOPHAGOGASTRODUODENOSCOPY N/A 5/26/2022    Procedure: ESOPHAGOGASTRODUODENOSCOPY (EGD);  Surgeon: Tin Novak MD;  Location: [facility] (2ND FLR);  Service: Endoscopy;  Laterality: N/A;  fully vaccinated    HYSTERECTOMY      due to endometriosis    ILEOSCOPY N/A 4/10/2019    Procedure: ILEOSCOPY through stoma;  Surgeon: Eve Currie MD;  Location: [facility] (4TH FLR);  Service: Endoscopy;  Laterality: N/A;  Schedule as 30 minute case, schedule in April or May 2019    ILEOSCOPY N/A 5/26/2022    Procedure: ILEOSCOPY;  Surgeon: Tin Novak MD;  Location: [facility] (2ND FLR);   "Service: Endoscopy;  Laterality: N/A;    ILEOSTOMY      LAPAROSCOPY W/ MINI-LAPAROTOMY      large intestine removed      just a portion    pelvic mass removal      REVISION COLOSTOMY  2010    SMALL INTESTINE SURGERY      J pouch excision with end ileostomy    STOMACH SURGERY      TONSILLECTOMY, ADENOIDECTOMY         MEDS:  Medcard reviewed and updated    ALLERGIES: Allergy Card reviewed and updated    SOCIAL HISTORY:   The patient is a nonsmoker.    PE:   APPEARANCE: Well nourished, well developed, in no acute distress.    CHEST: Lungs clear to auscultation with unlabored respirations.  CARDIOVASCULAR: Normal S1, S2. No murmurs. No carotid bruits. No pedal edema.  ABDOMEN: Bowel sounds normal. Not distended. Soft. Positive mild tenderness to palpation of lower abdomen.  Positive ileostomy.  PSYCHIATRIC: The patient is oriented to person, place, and time and has a pleasant affect.        ASSESSMENT/PLAN:  Emi Goldman" was seen today for follow-up, chest congestion, nasal congestion and cough.    Diagnoses and all orders for this visit:    Shortness of breath  -     Ambulatory referral/consult to Cardiology; Future  -     CBC Auto Differential; Future  -     Comprehensive Metabolic Panel; Future    History of seizures  -     stable, monitor    Attention to ileostomy  -     stable, monitor    Polyuria  -     Urine culture; Future  -     Urinalysis; Future    Fibromyalgia  -     stable, continue pain control    Cough, unspecified type  -     trial of omeprazole     Other orders  -     omeprazole (PRILOSEC) 20 MG capsule; Take 1 capsule (20 mg total) by mouth once daily.      "

## 2024-05-09 LAB
BACTERIA UR CULT: NORMAL
BACTERIA UR CULT: NORMAL

## 2024-05-14 ENCOUNTER — PATIENT MESSAGE (OUTPATIENT)
Dept: INTERNAL MEDICINE | Facility: CLINIC | Age: 49
End: 2024-05-14
Payer: COMMERCIAL

## 2024-05-20 ENCOUNTER — PATIENT MESSAGE (OUTPATIENT)
Dept: INTERNAL MEDICINE | Facility: CLINIC | Age: 49
End: 2024-05-20
Payer: COMMERCIAL

## 2024-05-24 ENCOUNTER — OFFICE VISIT (OUTPATIENT)
Dept: CARDIOLOGY | Facility: CLINIC | Age: 49
End: 2024-05-24
Payer: COMMERCIAL

## 2024-05-24 ENCOUNTER — TELEPHONE (OUTPATIENT)
Dept: GASTROENTEROLOGY | Facility: CLINIC | Age: 49
End: 2024-05-24
Payer: COMMERCIAL

## 2024-05-24 VITALS
DIASTOLIC BLOOD PRESSURE: 74 MMHG | BODY MASS INDEX: 36.37 KG/M2 | WEIGHT: 205.25 LBS | SYSTOLIC BLOOD PRESSURE: 106 MMHG | HEART RATE: 87 BPM | HEIGHT: 63 IN

## 2024-05-24 DIAGNOSIS — R94.31 NONSPECIFIC ABNORMAL ELECTROCARDIOGRAM (ECG) (EKG): Primary | ICD-10-CM

## 2024-05-24 DIAGNOSIS — I20.89 ANGINAL EQUIVALENT: ICD-10-CM

## 2024-05-24 DIAGNOSIS — R07.2 PRECORDIAL CHEST PAIN: ICD-10-CM

## 2024-05-24 DIAGNOSIS — R06.02 SHORTNESS OF BREATH: ICD-10-CM

## 2024-05-24 DIAGNOSIS — E66.01 SEVERE OBESITY (BMI 35.0-39.9) WITH COMORBIDITY: ICD-10-CM

## 2024-05-24 PROCEDURE — 3044F HG A1C LEVEL LT 7.0%: CPT | Mod: CPTII,S$GLB,, | Performed by: INTERNAL MEDICINE

## 2024-05-24 PROCEDURE — 1159F MED LIST DOCD IN RCRD: CPT | Mod: CPTII,S$GLB,, | Performed by: INTERNAL MEDICINE

## 2024-05-24 PROCEDURE — 99999 PR PBB SHADOW E&M-EST. PATIENT-LVL III: CPT | Mod: PBBFAC,,, | Performed by: INTERNAL MEDICINE

## 2024-05-24 PROCEDURE — 3008F BODY MASS INDEX DOCD: CPT | Mod: CPTII,S$GLB,, | Performed by: INTERNAL MEDICINE

## 2024-05-24 PROCEDURE — 99204 OFFICE O/P NEW MOD 45 MIN: CPT | Mod: S$GLB,,, | Performed by: INTERNAL MEDICINE

## 2024-05-24 PROCEDURE — 3078F DIAST BP <80 MM HG: CPT | Mod: CPTII,S$GLB,, | Performed by: INTERNAL MEDICINE

## 2024-05-24 PROCEDURE — 3074F SYST BP LT 130 MM HG: CPT | Mod: CPTII,S$GLB,, | Performed by: INTERNAL MEDICINE

## 2024-05-24 PROCEDURE — 1160F RVW MEDS BY RX/DR IN RCRD: CPT | Mod: CPTII,S$GLB,, | Performed by: INTERNAL MEDICINE

## 2024-05-24 NOTE — PROGRESS NOTES
Subjective:   05/24/2024     Patient ID:  Emi Johnson is a 48 y.o. female who presents for evaulation of Shortness of Breath and Chest Pain       Patient comes in referred by both Internal Medicine and Pulmonary.  She has chronic cough and dyspnea on exertion.  From a pulmonary standpoint, there was concern that this may be cardiac.  She was seen in the emergency room for severe coughing.  Her EKG does appear to be different, now showing inferolateral ST depression which is mild and felt to be nonspecific.  She continues to have dyspnea on exertion.  She does have precordial chest pain, but this does not appear to be associated with exercise, it can last all day.  It is described as a tightness.    Her family history is strongly positive for coronary artery disease and premature myocardial infarction.      Cardiac risk does appear to be low.  The 10-year ASCVD risk score (Austin LEE, et al., 2019) is: 0.7%    Values used to calculate the score:      Age: 48 years      Sex: Female      Is Non- : No      Diabetic: No      Tobacco smoker: No      Systolic Blood Pressure: 104 mmHg      Is BP treated: No      HDL Cholesterol: 45 mg/dL      Total Cholesterol: 169 mg/dL        Past Medical History:   Diagnosis Date    Anemia     Asthma     B12 deficiency     Crohn's disease     History of shingles     Lupus     Migraine headache     Raynaud disease     Reactive hypoglycemia     Seizures 2004    no medication     Sleep apnea     Non compliant with CPAP       Review of patient's allergies indicates:   Allergen Reactions    Aspirin      Other reaction(s): vomiting, contraindicated for bleeding from crohns  Other reaction(s): bleeding    Mold Hives     Black mold     Sulfa (sulfonamide antibiotics) Hives and Rash    Iodinated contrast media Other (See Comments)    Adhesive     Aspirin     Iodine     Remicade [infliximab] Other (See Comments)     Medical induced lupus    Latex Rash     Other  reaction(s): Hives         Current Outpatient Medications:     acetaminophen (TYLENOL) 500 MG tablet, Take 500 mg by mouth every 6 (six) hours as needed for Pain., Disp: , Rfl:     AJOVY 225 mg/1.5 mL injection, Inject 225 mg into the skin every 30 days. prn, Disp: , Rfl:     albuterol (PROVENTIL/VENTOLIN HFA) 90 mcg/actuation inhaler, Inhale 1-2 puffs into the lungs every 6 (six) hours as needed for Wheezing or Shortness of Breath. Rescue, Disp: 18 g, Rfl: 6    albuterol sulfate 2.5 mg/0.5 mL Nebu, Take 2.5 mg by nebulization every 4 (four) hours as needed (Wheezing or shortness of breath). Rescue, Disp: 25 each, Rfl: 6    amoxicillin-clavulanate 875-125mg (AUGMENTIN) 875-125 mg per tablet, Take 1 tablet by mouth every 12 (twelve) hours., Disp: 20 tablet, Rfl: 0    blood sugar diagnostic (CONTOUR NEXT TEST STRIPS) Strp, 1 strip by Misc.(Non-Drug; Combo Route) route 2 (two) times a day., Disp: 200 strip, Rfl: 3    blood sugar diagnostic Strp, To check BG 1 times daily, Disp: 100 strip, Rfl: 3    blood sugar diagnostic Strp, To check blood glucose one time daily, Disp: 100 each, Rfl: 3    blood-glucose meter kit, To check BG 1 times daily, to use with insurance preferred meter, Disp: 1 each, Rfl: 0    budesonide-formoterol 160-4.5 mcg (SYMBICORT) 160-4.5 mcg/actuation HFAA, Inhale 2 puffs into the lungs every 12 (twelve) hours. Controller, Disp: 10.2 g, Rfl: 3    cyanocobalamin 1,000 mcg/mL injection, INJECT 1 ML INTO THE MUSCLE EVERY 28 DAYS., Disp: 1 mL, Rfl: 12    cyclobenzaprine (FLEXERIL) 10 MG tablet, Take 1 tablet (10 mg total) by mouth 2 (two) times daily as needed., Disp: 30 tablet, Rfl: 1    estradioL (ESTRACE) 0.01 % (0.1 mg/gram) vaginal cream, Apply a pea sized amount to urethra and vaginal opening 3 x weekly at night, Disp: 42.5 g, Rfl: 3    flash glucose scanning reader (FREESTYLE PEDRO 14 DAY READER) Misc, 1 Units by Misc.(Non-Drug; Combo Route) route daily as needed., Disp: 1 each, Rfl: 0    flash  glucose sensor (FREESTYLE PEDRO 14 DAY SENSOR) Kit, 1 Units by Misc.(Non-Drug; Combo Route) route daily as needed., Disp: 2 kit, Rfl: 6    HYDROcodone-acetaminophen (NORCO)  mg per tablet, Take 1 tablet by mouth every 6 (six) hours as needed for Pain., Disp: 120 tablet, Rfl: 0    hydrocodone-chlorpheniramine (TUSSIONEX) 10-8 mg/5 mL suspension, Take 5 mLs by mouth every 12 (twelve) hours as needed for Cough., Disp: 115 mL, Rfl: 0    lancets (BD ULTRA FINE LANCETS) 33 gauge Misc, 1 lancet by Misc.(Non-Drug; Combo Route) route 2 (two) times a day., Disp: 200 each, Rfl: 3    levocetirizine (XYZAL) 5 MG tablet, Take 1 tablet (5 mg total) by mouth every evening., Disp: 30 tablet, Rfl: 2    naloxone (NARCAN) 4 mg/actuation Spry, 4mg by nasal route as needed for opioid overdose; may repeat every 2-3 minutes in alternating nostrils until medical help arrives. Call 911, Disp: 1 each, Rfl: 11    omeprazole (PRILOSEC) 20 MG capsule, Take 1 capsule (20 mg total) by mouth once daily., Disp: 90 capsule, Rfl: 1    ondansetron (ZOFRAN-ODT) 4 MG TbDL, DISSOLVE 1 TABLET BY MOUTH EVERY 8 HOURS AS NEEDED, Disp: 30 tablet, Rfl: 3    oxybutynin (DITROPAN-XL) 10 MG 24 hr tablet, Take 1 tablet (10 mg total) by mouth once daily., Disp: 30 tablet, Rfl: 11    oxyCODONE-acetaminophen (PERCOCET) 7.5-325 mg per tablet, Take 1 tablet by mouth every 6 (six) hours as needed for Pain., Disp: 120 tablet, Rfl: 0    oxyCODONE-acetaminophen (PERCOCET) 7.5-325 mg per tablet, Take 1 tablet by mouth every 6 (six) hours as needed for Pain., Disp: 120 tablet, Rfl: 0    phentermine (ADIPEX-P) 37.5 mg tablet, 1/2 tab- 1 tab po daily, Disp: 30 tablet, Rfl: 2    promethazine (PHENERGAN) 25 MG tablet, Take 1 tablet (25 mg total) by mouth every 6 (six) hours as needed for Nausea., Disp: 15 tablet, Rfl: 0    STELARA 90 mg/mL Syrg syringe, Inject 90 mg into the skin. Every 2 months, Disp: , Rfl:     sumatriptan (IMITREX STATDOSE) 6 mg/0.5 mL kit, INJECT 0.5 ML  "UNDER THE SKIN AS NEEDED ONE TIME FOR MIGRAINE, Disp: 2 kit, Rfl: 2    syringe with needle, safety (BD INTEGRA SYRINGE) 3 mL 25 gauge x 1" Syrg, 1 Syringe by Misc.(Non-Drug; Combo Route) route every 30 days., Disp: 20 Syringe, Rfl: 1    triamcinolone (NASACORT) 55 mcg nasal inhaler, 2 sprays by Nasal route once daily., Disp: 16.9 mL, Rfl: 0    zolpidem (AMBIEN CR) 12.5 MG CR tablet, Take 1 tablet (12.5 mg total) by mouth nightly as needed for Insomnia., Disp: 90 tablet, Rfl: 0    blood-glucose meter,continuous (DEXCOM G6 ) Misc, 1 Units by Misc.(Non-Drug; Combo Route) route once daily. (Patient not taking: Reported on 5/24/2024), Disp: 1 each, Rfl: 3    EPINEPHrine (EPIPEN) 0.3 mg/0.3 mL AtIn, Inject 0.3 mLs (0.3 mg total) into the muscle once. for 1 dose, Disp: 0.3 mL, Rfl: 0    sertraline (ZOLOFT) 50 MG tablet, Take 1 tablet (50 mg total) by mouth once daily., Disp: 30 tablet, Rfl: 11     Objective:   Review of Systems   Cardiovascular:  Positive for chest pain and dyspnea on exertion. Negative for claudication, cyanosis, irregular heartbeat, leg swelling, near-syncope, orthopnea, palpitations, paroxysmal nocturnal dyspnea and syncope.   Respiratory:  Positive for cough and shortness of breath.          Vitals:    05/24/24 0757   BP: 106/74   Pulse: 87     Wt Readings from Last 3 Encounters:   05/24/24 93.1 kg (205 lb 4 oz)   05/08/24 91.4 kg (201 lb 8 oz)   05/01/24 83.9 kg (185 lb)     Temp Readings from Last 3 Encounters:   05/08/24 97.4 °F (36.3 °C) (Temporal)   05/01/24 98.7 °F (37.1 °C)   02/26/24 97.6 °F (36.4 °C) (Temporal)     BP Readings from Last 3 Encounters:   05/24/24 106/74   05/08/24 104/62   05/01/24 118/79     Pulse Readings from Last 3 Encounters:   05/24/24 87   05/08/24 99   05/01/24 89             Physical Exam  Vitals reviewed.   Constitutional:       General: She is not in acute distress.     Appearance: She is well-developed.   HENT:      Head: Normocephalic and atraumatic.      " Nose: Nose normal.   Eyes:      Conjunctiva/sclera: Conjunctivae normal.      Pupils: Pupils are equal, round, and reactive to light.   Neck:      Vascular: No carotid bruit or JVD.   Cardiovascular:      Rate and Rhythm: Normal rate and regular rhythm.      Pulses: Normal pulses and intact distal pulses.      Heart sounds: Normal heart sounds. No murmur heard.     No friction rub. No gallop.   Pulmonary:      Effort: Pulmonary effort is normal. No respiratory distress.      Breath sounds: Normal breath sounds. No wheezing or rales.   Chest:      Chest wall: No tenderness.   Abdominal:      General: Bowel sounds are normal. There is no distension.      Palpations: Abdomen is soft.      Tenderness: There is no abdominal tenderness.   Musculoskeletal:         General: No tenderness or deformity. Normal range of motion.      Cervical back: Normal range of motion and neck supple.      Right lower leg: No edema.      Left lower leg: No edema.   Skin:     General: Skin is warm and dry.      Findings: No erythema or rash.   Neurological:      Mental Status: She is alert and oriented to person, place, and time.      Cranial Nerves: No cranial nerve deficit.      Motor: No abnormal muscle tone.      Coordination: Coordination normal.   Psychiatric:         Behavior: Behavior normal.         Thought Content: Thought content normal.         Judgment: Judgment normal.           Lab Results   Component Value Date    CHOL 169 01/19/2024    CHOL 146 01/09/2023    CHOL 180 07/27/2021     Lab Results   Component Value Date    HDL 45 01/19/2024    HDL 42 01/09/2023    HDL 48 07/27/2021     Lab Results   Component Value Date    LDLCALC 94.6 01/19/2024    LDLCALC 87.0 01/09/2023    LDLCALC 107.2 07/27/2021     Lab Results   Component Value Date    ALT 50 (H) 05/08/2024    AST 24 05/08/2024    AST 32 05/01/2024    AST 27 01/19/2024     Lab Results   Component Value Date    CREATININE 1.0 05/08/2024    BUN 20 05/08/2024      05/08/2024    K 4.1 05/08/2024    CO2 24 05/08/2024    CO2 17 (L) 05/01/2024    CO2 22 (L) 01/19/2024     Lab Results   Component Value Date    HGB 17.3 (H) 05/08/2024    HCT 52.1 (H) 05/08/2024    HCT 46.6 05/01/2024    HCT 52.4 (H) 01/19/2024                     EKG independent review does show sinus rhythm and nonspecific ST and T-wave abnormalities.  These are change compared to prior EKGs.      Assessment and Plan:     Nonspecific abnormal electrocardiogram (ECG) (EKG)  -     Stress Echo Which stress agent will be used? Treadmill Exercise; Color Flow Doppler? No; Future    Shortness of breath  -     Ambulatory referral/consult to Cardiology  -     Stress Echo Which stress agent will be used? Treadmill Exercise; Color Flow Doppler? No; Future    Severe obesity (BMI 35.0-39.9) with comorbidity  -     Stress Echo Which stress agent will be used? Treadmill Exercise; Color Flow Doppler? No; Future    Anginal equivalent  -     Stress Echo Which stress agent will be used? Treadmill Exercise; Color Flow Doppler? No; Future    Precordial chest pain  -     Stress Echo Which stress agent will be used? Treadmill Exercise; Color Flow Doppler? No; Future      Patient with chest pain which does sound atypical for that of myocardial ischemia, but she does have shortness breath consistent with anginal equivalent.  She had a severe reaction in the past and has not felt to be a candidate for contrast administration, had renal failure previously.  She was told not to take contrast.  A CT coronary angiogram would not be appropriate.      I would have her undergo a stress echocardiogram to assess her for myocardial ischemia given exertional  dyspnea, chest pain even if atypical, strongly positive family history coronary artery disease and  nonspecific ST T wave abnormalities.    No follow-ups on file.          Future Appointments   Date Time Provider Department Center   6/21/2024  9:00 AM Gia Langley MD [facility] UROLOGY [facility]    6/28/2024  1:45 PM [facility] [place]   7/8/2024 10:30 AM Leanna Manuel MD [facility] SLEEP [facility]   8/2/2024  8:00 AM Bianca Rutledge MD [facility] IM [place]   8/19/2024  8:30 AM Bianca Rutledge MD [facility] IM [place]   9/5/2024 10:00 AM Rebekah Panchal, NP [facility] HEPAT [facility]

## 2024-05-24 NOTE — TELEPHONE ENCOUNTER
LVM instructing patient to call back or message to schedule appt with GG for Chron's and mouth ulcers

## 2024-05-27 DIAGNOSIS — M79.7 FIBROMYALGIA: ICD-10-CM

## 2024-05-27 RX ORDER — OXYCODONE AND ACETAMINOPHEN 7.5; 325 MG/1; MG/1
1 TABLET ORAL EVERY 6 HOURS PRN
Qty: 120 TABLET | Refills: 0 | Status: SHIPPED | OUTPATIENT
Start: 2024-05-27

## 2024-05-27 NOTE — TELEPHONE ENCOUNTER
No care due was identified.  [facility] Embedded Care Due Messages. Reference number: [id number].   5/27/2024 7:04:48 AM CDT

## 2024-06-24 DIAGNOSIS — M79.7 FIBROMYALGIA: ICD-10-CM

## 2024-06-24 RX ORDER — OXYCODONE AND ACETAMINOPHEN 7.5; 325 MG/1; MG/1
1 TABLET ORAL EVERY 6 HOURS PRN
Qty: 120 TABLET | Refills: 0 | Status: SHIPPED | OUTPATIENT
Start: 2024-06-28

## 2024-06-24 NOTE — TELEPHONE ENCOUNTER
No care due was identified.  [facility] Embedded Care Due Messages. Reference number: [id number].   6/24/2024 11:06:31 AM CDT

## 2024-06-25 RX ORDER — EPINEPHRINE 0.3 MG/.3ML
1 INJECTION SUBCUTANEOUS ONCE
Qty: 0.3 ML | Refills: 0 | Status: SHIPPED | OUTPATIENT
Start: 2024-06-25 | End: 2024-06-25

## 2024-06-25 NOTE — TELEPHONE ENCOUNTER
No care due was identified.  [facility] Embedded Care Due Messages. Reference number: [id number].   6/25/2024 12:54:49 PM CDT

## 2024-07-19 DIAGNOSIS — M79.7 FIBROMYALGIA: ICD-10-CM

## 2024-07-19 RX ORDER — ZOLPIDEM TARTRATE 12.5 MG/1
12.5 TABLET, FILM COATED, EXTENDED RELEASE ORAL NIGHTLY PRN
Qty: 90 TABLET | Refills: 0 | Status: SHIPPED | OUTPATIENT
Start: 2024-07-19 | End: 2025-01-17

## 2024-07-19 RX ORDER — OXYCODONE AND ACETAMINOPHEN 7.5; 325 MG/1; MG/1
1 TABLET ORAL EVERY 6 HOURS PRN
Qty: 120 TABLET | Refills: 0 | Status: SHIPPED | OUTPATIENT
Start: 2024-07-19

## 2024-07-19 NOTE — TELEPHONE ENCOUNTER
No care due was identified.  [facility] Embedded Care Due Messages. Reference number: [id number].   7/19/2024 7:30:20 AM CDT

## 2024-07-19 NOTE — TELEPHONE ENCOUNTER
No care due was identified.  [facility] Embedded Care Due Messages. Reference number: [id number].   7/19/2024 8:17:42 AM CDT

## 2024-07-22 RX ORDER — ONDANSETRON 4 MG/1
TABLET, ORALLY DISINTEGRATING ORAL
Qty: 30 TABLET | Refills: 3 | Status: SHIPPED | OUTPATIENT
Start: 2024-07-22

## 2024-07-22 NOTE — TELEPHONE ENCOUNTER
Refill Routing Note   Medication(s) are not appropriate for processing by [facility] for the following reason(s):        Outside of protocol    [facility] action(s):  Route               Appointments  past 12m or future 3m with PCP    Date Provider   Last Visit   5/8/2024 Bianca Rutledge MD   Next Visit   8/2/2024 Bianca Rutledge MD   ED visits in past 90 days: 1        Note composed:9:17 AM 07/22/2024

## 2024-07-22 NOTE — TELEPHONE ENCOUNTER
No care due was identified.  [facility] Embedded Care Due Messages. Reference number: [id number].   7/22/2024 7:28:28 AM CDT

## 2024-08-06 ENCOUNTER — PATIENT MESSAGE (OUTPATIENT)
Dept: INTERNAL MEDICINE | Facility: CLINIC | Age: 49
End: 2024-08-06
Payer: COMMERCIAL

## 2024-08-06 NOTE — TELEPHONE ENCOUNTER
Pt asking for refill of sertraline, last rx was 3/2023 and it seems refills have  at pharmacy. Pt saw you on 24

## 2024-08-07 ENCOUNTER — PATIENT OUTREACH (OUTPATIENT)
Dept: ADMINISTRATIVE | Facility: HOSPITAL | Age: 49
End: 2024-08-07
Payer: COMMERCIAL

## 2024-08-07 DIAGNOSIS — Z12.31 ENCOUNTER FOR SCREENING MAMMOGRAM FOR MALIGNANT NEOPLASM OF BREAST: Primary | ICD-10-CM

## 2024-08-19 ENCOUNTER — OFFICE VISIT (OUTPATIENT)
Dept: INTERNAL MEDICINE | Facility: CLINIC | Age: 49
End: 2024-08-19
Payer: COMMERCIAL

## 2024-08-19 VITALS
TEMPERATURE: 97 F | BODY MASS INDEX: 35.86 KG/M2 | HEART RATE: 90 BPM | OXYGEN SATURATION: 98 % | WEIGHT: 202.38 LBS | SYSTOLIC BLOOD PRESSURE: 110 MMHG | DIASTOLIC BLOOD PRESSURE: 82 MMHG | HEIGHT: 63 IN

## 2024-08-19 DIAGNOSIS — E16.1 REACTIVE HYPOGLYCEMIA: ICD-10-CM

## 2024-08-19 DIAGNOSIS — M79.7 FIBROMYALGIA: ICD-10-CM

## 2024-08-19 DIAGNOSIS — J45.909 CHRONIC ASTHMA WITHOUT COMPLICATION, UNSPECIFIED ASTHMA SEVERITY, UNSPECIFIED WHETHER PERSISTENT: Primary | Chronic | ICD-10-CM

## 2024-08-19 DIAGNOSIS — F41.9 ANXIETY: ICD-10-CM

## 2024-08-19 PROCEDURE — 3044F HG A1C LEVEL LT 7.0%: CPT | Mod: CPTII,S$GLB,, | Performed by: INTERNAL MEDICINE

## 2024-08-19 PROCEDURE — 1160F RVW MEDS BY RX/DR IN RCRD: CPT | Mod: CPTII,S$GLB,, | Performed by: INTERNAL MEDICINE

## 2024-08-19 PROCEDURE — 3008F BODY MASS INDEX DOCD: CPT | Mod: CPTII,S$GLB,, | Performed by: INTERNAL MEDICINE

## 2024-08-19 PROCEDURE — 99999 PR PBB SHADOW E&M-EST. PATIENT-LVL III: CPT | Mod: PBBFAC,,, | Performed by: INTERNAL MEDICINE

## 2024-08-19 PROCEDURE — 3079F DIAST BP 80-89 MM HG: CPT | Mod: CPTII,S$GLB,, | Performed by: INTERNAL MEDICINE

## 2024-08-19 PROCEDURE — 99214 OFFICE O/P EST MOD 30 MIN: CPT | Mod: S$GLB,,, | Performed by: INTERNAL MEDICINE

## 2024-08-19 PROCEDURE — 1159F MED LIST DOCD IN RCRD: CPT | Mod: CPTII,S$GLB,, | Performed by: INTERNAL MEDICINE

## 2024-08-19 PROCEDURE — 3074F SYST BP LT 130 MM HG: CPT | Mod: CPTII,S$GLB,, | Performed by: INTERNAL MEDICINE

## 2024-08-19 RX ORDER — SERTRALINE HYDROCHLORIDE 50 MG/1
50 TABLET, FILM COATED ORAL DAILY
Qty: 30 TABLET | Refills: 11 | Status: SHIPPED | OUTPATIENT
Start: 2024-08-19 | End: 2025-08-19

## 2024-08-19 RX ORDER — OXYCODONE AND ACETAMINOPHEN 7.5; 325 MG/1; MG/1
1 TABLET ORAL EVERY 6 HOURS PRN
Qty: 120 TABLET | Refills: 0 | Status: SHIPPED | OUTPATIENT
Start: 2024-08-23

## 2024-08-19 RX ORDER — HYDROCODONE BITARTRATE AND ACETAMINOPHEN 10; 325 MG/1; MG/1
1 TABLET ORAL EVERY 6 HOURS PRN
Start: 2024-08-19

## 2024-08-19 NOTE — LETTER
August 19, 2024                 [facility]  [address] [place] [address].  [place] [address]  Phone: [phone]  Fax: [phone] August 19, 2024     Patient: Emi Johnson    YOB: 1975   Date of Visit: 8/19/2024       To Whom It May Concern:    It is medically necessary that Emi Johnson  wear slip-on shoes to work.    If you have any questions or concerns, please don't hesitate to call.    Sincerely,          Bianca Rutledge MD

## 2024-08-19 NOTE — PROGRESS NOTES
CC:  Asthma  HPI:  The patient is a 48 y.o. year old female who presents to the office for asthma.  She reports cough, postnasal drip and wheezing. The patient has taken omeprazole for reflux, as possible cause of cough without relief.  She reports her hernia has been exacerbated by persistent coughing.  The patient has been evaluated by pulmonary and cardiology.  She has not had chest CT yet, ordered by pulmonary.  She also reports a tremor sensation in left thumb when she held her thumb in a specific position on one day.  Her symptoms have since resolved.  She is right hand dominant.  The patient reports increased anxiety due to multiple stressors.    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    COLONOSCOPY      COLOSTOMY      ESOPHAGOGASTRODUODENOSCOPY N/A 5/26/2022    Procedure: ESOPHAGOGASTRODUODENOSCOPY (EGD);  Surgeon: Tin Novak MD;  Location: [facility] ([address] [place] [address]);  Service: Endoscopy;  Laterality: N/A;  fully vaccinated    HYSTERECTOMY      due to endometriosis    ILEOSCOPY N/A 4/10/2019    Procedure: ILEOSCOPY through stoma;  Surgeon: Eve Currie MD;  Location: [facility] (4TH FLR);  Service: Endoscopy;  Laterality: N/A;  Schedule as 30 minute case, schedule in April or May 2019    ILEOSCOPY N/A 5/26/2022    Procedure: ILEOSCOPY;  Surgeon: Tin Novak MD;  Location: [facility] (2ND [facility]);  Service: Endoscopy;  Laterality: N/A;    ILEOSTOMY      LAPAROSCOPY W/ MINI-LAPAROTOMY      large intestine removed      just a portion    pelvic mass removal      REVISION COLOSTOMY   "2010    SMALL INTESTINE SURGERY      J pouch excision with end ileostomy    STOMACH SURGERY      TONSILLECTOMY, ADENOIDECTOMY         MEDS:  Medcard reviewed and updated    ALLERGIES: Allergy Card reviewed and updated    SOCIAL HISTORY:   The patient is a nonsmoker.    PE:   APPEARANCE: Well nourished, well developed, in no acute distress.    CHEST: Lungs clear to auscultation with unlabored respirations.  CARDIOVASCULAR: Normal S1, S2. No murmurs. No carotid bruits. No pedal edema.  ABDOMEN: Bowel sounds normal. Positive abdominal hernia. Soft. No tenderness or masses.  Positive colostomy on the right.  PSYCHIATRIC: The patient is oriented to person, place, and time and has a pleasant affect.        ASSESSMENT/PLAN:  Emi Goldman" was seen today for cough, toe pain, hand pain, facial pain, chest congestion, asthma and stress.    Diagnoses and all orders for this visit:    Chronic asthma without complication, unspecified asthma severity, unspecified whether persistent  -     await CT recommended by pulmonary    Fibromyalgia  -     oxyCODONE-acetaminophen (PERCOCET) 7.5-325 mg per tablet; Take 1 tablet by mouth every 6 (six) hours as needed for Pain.    Reactive hypoglycemia  -     blood sugar diagnostic Strp; To check BG 2 times daily    Anxiety  -     restart zoloft    Other orders  -     sertraline (ZOLOFT) 50 MG tablet; Take 1 tablet (50 mg total) by mouth once daily.        "

## 2024-09-09 ENCOUNTER — TELEPHONE (OUTPATIENT)
Dept: INTERNAL MEDICINE | Facility: CLINIC | Age: 49
End: 2024-09-09
Payer: COMMERCIAL

## 2024-09-09 ENCOUNTER — LAB VISIT (OUTPATIENT)
Dept: LAB | Facility: HOSPITAL | Age: 49
End: 2024-09-09
Attending: INTERNAL MEDICINE
Payer: COMMERCIAL

## 2024-09-09 DIAGNOSIS — Z20.1 TB (TUBERCULOSIS) CONTACT: ICD-10-CM

## 2024-09-09 DIAGNOSIS — Z20.1 TB (TUBERCULOSIS) CONTACT: Primary | ICD-10-CM

## 2024-09-09 PROCEDURE — 86480 TB TEST CELL IMMUN MEASURE: CPT | Performed by: INTERNAL MEDICINE

## 2024-09-10 LAB
GAMMA INTERFERON BACKGROUND BLD IA-ACNC: 0.08 IU/ML
M TB IFN-G CD4+ BCKGRND COR BLD-ACNC: 0.04 IU/ML
M TB IFN-G CD4+ BCKGRND COR BLD-ACNC: 0.08 IU/ML
MITOGEN IGNF BCKGRD COR BLD-ACNC: 9.92 IU/ML
TB GOLD PLUS: NEGATIVE

## 2024-09-16 DIAGNOSIS — M79.7 FIBROMYALGIA: ICD-10-CM

## 2024-09-16 RX ORDER — OXYCODONE AND ACETAMINOPHEN 7.5; 325 MG/1; MG/1
1 TABLET ORAL EVERY 6 HOURS PRN
Qty: 120 TABLET | Refills: 0 | Status: SHIPPED | OUTPATIENT
Start: 2024-09-20

## 2024-09-16 NOTE — TELEPHONE ENCOUNTER
No care due was identified.  [facility] Embedded Care Due Messages. Reference number: [id number].   9/16/2024 6:46:13 AM CDT

## 2024-09-16 NOTE — TELEPHONE ENCOUNTER
Refill Routing Note   Medication(s) are not appropriate for processing by [facility] for the following reason(s):        Outside of protocol    [facility] action(s):  Route               Appointments  past 12m or future 3m with PCP    Date Provider   Last Visit   8/19/2024 Bianca Rutledge MD   Next Visit   12/2/2024 Bianca Rutledge MD   ED visits in past 90 days: 0        Note composed:6:57 AM 09/16/2024

## 2024-10-13 DIAGNOSIS — M79.7 FIBROMYALGIA: ICD-10-CM

## 2024-10-14 RX ORDER — OXYCODONE AND ACETAMINOPHEN 7.5; 325 MG/1; MG/1
1 TABLET ORAL EVERY 6 HOURS PRN
Qty: 120 TABLET | Refills: 0 | Status: SHIPPED | OUTPATIENT
Start: 2024-10-18

## 2024-10-14 RX ORDER — ZOLPIDEM TARTRATE 12.5 MG/1
12.5 TABLET, FILM COATED, EXTENDED RELEASE ORAL NIGHTLY PRN
Qty: 90 TABLET | Refills: 0 | Status: SHIPPED | OUTPATIENT
Start: 2024-10-14 | End: 2025-04-14

## 2024-10-14 RX ORDER — EPINEPHRINE 0.3 MG/.3ML
1 INJECTION SUBCUTANEOUS ONCE
Qty: 0.3 ML | Refills: 3 | Status: SHIPPED | OUTPATIENT
Start: 2024-10-14 | End: 2024-10-14

## 2024-10-14 NOTE — TELEPHONE ENCOUNTER
No care due was identified.  [facility] Embedded Care Due Messages. Reference number: [id number].   10/13/2024 9:02:02 PM CDT

## 2024-10-14 NOTE — TELEPHONE ENCOUNTER
No care due was identified.  [facility] Embedded Care Due Messages. Reference number: [id number].   10/13/2024 9:02:36 PM CDT

## 2024-10-14 NOTE — TELEPHONE ENCOUNTER
Refill Routing Note   Medication(s) are not appropriate for processing by [facility] for the following reason(s):        Outside of protocol    [facility] action(s):  Route             Appointments  past 12m or future 3m with PCP    Date Provider   Last Visit   8/19/2024 Bianca Rutledge MD   Next Visit   10/13/2024 Bianca Rutledge MD   ED visits in past 90 days: 0        Note composed:7:11 AM 10/14/2024

## 2024-10-14 NOTE — TELEPHONE ENCOUNTER
Refill Routing Note   Medication(s) are not appropriate for processing by [facility] for the following reason(s):        Outside of protocol    [facility] action(s):  Route             Appointments  past 12m or future 3m with PCP    Date Provider   Last Visit   8/19/2024 Bianca Rutledge MD   Next Visit   11/1/2024 Bianca Rutledge MD   ED visits in past 90 days: 0        Note composed:7:11 AM 10/14/2024

## 2024-10-25 ENCOUNTER — PATIENT MESSAGE (OUTPATIENT)
Dept: INTERNAL MEDICINE | Facility: CLINIC | Age: 49
End: 2024-10-25
Payer: COMMERCIAL

## 2024-10-25 DIAGNOSIS — K43.9 HERNIA OF ABDOMINAL WALL: ICD-10-CM

## 2024-10-25 DIAGNOSIS — J45.909 CHRONIC ASTHMA WITHOUT COMPLICATION, UNSPECIFIED ASTHMA SEVERITY, UNSPECIFIED WHETHER PERSISTENT: Primary | ICD-10-CM

## 2024-10-25 NOTE — TELEPHONE ENCOUNTER
Pt request CXR And x-ray of abd - xray of my chest for her wheezing and my stomach where hernia is.

## 2024-11-01 ENCOUNTER — OFFICE VISIT (OUTPATIENT)
Dept: INTERNAL MEDICINE | Facility: CLINIC | Age: 49
End: 2024-11-01
Payer: COMMERCIAL

## 2024-11-01 DIAGNOSIS — F41.9 ANXIETY: Primary | ICD-10-CM

## 2024-11-01 DIAGNOSIS — M79.7 FIBROMYALGIA: ICD-10-CM

## 2024-11-01 DIAGNOSIS — F32.9 MAJOR DEPRESSIVE DISORDER, REMISSION STATUS UNSPECIFIED, UNSPECIFIED WHETHER RECURRENT: ICD-10-CM

## 2024-11-01 DIAGNOSIS — R05.9 COUGH, UNSPECIFIED TYPE: ICD-10-CM

## 2024-11-01 PROCEDURE — 3044F HG A1C LEVEL LT 7.0%: CPT | Mod: CPTII,95,, | Performed by: INTERNAL MEDICINE

## 2024-11-01 PROCEDURE — 1160F RVW MEDS BY RX/DR IN RCRD: CPT | Mod: CPTII,95,, | Performed by: INTERNAL MEDICINE

## 2024-11-01 PROCEDURE — 99214 OFFICE O/P EST MOD 30 MIN: CPT | Mod: 95,,, | Performed by: INTERNAL MEDICINE

## 2024-11-01 PROCEDURE — 1159F MED LIST DOCD IN RCRD: CPT | Mod: CPTII,95,, | Performed by: INTERNAL MEDICINE

## 2024-11-01 RX ORDER — SUMATRIPTAN 50 MG/1
50 TABLET, FILM COATED ORAL
Qty: 6 TABLET | Refills: 3 | Status: SHIPPED | OUTPATIENT
Start: 2024-11-01 | End: 2024-12-01

## 2024-11-01 RX ORDER — CITALOPRAM 10 MG/1
10 TABLET ORAL DAILY
Qty: 30 TABLET | Refills: 11 | Status: SHIPPED | OUTPATIENT
Start: 2024-11-01 | End: 2025-11-01

## 2024-11-01 NOTE — PROGRESS NOTES
The patient location is:  [place]  The chief complaint leading to consultation is:  Depression/anxiety and fibromyalgia    Visit type: audiovisual    Face to Face time with patient:  14   minutes of total time spent on the encounter, which includes face to face time and non-face to face time preparing to see the patient (eg, review of tests), Obtaining and/or reviewing separately obtained history, Documenting clinical information in the electronic or other health record, Independently interpreting results (not separately reported) and communicating results to the patient/family/caregiver, or Care coordination (not separately reported).         Each patient to whom he or she provides medical services by telemedicine is:  (1) informed of the relationship between the physician and patient and the respective role of any other health care provider with respect to management of the patient; and (2) notified that he or she may decline to receive medical services by telemedicine and may withdraw from such care at any time.    Notes:      History of Present Illness    CHIEF COMPLAINT:  Emi presents today for persistent cough and worsening fibromyalgia symptoms.    RESPIRATORY CONCERNS:  She reports a persistent cough associated with sinus drainage, accompanied by wheezing and shortness of breath during coughing spells, indicating exacerbation of her asthma symptoms. She coughs up phlegm but swallows it back down. The recommended CT for pulmonary evaluation has not been completed yet.    FIBROMYALGIA:  She reports an exacerbation of her fibromyalgia symptoms, expressing uncertainty about whether the flare-up is related to her recent cough or increased stress levels.    PSYCHOSOCIAL STRESSORS:  She reports significant stress due to multiple family health issues. Her  was recently hospitalized for 6 days with a suspected lung fungal infection or possible lung cancer. Her aunt with stage four leukemia has moved closer,  requiring assistance and support.    MEDICATIONS:  She reports experiencing diarrhea as a side effect of sertraline, indicating that it caused more frequent diarrhea than her usual baseline. She is no longer using Ajovy injections for migraines.    ALLERGIES:  She reports allergies to aspirin, mold, sulfa, iodine, adhesive, Remicade, and latex.    SOCIAL HISTORY:  She denies current cigarette smoking.      ROS:  Respiratory: +shortness of breath, +cough, +wheezing  Gastrointestinal: +diarrhea       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    COLONOSCOPY      COLOSTOMY      ESOPHAGOGASTRODUODENOSCOPY N/A 5/26/2022    Procedure: ESOPHAGOGASTRODUODENOSCOPY (EGD);  Surgeon: Tin Novak MD;  [facility]  Service: Endoscopy;  Laterality: N/A;  fully vaccinated    HYSTERECTOMY      due to endometriosis    ILEOSCOPY N/A 4/10/2019    Procedure: ILEOSCOPY through stoma;  Surgeon: Eve Currie MD;  Location: [facility] (4TH FLR);  Service: Endoscopy;  Laterality: N/A;  Schedule as 30 minute case, schedule in April or May 2019    ILEOSCOPY N/A 5/26/2022    Procedure: ILEOSCOPY;  Surgeon: Tin Novak MD;  Location: [facility] (2ND FLR);  Service: Endoscopy;  Laterality: N/A;    ILEOSTOMY      LAPAROSCOPY W/ MINI-LAPAROTOMY      large intestine removed      just a portion    pelvic mass removal      REVISION COLOSTOMY  2010    SMALL INTESTINE SURGERY      J pouch excision with end ileostomy    STOMACH SURGERY      TONSILLECTOMY, ADENOIDECTOMY         PE:   APPEARANCE: Well  nourished, well developed, in no acute distress.    Video visit.  PSYCHIATRIC: The patient is oriented to person, place, and time and has a pleasant affect.        ASSESSMENT/PLAN:  Diagnoses and all orders for this visit:    Anxiety  -     worsening symptoms, start Celexa    Major depressive disorder, remission status unspecified, unspecified whether recurrent  -     worsening symptoms, start Celexa    Fibromyalgia  -     recent exacerbation, continue pain control    Cough, unspecified type  -     await completion of CT ordered, followed by Pulmonary    Other orders  -     sumatriptan (IMITREX) 50 MG tablet; Take 1 tablet (50 mg total) by mouth every 2 (two) hours as needed for Migraine. Maximum of 2 doses per day.  -     citalopram (CELEXA) 10 MG tablet; Take 1 tablet (10 mg total) by mouth once daily.        Considered potential reflux as cause of persistent cough  Evaluated asthma exacerbation contributing to cough and breathing difficulties  Assessed need for anxiety medication due to patient's ongoing stress  Determined CT still necessary to investigate chronic cough    STRESS MANAGEMENT:  - Manage stress levels.  - Started citalopram at a low dose for stress management.    MIGRAINE:  - Started Imitrex (sumatriptan) tablets for migraine management: Take 1 tablet every 2 hours as needed.  - Maximum 2 tablets per episode.  - Maximum 2 doses per day.    INSOMNIA:  - Continued Ambien CR (zolpidem extended-release) for sleep.    PSORIASIS:  - Continued Stelara (ustekinumab) injections.    PAIN MANAGEMENT:  - Continued Percocet (oxycodone/acetaminophen) for pain.  - Continued Tylenol (acetaminophen) as needed for pain.    NAUSEA:  - Continued Zofran (ondansetron) for nausea.    ANAPHYLAXIS:  - Continued EpiPen (epinephrine auto-injector) for emergency use.    RESPIRATORY ISSUES:  - Continued albuterol nebulizer solution and inhaler for breathing issues.    DIARRHEA:  - Discontinued sertraline due to side effect of  increased diarrhea.    CHRONIC COUGH:  - Await CT of lungs to investigate chronic cough.  - Follow up to complete CT of lungs as previously ordered.

## 2024-11-11 DIAGNOSIS — M79.7 FIBROMYALGIA: ICD-10-CM

## 2024-11-11 RX ORDER — OXYCODONE AND ACETAMINOPHEN 7.5; 325 MG/1; MG/1
1 TABLET ORAL EVERY 6 HOURS PRN
Qty: 120 TABLET | Refills: 0 | Status: SHIPPED | OUTPATIENT
Start: 2024-11-15

## 2024-11-11 NOTE — TELEPHONE ENCOUNTER
No care due was identified.  Health [facility] Embedded Care Due Messages. Reference number: [id number].   11/11/2024 8:33:01 AM CST

## 2024-12-03 PROCEDURE — 93005 ELECTROCARDIOGRAM TRACING: CPT

## 2024-12-03 PROCEDURE — 99285 EMERGENCY DEPT VISIT HI MDM: CPT | Mod: 25

## 2024-12-03 PROCEDURE — 93010 ELECTROCARDIOGRAM REPORT: CPT | Mod: ,,, | Performed by: INTERNAL MEDICINE

## 2024-12-04 ENCOUNTER — PATIENT MESSAGE (OUTPATIENT)
Dept: INTERNAL MEDICINE | Facility: CLINIC | Age: 49
End: 2024-12-04
Payer: COMMERCIAL

## 2024-12-04 ENCOUNTER — HOSPITAL ENCOUNTER (EMERGENCY)
Facility: HOSPITAL | Age: 49
Discharge: HOME | End: 2024-12-04
Attending: EMERGENCY MEDICINE
Payer: COMMERCIAL

## 2024-12-04 VITALS
BODY MASS INDEX: 35.79 KG/M2 | HEIGHT: 63 IN | RESPIRATION RATE: 20 BRPM | SYSTOLIC BLOOD PRESSURE: 126 MMHG | HEART RATE: 92 BPM | DIASTOLIC BLOOD PRESSURE: 78 MMHG | WEIGHT: 202 LBS | TEMPERATURE: 99 F | OXYGEN SATURATION: 98 %

## 2024-12-04 DIAGNOSIS — J98.01 BRONCHOSPASM, ACUTE: Primary | ICD-10-CM

## 2024-12-04 DIAGNOSIS — R05.9 COUGH: ICD-10-CM

## 2024-12-04 DIAGNOSIS — R07.9 CHEST PAIN: ICD-10-CM

## 2024-12-04 DIAGNOSIS — R09.82 POST-NASAL DRIP: ICD-10-CM

## 2024-12-04 LAB
ALBUMIN SERPL BCP-MCNC: 4 G/DL (ref 3.5–5.2)
ALP SERPL-CCNC: 105 U/L (ref 40–150)
ALT SERPL W/O P-5'-P-CCNC: 32 U/L (ref 10–44)
ANION GAP SERPL CALC-SCNC: 12 MMOL/L (ref 8–16)
AST SERPL-CCNC: 24 U/L (ref 10–40)
BASOPHILS # BLD AUTO: 0.03 K/UL (ref 0–0.2)
BASOPHILS NFR BLD: 0.4 % (ref 0–1.9)
BILIRUB SERPL-MCNC: 0.7 MG/DL (ref 0.1–1)
BILIRUB UR QL STRIP: NEGATIVE
BUN SERPL-MCNC: 23 MG/DL (ref 6–20)
CALCIUM SERPL-MCNC: 9.4 MG/DL (ref 8.7–10.5)
CHLORIDE SERPL-SCNC: 111 MMOL/L (ref 95–110)
CLARITY UR: CLEAR
CO2 SERPL-SCNC: 20 MMOL/L (ref 23–29)
COLOR UR: YELLOW
CREAT SERPL-MCNC: 1.1 MG/DL (ref 0.5–1.4)
DIFFERENTIAL METHOD BLD: NORMAL
EOSINOPHIL # BLD AUTO: 0.2 K/UL (ref 0–0.5)
EOSINOPHIL NFR BLD: 2.6 % (ref 0–8)
ERYTHROCYTE [DISTWIDTH] IN BLOOD BY AUTOMATED COUNT: 12.5 % (ref 11.5–14.5)
EST. GFR  (NO RACE VARIABLE): >60 ML/MIN/1.73 M^2
GLUCOSE SERPL-MCNC: 98 MG/DL (ref 70–110)
GLUCOSE UR QL STRIP: NEGATIVE
HCT VFR BLD AUTO: 44.3 % (ref 37–48.5)
HGB BLD-MCNC: 15.3 G/DL (ref 12–16)
HGB UR QL STRIP: ABNORMAL
IMM GRANULOCYTES # BLD AUTO: 0.03 K/UL (ref 0–0.04)
IMM GRANULOCYTES NFR BLD AUTO: 0.4 % (ref 0–0.5)
INFLUENZA A, MOLECULAR: NEGATIVE
INFLUENZA B, MOLECULAR: NEGATIVE
KETONES UR QL STRIP: NEGATIVE
LEUKOCYTE ESTERASE UR QL STRIP: NEGATIVE
LIPASE SERPL-CCNC: 32 U/L (ref 4–60)
LYMPHOCYTES # BLD AUTO: 1.6 K/UL (ref 1–4.8)
LYMPHOCYTES NFR BLD: 21.7 % (ref 18–48)
MCH RBC QN AUTO: 29.5 PG (ref 27–31)
MCHC RBC AUTO-ENTMCNC: 34.5 G/DL (ref 32–36)
MCV RBC AUTO: 86 FL (ref 82–98)
MONOCYTES # BLD AUTO: 0.6 K/UL (ref 0.3–1)
MONOCYTES NFR BLD: 8 % (ref 4–15)
NEUTROPHILS # BLD AUTO: 4.9 K/UL (ref 1.8–7.7)
NEUTROPHILS NFR BLD: 66.9 % (ref 38–73)
NITRITE UR QL STRIP: NEGATIVE
NRBC BLD-RTO: 0 /100 WBC
PH UR STRIP: 6 [PH] (ref 5–8)
PLATELET # BLD AUTO: 177 K/UL (ref 150–450)
PMV BLD AUTO: 10.5 FL (ref 9.2–12.9)
POTASSIUM SERPL-SCNC: 3.7 MMOL/L (ref 3.5–5.1)
PROT SERPL-MCNC: 6.8 G/DL (ref 6–8.4)
PROT UR QL STRIP: NEGATIVE
RBC # BLD AUTO: 5.18 M/UL (ref 4–5.4)
SARS-COV-2 RDRP RESP QL NAA+PROBE: NEGATIVE
SODIUM SERPL-SCNC: 143 MMOL/L (ref 136–145)
SP GR UR STRIP: 1.01 (ref 1–1.03)
SPECIMEN SOURCE: NORMAL
TROPONIN I SERPL DL<=0.01 NG/ML-MCNC: <0.006 NG/ML (ref 0–0.03)
URN SPEC COLLECT METH UR: ABNORMAL
UROBILINOGEN UR STRIP-ACNC: NEGATIVE EU/DL
WBC # BLD AUTO: 7.37 K/UL (ref 3.9–12.7)

## 2024-12-04 PROCEDURE — 94640 AIRWAY INHALATION TREATMENT: CPT

## 2024-12-04 PROCEDURE — 87635 SARS-COV-2 COVID-19 AMP PRB: CPT | Performed by: EMERGENCY MEDICINE

## 2024-12-04 PROCEDURE — 25000003 PHARM REV CODE 250: Performed by: EMERGENCY MEDICINE

## 2024-12-04 PROCEDURE — 83690 ASSAY OF LIPASE: CPT | Performed by: EMERGENCY MEDICINE

## 2024-12-04 PROCEDURE — 25000242 PHARM REV CODE 250 ALT 637 W/ HCPCS: Performed by: EMERGENCY MEDICINE

## 2024-12-04 PROCEDURE — 84484 ASSAY OF TROPONIN QUANT: CPT | Performed by: EMERGENCY MEDICINE

## 2024-12-04 PROCEDURE — 85025 COMPLETE CBC W/AUTO DIFF WBC: CPT | Performed by: EMERGENCY MEDICINE

## 2024-12-04 PROCEDURE — 87502 INFLUENZA DNA AMP PROBE: CPT | Performed by: EMERGENCY MEDICINE

## 2024-12-04 PROCEDURE — 63600175 PHARM REV CODE 636 W HCPCS: Performed by: EMERGENCY MEDICINE

## 2024-12-04 PROCEDURE — 80053 COMPREHEN METABOLIC PANEL: CPT | Performed by: EMERGENCY MEDICINE

## 2024-12-04 PROCEDURE — 81003 URINALYSIS AUTO W/O SCOPE: CPT | Performed by: EMERGENCY MEDICINE

## 2024-12-04 RX ORDER — BENZONATATE 100 MG/1
100 CAPSULE ORAL 3 TIMES DAILY PRN
Qty: 20 CAPSULE | Refills: 0 | Status: SHIPPED | OUTPATIENT
Start: 2024-12-04 | End: 2024-12-14

## 2024-12-04 RX ORDER — ALBUTEROL SULFATE 2.5 MG/.5ML
2.5 SOLUTION RESPIRATORY (INHALATION) EVERY 4 HOURS PRN
Qty: 25 EACH | Refills: 6 | OUTPATIENT
Start: 2024-12-04

## 2024-12-04 RX ORDER — IPRATROPIUM BROMIDE AND ALBUTEROL SULFATE 2.5; .5 MG/3ML; MG/3ML
3 SOLUTION RESPIRATORY (INHALATION)
Status: COMPLETED | OUTPATIENT
Start: 2024-12-04 | End: 2024-12-04

## 2024-12-04 RX ORDER — DEXAMETHASONE 4 MG/1
12 TABLET ORAL
Status: COMPLETED | OUTPATIENT
Start: 2024-12-04 | End: 2024-12-04

## 2024-12-04 RX ORDER — PROMETHAZINE HYDROCHLORIDE AND DEXTROMETHORPHAN HYDROBROMIDE 6.25; 15 MG/5ML; MG/5ML
5 SYRUP ORAL EVERY 4 HOURS PRN
Qty: 118 ML | Refills: 0 | Status: SHIPPED | OUTPATIENT
Start: 2024-12-04 | End: 2024-12-14

## 2024-12-04 RX ORDER — BENZONATATE 100 MG/1
100 CAPSULE ORAL 3 TIMES DAILY PRN
Status: DISCONTINUED | OUTPATIENT
Start: 2024-12-04 | End: 2024-12-04 | Stop reason: HOSPADM

## 2024-12-04 RX ORDER — FLUTICASONE PROPIONATE 50 MCG
1 SPRAY, SUSPENSION (ML) NASAL 2 TIMES DAILY PRN
Qty: 15 G | Refills: 0 | Status: SHIPPED | OUTPATIENT
Start: 2024-12-04

## 2024-12-04 RX ADMIN — DEXAMETHASONE 12 MG: 4 TABLET ORAL at 02:12

## 2024-12-04 RX ADMIN — BENZONATATE 100 MG: 100 CAPSULE ORAL at 02:12

## 2024-12-04 RX ADMIN — IPRATROPIUM BROMIDE AND ALBUTEROL SULFATE 3 ML: .5; 3 SOLUTION RESPIRATORY (INHALATION) at 12:12

## 2024-12-04 NOTE — Clinical Note
"Emi Kirklandhy" Elizabeth was seen and treated in our emergency department on 12/3/2024.  She may return to work on 12/05/2024.       If you have any questions or concerns, please don't hesitate to call.      Nikki Enamorado MD"

## 2024-12-04 NOTE — ED PROVIDER NOTES
"Encounter Date: 12/3/2024       History     Chief Complaint   Patient presents with    Multiple Medical Complaints     Pt c/o cough, subjective wheeze, chest pains, shortness of breath, weakness, malaise, choking on own saliva and then struggling to catch breath. "Popping blood vessels in my face from straining" and vomiting.      HPI    This is a 48-year-old female presents the ER for evaluation multiple complaints.  She has a medical history noted below.  She endorses a chronic worsening cough that has been going on for over 1 year.  It is she was having worsening, as well as wheeze, chest pain shortness breath malaise as well of postnasal drip.  She came the ER for further evaluation.    Review of patient's allergies indicates:   Allergen Reactions    Aspirin      Other reaction(s): vomiting, contraindicated for bleeding from crohns  Other reaction(s): bleeding    Mold Hives     Black mold     Sulfa (sulfonamide antibiotics) Hives and Rash    Iodinated contrast media Other (See Comments)    Adhesive     Aspirin     Infliximab Other (See Comments)     Medical induced lupus    Iodine Other (See Comments)    Latex Rash     Other reaction(s): Hives     Past Medical History:   Diagnosis Date    Anemia     Asthma     B12 deficiency     Crohn's disease     History of shingles     Lupus     Migraine headache     Raynaud disease     Reactive hypoglycemia     Seizures 2004    no medication     Sleep apnea     Non compliant with CPAP     Past Surgical History:   Procedure Laterality Date    ABDOMINAL SURGERY      COLON SURGERY      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    COLONOSCOPY      COLOSTOMY      ESOPHAGOGASTRODUODENOSCOPY N/A 5/26/2022    Procedure: ESOPHAGOGASTRODUODENOSCOPY (EGD);  Surgeon: Tin Novak MD;  Location: [address];  Service: Endoscopy;  Laterality: N/A;  fully vaccinated    HYSTERECTOMY      due to endometriosis    ILEOSCOPY N/A 4/10/2019    Procedure: ILEOSCOPY through stoma;  " Surgeon: Eve Currie MD;  Location: [facility] (4TH FLR);  Service: Endoscopy;  Laterality: N/A;  Schedule as 30 minute case, schedule in April or May 2019    ILEOSCOPY N/A 5/26/2022    Procedure: ILEOSCOPY;  Surgeon: Tin Novak MD;  Location: [facility] ENDO (2ND FLR);  Service: Endoscopy;  Laterality: N/A;    ILEOSTOMY      LAPAROSCOPY W/ MINI-LAPAROTOMY      large intestine removed      just a portion    pelvic mass removal      REVISION COLOSTOMY  2010    SMALL INTESTINE SURGERY      J pouch excision with end ileostomy    STOMACH SURGERY      TONSILLECTOMY, ADENOIDECTOMY       Family History   Problem Relation Name Age of Onset    Cancer Mother  54        Anal cancer     Diabetes Mellitus Mother      Hypertension Mother      Colon cancer Mother      Heart attack Father      Breast cancer Sister Edel     Hypertension Sister Edel     Cervical cancer Sister Crystal     Seizures Sister Crystal     Hypertension Sister [place]     Liver disease Sister [place]         Fatty Liver     Cancer Maternal Aunt      Breast cancer Paternal Aunt      Colon cancer Maternal Grandmother  72    Cancer Paternal Grandmother      Celiac disease Neg Hx      Cirrhosis Neg Hx      Colon polyps Neg Hx      Crohn's disease Neg Hx      Cystic fibrosis Neg Hx      Hemochromatosis Neg Hx      Esophageal cancer Neg Hx      Inflammatory bowel disease Neg Hx      Irritable bowel syndrome Neg Hx      Liver cancer Neg Hx      Rectal cancer Neg Hx      Stomach cancer Neg Hx      Ulcerative colitis Neg Hx      Hugo's disease Neg Hx       Social History     Tobacco Use    Smoking status: Never    Smokeless tobacco: Never   Substance Use Topics    Alcohol use: No     Comment: Rare social use.    Drug use: No     Review of Systems   Constitutional:  Positive for fatigue.   Respiratory:  Positive for cough, chest tightness, shortness of breath and wheezing.    Cardiovascular:  Positive for chest pain.   All other systems reviewed and are  negative.      Physical Exam     Initial Vitals [12/03/24 2343]   BP Pulse Resp Temp SpO2   134/80 103 16 98.6 °F (37 °C) 100 %      MAP       --         Physical Exam    Nursing note and vitals reviewed.  Constitutional: She appears well-developed and well-nourished. No distress.   HENT:   Head: Normocephalic and atraumatic.   Eyes: EOM are normal. Pupils are equal, round, and reactive to light.   Periorbital Petechiae noted from coughing   Neck: Neck supple.   Normal range of motion.  Cardiovascular:  Normal rate, regular rhythm and normal heart sounds.           Pulmonary/Chest: No respiratory distress.   Diminished breath sounds with faint wheeze no respiratory distress persistent cough   Abdominal: Abdomen is soft. She exhibits no distension. There is no abdominal tenderness.   Ostomy noted   Musculoskeletal:         General: Normal range of motion.      Cervical back: Normal range of motion and neck supple.     Neurological: She is alert and oriented to person, place, and time. She has normal strength.   Skin: Skin is warm and dry. Capillary refill takes less than 2 seconds.   Psychiatric: She has a normal mood and affect. Thought content normal.         ED Course   Procedures  Labs Reviewed   COMPREHENSIVE METABOLIC PANEL - Abnormal       Result Value    Sodium 143      Potassium 3.7      Chloride 111 (*)     CO2 20 (*)     Glucose 98      BUN 23 (*)     Creatinine 1.1      Calcium 9.4      Total Protein 6.8      Albumin 4.0      Total Bilirubin 0.7      Alkaline Phosphatase 105      AST 24      ALT 32      eGFR >60      Anion Gap 12     URINALYSIS, REFLEX TO URINE CULTURE - Abnormal    Specimen UA Urine, Clean Catch      Color, UA Yellow      Appearance, UA Clear      pH, UA 6.0      Specific Gravity, UA 1.015      Protein, UA Negative      Glucose, UA Negative      Ketones, UA Negative      Bilirubin (UA) Negative      Occult Blood UA Trace (*)     Nitrite, UA Negative      Urobilinogen, UA Negative       Leukocytes, UA Negative      Narrative:     Specimen Source->Urine   INFLUENZA A & B BY MOLECULAR    Influenza A, Molecular Negative      Influenza B, Molecular Negative      Flu A & B Source Nasal swab     CBC W/ AUTO DIFFERENTIAL    WBC 7.37      RBC 5.18      Hemoglobin 15.3      Hematocrit 44.3      MCV 86      MCH 29.5      MCHC 34.5      RDW 12.5      Platelets 177      MPV 10.5      Immature Granulocytes 0.4      Gran # (ANC) 4.9      Immature Grans (Abs) 0.03      Lymph # 1.6      Mono # 0.6      Eos # 0.2      Baso # 0.03      nRBC 0      Gran % 66.9      Lymph % 21.7      Mono % 8.0      Eosinophil % 2.6      Basophil % 0.4      Differential Method Automated     LIPASE    Lipase 32     TROPONIN I    Troponin I <0.006     SARS-COV-2 RNA AMPLIFICATION, QUAL    SARS-CoV-2 RNA, Amplification, Qual Negative       EKG Readings: (Independently Interpreted)   Normal sinus rhythm 92 beats per minute normal intervals and axis no acute ST changes       Imaging Results              X-Ray Chest PA And Lateral (Final result)  Result time 12/04/24 01:43:35      Final result by Efraín Sauceda MD (12/04/24 01:43:35)                   Impression:      No acute abnormality.      Electronically signed by: Efraín Sauceda  Date:    12/04/2024  Time:    01:43               Narrative:    EXAMINATION:  XR CHEST PA AND LATERAL    CLINICAL HISTORY:  . Cough, unspecified    TECHNIQUE:  Single frontal portable view of the chest was performed.    COMPARISON:  05/01/2024    FINDINGS:  Support devices: None    The lungs are clear, with normal appearance of pulmonary vasculature and no pleural effusion or pneumothorax.    The cardiac silhouette is normal in size. The hilar and mediastinal contours are unremarkable.    Bones are intact.                                       Medications   dexAMETHasone tablet 12 mg (has no administration in time range)   benzonatate capsule 100 mg (has no administration in time range)    albuterol-ipratropium 2.5 mg-0.5 mg/3 mL nebulizer solution 3 mL (3 mLs Nebulization Given 12/4/24 0053)     Medical Decision Making  Pleasant 48-year-old female ER for evaluation complaints.  She endorses he was a persistent chronic cough that has been worsening.  She reports this cough has been going on for over a year.  But now is worsening more persistent causing shortness of breath.  History of asthma she has been using her inhaler more frequently.  She came the ER some faint wheezing with diminished breath sounds noted.  Mild erythema of the posterior oropharynx.  Believe patient likely has mild asthma exacerbation as well as postnasal drip causing this worsening cough posttussive vomiting.  She endorses chest pain from the persistent cough as well.  Differential includes asthma exacerbation, pneumonia, postnasal drip, viral syndrome, NSTEMI ACS other cause.  Will plan blood work symptomatic support observation reassess.    Amount and/or Complexity of Data Reviewed  External Data Reviewed: notes.  Labs: ordered. Decision-making details documented in ED Course.  Radiology: ordered and independent interpretation performed. Decision-making details documented in ED Course.  ECG/medicine tests: ordered and independent interpretation performed. Decision-making details documented in ED Course.    Risk  Prescription drug management.               ED Course as of 12/04/24 0227   Wed Dec 04, 2024   0158 Influenza A & B by Molecular [SE]   0158 COVID-19 Rapid Screening [SE]   0158 Comprehensive metabolic panel(!) [SE]   0158 Lipase [SE]   0158 CBC auto differential [SE]   0158 Urinalysis, Reflex to Urine Culture Urine, Clean Catch(!) [SE]   0158 X-Ray Chest PA And Lateral [SE]   0213 Resting comfortably bad no acute distress.  Vitals imaging to obtain a reviewed.  Status post DuoNeb with some improvement symptoms.  COVID flu negative troponin within normal limits EKG without acute process CBC CMP lipase within normal  limits UA without infection x-ray pneumonia.  Discussed the patient's diagnosis likely bronchospasm, postnasal drip.  He was discharged home return precautions primary care follow up.  Will discharge with Flonase, Tessalon Perles promethazine.  Discussed with the patient dangers of polypharmacy.  I discussed with their playing to not take her pain medicine with her cough medication.  If patient was to take her cough medication she is to not take her pain meds.  Patient to follow up with primary care.  Patient to be discharged [SE]      ED Course User Index  [SE] Nikki Enamorado MD                           Clinical Impression:  Final diagnoses:  [R05.9] Cough  [R07.9] Chest pain  [J98.01] Bronchospasm, acute (Primary)  [R09.82] Post-nasal drip          ED Disposition Condition    Discharge Stable          ED Prescriptions       Medication Sig Dispense Start Date End Date Auth. Provider    fluticasone propionate (FLONASE) 50 mcg/actuation nasal spray 1 spray (50 mcg total) by Each Nostril route 2 (two) times daily as needed for Rhinitis or Allergies. 15 g 12/4/2024 -- Nikki Enamorado MD    benzonatate (TESSALON) 100 MG capsule Take 1 capsule (100 mg total) by mouth 3 (three) times daily as needed for Cough. 20 capsule 12/4/2024 12/14/2024 Nikki Enamorado MD    promethazine-dextromethorphan (PROMETHAZINE-DM) 6.25-15 mg/5 mL Syrp Take 5 mLs by mouth every 4 (four) hours as needed (cough). 118 mL 12/4/2024 12/14/2024 Nikki Enamorado MD          Follow-up Information       Follow up With Specialties Details Why Contact Info    Bianca Rutledge MD Internal Medicine Schedule an appointment as soon as possible for a visit  As needed [address] [facility] [address]  [phone]               Nikki Enamorado MD  12/04/24 0225

## 2024-12-04 NOTE — TELEPHONE ENCOUNTER
Pt requesting a refill on albuterol, seems to have prescribed by a differ provider    LOV 11/1/2024    Currently in ER

## 2024-12-04 NOTE — DISCHARGE INSTRUCTIONS
Thank you for coming in to see us at [facility] It was nice to meet you, and I hope you feel better soon. Please feel free to return to the ER at any time should your symptoms get worse, or if you have different emergent concerns.    Our goal in the emergency department is to always give you outstanding care and exceptional service. You may receive a survey by mail or e-mail in the next week regarding your experience in our ED. We would greatly appreciate your completing and returning the survey. Your feedback provides us with a way to recognize our staff who give very good care and it helps us learn how to improve when your experience was below our aspiration of excellence.      It is important to remember that some problems or medical conditions are difficult to diagnose and may not be found or addressed during your Emergency Department visit.  These conditions often start with non-specific symptoms and can only be diagnosed on follow up visits with your primary care physician or specialist when the symptoms continue or change. Please remember that all medical conditions can change, and we cannot predict how you will be feeling tomorrow or the next day.    Return to the ER with any questions/concerns, new/concerning symptoms, worsening, failure to improve or inability to obtain follow-up.       Be sure to follow up with your primary care doctor and review all labs/imaging/tests that were performed during your ER visit with them. It is very common for us to identify non-emergent incidental findings which must be followed up with your primary care physician.  Some labs/imaging/tests may be outside of the normal range, and require non-emergent follow-up and/or further investigation/treatment/procedures/testing to help diagnose/exclude/prevent complications or other potentially serious medical conditions. Some abnormalities may not have been discussed or addressed during your ER visit. Some lab  results may not return during your ER visit but can be accessible by downloading the free [facility] j carlos or by visiting [email or web address] . It is important for you to review all labs/imaging/tests which are outside of the normal range with your physician.    An ER visit does not replace a primary care visit, and many screening tests or follow-up tests cannot be ordered by an ER doctor or performed by the ER. Some tests may even require pre-approval.    If you do not have a primary care doctor, you may contact the one listed on your discharge paperwork or you may also call the [facility] Appointment Desk at [phone] , or [employer] at  [phone] to schedule an appointment, or establish care with a primary care doctor or even a specialist and to obtain information about local resources. It is important to your health that you have a primary care doctor.    Please take all medications as directed. We have done our best to select a medication for you that will treat your condition however, all medications may potentially have side-effects and it is impossible to predict which medications may give you side-effects or what those side-effects (if any) those medications may give you.  If you feel that you are having a negative effect or side-effect of any medication you should stop taking those medications immediately and seek medical attention. If you feel that you are having a life-threatening reaction call 911.        Do not drive, swim, climb to height, take a bath, operate heavy machinery, drink alcohol or take potentially sedating medications, sign any legal documents or make any important decisions for 24 hours if you have received any pain medications, sedatives or mood altering drugs during your ER visit or within 24 hours of taking them if they have been prescribed to you.     You can find additional resources for Dentists, hearing aids, durable medical equipment, low cost pharmacies and  other resources at [email or web address]

## 2024-12-06 LAB
OHS QRS DURATION: 76 MS
OHS QTC CALCULATION: 538 MS

## 2024-12-09 DIAGNOSIS — M79.7 FIBROMYALGIA: ICD-10-CM

## 2024-12-09 RX ORDER — OXYCODONE AND ACETAMINOPHEN 7.5; 325 MG/1; MG/1
1 TABLET ORAL EVERY 6 HOURS PRN
Qty: 120 TABLET | Refills: 0 | Status: SHIPPED | OUTPATIENT
Start: 2024-12-09

## 2024-12-09 RX ORDER — OXYCODONE AND ACETAMINOPHEN 7.5; 325 MG/1; MG/1
1 TABLET ORAL EVERY 6 HOURS PRN
Qty: 120 TABLET | Refills: 0 | Status: CANCELLED | OUTPATIENT
Start: 2024-12-09

## 2024-12-09 NOTE — TELEPHONE ENCOUNTER
No care due was identified.  Health [facility] Embedded Care Due Messages. Reference number: [id number].   12/09/2024 12:28:22 PM CST   PATIENT'S RX IS NOT READY AND HE ONLY HAS TWO TABLETS LEFT.    amphetamine-dextroamphetamine XR (ADDERALL XR) 15 MG 24 hr capsule     PLEASE ADVISE.

## 2024-12-09 NOTE — TELEPHONE ENCOUNTER
No care due was identified.  Health [facility] Embedded Care Due Messages. Reference number: [id number].   12/09/2024 8:07:25 AM CST

## 2025-01-06 DIAGNOSIS — G47.00 INSOMNIA, UNSPECIFIED TYPE: Primary | ICD-10-CM

## 2025-01-06 DIAGNOSIS — M79.7 FIBROMYALGIA: ICD-10-CM

## 2025-01-06 RX ORDER — ZOLPIDEM TARTRATE 12.5 MG/1
12.5 TABLET, FILM COATED, EXTENDED RELEASE ORAL NIGHTLY PRN
Qty: 90 TABLET | Refills: 0 | Status: SHIPPED | OUTPATIENT
Start: 2025-01-06 | End: 2025-07-07

## 2025-01-06 RX ORDER — ALBUTEROL SULFATE 0.83 MG/ML
2.5 SOLUTION RESPIRATORY (INHALATION) EVERY 6 HOURS PRN
Qty: 100 EACH | Refills: 2 | Status: SHIPPED | OUTPATIENT
Start: 2025-01-06

## 2025-01-06 RX ORDER — OXYCODONE AND ACETAMINOPHEN 7.5; 325 MG/1; MG/1
1 TABLET ORAL EVERY 6 HOURS PRN
Qty: 120 TABLET | Refills: 0 | Status: SHIPPED | OUTPATIENT
Start: 2025-01-06

## 2025-01-06 NOTE — TELEPHONE ENCOUNTER
No care due was identified.  [facility] Embedded Care Due Messages. Reference number: [id number].   1/06/2025 6:25:14 AM CST

## 2025-01-06 NOTE — TELEPHONE ENCOUNTER
No care due was identified.  Health [facility] Embedded Care Due Messages. Reference number: [id number].   1/06/2025 6:24:39 AM CST

## 2025-01-06 NOTE — TELEPHONE ENCOUNTER
No care due was identified.  Health [facility] Embedded Care Due Messages. Reference number: [id number].   1/06/2025 10:18:16 AM CST

## 2025-01-06 NOTE — TELEPHONE ENCOUNTER
Refill Routing Note   Medication(s) are not appropriate for processing by [facility] for the following reason(s):        Med affordability; FORMULARY ALTERNATIVE REQUESTED  ED/Hospital Visit since last OV with provider    [facility] action(s):  Defer        Medication Therapy Plan: ALBUTEROL SULFATE NEBULIZER SOLUTION WAS PREVIOUSLY PRESCRIBED. PHARMACY IS STATING NOT COVERED. THEY HAVE PENDED OROVENTIL NEBULIZER SOLUTION  FOR YOUR REVIEW.      Appointments  past 12m or future 3m with PCP    Date Provider   Last Visit   11/1/2024 Bianca Rutledge MD   Next Visit   2/21/2025 Bianca Rutledge MD   ED visits in past 90 days: 1        Note composed:11:34 AM 01/06/2025

## 2025-01-24 DIAGNOSIS — G47.00 INSOMNIA, UNSPECIFIED TYPE: ICD-10-CM

## 2025-01-24 RX ORDER — ZOLPIDEM TARTRATE 12.5 MG/1
12.5 TABLET, FILM COATED, EXTENDED RELEASE ORAL NIGHTLY PRN
Qty: 90 TABLET | Refills: 0 | Status: SHIPPED | OUTPATIENT
Start: 2025-01-24 | End: 2025-07-25

## 2025-01-24 NOTE — TELEPHONE ENCOUNTER
No care due was identified.  [facility] Embedded Care Due Messages. Reference number: [id number].   1/24/2025 1:44:00 PM CST

## 2025-01-27 NOTE — PROVATION PATIENT INSTRUCTIONS
Discharge Summary/Instructions after an Endoscopic Procedure  Patient Name: Emi Johnson  Patient MRN: [id number]  Patient YOB: 1975 Tuesday, March 13, 2018  Eve Currie MD  RESTRICTIONS:  During your procedure today, you received medications for sedation.  These   medications may affect your judgment, balance and coordination.  Therefore,   for 24 hours, you have the following restrictions:   - DO NOT drive a car, operate machinery, make legal/financial decisions,   sign important papers or drink alcohol.    ACTIVITY:  The following day: return to full activity including work, except no heavy   lifting, straining or running for 3 days if polyps were removed.  DIET:  Eat and drink normally unless instructed otherwise.     TREATMENT FOR COMMON SIDE EFFECTS:  - Mild abdominal pain, nausea, belching, bloating or excessive gas:  rest,   eat lightly and use a heating pad.  - Sore Throat: treat with throat lozenges and/or gargle with warm salt   water.  - Because air was used during the procedure, expelling large amounts of air   from your rectum or belching is normal.  - If a bowel prep was taken, you may not have a bowel movement for 1-3 days.    This is normal.  SYMPTOMS TO WATCH FOR AND REPORT TO YOUR PHYSICIAN:  1. Abdominal pain or bloating, other than gas cramps.  2. Chest pain.  3. Back pain.  4. Signs of infection such as: chills or fever occurring within 24 hours   after the procedure.  5. Rectal bleeding, which would show as bright red, maroon, or black stools.   (A tablespoon of blood from the rectum is not serious, especially if   hemorrhoids are present.)  6. Vomiting.  7. Weakness or dizziness.  GO DIRECTLY TO THE NEAREST EMERGENCY ROOM IF YOU HAVE ANY OF THE FOLLOWING:      Difficulty breathing  Chills and/or fever over 101 F   Persistent vomiting and/or vomiting blood   Severe abdominal pain   Severe chest pain   Black, tarry stools   Bleeding- more than one tablespoon   Any other  symptom or condition that you feel may need urgent attention  Your doctor recommends these additional instructions:  If any biopsies were taken, your doctors clinic will contact you in 1 to 2   weeks with any results.  You are being discharged to home.   You have a contact number available for emergencies.  The signs and symptoms   of potential delayed complications were discussed with you.  You may return   to normal activities tomorrow.  Written discharge instructions were   provided to you.   Resume your previous diet.   Your physician has recommended a repeat ileoscopy in one year to assess   disease activity.   Continue your present medications.  For questions, problems or results please call your physician - Eve Currie MD at Work:  [phone].  [facility], EMERGENCY ROOM PHONE NUMBER: [phone]  IF A COMPLICATION OR EMERGENCY SITUATION ARISES AND YOU ARE UNABLE TO REACH   YOUR PHYSICIAN - GO DIRECTLY TO THE EMERGENCY ROOM.  Eve Currie MD  3/13/2018 9:27:42 AM  This report has been verified and signed electronically.   4

## 2025-02-03 DIAGNOSIS — M79.7 FIBROMYALGIA: ICD-10-CM

## 2025-02-03 RX ORDER — OXYCODONE AND ACETAMINOPHEN 7.5; 325 MG/1; MG/1
1 TABLET ORAL EVERY 6 HOURS PRN
Qty: 120 TABLET | Refills: 0 | Status: SHIPPED | OUTPATIENT
Start: 2025-02-03 | End: 2025-03-02 | Stop reason: SDUPTHER

## 2025-02-03 NOTE — TELEPHONE ENCOUNTER
No care due was identified.  [facility] Embedded Care Due Messages. Reference number: [id number].   2/03/2025 7:11:19 AM CST

## 2025-02-14 ENCOUNTER — OFFICE VISIT (OUTPATIENT)
Dept: INTERNAL MEDICINE | Facility: CLINIC | Age: 50
End: 2025-02-14
Payer: COMMERCIAL

## 2025-02-14 VITALS
OXYGEN SATURATION: 96 % | HEART RATE: 82 BPM | DIASTOLIC BLOOD PRESSURE: 68 MMHG | SYSTOLIC BLOOD PRESSURE: 114 MMHG | HEIGHT: 63 IN | BODY MASS INDEX: 36.72 KG/M2 | TEMPERATURE: 97 F | WEIGHT: 207.25 LBS

## 2025-02-14 DIAGNOSIS — Z00.00 ROUTINE MEDICAL EXAM: Primary | ICD-10-CM

## 2025-02-14 DIAGNOSIS — G47.00 INSOMNIA, UNSPECIFIED TYPE: ICD-10-CM

## 2025-02-14 LAB
OHS QRS DURATION: 74 MS
OHS QTC CALCULATION: 440 MS

## 2025-02-14 PROCEDURE — 93010 ELECTROCARDIOGRAM REPORT: CPT | Mod: S$GLB,,, | Performed by: INTERNAL MEDICINE

## 2025-02-14 PROCEDURE — 99999 PR PBB SHADOW E&M-EST. PATIENT-LVL III: CPT | Mod: PBBFAC,,, | Performed by: INTERNAL MEDICINE

## 2025-02-14 PROCEDURE — 99396 PREV VISIT EST AGE 40-64: CPT | Mod: S$GLB,,, | Performed by: INTERNAL MEDICINE

## 2025-02-14 PROCEDURE — 3008F BODY MASS INDEX DOCD: CPT | Mod: CPTII,S$GLB,, | Performed by: INTERNAL MEDICINE

## 2025-02-14 PROCEDURE — 3078F DIAST BP <80 MM HG: CPT | Mod: CPTII,S$GLB,, | Performed by: INTERNAL MEDICINE

## 2025-02-14 PROCEDURE — 3074F SYST BP LT 130 MM HG: CPT | Mod: CPTII,S$GLB,, | Performed by: INTERNAL MEDICINE

## 2025-02-14 PROCEDURE — 93005 ELECTROCARDIOGRAM TRACING: CPT | Mod: S$GLB,,, | Performed by: INTERNAL MEDICINE

## 2025-02-14 RX ORDER — ZOLPIDEM TARTRATE 12.5 MG/1
12.5 TABLET, FILM COATED, EXTENDED RELEASE ORAL NIGHTLY PRN
Qty: 90 TABLET | Refills: 0 | Status: SHIPPED | OUTPATIENT
Start: 2025-02-14 | End: 2025-08-15

## 2025-02-14 NOTE — PROGRESS NOTES
The patient is a 49 y.o. old female who presents to the office for a physical.     RESPIRATORY SYMPTOMS:  She reports persistent cough associated with voice changes and sinus drainage. She is experiencing an asthma flare up. The coughing episodes are exacerbating her abdominal pain.    FIBROMYALGIA:  She reports exacerbation of fibromyalgia symptoms, particularly severe cramps and pains in her feet and toes, aggravated by weather changes.    PRE-OPERATIVE:  She is scheduled for hernia repair surgery and ostomy relocation on March 27th. She has a large hernia requiring surgical repair. The ostomy will be relocated to the contralateral side during the procedure.    SLEEP:  She reports difficulty obtaining current sleep medication from pharmacy and desires to switch back to Ambien 10 mg due to recurring supply issues.    ALLERGIES:  She has allergies to aspirin, mold, sulfa, iodine, adhesive, infliximab, and latex.    SOCIAL HISTORY:  She is employed at [employer].      ROS:  Respiratory: +cough  Musculoskeletal: +muscle pain       PAST MEDICAL HISTORY  Past Medical History:   Diagnosis Date    Anemia     Asthma     B12 deficiency     Crohn's disease     History of shingles     Lupus     Migraine headache     Raynaud disease     Reactive hypoglycemia     Seizures 2004    no medication     Sleep apnea     Non compliant with CPAP       SURGICAL HISTORY:  Past Surgical History:   Procedure Laterality Date    ABDOMINAL SURGERY      COLON SURGERY      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    COLONOSCOPY      COLOSTOMY      ESOPHAGOGASTRODUODENOSCOPY N/A 5/26/2022    Procedure: ESOPHAGOGASTRODUODENOSCOPY (EGD);  Surgeon: Tin Novak MD;  Location: [facility] (2ND FLR);  Service: Endoscopy;  Laterality: N/A;  fully vaccinated    HYSTERECTOMY      due to endometriosis    ILEOSCOPY N/A 4/10/2019    Procedure: ILEOSCOPY through stoma;  Surgeon: Eve Currie MD;  Location: [facility] (4TH FLR);  Service: Endoscopy;   "Laterality: N/A;  Schedule as 30 minute case, schedule in April or May 2019    ILEOSCOPY N/A 5/26/2022    Procedure: ILEOSCOPY;  Surgeon: Tin Novak MD;  Location: [facility] ([address] [place] [address]);  Service: Endoscopy;  Laterality: N/A;    ILEOSTOMY      LAPAROSCOPY W/ MINI-LAPAROTOMY      large intestine removed      just a portion    pelvic mass removal      REVISION COLOSTOMY  2010    SMALL INTESTINE SURGERY      J pouch excision with end ileostomy    STOMACH SURGERY      TONSILLECTOMY, ADENOIDECTOMY           MEDS:  Medcard reviewed and updated    ALLERGIES: Allergy Card reviewed and updated    SOCIAL HISTORY:   The patient is a nonsmoker, denies alcohol or illicit drug use.    SCREENINGS:  Last cholesterol:2024 .  Last colonoscopy/ Sigmoidoscopy: 2016  Last mammogram: 2023  Last Pap smear: 3 years ago  Last tetanus: 2021  Last Pneumovax: 2015  Last eye exam: December 2024  Last bone density: none  Last menstrual period: hysterectomy    PE:   Vitals:  Vitals:    02/14/25 1139   BP: 114/68   Pulse: 82   Temp: 97 °F (36.1 °C)       APPEARANCE: Well nourished, well developed, in no acute distress.    EYES: Sclerae anicteric. PERRL. EOMI.      EARS: TM's intact. No retraction or perforation.    NOSE: Mucosa pink. Airway clear.  MOUTH & THROAT: No tonsillar enlargement. No pharyngeal erythema or exudate. No stridor.  NECK: Supple, no thyromegaly.  CHEST: Lungs clear to auscultation with unlabored respirations.  CARDIOVASCULAR: Normal S1, S2. No murmurs. No carotid bruits. No pedal edema.  ABDOMEN: Bowel sounds normal. Not distended. Soft. No tenderness or masses.  Positive colostomy.  MUSCULOSKELETAL:  Normal gait, no cyanosis or clubbing.   SKIN: Normal skin turgor, warm and dry.  NEUROLOGIC: Cranial Nerves: Intact.  PSYCHIATRIC: The patient is oriented to person, place, and time and has a pleasant affect.        ASSESSMENT/PLAN:  Emi Goldman" was seen today for follow-up.    Diagnoses and all orders for this " visit:    Routine medical exam  -     CBC Auto Differential; Future  -     Comprehensive Metabolic Panel; Future  -     Lipid Panel; Future  -     TSH; Future  -     Hemoglobin A1C; Future  -     Urinalysis; Future  -     EKG 12-lead    Insomnia, unspecified type  -     zolpidem (AMBIEN CR) 12.5 MG CR tablet; Take 1 tablet (12.5 mg total) by mouth nightly as needed for Insomnia.        IMPRESSION:  - Assessed ongoing cough, voice changes, and sinus symptoms  - Considered fibromyalgia as cause of foot and toe pain  - Evaluated upcoming hernia repair and ostomy relocation surgery scheduled for March 27th  - Reviewed asthma exacerbation  - Discussed sleep medication options due to insurance coverage issues  - Considered pneumonia vaccine due to Crohn's disease, but deferred until after surgery  - Ordered EKG due to elevated heart rate noted in December, allowing time for potential stress test or cardiology consult before surgery if needed  - Planned fasting labs for physical exam, coordinating timing with pre-op appointment    CROHN'S DISEASE OF SMALL INTESTINE WITHOUT COMPLICATION:  - Noted patient's history of Crohn's disease and allergy to infliximab (Remicade).  - Acknowledged reported abdominal pains affecting heart rate in December.  - Recommend new pneumonia vaccine due to Crohn's disease, but suggested waiting until after other medical procedures.    ATTENTION TO ILEOSTOMY:  - Scheduled surgery on March 27th to repair large hernia and relocate ileostomy to left side.  - Procedure will be extensive, involving incision from upper to lower abdomen.  - Estimated recovery time is 2-3 months with 1.5-2 weeks of hospitalization.  - Ordered pre-operative tests including fasting labs and EKG.  - Discussed potential need for stress test or cardiology consultation before surgery.    FIBROMYALGIA:  - Emi reported severe cramps and pains in feet and toes, as well as joint pain during the review of systems, which are  consistent with fibromyalgia symptoms.  - Confirmed exacerbation of fibromyalgia, likely due to recent weather changes     ASTHMA:  - Emi reported asthma exacerbation.  - Confirmed patient has a nebulizer for management.  - Continued nebulizer for asthma treatment.    MEDICATIONS/SUPPLEMENTS:  - Changed sleep medication to Ambien 10mg due to insurance coverage issues and patient's request, as there were supply issues with the current medication.    OTHER INSTRUCTIONS:  - Emi has undergone a hysterectomy but still has cervix, requiring continued pap smears.  - Documented multiple allergies including aspirin, mold, sulfa, iodine, adhesive, and latex.  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  - Noted patient's report of persistent cough and sensation of sinus drainage.       Answers submitted by the patient for this visit:  Review of Systems Questionnaire (Submitted on 2/14/2025)  activity change: No  unexpected weight change: No  neck pain: No  hearing loss: No  rhinorrhea: No  trouble swallowing: No  eye discharge: No  visual disturbance: No  chest tightness: No  wheezing: No  chest pain: No  palpitations: No  blood in stool: No  constipation: No  vomiting: No  diarrhea: No  polydipsia: No  polyuria: No  difficulty urinating: No  hematuria: No  menstrual problem: No  dysuria: No  joint swelling: No  arthralgias: Yes  headaches: Yes  weakness: No  confusion: No  dysphoric mood: No

## 2025-02-17 ENCOUNTER — PATIENT MESSAGE (OUTPATIENT)
Dept: INTERNAL MEDICINE | Facility: CLINIC | Age: 50
End: 2025-02-17
Payer: COMMERCIAL

## 2025-02-17 NOTE — TELEPHONE ENCOUNTER
Ava msg. Pt sent another [facility] msg also requesting EKG results which was routed to provider for review.

## 2025-02-18 ENCOUNTER — PATIENT MESSAGE (OUTPATIENT)
Dept: INTERNAL MEDICINE | Facility: CLINIC | Age: 50
End: 2025-02-18
Payer: COMMERCIAL

## 2025-02-21 ENCOUNTER — OFFICE VISIT (OUTPATIENT)
Dept: INTERNAL MEDICINE | Facility: CLINIC | Age: 50
End: 2025-02-21
Payer: COMMERCIAL

## 2025-02-21 VITALS
OXYGEN SATURATION: 98 % | WEIGHT: 207.25 LBS | TEMPERATURE: 97 F | HEART RATE: 74 BPM | DIASTOLIC BLOOD PRESSURE: 80 MMHG | SYSTOLIC BLOOD PRESSURE: 122 MMHG | HEIGHT: 63 IN | BODY MASS INDEX: 36.72 KG/M2

## 2025-02-21 DIAGNOSIS — Z43.2 ATTENTION TO ILEOSTOMY: ICD-10-CM

## 2025-02-21 DIAGNOSIS — E66.01 SEVERE OBESITY (BMI 35.0-39.9) WITH COMORBIDITY: ICD-10-CM

## 2025-02-21 DIAGNOSIS — Z01.818 PREOP EXAM FOR INTERNAL MEDICINE: ICD-10-CM

## 2025-02-21 DIAGNOSIS — K50.00 CROHN'S DISEASE OF SMALL INTESTINE WITHOUT COMPLICATION: ICD-10-CM

## 2025-02-21 DIAGNOSIS — R94.31 ABNORMAL EKG: Primary | ICD-10-CM

## 2025-02-21 RX ORDER — ONDANSETRON 4 MG/1
TABLET, ORALLY DISINTEGRATING ORAL
Qty: 30 TABLET | Refills: 3 | Status: SHIPPED | OUTPATIENT
Start: 2025-02-21

## 2025-02-21 NOTE — PROGRESS NOTES
CC: followup of abnormal EKG  HPI:  The patient is a 49 y.o. year old female with severe obesity with comorbidity, Crohn's disease of small intestine without complication and attention to ileostomy who presents to the office for followup of abnormal EKG.  The patient denies any chest pain, shortness of breath, new headache, blurred vision, excessive fatigue, nausea or vomiting.  She is scheduled for surgery next month.    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    COLONOSCOPY      COLOSTOMY      ESOPHAGOGASTRODUODENOSCOPY N/A 5/26/2022    Procedure: ESOPHAGOGASTRODUODENOSCOPY (EGD);  Surgeon: Tin Novak MD;  [facility]  Service: Endoscopy;  Laterality: N/A;  fully vaccinated    HYSTERECTOMY      due to endometriosis    ILEOSCOPY N/A 4/10/2019    Procedure: ILEOSCOPY through stoma;  Surgeon: Eve Currie MD;  Location: [facility] (4TH FLR);  Service: Endoscopy;  Laterality: N/A;  Schedule as 30 minute case, schedule in April or May 2019    ILEOSCOPY N/A 5/26/2022    Procedure: ILEOSCOPY;  Surgeon: Tin Novak MD;  Location: [facility] (2ND FLR);  Service: Endoscopy;  Laterality: N/A;    ILEOSTOMY      LAPAROSCOPY W/ MINI-LAPAROTOMY      large intestine removed      just a portion    pelvic mass removal      REVISION COLOSTOMY  2010    SMALL INTESTINE SURGERY      J pouch excision with end ileostomy    STOMACH SURGERY      TONSILLECTOMY, ADENOIDECTOMY         MEDS:  Medcard reviewed and updated    ALLERGIES: Allergy Card reviewed and updated    SOCIAL HISTORY:  "  The patient is a nonsmoker.    PE:   APPEARANCE: Well nourished, well developed, in no acute distress.    CHEST: Lungs clear to auscultation with unlabored respirations.  CARDIOVASCULAR: Normal S1, S2. No murmurs. No carotid bruits. No pedal edema.  ABDOMEN: Bowel sounds normal. Not distended. Soft. No tenderness or masses. Positive ostomy.  PSYCHIATRIC: The patient is oriented to person, place, and time and has a pleasant affect.        ASSESSMENT/PLAN:  Emi Goldman" was seen today for follow-up and medication refill.    Diagnoses and all orders for this visit:    Abnormal EKG  -     Stress Echo Which stress agent will be used? Pharmacological; Color Flow Doppler? No; Future    Preop exam for internal medicine  -     Stress Echo Which stress agent will be used? Pharmacological; Color Flow Doppler? No; Future    Severe obesity (BMI 35.0-39.9) with comorbidity  -     encouraged weight loss through healthy diet and regular exercise    Crohn's disease of small intestine without complication  -     followed by GI    Attention to ileostomy  -     awaiting surgical revision    Other orders  -     ondansetron (ZOFRAN-ODT) 4 MG TbDL; DISSOLVE 1 TABLET BY MOUTH EVERY 8 HOURS AS NEEDED      "

## 2025-02-28 ENCOUNTER — LAB VISIT (OUTPATIENT)
Dept: LAB | Facility: HOSPITAL | Age: 50
End: 2025-02-28
Attending: INTERNAL MEDICINE
Payer: COMMERCIAL

## 2025-02-28 ENCOUNTER — RESULTS FOLLOW-UP (OUTPATIENT)
Dept: INTERNAL MEDICINE | Facility: CLINIC | Age: 50
End: 2025-02-28

## 2025-02-28 DIAGNOSIS — Z00.00 ROUTINE MEDICAL EXAM: ICD-10-CM

## 2025-02-28 LAB
BILIRUB UR QL STRIP: NEGATIVE
CLARITY UR: CLEAR
COLOR UR: YELLOW
GLUCOSE UR QL STRIP: NEGATIVE
HGB UR QL STRIP: NEGATIVE
KETONES UR QL STRIP: NEGATIVE
LEUKOCYTE ESTERASE UR QL STRIP: NEGATIVE
NITRITE UR QL STRIP: NEGATIVE
PH UR STRIP: 6 [PH] (ref 5–8)
PROT UR QL STRIP: NEGATIVE
SP GR UR STRIP: 1.02 (ref 1–1.03)
URN SPEC COLLECT METH UR: NORMAL
UROBILINOGEN UR STRIP-ACNC: NEGATIVE EU/DL

## 2025-02-28 PROCEDURE — 81003 URINALYSIS AUTO W/O SCOPE: CPT | Performed by: INTERNAL MEDICINE

## 2025-03-02 DIAGNOSIS — M79.7 FIBROMYALGIA: ICD-10-CM

## 2025-03-03 RX ORDER — OXYCODONE AND ACETAMINOPHEN 7.5; 325 MG/1; MG/1
1 TABLET ORAL EVERY 6 HOURS PRN
Qty: 120 TABLET | Refills: 0 | Status: SHIPPED | OUTPATIENT
Start: 2025-03-03

## 2025-03-03 NOTE — TELEPHONE ENCOUNTER
No care due was identified.  Health [facility] Embedded Care Due Messages. Reference number: [id number].   3/02/2025 7:11:48 PM CST

## 2025-03-06 ENCOUNTER — PATIENT MESSAGE (OUTPATIENT)
Dept: INTERNAL MEDICINE | Facility: CLINIC | Age: 50
End: 2025-03-06
Payer: COMMERCIAL

## 2025-03-06 ENCOUNTER — TELEPHONE (OUTPATIENT)
Dept: INTERNAL MEDICINE | Facility: CLINIC | Age: 50
End: 2025-03-06
Payer: COMMERCIAL

## 2025-03-06 NOTE — TELEPHONE ENCOUNTER
Spoke to pt and she states she was now told that she needs the clearance and all testing done the week before her appt with the doctor on 03/20/2025. So the test and clearance would have to be in by 03/13/2025. She stated she has seen Cardiology at  last year November or December of 2024 and they cleared her saying her heart is fine. She was wondering if this stress test is needed.

## 2025-03-10 ENCOUNTER — HOSPITAL ENCOUNTER (OUTPATIENT)
Dept: CARDIOLOGY | Facility: HOSPITAL | Age: 50
Discharge: HOME | End: 2025-03-10
Attending: INTERNAL MEDICINE
Payer: COMMERCIAL

## 2025-03-10 ENCOUNTER — RESULTS FOLLOW-UP (OUTPATIENT)
Dept: INTERNAL MEDICINE | Facility: CLINIC | Age: 50
End: 2025-03-10

## 2025-03-10 ENCOUNTER — OFFICE VISIT (OUTPATIENT)
Dept: INTERNAL MEDICINE | Facility: CLINIC | Age: 50
End: 2025-03-10
Payer: COMMERCIAL

## 2025-03-10 VITALS
DIASTOLIC BLOOD PRESSURE: 70 MMHG | WEIGHT: 212.06 LBS | OXYGEN SATURATION: 95 % | BODY MASS INDEX: 39.02 KG/M2 | HEART RATE: 78 BPM | HEIGHT: 62 IN | SYSTOLIC BLOOD PRESSURE: 106 MMHG | TEMPERATURE: 98 F

## 2025-03-10 VITALS
HEIGHT: 63 IN | DIASTOLIC BLOOD PRESSURE: 72 MMHG | WEIGHT: 200 LBS | SYSTOLIC BLOOD PRESSURE: 131 MMHG | BODY MASS INDEX: 35.44 KG/M2 | HEART RATE: 74 BPM | RESPIRATION RATE: 18 BRPM

## 2025-03-10 DIAGNOSIS — M79.7 FIBROMYALGIA: ICD-10-CM

## 2025-03-10 DIAGNOSIS — K46.9 ABDOMINAL HERNIA WITHOUT OBSTRUCTION AND WITHOUT GANGRENE, RECURRENCE NOT SPECIFIED, UNSPECIFIED HERNIA TYPE: ICD-10-CM

## 2025-03-10 DIAGNOSIS — R94.31 ABNORMAL EKG: ICD-10-CM

## 2025-03-10 DIAGNOSIS — Z01.818 PREOP EXAM FOR INTERNAL MEDICINE: Primary | ICD-10-CM

## 2025-03-10 DIAGNOSIS — Z01.818 PREOP EXAM FOR INTERNAL MEDICINE: ICD-10-CM

## 2025-03-10 LAB
ASCENDING AORTA: 3.11 CM
AV AREA BY CONTINUOUS VTI: 3.3 CM2
AV INDEX (PROSTH): 1.02
AV LVOT MEAN GRADIENT: 2 MMHG
AV LVOT PEAK GRADIENT: 4 MMHG
AV MEAN GRADIENT: 2 MMHG
AV PEAK GRADIENT: 4 MMHG
AV VALVE AREA BY VELOCITY RATIO: 3.1 CM²
AV VALVE AREA: 3.2 CM2
AV VELOCITY RATIO: 1
BSA FOR ECHO PROCEDURE: 2.01 M2
CV ECHO LV RWT: 0.36 CM
CV STRESS BASE HR: 74 BPM
DIASTOLIC BLOOD PRESSURE: 72 MMHG
DOP CALC AO PEAK VEL: 1 M/S
DOP CALC AO VTI: 20.1 CM
DOP CALC LVOT AREA: 3.1 CM2
DOP CALC LVOT DIAMETER: 2 CM
DOP CALC LVOT PEAK VEL: 1 M/S
DOP CALC LVOT STROKE VOLUME: 64.7 CM3
DOP CALCLVOT PEAK VEL VTI: 20.6 CM
E WAVE DECELERATION TIME: 172 MS
E/A RATIO: 1.34
E/E' RATIO: 7 M/S
ECHO EF ESTIMATED: 68 %
ECHO LV POSTERIOR WALL: 0.8 CM (ref 0.6–1.1)
FRACTIONAL SHORTENING: 37.8 % (ref 28–44)
INTERVENTRICULAR SEPTUM: 0.8 CM (ref 0.6–1.1)
IVC DIAMETER: 1.52 CM
IVRT: 83 MS
LA MAJOR: 4.5 CM
LA MINOR: 4.5 CM
LA WIDTH: 3.1 CM
LEFT ATRIUM SIZE: 3.2 CM
LEFT ATRIUM VOLUME INDEX: 20 ML/M2
LEFT ATRIUM VOLUME: 38 CM3
LEFT INTERNAL DIMENSION IN SYSTOLE: 2.8 CM (ref 2.1–4)
LEFT VENTRICLE DIASTOLIC VOLUME INDEX: 48.19 ML/M2
LEFT VENTRICLE DIASTOLIC VOLUME: 93 ML
LEFT VENTRICLE MASS INDEX: 58.9 G/M2
LEFT VENTRICLE SYSTOLIC VOLUME INDEX: 15 ML/M2
LEFT VENTRICLE SYSTOLIC VOLUME: 29 ML
LEFT VENTRICULAR INTERNAL DIMENSION IN DIASTOLE: 4.5 CM (ref 3.5–6)
LEFT VENTRICULAR MASS: 113.6 G
LV LATERAL E/E' RATIO: 6.1
LV SEPTAL E/E' RATIO: 8.8
MV A" WAVE DURATION": 99.9 MS
MV PEAK A VEL: 0.59 M/S
MV PEAK E VEL: 0.79 M/S
OHS CV CPX 1 MINUTE RECOVERY HEART RATE: 102 BPM
OHS CV CPX 85 PERCENT MAX PREDICTED HEART RATE MALE: 145
OHS CV CPX MAX PREDICTED HEART RATE: 171
OHS CV CPX PATIENT IS FEMALE: 1
OHS CV CPX PATIENT IS MALE: 0
OHS CV CPX PEAK DIASTOLIC BLOOD PRESSURE: 59 MMHG
OHS CV CPX PEAK HEAR RATE: 141 BPM
OHS CV CPX PEAK RATE PRESSURE PRODUCT: NORMAL
OHS CV CPX PEAK SYSTOLIC BLOOD PRESSURE: 138 MMHG
OHS CV CPX PERCENT MAX PREDICTED HEART RATE ACHIEVED: 87
OHS CV CPX RATE PRESSURE PRODUCT PRESENTING: 9694
OHS CV INITIAL DOSE: 10 MCG/KG/MIN
OHS CV PEAK DOSE: 30 MCG/KG/MIN
OHS CV RV/LV RATIO: 0.64 CM
PISA TR MAX VEL: 2.6 M/S
PULM VEIN A" WAVE DURATION": 99.9 MS
PULM VEIN S/D RATIO: 1
PULMONIC VEIN PEAK A VELOCITY: 0.3 M/S
PV PEAK D VEL: 0.45 M/S
PV PEAK S VEL: 0.45 M/S
RA MAJOR: 3.9 CM
RA PRESSURE ESTIMATED: 8 MMHG
RA WIDTH: 3.19 CM
RIGHT VENTRICLE DIASTOLIC BASEL DIMENSION: 2.9 CM
RV TB RVSP: 11 MMHG
RV TISSUE DOPPLER FREE WALL SYSTOLIC VELOCITY 1 (APICAL 4 CHAMBER VIEW): 11.74 CM/S
SINUS: 3.2 CM
STJ: 2.75 CM
SYSTOLIC BLOOD PRESSURE: 131 MMHG
TDI LATERAL: 0.13 M/S
TDI SEPTAL: 0.09 M/S
TDI: 0.11 M/S
TRICUSPID ANNULAR PLANE SYSTOLIC EXCURSION: 2.14 CM
TV PEAK GRADIENT: 27 MMHG
TV REST PULMONARY ARTERY PRESSURE: 35 MMHG
Z-SCORE OF LEFT VENTRICULAR DIMENSION IN END DIASTOLE: -1.94
Z-SCORE OF LEFT VENTRICULAR DIMENSION IN END SYSTOLE: -1.45

## 2025-03-10 PROCEDURE — 3008F BODY MASS INDEX DOCD: CPT | Mod: CPTII,S$GLB,, | Performed by: INTERNAL MEDICINE

## 2025-03-10 PROCEDURE — 1159F MED LIST DOCD IN RCRD: CPT | Mod: CPTII,S$GLB,, | Performed by: INTERNAL MEDICINE

## 2025-03-10 PROCEDURE — 63600175 PHARM REV CODE 636 W HCPCS: Performed by: INTERNAL MEDICINE

## 2025-03-10 PROCEDURE — 3074F SYST BP LT 130 MM HG: CPT | Mod: CPTII,S$GLB,, | Performed by: INTERNAL MEDICINE

## 2025-03-10 PROCEDURE — 1160F RVW MEDS BY RX/DR IN RCRD: CPT | Mod: CPTII,S$GLB,, | Performed by: INTERNAL MEDICINE

## 2025-03-10 PROCEDURE — 99214 OFFICE O/P EST MOD 30 MIN: CPT | Mod: S$GLB,,, | Performed by: INTERNAL MEDICINE

## 2025-03-10 PROCEDURE — 3078F DIAST BP <80 MM HG: CPT | Mod: CPTII,S$GLB,, | Performed by: INTERNAL MEDICINE

## 2025-03-10 PROCEDURE — G2211 COMPLEX E/M VISIT ADD ON: HCPCS | Mod: S$GLB,,, | Performed by: INTERNAL MEDICINE

## 2025-03-10 PROCEDURE — 99999 PR PBB SHADOW E&M-EST. PATIENT-LVL V: CPT | Mod: PBBFAC,,, | Performed by: INTERNAL MEDICINE

## 2025-03-10 PROCEDURE — 93351 STRESS TTE COMPLETE: CPT | Mod: 26,,, | Performed by: INTERNAL MEDICINE

## 2025-03-10 PROCEDURE — 3044F HG A1C LEVEL LT 7.0%: CPT | Mod: CPTII,S$GLB,, | Performed by: INTERNAL MEDICINE

## 2025-03-10 PROCEDURE — 93351 STRESS TTE COMPLETE: CPT

## 2025-03-10 RX ORDER — DOBUTAMINE HYDROCHLORIDE 400 MG/100ML
30 INJECTION, SOLUTION INTRAVENOUS
Status: COMPLETED | OUTPATIENT
Start: 2025-03-10 | End: 2025-03-10

## 2025-03-10 RX ORDER — ATROPINE SULFATE 0.1 MG/ML
0.1 INJECTION INTRAVENOUS
Status: COMPLETED | OUTPATIENT
Start: 2025-03-10 | End: 2025-03-10

## 2025-03-10 RX ORDER — OXYCODONE AND ACETAMINOPHEN 7.5; 325 MG/1; MG/1
1 TABLET ORAL EVERY 4 HOURS PRN
Qty: 180 TABLET | Refills: 0 | Status: SHIPPED | OUTPATIENT
Start: 2025-04-04

## 2025-03-10 RX ADMIN — ATROPINE SULFATE 0.1 MG: 0.1 INJECTION INTRAVENOUS at 10:03

## 2025-03-10 RX ADMIN — DOBUTAMINE 30 MCG/KG/MIN: 12.5 INJECTION, SOLUTION, CONCENTRATE INTRAVENOUS at 10:03

## 2025-03-10 NOTE — PROGRESS NOTES
48 yo female here for pre-op evaluation of hernia repair    No CP/SOB/nausea/MI last 3 months, No heart failure, No arrhythmias, No valvular heart disease.    RCRI criteria: No IDDM, No CAD, No CVA, No chronic renal insufficiency, No heart failure, Yes high risk surgery    Functional status: greater than 4 METS    Bleeding risk - history of bleeding with prior surgeries - No, family history of bleeding disorders - No    History of prior anesthetic complications - No    No tobacco, No EtOH, No Illicit substances    Chronic Steroid usage - No    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    COLONOSCOPY      COLOSTOMY      ESOPHAGOGASTRODUODENOSCOPY N/A 5/26/2022    Procedure: ESOPHAGOGASTRODUODENOSCOPY (EGD);  Surgeon: Tin Novak MD;  Location: [facility] ([address] [place] [address]);  Service: Endoscopy;  Laterality: N/A;  fully vaccinated    HYSTERECTOMY      due to endometriosis    ILEOSCOPY N/A 4/10/2019    Procedure: ILEOSCOPY through stoma;  Surgeon: Eve Currie MD;  Location: [facility] (4TH FLR);  Service: Endoscopy;  Laterality: N/A;  Schedule as 30 minute case, schedule in April or May 2019    ILEOSCOPY N/A 5/26/2022    Procedure: ILEOSCOPY;  Surgeon: Tin Novak MD;  [facility]  Service: Endoscopy;  Laterality: N/A;    ILEOSTOMY      LAPAROSCOPY W/ MINI-LAPAROTOMY      large intestine removed      just a portion    pelvic mass removal      REVISION COLOSTOMY  2010    SMALL INTESTINE SURGERY      J pouch excision with end ileostomy    STOMACH SURGERY      TONSILLECTOMY,  "ADENOIDECTOMY         MEDS:  Medcard reviewed and updated    ALLERGIES: Allergy Card reviewed and updated    SOCIAL HISTORY:   The patient is a nonsmoker.    PE:   APPEARANCE: Well nourished, well developed, in no acute distress.    CHEST: Lungs clear to auscultation with unlabored respirations.  CARDIOVASCULAR: Normal S1, S2. No murmurs. No carotid bruits. No pedal edema.  ABDOMEN: Bowel sounds normal. Not distended. Soft. No tenderness or masses.  Positive ostomy.  PSYCHIATRIC: The patient is oriented to person, place, and time and has a pleasant affect.        ASSESSMENT/PLAN:  Emi Goldman" was seen today for pre-op exam.    Diagnoses and all orders for this visit:    Preop exam for internal medicine  -     PROTIME-INR; Future  -     APTT; Future    Abdominal hernia without obstruction and without gangrene, recurrence not specified, unspecified hernia type  -     PROTIME-INR; Future  -     APTT; Future    Fibromyalgia  -     oxyCODONE-acetaminophen (PERCOCET) 7.5-325 mg per tablet; Take 1 tablet by mouth every 4 (four) hours as needed for Pain.    ADDENDUM:  Labs are stable.  EKG shows no acute abnormalities.  Stress echo is negative for ischemia.  The patient is medically maximized for surgery.    "

## 2025-03-31 ENCOUNTER — TELEPHONE (OUTPATIENT)
Dept: INTERNAL MEDICINE | Facility: CLINIC | Age: 50
End: 2025-03-31
Payer: COMMERCIAL

## 2025-03-31 NOTE — TELEPHONE ENCOUNTER
Returned call.  Patient was prescribed Percocet 10 mg in preparation for her surgery, which she is satisfied with.  She will continue medication as prescribed.

## 2025-03-31 NOTE — TELEPHONE ENCOUNTER
Copied from CRM #[id number]. Topic: General Inquiry - Patient Advice  >> Mar 31, 2025  8:00 AM Stefania wrote:  Patient would like to get medical advice.  Symptoms (please be specific):   Floridalma with [facility] is calling to discuss with Dr Rutledge the pt pain management   How long have you had these symptoms: N/A  Would you like a call back, or a response through your [facility] portal?: call back to Floridalma at [facility]     Pharmacy name and phone # (copy from chart):   N/A  Comments:

## 2025-04-14 ENCOUNTER — PATIENT MESSAGE (OUTPATIENT)
Dept: WOUND CARE | Facility: CLINIC | Age: 50
End: 2025-04-14
Payer: COMMERCIAL

## 2025-04-16 ENCOUNTER — OFFICE VISIT (OUTPATIENT)
Dept: WOUND CARE | Facility: CLINIC | Age: 50
End: 2025-04-16
Payer: COMMERCIAL

## 2025-04-16 DIAGNOSIS — Z43.2 ATTENTION TO ILEOSTOMY: Primary | ICD-10-CM

## 2025-04-16 DIAGNOSIS — K43.9 HERNIA OF ABDOMINAL WALL: ICD-10-CM

## 2025-04-16 PROCEDURE — 99214 OFFICE O/P EST MOD 30 MIN: CPT | Mod: S$GLB,,, | Performed by: CLINICAL NURSE SPECIALIST

## 2025-04-16 PROCEDURE — 1160F RVW MEDS BY RX/DR IN RCRD: CPT | Mod: CPTII,S$GLB,, | Performed by: CLINICAL NURSE SPECIALIST

## 2025-04-16 PROCEDURE — 99999 PR PBB SHADOW E&M-EST. PATIENT-LVL IV: CPT | Mod: PBBFAC,,, | Performed by: CLINICAL NURSE SPECIALIST

## 2025-04-16 PROCEDURE — 1159F MED LIST DOCD IN RCRD: CPT | Mod: CPTII,S$GLB,, | Performed by: CLINICAL NURSE SPECIALIST

## 2025-04-16 PROCEDURE — 3044F HG A1C LEVEL LT 7.0%: CPT | Mod: CPTII,S$GLB,, | Performed by: CLINICAL NURSE SPECIALIST

## 2025-04-16 NOTE — PROGRESS NOTES
Subjective:       Patient ID: Emi Johnson is a 49 y.o. female.    Chief Complaint: Ileostomy    This is a visit to enterostomal therapy clinic and she comes today for assistance with her new Ileostomy post hernia repair and reloction done 3/26 at [facility] .   PMH  She had total colectomy for crohn's  2018  by Dr Saldaña, she later developed large hernia that was subsequently repaired.     Wound Check      Review of Systems   Constitutional:  Negative for fever.   HENT: Negative.     Respiratory: Negative.     Gastrointestinal:  Negative for abdominal distention.        Ileostomy   Genitourinary:  Negative for dysuria.   Musculoskeletal: Negative.    Skin:  Negative for rash and wound.   Neurological: Negative.        Objective:      Physical Exam  Constitutional:       Appearance: She is well-developed.   Pulmonary:      Effort: Pulmonary effort is normal. No respiratory distress.   Abdominal:      General: Bowel sounds are normal.      Palpations: Abdomen is soft.   Skin:     General: Skin is warm and dry.      Findings: No rash.   Neurological:      Mental Status: She is alert.             4/16/25  relocated ileo  29 mm today and slight rash noted that itches   Suture line looks good   Suggest cont with AARON new image 29 m cut 94212  and a belt too for next month  this will put some pressure around stoma to lift it a mm or so   Discussed use of hernia binder arline when she goes back to work in 3 weeks     4/21/23  sjhowed pt how to touch the bumps and treat at home  she is using New image precut 25 mm but stoma is larger, she is advised to use rings since this is only supply she has  Discussed Hernia binder as she works at [employer]. She has had one hernia repair already and needs support while at work   I gave her a [employer] that I had and fit her . As she said she does not have the money to buy a binder.                7/2/18 7/25/19 post hernia repair       5/13/20 5/19/21  Stoma is still 28  mm and she wants to stay with 13/4 flange new image so as last time I have reiterated to cut it a tad to fit perfectly, gave her a pair of scissors that make this easy to do and a angie   Had same discussion on miralax and hydration to encourage her to drink more daily, she drinks coke typically and very little water.    Assessment:       1. Attention to ileostomy    2. Hernia of abdominal wall        Plan:     Wear the Hernia binder as much as possible at work   Cut the 25 mm wafer to 29 mm for now   With her 13/4 wafer if possible to keep from moving up in size   [employer]abe is her DME    I spent a total of 30 minutes on the day of the visit.  This includes face to face time and non-face to face time preparing to see the patient (eg, review of tests), obtaining and/or reviewing separately obtained history, documenting clinical information in the electronic or other health record, independently interpreting results and communicating results to the patient/family/caregiver, or care coordinator.

## 2025-04-18 NOTE — IP AVS SNAPSHOT
[facility]  [address]  [facility] [address]  Phone: [phone]           Patient Discharge Instructions     Our goal is to set you up for success. This packet includes information on your condition, medications, and your home care. It will help you to care for yourself so you don't get sicker and need to go back to the hospital.     Please ask your nurse if you have any questions.        There are many details to remember when preparing to leave the hospital. Here is what you will need to do:    1. Take your medicine. If you are prescribed medications, review your Medication List in the following pages. You may have new medications to  at the pharmacy and others that you'll need to stop taking. Review the instructions for how and when to take your medications. Talk with your doctor or nurses if you are unsure of what to do.     2. Go to your follow-up appointments. Specific follow-up information is listed in the following pages. Your may be contacted by a transition nurse or clinical provider about future appointments. Be sure we have all of the phone numbers to reach you, if needed. Please contact your provider's office if you are unable to make an appointment.     3. Watch for warning signs. Your doctor or nurse will give you detailed warning signs to watch for and when to call for assistance. These instructions may also include educational information about your condition. If you experience any of warning signs to your health, call your doctor.               [facility]  Unless otherwise directed by your provider, please contact [facility], our nurse care line that is available for 24/7 assistance.     [phone] (toll-free)    Registered nurses in the [facility] provide clinical advisement, health education, appointment booking, and other advisory services.                    ** Verify the list of medication(s) below is accurate and up  A [employer] message has been sent to the patient for PATIENT ROUNDING for [facility] - Bariatric Surgery/[facility] Weight Mgmt.      to date. Carry this with you in case of emergency. If your medications have changed, please notify your healthcare provider.             Medication List      CONTINUE taking these medications        Additional Info                      * BLOOD GLUCOSE TEST Strp   Refills:  0   Generic drug:  blood sugar diagnostic    Instructions:  by Misc.(Non-Drug; Combo Route) route.     Begin Date    AM    Noon    PM    Bedtime       * blood sugar diagnostic Strp   Commonly known as:  ONETOUCH ULTRA TEST   Quantity:  100 strip   Refills:  2   Dose:  1 strip    Instructions:  1 strip by Misc.(Non-Drug; Combo Route) route once daily.     Begin Date    AM    Noon    PM    Bedtime       clonazepam 0.5 MG disintegrating tablet   Commonly known as:  KLONOPIN   Quantity:  60 tablet   Refills:  3   Dose:  0.5 mg    Instructions:  Take 1 tablet (0.5 mg total) by mouth 2 (two) times daily as needed.     Begin Date    AM    Noon    PM    Bedtime       cyanocobalamin 1,000 mcg/mL injection   Quantity:  10 mL   Refills:  1    Instructions:  INJECT 1 ML (1,000 MCG TOTAL) INTO THE MUSCLE EVERY 28 DAYS.     Begin Date    AM    Noon    PM    Bedtime       ondansetron 4 MG Tbdl   Commonly known as:  ZOFRAN-ODT   Quantity:  15 tablet   Refills:  0   Dose:  4 mg    Instructions:  Take 1 tablet (4 mg total) by mouth every 6 (six) hours as needed.     Begin Date    AM    Noon    PM    Bedtime       oxycodone-acetaminophen  mg per tablet   Commonly known as:  PERCOCET   Quantity:  120 tablet   Refills:  0   Dose:  1 tablet    Instructions:  Take 1 tablet by mouth every 4 (four) hours as needed for Pain.     Begin Date    AM    Noon    PM    Bedtime       * PROAIR HFA 90 mcg/actuation inhaler   Refills:  0   Dose:  2 puff   Generic drug:  albuterol    Instructions:  Inhale 2 puffs into the lungs.     Begin Date    AM    Noon    PM    Bedtime       * albuterol 2.5 mg /3 mL (0.083 %) nebulizer solution   Commonly known as:  PROVENTIL   Quantity:  1  Box   Refills:  3   Dose:  2.5 mg    Instructions:  Take 3 mLs (2.5 mg total) by nebulization every 6 (six) hours as needed for Wheezing.     Begin Date    AM    Noon    PM    Bedtime       promethazine 25 MG tablet   Commonly known as:  PHENERGAN   Quantity:  30 tablet   Refills:  3    Instructions:  TAKE 1 TABLET (25 MG TOTAL) BY MOUTH EVERY 6 (SIX) HOURS AS NEEDED FOR NAUSEA.     Begin Date    AM    Noon    PM    Bedtime       sumatriptan 6 mg/0.5 mL kit   Commonly known as:  IMITREX STATDOSE   Refills:  1    Instructions:  INJECT 0.5 ML UNDER THE SKIN AS NEEDED ONE TIME FOR MIGRAINE     Begin Date    AM    Noon    PM    Bedtime       zolpidem 10 mg Tab   Commonly known as:  AMBIEN   Quantity:  30 tablet   Refills:  3   Dose:  10 mg    Instructions:  Take 1 tablet (10 mg total) by mouth nightly as needed.     Begin Date    AM    Noon    PM    Bedtime       * Notice:  This list has 4 medication(s) that are the same as other medications prescribed for you. Read the directions carefully, and ask your doctor or other care provider to review them with you.      ASK your doctor about these medications        Additional Info                      ADVAIR DISKUS 500-50 mcg/dose Dsdv diskus inhaler   Refills:  0   Dose:  1 puff   Generic drug:  fluticasone-salmeterol 500-50 mcg/dose    Instructions:  Inhale 1 puff into the lungs.     Begin Date    AM    Noon    PM    Bedtime                  Please bring to all follow up appointments:    1. A copy of your discharge instructions.  2. All medicines you are currently taking in their original bottles.  3. Identification and insurance card.    Please arrive 15 minutes ahead of scheduled appointment time.    Please call 24 hours in advance if you must reschedule your appointment and/or time.        Your Scheduled Appointments     Mar 21, 2017  2:00 PM CDT   GASTROENTEROLOGY ESTABLISHED PATIENT with MD Olvin Benedict vinod - Gastroenterology ([place] )    [address]  [facility] [address]   [phone]            Mar 27, 2017  8:20 AM CDT   Established Patient Visit with Bianca Rutledge MD   [place]    [address] [facility]  [place] [address]   [phone]            Apr 05, 2017  9:00 AM CDT   New Patient with MD Olvin Gunter vinod - Urology 4th Floor ([address] )    [address]  [facility] [address]   [phone]                Discharge Instructions     Future Orders    Activity as tolerated     Diet general     Questions:    Total calories:      Fat restriction, if any:      Protein restriction, if any:      Na restriction, if any:      Fluid restriction:      Additional restrictions:          Discharge Instructions         Colonoscopy     A camera attached to a flexible tube with a viewing lens is used to take video pictures.     Colonoscopy is a test to view the inside of your lower digestive tract (colon and rectum). Sometimes it can show the last part of the small intestine (ileum). During the test, small pieces of tissue may be removed for testing. This is called a biopsy. Small growths, such as polyps, may also be removed.   Why is colonoscopy done?  The test is done to help look for colon cancer. And it can help find the source of abdominal pain, bleeding, and changes in bowel habits. It may be needed once a year, depending on factors such as your:  · Age  · Health history  · Family health history  · Symptoms  · Results from any prior colonoscopy  Risks and possible complications  These include:  · Bleeding               · A puncture or tear in the colon   · Risks of anesthesia  · A cancer lesion not being seen  Getting ready   To prepare for the test:  · Talk with your healthcare provider about the risks of the test (see below). Also ask your healthcare provider about alternatives to the test.  · Tell your healthcare provider about any medicines you take. Also tell him or  her about any health conditions you may have.  · Make sure your rectum and colon are empty for the test. Follow the diet and bowel prep instructions exactly. If you dont, the test may need to be rescheduled.  · Plan for a friend or family member to drive you home after the test.     Colonoscopy provides an inside view of the entire colon.     You may discuss the results with your doctor right away or at a future visit.  During the test   The test is usually done in the hospital on an outpatient basis. This means you go home the same day. The procedure takes about 30 minutes. During that time:  · You are given relaxing (sedating) medicine through an IV line. You may be drowsy, or fully asleep.  · The healthcare provider will first give you a physical exam to check for anal and rectal problems.  · Then the anus is lubricated and the scope inserted.  · If you are awake, you may have a feeling similar to needing to have a bowel movement. You may also feel pressure as air is pumped into the colon. Its OK to pass gas during the procedure.  · Biopsy, polyp removal, or other treatments may be done during the test.  After the test   You may have gas right after the test. It can help to try to pass it to help prevent later bloating. Your healthcare provider may discuss the results with you right away. Or you may need to schedule a follow-up visit to talk about the results. After the test, you can go back to your normal eating and other activities. You may be tired from the sedation and need to rest for a few hours.  Date Last Reviewed: 11/1/2016 © 2000-2016 [employer]. [address] [place] [id number]. All rights reserved. This information is not intended as a substitute for professional medical care. Always follow your healthcare professional's instructions.            Admission Information     Date & Time Provider Department CSN    3/21/2017  7:22 AM Eve Currie MD [facility]  "[facility] [id number]      Care Providers     Provider Role Specialty Primary office phone    Eve Currie MD Attending Provider Gastroenterology [phone]    Eve Currie MD Surgeon  Gastroenterology [phone]      Your Vitals Were     BP Pulse Temp Resp Height Weight    107/69 (BP Location: Left arm, Patient Position: Lying, BP Method: Automatic) 76 98.7 °F (37.1 °C) (Oral) 12 5' 3" (1.6 m) 71.7 kg (158 lb)    SpO2 BMI             98% 27.99 kg/m2         Recent Lab Values        9/29/2011                           9:07 AM           A1C 5.3                       Allergies as of 3/21/2017        Reactions    Aspirin     Other reaction(s): vomiting, contraindicated for bleeding from crohns  Other reaction(s): bleeding    Sulfa (Sulfonamide Antibiotics) Hives, Rash    Iodinated Contrast Media - Iv Dye Other (See Comments)    Adhesive     Aspirin     Remicade [Infliximab] Other (See Comments)    Medical induced lupus    Latex Rash    Other reaction(s): Hives      Advance Directives     An advance directive is a document which, in the event you are no longer able to make decisions for yourself, tells your healthcare team what kind of treatment you do or do not want to receive, or who you would like to make those decisions for you.  If you do not currently have an advance directive, [facility] encourages you to create one.  For more information call:  [phone]), [phone] ([phone]),  or log on to [email or web address].        Language Assistance Services     ATTENTION: Language assistance services are available, free of charge. Please call [phone].      ATENCIÓN: Si habla español, tiene a madrid disposición servicios gratuitos de asistencia lingüística. Llame al [phone].     DAMIEN Ý: N?u b?n nói Ti?ng Vi?t, có các d?ch v? h? tr? ngôn ng? mi?n phí dành cho b?n. G?i s? [phone].         [facility] complies with applicable Federal civil rights laws " and does not discriminate on the basis of race, color, national origin, age, disability, or sex.

## 2025-04-28 DIAGNOSIS — M79.7 FIBROMYALGIA: Primary | ICD-10-CM

## 2025-04-28 RX ORDER — OXYCODONE AND ACETAMINOPHEN 7.5; 325 MG/1; MG/1
1 TABLET ORAL EVERY 6 HOURS PRN
Qty: 120 TABLET | Refills: 0 | Status: SHIPPED | OUTPATIENT
Start: 2025-04-28

## 2025-04-28 NOTE — TELEPHONE ENCOUNTER
No care due was identified.  Health [facility] Embedded Care Due Messages. Reference number: [id number].   4/28/2025 7:09:11 AM CDT

## 2025-05-05 ENCOUNTER — OFFICE VISIT (OUTPATIENT)
Dept: INTERNAL MEDICINE | Facility: CLINIC | Age: 50
End: 2025-05-05
Payer: COMMERCIAL

## 2025-05-05 ENCOUNTER — LAB VISIT (OUTPATIENT)
Dept: LAB | Facility: HOSPITAL | Age: 50
End: 2025-05-05
Attending: INTERNAL MEDICINE
Payer: COMMERCIAL

## 2025-05-05 VITALS
WEIGHT: 194 LBS | SYSTOLIC BLOOD PRESSURE: 108 MMHG | HEART RATE: 87 BPM | DIASTOLIC BLOOD PRESSURE: 72 MMHG | HEIGHT: 62 IN | OXYGEN SATURATION: 98 % | TEMPERATURE: 98 F | BODY MASS INDEX: 35.7 KG/M2

## 2025-05-05 DIAGNOSIS — R10.2 PELVIC PAIN: ICD-10-CM

## 2025-05-05 DIAGNOSIS — K50.00 CROHN'S DISEASE OF SMALL INTESTINE WITHOUT COMPLICATION: ICD-10-CM

## 2025-05-05 DIAGNOSIS — R20.0 NUMBNESS AND TINGLING: Primary | ICD-10-CM

## 2025-05-05 DIAGNOSIS — R20.2 NUMBNESS AND TINGLING: Primary | ICD-10-CM

## 2025-05-05 DIAGNOSIS — J01.90 ACUTE SINUSITIS, RECURRENCE NOT SPECIFIED, UNSPECIFIED LOCATION: ICD-10-CM

## 2025-05-05 DIAGNOSIS — R10.9 ABDOMINAL PAIN, UNSPECIFIED ABDOMINAL LOCATION: ICD-10-CM

## 2025-05-05 LAB
BILIRUB UR QL STRIP.AUTO: NEGATIVE
CLARITY UR: CLEAR
COLOR UR AUTO: YELLOW
GLUCOSE UR QL STRIP: NEGATIVE
HGB UR QL STRIP: NEGATIVE
HOLD SPECIMEN: NORMAL
KETONES UR QL STRIP: NEGATIVE
LEUKOCYTE ESTERASE UR QL STRIP: NEGATIVE
NITRITE UR QL STRIP: NEGATIVE
PH UR STRIP: 5 [PH]
PROT UR QL STRIP: NEGATIVE
SP GR UR STRIP: 1.03
UROBILINOGEN UR STRIP-ACNC: NEGATIVE EU/DL

## 2025-05-05 PROCEDURE — 1160F RVW MEDS BY RX/DR IN RCRD: CPT | Mod: CPTII,S$GLB,, | Performed by: INTERNAL MEDICINE

## 2025-05-05 PROCEDURE — 3078F DIAST BP <80 MM HG: CPT | Mod: CPTII,S$GLB,, | Performed by: INTERNAL MEDICINE

## 2025-05-05 PROCEDURE — 81003 URINALYSIS AUTO W/O SCOPE: CPT

## 2025-05-05 PROCEDURE — 3074F SYST BP LT 130 MM HG: CPT | Mod: CPTII,S$GLB,, | Performed by: INTERNAL MEDICINE

## 2025-05-05 PROCEDURE — G2211 COMPLEX E/M VISIT ADD ON: HCPCS | Mod: S$GLB,,, | Performed by: INTERNAL MEDICINE

## 2025-05-05 PROCEDURE — 3044F HG A1C LEVEL LT 7.0%: CPT | Mod: CPTII,S$GLB,, | Performed by: INTERNAL MEDICINE

## 2025-05-05 PROCEDURE — 99214 OFFICE O/P EST MOD 30 MIN: CPT | Mod: S$GLB,,, | Performed by: INTERNAL MEDICINE

## 2025-05-05 PROCEDURE — 1159F MED LIST DOCD IN RCRD: CPT | Mod: CPTII,S$GLB,, | Performed by: INTERNAL MEDICINE

## 2025-05-05 PROCEDURE — 3008F BODY MASS INDEX DOCD: CPT | Mod: CPTII,S$GLB,, | Performed by: INTERNAL MEDICINE

## 2025-05-05 PROCEDURE — 99999 PR PBB SHADOW E&M-EST. PATIENT-LVL V: CPT | Mod: PBBFAC,,, | Performed by: INTERNAL MEDICINE

## 2025-05-05 RX ORDER — FLUCONAZOLE 150 MG/1
150 TABLET ORAL DAILY
Qty: 1 TABLET | Refills: 0 | Status: SHIPPED | OUTPATIENT
Start: 2025-05-05 | End: 2025-05-19 | Stop reason: SDUPTHER

## 2025-05-05 RX ORDER — CEFDINIR 300 MG/1
300 CAPSULE ORAL 2 TIMES DAILY
Qty: 20 CAPSULE | Refills: 0 | Status: SHIPPED | OUTPATIENT
Start: 2025-05-05 | End: 2025-05-15

## 2025-05-18 ENCOUNTER — PATIENT MESSAGE (OUTPATIENT)
Dept: INTERNAL MEDICINE | Facility: CLINIC | Age: 50
End: 2025-05-18
Payer: COMMERCIAL

## 2025-05-18 ENCOUNTER — RESULTS FOLLOW-UP (OUTPATIENT)
Dept: INTERNAL MEDICINE | Facility: CLINIC | Age: 50
End: 2025-05-18

## 2025-05-19 RX ORDER — FLUCONAZOLE 150 MG/1
150 TABLET ORAL DAILY
Qty: 1 TABLET | Refills: 0 | Status: SHIPPED | OUTPATIENT
Start: 2025-05-19 | End: 2025-05-20

## 2025-05-26 DIAGNOSIS — M79.7 FIBROMYALGIA: ICD-10-CM

## 2025-05-26 RX ORDER — OXYCODONE AND ACETAMINOPHEN 7.5; 325 MG/1; MG/1
1 TABLET ORAL EVERY 6 HOURS PRN
Qty: 120 TABLET | Refills: 0 | Status: SHIPPED | OUTPATIENT
Start: 2025-05-26

## 2025-05-26 NOTE — TELEPHONE ENCOUNTER
No care due was identified.  [facility] Embedded Care Due Messages. Reference number: [id number].   5/26/2025 8:15:13 AM CDT

## 2025-06-19 ENCOUNTER — PATIENT MESSAGE (OUTPATIENT)
Dept: INTERNAL MEDICINE | Facility: CLINIC | Age: 50
End: 2025-06-19
Payer: COMMERCIAL

## 2025-06-20 ENCOUNTER — OFFICE VISIT (OUTPATIENT)
Dept: INTERNAL MEDICINE | Facility: CLINIC | Age: 50
End: 2025-06-20
Payer: COMMERCIAL

## 2025-06-20 VITALS
HEIGHT: 62 IN | WEIGHT: 200.63 LBS | BODY MASS INDEX: 36.92 KG/M2 | TEMPERATURE: 97 F | OXYGEN SATURATION: 99 % | DIASTOLIC BLOOD PRESSURE: 72 MMHG | SYSTOLIC BLOOD PRESSURE: 114 MMHG | HEART RATE: 93 BPM

## 2025-06-20 DIAGNOSIS — M79.7 FIBROMYALGIA: ICD-10-CM

## 2025-06-20 DIAGNOSIS — R10.9 ABDOMINAL PAIN, UNSPECIFIED ABDOMINAL LOCATION: ICD-10-CM

## 2025-06-20 DIAGNOSIS — J45.909 CHRONIC ASTHMA WITHOUT COMPLICATION, UNSPECIFIED ASTHMA SEVERITY, UNSPECIFIED WHETHER PERSISTENT: Chronic | ICD-10-CM

## 2025-06-20 DIAGNOSIS — S89.92XA INJURY OF LEFT KNEE, INITIAL ENCOUNTER: Primary | ICD-10-CM

## 2025-06-20 PROCEDURE — 99214 OFFICE O/P EST MOD 30 MIN: CPT | Mod: S$GLB,,, | Performed by: INTERNAL MEDICINE

## 2025-06-20 PROCEDURE — 3078F DIAST BP <80 MM HG: CPT | Mod: CPTII,S$GLB,, | Performed by: INTERNAL MEDICINE

## 2025-06-20 PROCEDURE — 99999 PR PBB SHADOW E&M-EST. PATIENT-LVL V: CPT | Mod: PBBFAC,,, | Performed by: INTERNAL MEDICINE

## 2025-06-20 PROCEDURE — 3008F BODY MASS INDEX DOCD: CPT | Mod: CPTII,S$GLB,, | Performed by: INTERNAL MEDICINE

## 2025-06-20 PROCEDURE — 1159F MED LIST DOCD IN RCRD: CPT | Mod: CPTII,S$GLB,, | Performed by: INTERNAL MEDICINE

## 2025-06-20 PROCEDURE — 3044F HG A1C LEVEL LT 7.0%: CPT | Mod: CPTII,S$GLB,, | Performed by: INTERNAL MEDICINE

## 2025-06-20 PROCEDURE — 3074F SYST BP LT 130 MM HG: CPT | Mod: CPTII,S$GLB,, | Performed by: INTERNAL MEDICINE

## 2025-06-20 PROCEDURE — 1160F RVW MEDS BY RX/DR IN RCRD: CPT | Mod: CPTII,S$GLB,, | Performed by: INTERNAL MEDICINE

## 2025-06-20 PROCEDURE — G2211 COMPLEX E/M VISIT ADD ON: HCPCS | Mod: S$GLB,,, | Performed by: INTERNAL MEDICINE

## 2025-06-20 RX ORDER — METHYLPREDNISOLONE 4 MG/1
TABLET ORAL
Qty: 21 EACH | Refills: 0 | Status: SHIPPED | OUTPATIENT
Start: 2025-06-20 | End: 2025-07-11

## 2025-06-20 RX ORDER — OXYCODONE AND ACETAMINOPHEN 7.5; 325 MG/1; MG/1
1 TABLET ORAL EVERY 6 HOURS PRN
Qty: 120 TABLET | Refills: 0 | Status: SHIPPED | OUTPATIENT
Start: 2025-06-27

## 2025-06-20 NOTE — PROGRESS NOTES
History of Present Illness    CHIEF COMPLAINT:  Emi presents today with left-sided abdominal pain    ABDOMINAL PAIN:  She reports left-sided abdominal pain that started approximately one month ago. Pain is described as sharp in nature, intermittent, occurring in episodes that come and go. She denies association of pain with meals or specific activities. She experiences nausea without vomiting, which is not new. No changes in colostomy output noted.    CROHN'S DISEASE:  She reports active Crohn's disease currently in flare. She expresses reluctance about using steroids due to potential weight gain side effects, but acknowledges potential necessity of treatment. She indicates current disease state is not manageable and is considering steroid intervention despite reservations.    ASTHMA:  She reports active asthma symptoms including chest tightness, productive cough with phlegm, wheezing, and shortness of breath, attributed to recent weather changes. Currently managing symptoms with breathing treatments and asthma inhaler. She denies need for oral steroids at this time and reports symptoms as manageable.    LEFT KNEE:  She reports left knee instability with occasional giving way during walking, which immediately catches and returns to normal position. She describes persistent numbness in the left knee since previous surgery, without active pain. She is currently scheduled to see a neurologist regarding the ongoing numbness.    ALLERGIES:  She has allergies to aspirin, malt, sulfa, iodine adhesive, infliximab, and latex.      ROS:  Respiratory: +shortness of breath, +wheezing, +chest tightness, +productive cough  Gastrointestinal: +abdominal pain, +nausea, -vomiting  Musculoskeletal: +difficulty standing up  Neurological: +numbness       PAST MEDICAL HISTORY:  Past Medical History:   Diagnosis Date    Anemia     Asthma     B12 deficiency     Crohn's disease     History of shingles     Lupus     Migraine headache      "Raynaud disease     Reactive hypoglycemia     Seizures 2004    no medication     Sleep apnea     Non compliant with CPAP       SURGICAL HISTORY:  Past Surgical History:   Procedure Laterality Date    ABDOMINAL SURGERY      COLON SURGERY      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    COLONOSCOPY      COLOSTOMY      ESOPHAGOGASTRODUODENOSCOPY N/A 5/26/2022    Procedure: ESOPHAGOGASTRODUODENOSCOPY (EGD);  Surgeon: Tin Novak MD;  Location: [facility] (2ND FLR);  Service: Endoscopy;  Laterality: N/A;  fully vaccinated    HYSTERECTOMY      due to endometriosis    ILEOSCOPY N/A 4/10/2019    Procedure: ILEOSCOPY through stoma;  Surgeon: Eve Currie MD;  Location: [facility] (4TH FLR);  Service: Endoscopy;  Laterality: N/A;  Schedule as 30 minute case, schedule in April or May 2019    ILEOSCOPY N/A 5/26/2022    Procedure: ILEOSCOPY;  Surgeon: Tin Novak MD;  Location: [facility] (2ND FLR);  Service: Endoscopy;  Laterality: N/A;    ILEOSTOMY      LAPAROSCOPY W/ MINI-LAPAROTOMY      large intestine removed      just a portion    pelvic mass removal      REVISION COLOSTOMY  2010    SMALL INTESTINE SURGERY      J pouch excision with end ileostomy    STOMACH SURGERY      TONSILLECTOMY, ADENOIDECTOMY         MEDS:  Medcard reviewed and updated    ALLERGIES: Allergy Card reviewed and updated    SOCIAL HISTORY:   The patient is a nonsmoker.    PE:   APPEARANCE: Well nourished, well developed, in no acute distress.    CHEST: Lungs clear to auscultation with unlabored respirations.  CARDIOVASCULAR: Normal S1, S2. No murmurs. No carotid bruits. No pedal edema.  ABDOMEN: Bowel sounds normal. Not distended. Soft. No tenderness or masses.  Positive colostomy..  PSYCHIATRIC: The patient is oriented to person, place, and time and has a pleasant affect.        ASSESSMENT/PLAN:  Emi Goldman" was seen today for abdominal pain and knee gives out.    Diagnoses and all orders for this visit:    Injury of left knee, initial " encounter  -     X-Ray Knee 3 View Left; Future  -     Ambulatory referral/consult to Orthopedics; Future    Abdominal pain, unspecified abdominal location  -     CBC Auto Differential; Future  -     Comprehensive Metabolic Panel; Future    Chronic asthma without complication, unspecified asthma severity, unspecified whether persistent  -     prescribe Medrol Dosepak    Fibromyalgia  -     oxyCODONE-acetaminophen (PERCOCET) 7.5-325 mg per tablet; Take 1 tablet by mouth every 6 (six) hours as needed for Pain.    Other orders  -     methylPREDNISolone (MEDROL DOSEPACK) 4 mg tablet; use as directed        IMPRESSION:  - Considered imaging and labs to investigate left-sided abdominal pain, potentially related to scar tissue.  - Evaluated knee issue, deciding to proceed with orthopedic referral and imaging before neurologist appointment.  - Assessed asthma exacerbation, opting for steroid treatment with Medrol Dosepak to manage symptoms while considering Crohn's disease.    CROHN'S DISEASE:  - Emi is already experiencing a flare of Crohn's disease.    ASTHMA:  - Assessed that the patient's asthma is currently not well-controlled with symptoms including chest tightness, productive cough with phlegm, dyspnea, and occasional wheezing.  - Emi has been using nebulizer treatments and asthma inhaler.  - Prescribed Medrol Dosepak: 6-day taper starting with 24 mg (six 4 mg tablets) on day 1, then decreasing by 4 mg each day.  - Instructed the patient that the medication can be discontinued if symptoms improve before completing the full course.  - This treatment plan was considered despite the ongoing Crohn's flare.    LEFT KNEE INSTABILITY:  - Emi reports left knee instability when walking with subsequent self-reduction.  - Ordered left knee x-ray to evaluate structural integrity.  - Referred the patient to orthopedics for comprehensive evaluation of left knee issues.  - Discussed options to either wait for neurological  consultation or proceed with imaging and orthopedic referral.    PARESTHESIA:  - Emi reports numbness in the left knee persisting since surgery.  - Scheduled neurological consultation to evaluate the paresthesia.    LEFT LOWER QUADRANT PAIN:  - Emi reports intermittent sharp left lower quadrant abdominal pain, not associated with meals or activity.    NAUSEA:  - Emi reports persistent nausea without emesis, noting this is not a new symptom.    COLOSTOMY STATUS:  - Monitored colostomy status with no changes in output reported.    ALLERGIES:  - Documented the patient's allergies to aspirin, sulfa, iodine, adhesive, infliximab, and latex.

## 2025-06-25 RX ORDER — ONDANSETRON 4 MG/1
TABLET, ORALLY DISINTEGRATING ORAL
Qty: 30 TABLET | Refills: 3 | Status: SHIPPED | OUTPATIENT
Start: 2025-06-25

## 2025-06-25 NOTE — TELEPHONE ENCOUNTER
Refill Routing Note   Medication(s) are not appropriate for processing by [facility] for the following reason(s):        Outside of protocol    [facility] action(s):  Route             Appointments  past 12m or future 3m with PCP    Date Provider   Last Visit   6/20/2025 Bianca Rutledge MD   Next Visit   7/7/2025 Bianca Rutledge MD   ED visits in past 90 days: 0        Note composed:9:47 AM 06/25/2025

## 2025-06-25 NOTE — TELEPHONE ENCOUNTER
No care due was identified.  [facility] Embedded Care Due Messages. Reference number: [id number].   6/25/2025 9:08:41 AM CDT

## 2025-07-07 ENCOUNTER — PATIENT MESSAGE (OUTPATIENT)
Dept: INTERNAL MEDICINE | Facility: CLINIC | Age: 50
End: 2025-07-07
Payer: COMMERCIAL

## 2025-07-18 DIAGNOSIS — E16.1 REACTIVE HYPOGLYCEMIA: ICD-10-CM

## 2025-07-18 DIAGNOSIS — M79.7 FIBROMYALGIA: ICD-10-CM

## 2025-07-18 RX ORDER — OXYCODONE AND ACETAMINOPHEN 7.5; 325 MG/1; MG/1
1 TABLET ORAL EVERY 6 HOURS PRN
Qty: 120 TABLET | Refills: 0 | Status: SHIPPED | OUTPATIENT
Start: 2025-06-24 | End: 2025-07-18 | Stop reason: SDUPTHER

## 2025-07-18 RX ORDER — OXYCODONE AND ACETAMINOPHEN 7.5; 325 MG/1; MG/1
1 TABLET ORAL EVERY 6 HOURS PRN
Qty: 120 TABLET | Refills: 0 | Status: SHIPPED | OUTPATIENT
Start: 2025-07-25

## 2025-07-18 RX ORDER — LANCETS 33 GAUGE
1 EACH MISCELLANEOUS 2 TIMES DAILY
Qty: 200 EACH | Refills: 3 | Status: SHIPPED | OUTPATIENT
Start: 2025-07-18

## 2025-07-18 RX ORDER — OXYCODONE AND ACETAMINOPHEN 7.5; 325 MG/1; MG/1
1 TABLET ORAL EVERY 6 HOURS PRN
Qty: 120 TABLET | Refills: 0 | Status: SHIPPED | OUTPATIENT
Start: 2025-06-24

## 2025-07-18 NOTE — TELEPHONE ENCOUNTER
Refill Routing Note   Medication(s) are not appropriate for processing by [facility] for the following reason(s):        No active prescription written by provider  Due for refill >6 months ago    [facility] action(s):  Defer               Appointments  past 12m or future 3m with PCP    Date Provider   Last Visit   6/20/2025 Bianca Rutledge MD   Next Visit   7/18/2025 Bianca Rutledge MD   ED visits in past 90 days: 0        Note composed:4:43 PM 07/18/2025

## 2025-07-18 NOTE — TELEPHONE ENCOUNTER
No care due was identified.  [facility] Embedded Care Due Messages. Reference number: [id number].   7/18/2025 12:13:35 PM CDT

## 2025-07-18 NOTE — TELEPHONE ENCOUNTER
No care due was identified.  [facility] Embedded Care Due Messages. Reference number: [id number].   7/18/2025 9:38:37 AM CDT

## 2025-07-18 NOTE — TELEPHONE ENCOUNTER
Last rx was for 6/27/25 (4/4/25, 5/26/25, 6/27/25)  Lov 6/20/25    Hold request till you feel ok to refill.  Thanks megha

## 2025-07-18 NOTE — TELEPHONE ENCOUNTER
No care due was identified.  [facility] Embedded Care Due Messages. Reference number: [id number].   7/18/2025 9:39:03 AM CDT

## 2025-07-22 ENCOUNTER — PATIENT MESSAGE (OUTPATIENT)
Dept: INTERNAL MEDICINE | Facility: CLINIC | Age: 50
End: 2025-07-22
Payer: COMMERCIAL

## 2025-07-22 DIAGNOSIS — G47.00 INSOMNIA, UNSPECIFIED TYPE: ICD-10-CM

## 2025-07-23 RX ORDER — ZOLPIDEM TARTRATE 12.5 MG/1
12.5 TABLET, FILM COATED, EXTENDED RELEASE ORAL NIGHTLY PRN
Qty: 90 TABLET | Refills: 0 | Status: SHIPPED | OUTPATIENT
Start: 2025-07-23 | End: 2026-01-21

## 2025-07-23 RX ORDER — ONDANSETRON 4 MG/1
TABLET, ORALLY DISINTEGRATING ORAL
Qty: 30 TABLET | Refills: 3 | Status: SHIPPED | OUTPATIENT
Start: 2025-07-23

## 2025-07-23 NOTE — TELEPHONE ENCOUNTER
Refill Routing Note   Medication(s) are not appropriate for processing by [facility] for the following reason(s):        Outside of protocol    [facility] action(s):  Route             Appointments  past 12m or future 3m with PCP    Date Provider   Last Visit   6/20/2025 Bianca Rutledge MD   Next Visit   7/30/2025 Bianca Rutledge MD   ED visits in past 90 days: 0        Note composed:4:42 PM 07/23/2025

## 2025-07-23 NOTE — TELEPHONE ENCOUNTER
No care due was identified.  [facility] Embedded Care Due Messages. Reference number: [id number].   7/23/2025 1:52:24 PM CDT

## 2025-07-23 NOTE — TELEPHONE ENCOUNTER
No care due was identified.  [facility] Embedded Care Due Messages. Reference number: [id number].   7/23/2025 9:36:06 AM CDT

## 2025-07-25 ENCOUNTER — PATIENT MESSAGE (OUTPATIENT)
Dept: INTERNAL MEDICINE | Facility: CLINIC | Age: 50
End: 2025-07-25
Payer: COMMERCIAL

## 2025-07-25 DIAGNOSIS — M79.7 FIBROMYALGIA: ICD-10-CM

## 2025-07-25 RX ORDER — OXYCODONE AND ACETAMINOPHEN 7.5; 325 MG/1; MG/1
1 TABLET ORAL EVERY 6 HOURS PRN
Qty: 120 TABLET | Refills: 0 | Status: SHIPPED | OUTPATIENT
Start: 2025-07-25

## 2025-07-25 NOTE — TELEPHONE ENCOUNTER
Copied from CRM #[id number]. Topic: Medications - Medication Question  >> Jul 25, 2025  9:11 AM Day wrote:  Type:  RX Refill Request    Who Called: Pt   Refill or New Rx:Refill   RX Name and Strength:oxyCODONE-acetaminophen (PERCOCET) 7.5-325 mg per tablet   How is the patient currently taking it? (ex. 1XDay):N/A  Is this a 30 day or 90 day RX:90  Preferred Pharmacy with phone number:  [facility]  [address]  [facility] [address]  Phone: [phone] Fax: [phone]  Local or Mail Order:Local   Ordering Provider:Dr Rutledge   Would the patient rather a call back or a response via [facility]? Call back   Best Call Back Number:[phone]  Additional Information: Pt would like to come to the office to  the RX and take it to [facility].

## 2025-07-25 NOTE — TELEPHONE ENCOUNTER
No care due was identified.  Health [facility] Embedded Care Due Messages. Reference number: [id number].   7/25/2025 9:24:07 AM CDT

## 2025-08-18 DIAGNOSIS — M79.7 FIBROMYALGIA: ICD-10-CM

## 2025-08-18 RX ORDER — OXYCODONE AND ACETAMINOPHEN 7.5; 325 MG/1; MG/1
1 TABLET ORAL EVERY 6 HOURS PRN
Qty: 120 TABLET | Refills: 0 | Status: SHIPPED | OUTPATIENT
Start: 2025-08-18

## 2025-08-22 ENCOUNTER — PATIENT MESSAGE (OUTPATIENT)
Dept: WOUND CARE | Facility: CLINIC | Age: 50
End: 2025-08-22
Payer: COMMERCIAL

## 2025-08-22 ENCOUNTER — TELEPHONE (OUTPATIENT)
Dept: INTERNAL MEDICINE | Facility: CLINIC | Age: 50
End: 2025-08-22
Payer: COMMERCIAL

## 2025-08-27 ENCOUNTER — OFFICE VISIT (OUTPATIENT)
Dept: INTERNAL MEDICINE | Facility: CLINIC | Age: 50
End: 2025-08-27
Payer: COMMERCIAL

## 2025-08-27 ENCOUNTER — LAB VISIT (OUTPATIENT)
Dept: LAB | Facility: HOSPITAL | Age: 50
End: 2025-08-27
Attending: INTERNAL MEDICINE
Payer: COMMERCIAL

## 2025-08-27 VITALS
WEIGHT: 202.81 LBS | DIASTOLIC BLOOD PRESSURE: 84 MMHG | OXYGEN SATURATION: 95 % | BODY MASS INDEX: 37.32 KG/M2 | HEART RATE: 77 BPM | TEMPERATURE: 97 F | HEIGHT: 62 IN | SYSTOLIC BLOOD PRESSURE: 122 MMHG

## 2025-08-27 DIAGNOSIS — R55 NEAR SYNCOPE: ICD-10-CM

## 2025-08-27 DIAGNOSIS — R42 LIGHTHEADEDNESS: ICD-10-CM

## 2025-08-27 DIAGNOSIS — R07.9 CHEST PAIN, UNSPECIFIED TYPE: ICD-10-CM

## 2025-08-27 DIAGNOSIS — L30.9 DERMATITIS: Primary | ICD-10-CM

## 2025-08-27 LAB
ABSOLUTE EOSINOPHIL (OHS): 0.19 K/UL
ABSOLUTE MONOCYTE (OHS): 0.43 K/UL (ref 0.3–1)
ABSOLUTE NEUTROPHIL COUNT (OHS): 4.18 K/UL (ref 1.8–7.7)
ALBUMIN SERPL BCP-MCNC: 4.3 G/DL (ref 3.5–5.2)
ALP SERPL-CCNC: 106 UNIT/L (ref 40–150)
ALT SERPL W/O P-5'-P-CCNC: 47 UNIT/L (ref 0–55)
ANION GAP (OHS): 14 MMOL/L (ref 8–16)
AST SERPL-CCNC: 31 UNIT/L (ref 0–50)
BASOPHILS # BLD AUTO: 0.05 K/UL
BASOPHILS NFR BLD AUTO: 0.7 %
BILIRUB SERPL-MCNC: 0.6 MG/DL (ref 0.1–1)
BUN SERPL-MCNC: 19 MG/DL (ref 6–20)
CALCIUM SERPL-MCNC: 9.6 MG/DL (ref 8.7–10.5)
CHLORIDE SERPL-SCNC: 107 MMOL/L (ref 95–110)
CO2 SERPL-SCNC: 22 MMOL/L (ref 23–29)
CREAT SERPL-MCNC: 1 MG/DL (ref 0.5–1.4)
ERYTHROCYTE [DISTWIDTH] IN BLOOD BY AUTOMATED COUNT: 13.1 % (ref 11.5–14.5)
GFR SERPLBLD CREATININE-BSD FMLA CKD-EPI: >60 ML/MIN/1.73/M2
GLUCOSE SERPL-MCNC: 91 MG/DL (ref 70–110)
HCT VFR BLD AUTO: 49.5 % (ref 37–48.5)
HGB BLD-MCNC: 16 GM/DL (ref 12–16)
IMM GRANULOCYTES # BLD AUTO: 0.02 K/UL (ref 0–0.04)
IMM GRANULOCYTES NFR BLD AUTO: 0.3 % (ref 0–0.5)
LYMPHOCYTES # BLD AUTO: 2.04 K/UL (ref 1–4.8)
MCH RBC QN AUTO: 28.5 PG (ref 27–31)
MCHC RBC AUTO-ENTMCNC: 32.3 G/DL (ref 32–36)
MCV RBC AUTO: 88 FL (ref 82–98)
NUCLEATED RBC (/100WBC) (OHS): 0 /100 WBC
OHS QRS DURATION: 80 MS
OHS QTC CALCULATION: 453 MS
PLATELET # BLD AUTO: 215 K/UL (ref 150–450)
PMV BLD AUTO: 11.5 FL (ref 9.2–12.9)
POTASSIUM SERPL-SCNC: 4.5 MMOL/L (ref 3.5–5.1)
PROT SERPL-MCNC: 7 GM/DL (ref 6–8.4)
RBC # BLD AUTO: 5.62 M/UL (ref 4–5.4)
RELATIVE EOSINOPHIL (OHS): 2.7 %
RELATIVE LYMPHOCYTE (OHS): 29.5 % (ref 18–48)
RELATIVE MONOCYTE (OHS): 6.2 % (ref 4–15)
RELATIVE NEUTROPHIL (OHS): 60.6 % (ref 38–73)
SODIUM SERPL-SCNC: 143 MMOL/L (ref 136–145)
WBC # BLD AUTO: 6.91 K/UL (ref 3.9–12.7)

## 2025-08-27 PROCEDURE — 99999 PR PBB SHADOW E&M-EST. PATIENT-LVL III: CPT | Mod: PBBFAC,,, | Performed by: INTERNAL MEDICINE

## 2025-08-27 PROCEDURE — 85025 COMPLETE CBC W/AUTO DIFF WBC: CPT

## 2025-08-27 PROCEDURE — 84075 ASSAY ALKALINE PHOSPHATASE: CPT

## 2025-08-27 PROCEDURE — 36415 COLL VENOUS BLD VENIPUNCTURE: CPT | Mod: PO

## 2025-08-27 PROCEDURE — 93010 ELECTROCARDIOGRAM REPORT: CPT | Mod: S$GLB,,, | Performed by: INTERNAL MEDICINE

## 2025-08-27 RX ORDER — FLUCONAZOLE 150 MG/1
150 TABLET ORAL DAILY
Qty: 2 TABLET | Refills: 0 | Status: SHIPPED | OUTPATIENT
Start: 2025-08-27 | End: 2025-08-28

## 2025-08-27 RX ORDER — AMOXICILLIN 500 MG/1
500 TABLET, FILM COATED ORAL 3 TIMES DAILY
COMMUNITY
Start: 2025-08-25

## 2025-08-27 RX ORDER — KETOCONAZOLE 20 MG/G
CREAM TOPICAL 2 TIMES DAILY
Qty: 30 G | Refills: 0 | Status: SHIPPED | OUTPATIENT
Start: 2025-08-27

## 2025-08-29 ENCOUNTER — TELEPHONE (OUTPATIENT)
Dept: HEMATOLOGY/ONCOLOGY | Facility: CLINIC | Age: 50
End: 2025-08-29
Payer: COMMERCIAL

## 2025-09-03 ENCOUNTER — PATIENT MESSAGE (OUTPATIENT)
Dept: INTERNAL MEDICINE | Facility: CLINIC | Age: 50
End: 2025-09-03
Payer: COMMERCIAL

## 2025-09-03 DIAGNOSIS — K13.79 ORAL PAIN: Primary | ICD-10-CM

## 2025-09-03 RX ORDER — OXYCODONE AND ACETAMINOPHEN 10; 325 MG/1; MG/1
1 TABLET ORAL EVERY 4 HOURS PRN
Qty: 28 TABLET | Refills: 0 | Status: SHIPPED | OUTPATIENT
Start: 2025-09-03 | End: 2025-09-04 | Stop reason: SDUPTHER

## 2025-09-04 ENCOUNTER — PATIENT MESSAGE (OUTPATIENT)
Dept: INTERNAL MEDICINE | Facility: CLINIC | Age: 50
End: 2025-09-04
Payer: COMMERCIAL

## 2025-09-04 DIAGNOSIS — K13.79 ORAL PAIN: ICD-10-CM

## 2025-09-04 RX ORDER — OXYCODONE AND ACETAMINOPHEN 10; 325 MG/1; MG/1
1 TABLET ORAL EVERY 4 HOURS PRN
Qty: 28 TABLET | Refills: 0 | Status: SHIPPED | OUTPATIENT
Start: 2025-09-04

## 2025-09-04 RX ORDER — OXYCODONE AND ACETAMINOPHEN 10; 325 MG/1; MG/1
1 TABLET ORAL EVERY 4 HOURS PRN
Qty: 28 TABLET | Refills: 0 | Status: SHIPPED | OUTPATIENT
Start: 2025-09-04 | End: 2025-09-04